# Patient Record
Sex: FEMALE | Race: WHITE
[De-identification: names, ages, dates, MRNs, and addresses within clinical notes are randomized per-mention and may not be internally consistent; named-entity substitution may affect disease eponyms.]

---

## 2018-08-12 ENCOUNTER — HOSPITAL ENCOUNTER (EMERGENCY)
Dept: HOSPITAL 45 - C.EDB | Age: 83
Discharge: HOME | End: 2018-08-12
Payer: COMMERCIAL

## 2018-08-12 VITALS — OXYGEN SATURATION: 96 % | SYSTOLIC BLOOD PRESSURE: 156 MMHG | DIASTOLIC BLOOD PRESSURE: 89 MMHG | HEART RATE: 78 BPM

## 2018-08-12 VITALS
BODY MASS INDEX: 33.37 KG/M2 | HEIGHT: 62.01 IN | WEIGHT: 183.65 LBS | WEIGHT: 183.65 LBS | BODY MASS INDEX: 33.37 KG/M2 | HEIGHT: 62.01 IN

## 2018-08-12 VITALS — TEMPERATURE: 97.7 F

## 2018-08-12 DIAGNOSIS — Z79.82: ICD-10-CM

## 2018-08-12 DIAGNOSIS — J39.2: Primary | ICD-10-CM

## 2018-08-12 DIAGNOSIS — Z87.891: ICD-10-CM

## 2018-08-12 DIAGNOSIS — L50.9: ICD-10-CM

## 2018-08-12 NOTE — EMERGENCY ROOM VISIT NOTE
History


First contact with patient:  09:02


Chief Complaint:  ALLERGIC REACTION


Stated Complaint:  HIVES,SWELLING IN MOUTH AND THROAT


Nursing Triage Summary:  


Pt developed angioedema from Lisinopril one month ago, stopped taking it at 

that 


time. Has not taken it since then, today woke up with similar sx of toung edema

, 


swollen throat, and generalized hives. 











Pt hypoxic in triage, 87% on room air- normally wears 3L O2 chronic, but did 

not 


wear it today.





History of Present Illness


The patient is a 85 year old female who presents to the Emergency Room with 

complaints of some swelling of her tongue.  The patient states that about a 

month ago she had similar symptoms and it was felt to be related to lisinopril.

  She has been off lisinopril for at least a month.  She has had at least 1 or 

2 other episodes since that time.  Today she is traveling through the area 

heading back to [place].  She noticed some swelling of the tongue and came in for 

evaluation.  She did take some Benadryl prior to arrival.  Nothing else has 

made her symptoms better or worse.  She has had no other allergic contacts.  

She also felt that there was some swelling or hives to the abdominal region.





Review of Systems


As above otherwise negative for 10 systems





Past Medical/Surgical History


Hypertension, atrial fibrillation, asthma/COPD-chronically on oxygen 2 L





Social History


Smoking Status:  Former Smoker





Current/Historical Medications


Scheduled


Amiodarone Hcl (Pacerone), 200 MG PO DAILY


Amlodipine (Norvasc), 2.5 MG PO DAILY


Apixaban (Eliquis), 5 MG PO BID


Aspirin (Aspirin Ec), 81 MG PO DAILY


Atorvastatin (Lipitor), 40 MG PO DAILY


Budesonide/Formoterol Fumarate (Symbicort 160/4.5 Inhaler ), 2 PUFFS INH BID


Cholecalciferol (Vitamin D3), 2,000 UNITS PO DAILY


Gabapentin (Neurontin), 100 MG PO TID


Glipizide (Glipizide), 5 MG PO BID


Hydrochlorothiazide (Hctz), 25 MG PO DAILY


Metoprolol Tartrate (Lopressor) (Lopressor), 25 MG PO BID


Oxybutynin Chloride (Oxybutynin Chloride ER), 10 MG PO DAILY


Senna (Senokot), 8.6 MG PO BID





Miscellaneous Medications


Diclofenac Sodium (Topical) (Diclofenac Sodium)





Physical Exam


Vital Signs











  Date Time  Temp Pulse Resp B/P (MAP) Pulse Ox O2 Delivery O2 Flow Rate FiO2


 


8/12/18 10:07  74 18 188/92 94 Nasal Cannula 3.0 


 


8/12/18 09:04  61      


 


8/12/18 08:51 36.5 72 18 180/76 87 Nasal Cannula  











Physical Exam


CONSTITUTIONAL/VITAL SIGNS: Reviewed / noted above.


GENERAL: Non-toxic in appearance. 


INTEGUMENTARY: Warm, dry, and Pink.


HEAD: Normocephalic.


EYES: without scleral icterus or trauma.


ENT/OROPHARYNX: clear and moist.  There is some mild swelling to the tongue.  

There is also some mild hives to the abdominal region left and right.


LYMPHADENOPATHY/NECK: Is supple without lymphadenopathy or meningismus.


RESPIRATORY: Lungs clear and equal.


CARDIOVASCULAR: Regular rate and rhythm.


GI/ABDOMEN: Soft and nontender.  No organomegaly or pulsatile mass.  No rebound 

or guarding. Normal bowel sounds.


EXTREMITIES: Warm and well perfused.


BACK: No CVA tenderness.


NEUROLOGICAL: Intact without focal deficits.  


PSYCHIATRIC: normal affect.


MUSCULOSKELETAL: Normally developed with good muscle tone.  





TRIAGE NURSING DOCUMENTATION REVIEWED.





Medical Decision & Procedures


Medications Administered











 Medications


  (Trade)  Dose


 Ordered  Sig/Sima


 Route  Start Time


 Stop Time Status Last Admin


Dose Admin


 


 Ranitidine HCl


  (zANTac IV)  50 mg  NOW  STAT


 IV  8/12/18 09:07


 8/12/18 09:09 DC 8/12/18 09:35


50 MG


 


 Methylprednisolone


 Sodium Succinate


  (Solu-Medrol IV)  125 mg  NOW  STAT


 IV  8/12/18 09:07


 8/12/18 09:09 DC 8/12/18 09:14


125 MG


 


 Diphenhydramine


 HCl


  (Benadryl Inj)  25 mg  NOW  STAT


 IV  8/12/18 09:07


 8/12/18 09:09 DC 8/12/18 09:14


25 MG











ED Course


The patient was treated with IV Zantac, IV Benadryl and IV Solu-Medrol





Medical Decision


There is no evidence of impending airway obstruction, anaphylaxis or serious 

infection.





There is an 85-year-old female who presents with swelling of her tongue and 

some hives in the abdominal region.  She has had this previously.  It was felt 

to be related to lisinopril.  She has not been on lisinopril for a month.  The 

patient felt like she was having some tongue swelling today and came in for 

evaluation.  She is traveling through the area.  She was treated with IV 

Benadryl, IV Solu-Medrol and IV Zantac.  She states that she is feeling better.

  The hives have resolved in the abdominal region.  Her tongue swelling appears 

to have improved slightly and certainly not worsened.  She is felt to be stable 

for discharge.  Prescription for prednisone given.  She will continue Benadryl.





Medication Reconcilliation


Current Medication List:  was personally reviewed by me





Blood Pressure Screening


Patient's blood pressure:  Elevated blood pressure


Blood pressure disposition:  Referred to PCP





Impression





 Primary Impression:  


 Mild tongue swelling


 Additional Impression:  


 Hives





Departure Information


Dispostion


Home / Self-Care





Prescriptions





Prednisone (Prednisone) 20 Mg Tab


2 TAB PO DAILY for 4 Days, #8 TAB


   Prov: David Elder D.O.         8/12/18





Referrals


No Doctor, Assigned (PCP)





Patient Instructions


My [facility]





Additional Instructions





Take Benadryl 25-50 mg every 6 hours as needed for swelling or hives or itching.





Prednisone as prescribed.





Follow-up with your doctor for further care and evaluation in 1-5 days.  Return 

to the emergency department for worsening or new symptoms or any concerns.


You have been examined and treated today on an emergency basis only. This is 

not a substitute for, or an effort to provide, complete comprehensive medical 

care. It is impossible to recognize and treat all injuries or illnesses in a 

single emergency department visit. It is therefore important that you follow up 

closely with your doctor.  Call as soon as possible for an appointment.





Have your blood pressure rechecked by your doctor sometime this week.





Problem Qualifiers

## 2023-03-10 PROBLEM — M25.552 BILATERAL HIP PAIN: Status: ACTIVE | Noted: 2023-03-10

## 2023-03-10 PROBLEM — E53.8 VITAMIN B12 DEFICIENCY: Status: ACTIVE | Noted: 2023-03-10

## 2023-03-10 PROBLEM — J45.909 ASTHMA (HHS-HCC): Status: ACTIVE | Noted: 2023-03-10

## 2023-03-10 PROBLEM — M62.838 TRAPEZIUS MUSCLE SPASM: Status: ACTIVE | Noted: 2023-03-10

## 2023-03-10 PROBLEM — R18.8 ASCITES: Status: ACTIVE | Noted: 2023-03-10

## 2023-03-10 PROBLEM — R09.02 HYPOXIA: Status: ACTIVE | Noted: 2023-03-10

## 2023-03-10 PROBLEM — J44.9 CHRONIC OBSTRUCTIVE PULMONARY DISEASE (MULTI): Status: ACTIVE | Noted: 2023-03-10

## 2023-03-10 PROBLEM — H54.7 POOR VISION: Status: ACTIVE | Noted: 2023-03-10

## 2023-03-10 PROBLEM — J43.9 EMPHYSEMA, UNSPECIFIED (MULTI): Status: ACTIVE | Noted: 2023-03-10

## 2023-03-10 PROBLEM — H40.003 GLAUCOMA SUSPECT OF BOTH EYES: Status: ACTIVE | Noted: 2023-03-10

## 2023-03-10 PROBLEM — R93.89 ABNORMAL CT OF THE CHEST: Status: ACTIVE | Noted: 2023-03-10

## 2023-03-10 PROBLEM — K59.09 CHRONIC CONSTIPATION: Status: ACTIVE | Noted: 2023-03-10

## 2023-03-10 PROBLEM — B35.6 TINEA CRURIS: Status: ACTIVE | Noted: 2023-03-10

## 2023-03-10 PROBLEM — M50.90 CERVICAL DISC DISEASE: Status: ACTIVE | Noted: 2023-03-10

## 2023-03-10 PROBLEM — R30.0 DYSURIA: Status: ACTIVE | Noted: 2023-03-10

## 2023-03-10 PROBLEM — R41.3 MEMORY LOSS: Status: ACTIVE | Noted: 2023-03-10

## 2023-03-10 PROBLEM — M85.80 OSTEOPENIA: Status: ACTIVE | Noted: 2023-03-10

## 2023-03-10 PROBLEM — E78.5 HYPERLIPIDEMIA: Status: ACTIVE | Noted: 2023-03-10

## 2023-03-10 PROBLEM — I10 HYPERTENSION: Status: ACTIVE | Noted: 2023-03-10

## 2023-03-10 PROBLEM — G47.33 OBSTRUCTIVE SLEEP APNEA SYNDROME: Status: ACTIVE | Noted: 2023-03-10

## 2023-03-10 PROBLEM — H61.20 CERUMEN IMPACTION: Status: ACTIVE | Noted: 2023-03-10

## 2023-03-10 PROBLEM — I87.8 VENOUS STASIS: Status: ACTIVE | Noted: 2023-03-10

## 2023-03-10 PROBLEM — F32.A DEPRESSION: Status: ACTIVE | Noted: 2023-03-10

## 2023-03-10 PROBLEM — R42 DIZZINESS: Status: ACTIVE | Noted: 2023-03-10

## 2023-03-10 PROBLEM — I50.9 CONGESTIVE HEART FAILURE (CHF) (MULTI): Status: ACTIVE | Noted: 2023-03-10

## 2023-03-10 PROBLEM — M54.9 BACK PAIN: Status: ACTIVE | Noted: 2023-03-10

## 2023-03-10 PROBLEM — R10.9 ABDOMINAL PAIN: Status: ACTIVE | Noted: 2023-03-10

## 2023-03-10 PROBLEM — K21.9 ACID REFLUX: Status: ACTIVE | Noted: 2023-03-10

## 2023-03-10 PROBLEM — R32 URINARY INCONTINENCE: Status: ACTIVE | Noted: 2023-03-10

## 2023-03-10 PROBLEM — E11.610 DIABETIC NEUROPATHIC ARTHROPATHY (MULTI): Status: ACTIVE | Noted: 2023-03-10

## 2023-03-10 PROBLEM — R20.2 PARESTHESIAS: Status: ACTIVE | Noted: 2023-03-10

## 2023-03-10 PROBLEM — R44.0 AUDITORY HALLUCINATIONS: Status: ACTIVE | Noted: 2023-03-10

## 2023-03-10 PROBLEM — R60.0 PEDAL EDEMA: Status: ACTIVE | Noted: 2023-03-10

## 2023-03-10 PROBLEM — M47.812 OSTEOARTHRITIS OF NECK: Status: ACTIVE | Noted: 2023-03-10

## 2023-03-10 PROBLEM — M25.551 BILATERAL HIP PAIN: Status: ACTIVE | Noted: 2023-03-10

## 2023-03-10 PROBLEM — B37.2 CANDIDIASIS OF SKIN: Status: ACTIVE | Noted: 2023-03-10

## 2023-03-10 PROBLEM — L03.119 CELLULITIS, LEG: Status: ACTIVE | Noted: 2023-03-10

## 2023-03-10 PROBLEM — S29.001A: Status: ACTIVE | Noted: 2023-03-10

## 2023-03-10 PROBLEM — R10.32 LEFT GROIN PAIN: Status: ACTIVE | Noted: 2023-03-10

## 2023-03-10 PROBLEM — M54.16 LUMBAR RADICULOPATHY: Status: ACTIVE | Noted: 2023-03-10

## 2023-03-10 PROBLEM — E55.9 VITAMIN D DEFICIENCY: Status: ACTIVE | Noted: 2023-03-10

## 2023-03-10 PROBLEM — E66.01 SEVERE OBESITY (BMI 35.0-35.9 WITH COMORBIDITY) (MULTI): Status: ACTIVE | Noted: 2023-03-10

## 2023-03-10 PROBLEM — I50.30 (HFPEF) HEART FAILURE WITH PRESERVED EJECTION FRACTION (MULTI): Status: ACTIVE | Noted: 2023-03-10

## 2023-03-10 PROBLEM — H91.90 ACQUIRED DEAFNESS: Status: ACTIVE | Noted: 2023-03-10

## 2023-03-10 PROBLEM — E66.9 OBESITY: Status: ACTIVE | Noted: 2023-03-10

## 2023-03-10 PROBLEM — M51.369 DEGENERATIVE LUMBAR DISC: Status: ACTIVE | Noted: 2023-03-10

## 2023-03-10 PROBLEM — M48.061 LUMBAR STENOSIS: Status: ACTIVE | Noted: 2023-03-10

## 2023-03-10 PROBLEM — R26.9 GAIT DIFFICULTY: Status: ACTIVE | Noted: 2023-03-10

## 2023-03-10 PROBLEM — Z96.1 BILATERAL ARTIFICIAL LENS IMPLANT: Status: ACTIVE | Noted: 2023-03-10

## 2023-03-10 PROBLEM — E11.9 DIABETES MELLITUS TYPE 2 WITHOUT RETINOPATHY (MULTI): Status: ACTIVE | Noted: 2023-03-10

## 2023-03-10 PROBLEM — Z79.01 CHRONIC ANTICOAGULATION: Status: ACTIVE | Noted: 2023-03-10

## 2023-03-10 PROBLEM — I48.91 ATRIAL FIBRILLATION (MULTI): Status: ACTIVE | Noted: 2023-03-10

## 2023-03-10 PROBLEM — H04.123 BILATERAL DRY EYES: Status: ACTIVE | Noted: 2023-03-10

## 2023-03-10 PROBLEM — M51.36 DEGENERATIVE LUMBAR DISC: Status: ACTIVE | Noted: 2023-03-10

## 2023-03-10 RX ORDER — ATORVASTATIN CALCIUM 40 MG/1
1 TABLET, FILM COATED ORAL NIGHTLY
COMMUNITY
Start: 2015-04-08

## 2023-03-10 RX ORDER — GABAPENTIN 100 MG/1
300 CAPSULE ORAL 3 TIMES DAILY
COMMUNITY
Start: 2021-02-01 | End: 2024-01-19 | Stop reason: ENTERED-IN-ERROR

## 2023-03-10 RX ORDER — BUDESONIDE AND FORMOTEROL FUMARATE DIHYDRATE 160; 4.5 UG/1; UG/1
AEROSOL RESPIRATORY (INHALATION)
Status: ON HOLD | COMMUNITY
Start: 2017-11-06 | End: 2024-01-19 | Stop reason: ENTERED-IN-ERROR

## 2023-03-10 RX ORDER — ALBUTEROL SULFATE 90 UG/1
AEROSOL, METERED RESPIRATORY (INHALATION)
COMMUNITY
Start: 2019-02-28

## 2023-03-10 RX ORDER — KETOCONAZOLE 20 MG/G
CREAM TOPICAL
COMMUNITY
Start: 2022-11-09 | End: 2024-01-19 | Stop reason: ENTERED-IN-ERROR

## 2023-03-10 RX ORDER — TIOTROPIUM BROMIDE 18 UG/1
CAPSULE ORAL; RESPIRATORY (INHALATION)
COMMUNITY
Start: 2013-11-26

## 2023-03-10 RX ORDER — ACETAMINOPHEN 500 MG
1 TABLET ORAL DAILY
COMMUNITY
Start: 2017-11-06

## 2023-03-10 RX ORDER — AMLODIPINE BESYLATE 2.5 MG/1
1 TABLET ORAL DAILY
COMMUNITY
Start: 2015-04-03 | End: 2024-02-27

## 2023-03-10 RX ORDER — IBUPROFEN 200 MG
CAPSULE ORAL
COMMUNITY
Start: 2022-11-09

## 2023-03-10 RX ORDER — TRIAMCINOLONE ACETONIDE 1 MG/G
CREAM TOPICAL
COMMUNITY
Start: 2021-03-03 | End: 2024-01-19 | Stop reason: ENTERED-IN-ERROR

## 2023-03-10 RX ORDER — POLYETHYLENE GLYCOL 3350 17 G/17G
POWDER, FOR SOLUTION ORAL
Status: ON HOLD | COMMUNITY
Start: 2020-09-02 | End: 2024-01-19 | Stop reason: ENTERED-IN-ERROR

## 2023-03-10 RX ORDER — OXYBUTYNIN CHLORIDE 5 MG/1
1 TABLET ORAL DAILY
COMMUNITY
Start: 2022-11-02 | End: 2024-04-04 | Stop reason: WASHOUT

## 2023-03-10 RX ORDER — NYSTATIN 100000 [USP'U]/G
POWDER TOPICAL
COMMUNITY
Start: 2022-11-09 | End: 2024-01-19 | Stop reason: ENTERED-IN-ERROR

## 2023-03-10 RX ORDER — NYSTATIN 100000 [USP'U]/G
POWDER TOPICAL
COMMUNITY
Start: 2018-10-16 | End: 2024-01-19 | Stop reason: ENTERED-IN-ERROR

## 2023-03-10 RX ORDER — BLOOD SUGAR DIAGNOSTIC
STRIP MISCELLANEOUS
Status: ON HOLD | COMMUNITY
Start: 2019-01-04 | End: 2024-01-19 | Stop reason: ENTERED-IN-ERROR

## 2023-03-10 RX ORDER — AMIODARONE HYDROCHLORIDE 100 MG/1
1 TABLET ORAL DAILY
COMMUNITY
Start: 2022-03-23

## 2023-03-16 ENCOUNTER — APPOINTMENT (OUTPATIENT)
Dept: PRIMARY CARE | Facility: CLINIC | Age: 88
End: 2023-03-16
Payer: MEDICARE

## 2023-03-29 ENCOUNTER — PATIENT OUTREACH (OUTPATIENT)
Dept: PRIMARY CARE | Facility: CLINIC | Age: 88
End: 2023-03-29
Payer: MEDICARE

## 2023-03-29 ENCOUNTER — DOCUMENTATION (OUTPATIENT)
Dept: PRIMARY CARE | Facility: CLINIC | Age: 88
End: 2023-03-29
Payer: MEDICARE

## 2023-03-29 DIAGNOSIS — J12.9 VIRAL PNEUMONIA: ICD-10-CM

## 2023-03-29 DIAGNOSIS — U07.1 COVID-19: ICD-10-CM

## 2023-03-29 RX ORDER — DAPAGLIFLOZIN 5 MG/1
5 TABLET, FILM COATED ORAL DAILY
COMMUNITY

## 2023-03-29 RX ORDER — ACETAMINOPHEN 325 MG/1
325 TABLET ORAL EVERY 6 HOURS PRN
COMMUNITY
End: 2024-04-04 | Stop reason: WASHOUT

## 2023-03-30 NOTE — PROGRESS NOTES
Discharge Facility: [facility]  Discharge Diagnosis: Covid 19, viral pneumonia, arrythmia  Admission Date: 3/24/2023  Discharge Date: 3/28/2023    PCP appt: 5/24/2023 2:00 pm (previously scheduled)    Engagement  Call Start Time: 0837 (3/30/2023  8:36 AM)    Medications  Medications reviewed with patient/caregiver?: No (Per patient, staff at the assisted living manage her medications. She is getting them in a timely manner.  Message left at the AL to review medications, no response received.) (3/30/2023  8:36 AM)  Is the patient having any side effects they believe may be caused by any medication additions or changes?: No (3/30/2023  8:36 AM)  Does the patient have all medications ordered at discharge?: Yes (To the best of the patient's knowledge, she is getting her medications.) (3/30/2023  8:36 AM)  Care Management Interventions: No intervention needed (3/30/2023  8:36 AM)    Appointments  Does the patient have a primary care provider?: Yes (3/30/2023  8:36 AM)  Care Management Interventions: Verified appointment date/time/provider; Educated patient on importance of making appointment; Advised patient to make appointment (Patient states the CNP from [facility] is coming to see her on Tuesday, 4/4. If the nurse advises her to make an appt. to see her PCP, she will.) (3/30/2023  8:36 AM)  Has the patient kept scheduled appointments due by today?: Not applicable (3/30/2023  8:36 AM)    Self Management  What is the home health agency?: N/A (3/30/2023  8:36 AM)  What Durable Medical Equipment (DME) was ordered?: N/A (3/30/2023  8:36 AM)    Patient Teaching  Does the patient have access to their discharge instructions?: Yes (3/30/2023  8:36 AM)  Care Management Interventions: Reviewed instructions with patient (3/30/2023  8:36 AM)  What is the patient's perception of their health status since discharge?: Improving (Patient states she still has an occ. cough, but no SOB. Does have a right, sided pain.  CT of abdomen showed constipation, but the patient states her bowels are moving well. Patient independent in ADL's and has returned to previous level of function.) (3/30/2023  8:36 AM)  Is the patient/caregiver able to teach back the hierarchy of who to call/visit for symptoms/problems? PCP, Specialist, Home Health nurse, Urgent Care, ED, 911: Yes (3/30/2023  8:36 AM)

## 2023-04-27 ENCOUNTER — PATIENT OUTREACH (OUTPATIENT)
Dept: PRIMARY CARE | Facility: CLINIC | Age: 88
End: 2023-04-27
Payer: MEDICARE

## 2023-05-24 ENCOUNTER — OFFICE VISIT (OUTPATIENT)
Dept: PRIMARY CARE | Facility: CLINIC | Age: 88
End: 2023-05-24
Payer: MEDICARE

## 2023-05-24 VITALS
DIASTOLIC BLOOD PRESSURE: 61 MMHG | TEMPERATURE: 97.5 F | HEART RATE: 85 BPM | SYSTOLIC BLOOD PRESSURE: 123 MMHG | BODY MASS INDEX: 31.96 KG/M2 | WEIGHT: 158.25 LBS

## 2023-05-24 DIAGNOSIS — R09.02 HYPOXIA: ICD-10-CM

## 2023-05-24 DIAGNOSIS — E11.42 DIABETIC POLYNEUROPATHY ASSOCIATED WITH TYPE 2 DIABETES MELLITUS (MULTI): Primary | ICD-10-CM

## 2023-05-24 DIAGNOSIS — J44.9 CHRONIC OBSTRUCTIVE PULMONARY DISEASE, UNSPECIFIED COPD TYPE (MULTI): ICD-10-CM

## 2023-05-24 DIAGNOSIS — H91.93 HEARING DIFFICULTY OF BOTH EARS: ICD-10-CM

## 2023-05-24 DIAGNOSIS — E83.51 HYPOCALCEMIA: ICD-10-CM

## 2023-05-24 DIAGNOSIS — Z00.00 HEALTHCARE MAINTENANCE: ICD-10-CM

## 2023-05-24 DIAGNOSIS — I50.30 HEART FAILURE WITH PRESERVED EJECTION FRACTION, UNSPECIFIED HF CHRONICITY (MULTI): ICD-10-CM

## 2023-05-24 PROBLEM — M62.838 TRAPEZIUS MUSCLE SPASM: Status: RESOLVED | Noted: 2023-03-10 | Resolved: 2023-05-24

## 2023-05-24 PROBLEM — R44.0 AUDITORY HALLUCINATIONS: Status: RESOLVED | Noted: 2023-03-10 | Resolved: 2023-05-24

## 2023-05-24 PROBLEM — H04.123 BILATERAL DRY EYES: Status: RESOLVED | Noted: 2023-03-10 | Resolved: 2023-05-24

## 2023-05-24 PROBLEM — I50.9 CONGESTIVE HEART FAILURE (CHF) (MULTI): Status: RESOLVED | Noted: 2023-03-10 | Resolved: 2023-05-24

## 2023-05-24 PROBLEM — M50.90 CERVICAL DISC DISEASE: Status: RESOLVED | Noted: 2023-03-10 | Resolved: 2023-05-24

## 2023-05-24 PROBLEM — M25.552 BILATERAL HIP PAIN: Status: RESOLVED | Noted: 2023-03-10 | Resolved: 2023-05-24

## 2023-05-24 PROBLEM — E11.40 DIABETIC NEUROPATHY (MULTI): Status: ACTIVE | Noted: 2023-05-24

## 2023-05-24 PROBLEM — J43.9 EMPHYSEMA, UNSPECIFIED (MULTI): Status: RESOLVED | Noted: 2023-03-10 | Resolved: 2023-05-24

## 2023-05-24 PROBLEM — R42 DIZZINESS: Status: RESOLVED | Noted: 2023-03-10 | Resolved: 2023-05-24

## 2023-05-24 PROBLEM — L03.119 CELLULITIS, LEG: Status: RESOLVED | Noted: 2023-03-10 | Resolved: 2023-05-24

## 2023-05-24 PROBLEM — H61.20 CERUMEN IMPACTION: Status: RESOLVED | Noted: 2023-03-10 | Resolved: 2023-05-24

## 2023-05-24 PROBLEM — B35.6 TINEA CRURIS: Status: RESOLVED | Noted: 2023-03-10 | Resolved: 2023-05-24

## 2023-05-24 PROBLEM — E66.01 SEVERE OBESITY (BMI 35.0-35.9 WITH COMORBIDITY) (MULTI): Status: RESOLVED | Noted: 2023-03-10 | Resolved: 2023-05-24

## 2023-05-24 PROBLEM — M25.551 BILATERAL HIP PAIN: Status: RESOLVED | Noted: 2023-03-10 | Resolved: 2023-05-24

## 2023-05-24 PROCEDURE — 1036F TOBACCO NON-USER: CPT | Performed by: INTERNAL MEDICINE

## 2023-05-24 PROCEDURE — 99214 OFFICE O/P EST MOD 30 MIN: CPT | Performed by: INTERNAL MEDICINE

## 2023-05-24 PROCEDURE — 3074F SYST BP LT 130 MM HG: CPT | Performed by: INTERNAL MEDICINE

## 2023-05-24 PROCEDURE — 1160F RVW MEDS BY RX/DR IN RCRD: CPT | Performed by: INTERNAL MEDICINE

## 2023-05-24 PROCEDURE — 1159F MED LIST DOCD IN RCRD: CPT | Performed by: INTERNAL MEDICINE

## 2023-05-24 PROCEDURE — 3078F DIAST BP <80 MM HG: CPT | Performed by: INTERNAL MEDICINE

## 2023-05-24 PROCEDURE — 1157F ADVNC CARE PLAN IN RCRD: CPT | Performed by: INTERNAL MEDICINE

## 2023-05-24 NOTE — PATIENT INSTRUCTIONS
It was a pleasure to see you today! Here is a list of things we have discussed and to follow up on:    See if you can fix the hearing aids!   I have referred you to our CARE MANAGER to help with your chronic conditions.   Numbness of your hands - I believe this is related to NEUROPATHY. I recommend following up with the occupational therapist to see if your dexterity and strength improve. I will also refer you to a neurologist to consider further workup.   I have ordered blood and/or urine tests for you to do today. The lab can be found on this floor (2nd floor) next to the pharmacy across from the elevators.   Followup with me in 3 months for your ANNUAL WELLNESS VISIT.

## 2023-05-24 NOTE — ASSESSMENT & PLAN NOTE
Gradually progressive in bilateral hands and now toes. Has upcoming appt already scheduled with OT for further management.

## 2023-05-24 NOTE — PROGRESS NOTES
Subjective   Patient ID: Lissett Rodríguez is a 89 y.o. female who presents for Follow-up.  HPI  89-year-old female here for follow-up visit.    3/23: hospitalized for covid 19, viral pneumonia and arrythmia 3/24-3/28.  She also complained of right upper quadrant discomfort a CAT scan was performed showing postsurgical changes, stool debris of the colon and signs of possible ileus, but she did have a bowel movement at the time.  She was treated for COVID-19 pneumonia with remdesivir and Decadron, discharged to her baseline of 6 L of oxygen on nasal cannula    CHRONIC CARE MANAGEMENT     - Bilateral neck and right shoulder pain -has been getting PT which has been helping her symptoms.   - HfPEF with two hospitalizations due to exacerbation - On lasix BID as well as Jardiance, getting  cardiac rehab.No elevation of BNP, seen recently on 11/2 recommended continued management.  - COPD  with MANUEL thought multifactorial due to pulmonary edema and COPD with HFpEF and mediastinal adenopathy -seen by pulmonology recommended discontinuation of Symbicort secondary to cardiac etiology for her worsening symptoms.  Next visit scheduled in September.  - HTN on amlodipine   - Bilateral hip pain referred to PT and orthopedics   - KALPANA on CPAP   - Acquired Deafness recommended hearing aids needs repair   - AFib on xarelto and amiodarone   - DM2 on Jardiance - last A1C 7.8%  - HLD   - TIA   - Memory loss   - Poor vision   - Obesity     Social:   - Lives in [facility] alone, son lives nearby who checks in regularly   - 14 grandchildren and 7 great-grandchildren  Current Outpatient Medications   Medication Instructions    acetaminophen (TYLENOL) 325 mg, oral, Every 6 hours PRN    albuterol 90 mcg/actuation inhaler INHALE 1 TO 2 PUFFS EVERY 4 TO 6 HOURS AS NEEDED.    amiodarone (Pacerone) 100 mg tablet 1 tablet, oral, Daily    amLODIPine (Norvasc) 2.5 mg tablet 1 tablet, oral, Daily    atorvastatin (Lipitor) 40 mg tablet 1  tablet, oral, Nightly    blood sugar diagnostic (Blood Glucose Test) strip TEST 3 TIMES DAILY.    blood sugar diagnostic (Prodigy No Coding) strip USE 1 STRIP DAILY.    budesonide-formoteroL (Symbicort) 160-4.5 mcg/actuation inhaler INHALE 2 PUFFS TWICE DAILY. RINSE MOUTH AFTER USE.    cholecalciferol (Vitamin D-3) 50 mcg (2,000 unit) capsule 1 tablet, oral, Daily    dapagliflozin (FARXIGA) 5 mg, oral, Daily    diclofenac sodium 1 % kit APPLY SPARINGLY TO AFFECTED AREA EVERY DAY    empagliflozin (Jardiance) 10 mg 1 tablet, oral, Daily    flash glucose sensor (FREESTYLE BUSTER 14 DAY SENSOR [facility] [place] Use to check blood sugar once daily    gabapentin (Neurontin) 100 mg capsule Take 3 capsules (300 mg) by mouth in the morning and 3 capsules (300 mg) in the evening and 3 capsules (300 mg) before bedtime.    ketoconazole (NIZOral) 2 % cream APPLY SPARINGLY TO AFFECTED AREA(S) ONCE DAILY    nystatin (Mycostatin) 100,000 unit/gram powder APPLY TO THE AFFECTED AREA DAILY    nystatin (Mycostatin) 100,000 unit/gram powder APPLY 2-3 TIMES DAILY TO AFFECTED AREA(S).    oxybutynin (Ditropan) 5 mg tablet 1 tablet, oral, Daily    polyethylene glycol (Glycolax) 17 gram/dose powder MIX 17 GM IN 8 OUNCES OF LIQUID AND DRINK 1 TO 2 TIMES DAILY FOR CONSTIPATION.    rivaroxaban (Xarelto) 20 mg tablet 1 tablet, oral, Daily    tiotropium (Spiriva with HandiHaler) 18 mcg inhalation capsule INHALE CONTENTS OF 1 CAPSULE ONCE DAILY.    triamcinolone (Kenalog) 0.1 % cream APPLY  AND RUB  IN A THIN FILM TO AFFECTED AREAS TWICE DAILY.(AM AND PM).        Objective     /61   Pulse 85   Temp 36.4 °C (97.5 °F)   Wt 71.8 kg (158 lb 4 oz)   BMI 31.96 kg/m²     Physical Exam  General: Appears comfortable, NAD, appropriate affect, speaking full sentences  HEENT: NCAT, EOMI, pupils symmetric, no conjunctival erythema, nasal cannula in place  Neck: Supple, no LAD   Heart: RRR S1 S2 no murmurs appreciated   Lungs: Faint crackles appreciated at right  lower base, no rhonchi, rales, or wheezes   Abdomen: Soft, NT/ND, no rebound or guarding, NABS   Extremities: no cyanosis or edema appreciated  Neuro: AAO x 3, answers questions appropriately, no FND, walks with a walker    Assessment/Plan   Problem List Items Addressed This Visit          Nervous    Diabetic neuropathy (CMS/HCC) - Primary     Gradually progressive in bilateral hands and now toes. Has upcoming appt already scheduled with OT for further management.           Relevant Orders    Referral to Neurology       Respiratory    Chronic obstructive pulmonary disease (CMS/HCC)     -Discontinue Symbicort without notable changes in her symptoms, has upcoming appointment scheduled with pulmonology in September.  She is clinically stable.         Hypoxia     Now back to her baseline of 5 L of oxygen therapy.  This has been reduced from 6 L posthospitalization.  May consider reduction in dosage of oxygen as tolerated            Circulatory    (HFpEF) heart failure with preserved ejection fraction (CMS/HCC)     Without current evidence of exacerbation, BNP has been unremarkable during hospitalization.            Other    Hearing difficulty     Initially getting her hearing aids, encouraged her to have them fixed if possible.          Other Visit Diagnoses       Hypocalcemia        Mild noted during hospitalization we will repeat    Relevant Orders    Comprehensive Metabolic Panel    Healthcare maintenance        Relevant Orders    Follow Up In [facility] - PCP

## 2023-05-25 NOTE — ASSESSMENT & PLAN NOTE
Now back to her baseline of 5 L of oxygen therapy.  This has been reduced from 6 L posthospitalization.  May consider reduction in dosage of oxygen as tolerated

## 2023-05-25 NOTE — ASSESSMENT & PLAN NOTE
-Discontinue Symbicort without notable changes in her symptoms, has upcoming appointment scheduled with pulmonology in September.  She is clinically stable.

## 2023-08-29 PROBLEM — N17.9 ACUTE KIDNEY INJURY (CMS-HCC): Status: ACTIVE | Noted: 2023-08-29

## 2023-08-29 PROBLEM — G56.00 CARPAL TUNNEL SYNDROME: Status: ACTIVE | Noted: 2023-08-29

## 2023-08-29 PROBLEM — Z79.899 HIGH RISK MEDICATION USE: Status: ACTIVE | Noted: 2023-08-29

## 2023-08-29 PROBLEM — I45.9 CARDIAC CONDUCTION DISORDER: Status: ACTIVE | Noted: 2023-03-27

## 2023-08-29 PROBLEM — H91.90 ACQUIRED DEAFNESS: Status: ACTIVE | Noted: 2023-08-29

## 2023-08-29 PROBLEM — Z86.73 HISTORY OF TRANSIENT CEREBRAL ISCHEMIA: Status: ACTIVE | Noted: 2023-08-29

## 2023-08-29 RX ORDER — GUAIFENESIN 600 MG/1
600 TABLET, EXTENDED RELEASE ORAL 2 TIMES DAILY
COMMUNITY
End: 2024-02-27

## 2023-08-29 RX ORDER — FUROSEMIDE 40 MG/1
40 TABLET ORAL DAILY
COMMUNITY
Start: 2023-02-03

## 2023-08-29 RX ORDER — GABAPENTIN 100 MG/1
2 CAPSULE ORAL 3 TIMES DAILY
COMMUNITY
Start: 2021-02-01 | End: 2024-01-19 | Stop reason: ENTERED-IN-ERROR

## 2023-08-29 RX ORDER — GABAPENTIN 300 MG/1
1 CAPSULE ORAL 3 TIMES DAILY
COMMUNITY
Start: 2023-05-28

## 2023-08-29 RX ORDER — SENNOSIDES 8.6 MG/1
1 TABLET ORAL DAILY PRN
COMMUNITY
End: 2024-02-29 | Stop reason: WASHOUT

## 2023-08-29 RX ORDER — GLIPIZIDE 5 MG/1
TABLET ORAL
Status: ON HOLD | COMMUNITY
Start: 2022-11-09 | End: 2024-01-19 | Stop reason: ENTERED-IN-ERROR

## 2023-08-29 RX ORDER — NITROFURANTOIN 25; 75 MG/1; MG/1
CAPSULE ORAL
Status: ON HOLD | COMMUNITY
Start: 2022-08-11 | End: 2024-01-19 | Stop reason: ENTERED-IN-ERROR

## 2023-08-29 RX ORDER — CYCLOBENZAPRINE HCL 10 MG
10 TABLET ORAL 3 TIMES DAILY PRN
COMMUNITY
End: 2024-02-27

## 2023-11-01 ENCOUNTER — OFFICE VISIT (OUTPATIENT)
Dept: CARDIOLOGY | Facility: CLINIC | Age: 88
End: 2023-11-01
Payer: MEDICARE

## 2023-11-01 VITALS
HEART RATE: 69 BPM | WEIGHT: 159.6 LBS | BODY MASS INDEX: 32.17 KG/M2 | TEMPERATURE: 97.6 F | HEIGHT: 59 IN | DIASTOLIC BLOOD PRESSURE: 68 MMHG | SYSTOLIC BLOOD PRESSURE: 154 MMHG | RESPIRATION RATE: 20 BRPM

## 2023-11-01 DIAGNOSIS — I50.32 CHRONIC HEART FAILURE WITH PRESERVED EJECTION FRACTION (MULTI): Primary | ICD-10-CM

## 2023-11-01 PROCEDURE — 99214 OFFICE O/P EST MOD 30 MIN: CPT | Performed by: INTERNAL MEDICINE

## 2023-11-01 PROCEDURE — 3077F SYST BP >= 140 MM HG: CPT | Performed by: INTERNAL MEDICINE

## 2023-11-01 PROCEDURE — 1159F MED LIST DOCD IN RCRD: CPT | Performed by: INTERNAL MEDICINE

## 2023-11-01 PROCEDURE — 1160F RVW MEDS BY RX/DR IN RCRD: CPT | Performed by: INTERNAL MEDICINE

## 2023-11-01 PROCEDURE — 1036F TOBACCO NON-USER: CPT | Performed by: INTERNAL MEDICINE

## 2023-11-01 PROCEDURE — 3078F DIAST BP <80 MM HG: CPT | Performed by: INTERNAL MEDICINE

## 2023-11-01 NOTE — PATIENT INSTRUCTIONS
Thank you for coming to see us today! To reach Dr. Hinkle's office please call [phone]. Fax [phone]. Call [phone] to schedule an appointment. You may also contact the HF RNs at [email or web address]  (Please include your name and date of birth)  For MEDICATION REFILLS, please call [phone] option [phone] then option 1.    Please reach out to our office if you need anything or if you have worsening symptoms. [phone].  2. No medication changes today.

## 2023-11-01 NOTE — PROGRESS NOTES
"Advanced Heart Failure and Cardiac Transplantation Cardiology    Lissett Rodríguez is a 89 y.o. female from [place], [place], resident of an [facility], here for a routine visit.  I last saw her a year ago, and in the interim she reports that she has been well without any worsening in her cardiac condition.  On exam today she is completely euvolemic, without leg edema, and normal appearing jugular vein without distention.  Regular rate and rhythm.    Exam: /68 (BP Location: Right arm, Patient Position: Sitting, BP Cuff Size: Adult)   Pulse 69   Temp 36.4 °C (97.6 °F) (Temporal)   Resp 20   Ht 1.499 m (4' 11\")   Wt 72.4 kg (159 lb 9.6 oz)   BMI 32.24 kg/m²     Current Outpatient Medications   Medication Instructions    acetaminophen (TYLENOL) 325 mg, oral, Every 6 hours PRN    albuterol 90 mcg/actuation inhaler INHALE 1 TO 2 PUFFS EVERY 4 TO 6 HOURS AS NEEDED.    amiodarone (Pacerone) 100 mg tablet 1 tablet, oral, Daily    amLODIPine (Norvasc) 2.5 mg tablet 1 tablet, oral, Daily    atorvastatin (Lipitor) 40 mg tablet 1 tablet, oral, Nightly    benzocaine (Hurricaine) 20 % mucosal gel Topical, 2 times daily, Apply to affected area     blood sugar diagnostic (Blood Glucose Test) strip TEST 3 TIMES DAILY.    blood sugar diagnostic (Prodigy No Coding) strip USE 1 STRIP DAILY.    budesonide-formoteroL (Symbicort) 160-4.5 mcg/actuation inhaler INHALE 2 PUFFS TWICE DAILY. RINSE MOUTH AFTER USE.    cholecalciferol (Vitamin D-3) 50 mcg (2,000 unit) capsule 1 tablet, oral, Daily    cyclobenzaprine (FLEXERIL) 10 mg, oral, 3 times daily PRN    dapagliflozin propanediol (FARXIGA) 5 mg, oral, Daily    diclofenac sodium 1 % kit APPLY SPARINGLY TO AFFECTED AREA EVERY DAY    empagliflozin (Jardiance) 10 mg 1 tablet, oral, Daily    flash glucose sensor (FREESTYLE BUSTER 14 DAY SENSOR [facility]) Use to check blood sugar once daily    furosemide (LASIX) 40 mg, oral, Daily    gabapentin (Neurontin) 100 mg capsule Take 3 capsules (300 mg) " by mouth 3 times a day.    gabapentin (Neurontin) 100 mg capsule 2 capsules, oral, 3 times daily, For neuropathy    gabapentin (Neurontin) 300 mg capsule 1 capsule, oral, 3 times daily    glipiZIDE (Glucotrol) 5 mg tablet     guaiFENesin (MUCINEX) 600 mg, oral, 2 times daily    ketoconazole (NIZOral) 2 % cream APPLY SPARINGLY TO AFFECTED AREA(S) ONCE DAILY    nitrofurantoin, macrocrystal-monohydrate, (Macrobid) 100 mg capsule Nitrofurantoin Monohyd Macro 100 MG Oral Capsule  Quantity: 6   Refills: 0  Start: 11-Aug-2022    nystatin (Mycostatin) 100,000 unit/gram powder APPLY TO THE AFFECTED AREA DAILY    nystatin (Mycostatin) 100,000 unit/gram powder APPLY 2-3 TIMES DAILY TO AFFECTED AREA(S).    oxybutynin (Ditropan) 5 mg tablet 1 tablet, oral, Daily    oxygen (O2) 6 L/min, inhalation, Continuous    polyethylene glycol (Glycolax) 17 gram/dose powder MIX 17 GM IN 8 OUNCES OF LIQUID AND DRINK 1 TO 2 TIMES DAILY FOR CONSTIPATION.    rivaroxaban (Xarelto) 20 mg tablet 1 tablet, oral, Daily    sennosides (Senokot) 8.6 mg tablet 1 tablet, oral, Daily PRN    tiotropium (Spiriva with HandiHaler) 18 mcg inhalation capsule INHALE CONTENTS OF 1 CAPSULE ONCE DAILY.    triamcinolone (Kenalog) 0.1 % cream APPLY  AND RUB  IN A THIN FILM TO AFFECTED AREAS TWICE DAILY.(AM AND PM).     Past medical history (non-cardiac):  -- Obesity  -- DM2  -- KALPANA  -- OA  -- History of TIA  -- COPD, former smoker on home O2 6L nc    Cardiovascular history:  -- PAF on amiodarone suppression, and DOAC  -- HFpEF (mild LA dilatation, mild MV regurgitation) Stage C and stable; NYHA class IV symptoms related to lung disease / home O2    Assessment: Very pleasant 89WF who is quite stable from cardiac perspective. Euvolemic on exam.  Note the medication list above may be inaccurate, she does not have a list of what she is on and does not know them by heart.    Plan:  -- No changes recommended today    Keri Hinkle MD, MPH  Advanced Heart Failure and  Transplant Cardiology  [facility]  [facility]

## 2024-01-18 ENCOUNTER — APPOINTMENT (OUTPATIENT)
Dept: RADIOLOGY | Facility: HOSPITAL | Age: 89
DRG: 175 | End: 2024-01-18
Payer: MEDICARE

## 2024-01-18 ENCOUNTER — HOSPITAL ENCOUNTER (INPATIENT)
Facility: HOSPITAL | Age: 89
LOS: 9 days | Discharge: HOME | DRG: 175 | End: 2024-01-27
Attending: GENERAL PRACTICE | Admitting: HOSPITALIST
Payer: MEDICARE

## 2024-01-18 ENCOUNTER — APPOINTMENT (OUTPATIENT)
Dept: CARDIOLOGY | Facility: HOSPITAL | Age: 89
DRG: 175 | End: 2024-01-18
Payer: MEDICARE

## 2024-01-18 DIAGNOSIS — J18.9 COMMUNITY ACQUIRED PNEUMONIA, UNSPECIFIED LATERALITY: Primary | ICD-10-CM

## 2024-01-18 DIAGNOSIS — I26.93 SINGLE SUBSEGMENTAL PULMONARY EMBOLISM WITHOUT ACUTE COR PULMONALE (MULTI): ICD-10-CM

## 2024-01-18 DIAGNOSIS — K59.00 CONSTIPATION, UNSPECIFIED CONSTIPATION TYPE: ICD-10-CM

## 2024-01-18 DIAGNOSIS — Z13.6 ENCOUNTER FOR SCREENING FOR CARDIOVASCULAR DISORDERS: ICD-10-CM

## 2024-01-18 DIAGNOSIS — I26.99 OTHER ACUTE PULMONARY EMBOLISM, UNSPECIFIED WHETHER ACUTE COR PULMONALE PRESENT (MULTI): ICD-10-CM

## 2024-01-18 LAB
ALBUMIN SERPL BCP-MCNC: 3.9 G/DL (ref 3.4–5)
ALP SERPL-CCNC: 78 U/L (ref 33–136)
ALT SERPL W P-5'-P-CCNC: 9 U/L (ref 7–45)
ANION GAP SERPL CALC-SCNC: 14 MMOL/L (ref 10–20)
AST SERPL W P-5'-P-CCNC: 13 U/L (ref 9–39)
BASOPHILS # BLD AUTO: 0.06 X10*3/UL (ref 0–0.1)
BASOPHILS NFR BLD AUTO: 0.4 %
BILIRUB SERPL-MCNC: 1.4 MG/DL (ref 0–1.2)
BNP SERPL-MCNC: 59 PG/ML (ref 0–99)
BUN SERPL-MCNC: 20 MG/DL (ref 6–23)
CALCIUM SERPL-MCNC: 9 MG/DL (ref 8.6–10.3)
CARDIAC TROPONIN I PNL SERPL HS: 9 NG/L (ref 0–13)
CHLORIDE SERPL-SCNC: 98 MMOL/L (ref 98–107)
CO2 SERPL-SCNC: 25 MMOL/L (ref 21–32)
CREAT SERPL-MCNC: 1.05 MG/DL (ref 0.5–1.05)
EGFRCR SERPLBLD CKD-EPI 2021: 50 ML/MIN/1.73M*2
EOSINOPHIL # BLD AUTO: 0.02 X10*3/UL (ref 0–0.4)
EOSINOPHIL NFR BLD AUTO: 0.1 %
ERYTHROCYTE [DISTWIDTH] IN BLOOD BY AUTOMATED COUNT: 14.2 % (ref 11.5–14.5)
FLUAV RNA RESP QL NAA+PROBE: NOT DETECTED
FLUBV RNA RESP QL NAA+PROBE: NOT DETECTED
GLUCOSE SERPL-MCNC: 186 MG/DL (ref 74–99)
HCT VFR BLD AUTO: 49.4 % (ref 36–46)
HGB BLD-MCNC: 15.8 G/DL (ref 12–16)
IMM GRANULOCYTES # BLD AUTO: 0.09 X10*3/UL (ref 0–0.5)
IMM GRANULOCYTES NFR BLD AUTO: 0.6 % (ref 0–0.9)
LYMPHOCYTES # BLD AUTO: 1.83 X10*3/UL (ref 0.8–3)
LYMPHOCYTES NFR BLD AUTO: 11.4 %
MCH RBC QN AUTO: 29.2 PG (ref 26–34)
MCHC RBC AUTO-ENTMCNC: 32 G/DL (ref 32–36)
MCV RBC AUTO: 91 FL (ref 80–100)
MONOCYTES # BLD AUTO: 1.08 X10*3/UL (ref 0.05–0.8)
MONOCYTES NFR BLD AUTO: 6.7 %
NEUTROPHILS # BLD AUTO: 12.94 X10*3/UL (ref 1.6–5.5)
NEUTROPHILS NFR BLD AUTO: 80.8 %
NRBC BLD-RTO: 0 /100 WBCS (ref 0–0)
PLATELET # BLD AUTO: 217 X10*3/UL (ref 150–450)
POTASSIUM SERPL-SCNC: 3.8 MMOL/L (ref 3.5–5.3)
PROT SERPL-MCNC: 7.9 G/DL (ref 6.4–8.2)
RBC # BLD AUTO: 5.41 X10*6/UL (ref 4–5.2)
RSV RNA RESP QL NAA+PROBE: NOT DETECTED
SARS-COV-2 RNA RESP QL NAA+PROBE: NOT DETECTED
SODIUM SERPL-SCNC: 133 MMOL/L (ref 136–145)
WBC # BLD AUTO: 16 X10*3/UL (ref 4.4–11.3)

## 2024-01-18 PROCEDURE — 96365 THER/PROPH/DIAG IV INF INIT: CPT

## 2024-01-18 PROCEDURE — 1210000001 HC SEMI-PRIVATE ROOM DAILY

## 2024-01-18 PROCEDURE — 99223 1ST HOSP IP/OBS HIGH 75: CPT | Performed by: HOSPITALIST

## 2024-01-18 PROCEDURE — 96372 THER/PROPH/DIAG INJ SC/IM: CPT

## 2024-01-18 PROCEDURE — 99285 EMERGENCY DEPT VISIT HI MDM: CPT | Performed by: GENERAL PRACTICE

## 2024-01-18 PROCEDURE — 83880 ASSAY OF NATRIURETIC PEPTIDE: CPT | Performed by: GENERAL PRACTICE

## 2024-01-18 PROCEDURE — 87637 SARSCOV2&INF A&B&RSV AMP PRB: CPT | Performed by: GENERAL PRACTICE

## 2024-01-18 PROCEDURE — 71045 X-RAY EXAM CHEST 1 VIEW: CPT

## 2024-01-18 PROCEDURE — 36415 COLL VENOUS BLD VENIPUNCTURE: CPT | Performed by: GENERAL PRACTICE

## 2024-01-18 PROCEDURE — 71045 X-RAY EXAM CHEST 1 VIEW: CPT | Performed by: RADIOLOGY

## 2024-01-18 PROCEDURE — 96367 TX/PROPH/DG ADDL SEQ IV INF: CPT

## 2024-01-18 PROCEDURE — 85025 COMPLETE CBC W/AUTO DIFF WBC: CPT | Performed by: GENERAL PRACTICE

## 2024-01-18 PROCEDURE — 2550000001 HC RX 255 CONTRASTS: Performed by: GENERAL PRACTICE

## 2024-01-18 PROCEDURE — 2500000004 HC RX 250 GENERAL PHARMACY W/ HCPCS (ALT 636 FOR OP/ED): Performed by: GENERAL PRACTICE

## 2024-01-18 PROCEDURE — 80053 COMPREHEN METABOLIC PANEL: CPT | Performed by: GENERAL PRACTICE

## 2024-01-18 PROCEDURE — 84484 ASSAY OF TROPONIN QUANT: CPT | Performed by: GENERAL PRACTICE

## 2024-01-18 PROCEDURE — 71275 CT ANGIOGRAPHY CHEST: CPT | Performed by: RADIOLOGY

## 2024-01-18 PROCEDURE — 71275 CT ANGIOGRAPHY CHEST: CPT

## 2024-01-18 PROCEDURE — 93005 ELECTROCARDIOGRAM TRACING: CPT

## 2024-01-18 RX ORDER — ACETAMINOPHEN 325 MG/1
650 TABLET ORAL ONCE
Status: COMPLETED | OUTPATIENT
Start: 2024-01-18 | End: 2024-01-18

## 2024-01-18 RX ORDER — ENOXAPARIN SODIUM 100 MG/ML
1 INJECTION SUBCUTANEOUS ONCE
Status: COMPLETED | OUTPATIENT
Start: 2024-01-18 | End: 2024-01-18

## 2024-01-18 RX ADMIN — ACETAMINOPHEN 650 MG: 325 TABLET ORAL at 19:40

## 2024-01-18 RX ADMIN — CEFTRIAXONE 2 G: 2 INJECTION, POWDER, FOR SOLUTION INTRAMUSCULAR; INTRAVENOUS at 20:50

## 2024-01-18 RX ADMIN — IOHEXOL 65 ML: 350 INJECTION, SOLUTION INTRAVENOUS at 21:12

## 2024-01-18 RX ADMIN — ENOXAPARIN SODIUM 70 MG: 80 INJECTION SUBCUTANEOUS at 22:20

## 2024-01-18 RX ADMIN — AZITHROMYCIN 500 MG: 500 INJECTION, POWDER, LYOPHILIZED, FOR SOLUTION INTRAVENOUS at 21:20

## 2024-01-18 ASSESSMENT — PAIN - FUNCTIONAL ASSESSMENT
PAIN_FUNCTIONAL_ASSESSMENT: 0-10
PAIN_FUNCTIONAL_ASSESSMENT: 0-10

## 2024-01-18 ASSESSMENT — PAIN SCALES - GENERAL
PAINLEVEL_OUTOF10: 3
PAINLEVEL_OUTOF10: 0 - NO PAIN

## 2024-01-18 ASSESSMENT — COLUMBIA-SUICIDE SEVERITY RATING SCALE - C-SSRS
1. IN THE PAST MONTH, HAVE YOU WISHED YOU WERE DEAD OR WISHED YOU COULD GO TO SLEEP AND NOT WAKE UP?: NO
6. HAVE YOU EVER DONE ANYTHING, STARTED TO DO ANYTHING, OR PREPARED TO DO ANYTHING TO END YOUR LIFE?: NO
2. HAVE YOU ACTUALLY HAD ANY THOUGHTS OF KILLING YOURSELF?: NO

## 2024-01-19 ENCOUNTER — APPOINTMENT (OUTPATIENT)
Dept: CARDIOLOGY | Facility: HOSPITAL | Age: 89
DRG: 175 | End: 2024-01-19
Payer: MEDICARE

## 2024-01-19 LAB
ANION GAP SERPL CALC-SCNC: 12 MMOL/L (ref 10–20)
AORTIC VALVE MEAN GRADIENT: 4 MMHG
AORTIC VALVE PEAK VELOCITY: 1.24 M/S
ATRIAL RATE: 85 BPM
AV PEAK GRADIENT: 6.2 MMHG
AVA (PEAK VEL): 2.44 CM2
AVA (VTI): 2.65 CM2
BUN SERPL-MCNC: 17 MG/DL (ref 6–23)
CALCIUM SERPL-MCNC: 7.7 MG/DL (ref 8.6–10.3)
CHLORIDE SERPL-SCNC: 103 MMOL/L (ref 98–107)
CO2 SERPL-SCNC: 24 MMOL/L (ref 21–32)
CREAT SERPL-MCNC: 0.92 MG/DL (ref 0.5–1.05)
EGFRCR SERPLBLD CKD-EPI 2021: 59 ML/MIN/1.73M*2
EJECTION FRACTION APICAL 4 CHAMBER: 62.9
EJECTION FRACTION: 63 %
ERYTHROCYTE [DISTWIDTH] IN BLOOD BY AUTOMATED COUNT: 14.1 % (ref 11.5–14.5)
EST. AVERAGE GLUCOSE BLD GHB EST-MCNC: 189 MG/DL
GLUCOSE BLD MANUAL STRIP-MCNC: 157 MG/DL (ref 74–99)
GLUCOSE BLD MANUAL STRIP-MCNC: 169 MG/DL (ref 74–99)
GLUCOSE BLD MANUAL STRIP-MCNC: 235 MG/DL (ref 74–99)
GLUCOSE SERPL-MCNC: 158 MG/DL (ref 74–99)
HBA1C MFR BLD: 8.2 %
HCT VFR BLD AUTO: 40.5 % (ref 36–46)
HGB BLD-MCNC: 12.6 G/DL (ref 12–16)
LEFT ATRIUM VOLUME AREA LENGTH INDEX BSA: 32.9 ML/M2
LEFT VENTRICLE INTERNAL DIMENSION DIASTOLE: 3.9 CM (ref 3.5–6)
LEFT VENTRICULAR OUTFLOW TRACT DIAMETER: 2.12 CM
MCH RBC QN AUTO: 28.3 PG (ref 26–34)
MCHC RBC AUTO-ENTMCNC: 31.1 G/DL (ref 32–36)
MCV RBC AUTO: 91 FL (ref 80–100)
NRBC BLD-RTO: 0 /100 WBCS (ref 0–0)
P AXIS: 53 DEGREES
P OFFSET: 204 MS
P ONSET: 148 MS
PLATELET # BLD AUTO: 170 X10*3/UL (ref 150–450)
POTASSIUM SERPL-SCNC: 3.3 MMOL/L (ref 3.5–5.3)
PR INTERVAL: 156 MS
Q ONSET: 226 MS
QRS COUNT: 14 BEATS
QRS DURATION: 90 MS
QT INTERVAL: 382 MS
QTC CALCULATION(BAZETT): 454 MS
QTC FREDERICIA: 429 MS
R AXIS: -10 DEGREES
RBC # BLD AUTO: 4.46 X10*6/UL (ref 4–5.2)
RIGHT VENTRICLE FREE WALL PEAK S': 15.4 CM/S
SODIUM SERPL-SCNC: 136 MMOL/L (ref 136–145)
T AXIS: 70 DEGREES
T OFFSET: 417 MS
TRICUSPID ANNULAR PLANE SYSTOLIC EXCURSION: 2.3 CM
VENTRICULAR RATE: 85 BPM
WBC # BLD AUTO: 13.4 X10*3/UL (ref 4.4–11.3)

## 2024-01-19 PROCEDURE — 94640 AIRWAY INHALATION TREATMENT: CPT | Mod: MUE

## 2024-01-19 PROCEDURE — 85027 COMPLETE CBC AUTOMATED: CPT | Performed by: HOSPITALIST

## 2024-01-19 PROCEDURE — 99221 1ST HOSP IP/OBS SF/LOW 40: CPT | Performed by: NURSE PRACTITIONER

## 2024-01-19 PROCEDURE — 83036 HEMOGLOBIN GLYCOSYLATED A1C: CPT | Mod: AHULAB | Performed by: HOSPITALIST

## 2024-01-19 PROCEDURE — 2500000002 HC RX 250 W HCPCS SELF ADMINISTERED DRUGS (ALT 637 FOR MEDICARE OP, ALT 636 FOR OP/ED): Mod: MUE | Performed by: PHARMACIST

## 2024-01-19 PROCEDURE — 99233 SBSQ HOSP IP/OBS HIGH 50: CPT | Performed by: INTERNAL MEDICINE

## 2024-01-19 PROCEDURE — 5A0935A ASSISTANCE WITH RESPIRATORY VENTILATION, LESS THAN 24 CONSECUTIVE HOURS, HIGH NASAL FLOW/VELOCITY: ICD-10-PCS | Performed by: INTERNAL MEDICINE

## 2024-01-19 PROCEDURE — 94660 CPAP INITIATION&MGMT: CPT

## 2024-01-19 PROCEDURE — 97165 OT EVAL LOW COMPLEX 30 MIN: CPT | Mod: GO | Performed by: OCCUPATIONAL THERAPIST

## 2024-01-19 PROCEDURE — 1200000002 HC GENERAL ROOM WITH TELEMETRY DAILY

## 2024-01-19 PROCEDURE — 2500000002 HC RX 250 W HCPCS SELF ADMINISTERED DRUGS (ALT 637 FOR MEDICARE OP, ALT 636 FOR OP/ED): Performed by: HOSPITALIST

## 2024-01-19 PROCEDURE — 97161 PT EVAL LOW COMPLEX 20 MIN: CPT | Mod: GP

## 2024-01-19 PROCEDURE — 94640 AIRWAY INHALATION TREATMENT: CPT

## 2024-01-19 PROCEDURE — 82947 ASSAY GLUCOSE BLOOD QUANT: CPT

## 2024-01-19 PROCEDURE — 2500000001 HC RX 250 WO HCPCS SELF ADMINISTERED DRUGS (ALT 637 FOR MEDICARE OP): Performed by: HOSPITALIST

## 2024-01-19 PROCEDURE — 93306 TTE W/DOPPLER COMPLETE: CPT | Performed by: INTERNAL MEDICINE

## 2024-01-19 PROCEDURE — 93306 TTE W/DOPPLER COMPLETE: CPT

## 2024-01-19 PROCEDURE — 2500000001 HC RX 250 WO HCPCS SELF ADMINISTERED DRUGS (ALT 637 FOR MEDICARE OP): Performed by: INTERNAL MEDICINE

## 2024-01-19 PROCEDURE — 80048 BASIC METABOLIC PNL TOTAL CA: CPT | Performed by: HOSPITALIST

## 2024-01-19 PROCEDURE — 2500000004 HC RX 250 GENERAL PHARMACY W/ HCPCS (ALT 636 FOR OP/ED): Performed by: HOSPITALIST

## 2024-01-19 PROCEDURE — 2500000005 HC RX 250 GENERAL PHARMACY W/O HCPCS: Performed by: HOSPITALIST

## 2024-01-19 PROCEDURE — 36415 COLL VENOUS BLD VENIPUNCTURE: CPT | Performed by: HOSPITALIST

## 2024-01-19 RX ORDER — INSULIN LISPRO 100 [IU]/ML
0-10 INJECTION, SOLUTION INTRAVENOUS; SUBCUTANEOUS
Status: DISCONTINUED | OUTPATIENT
Start: 2024-01-19 | End: 2024-01-28 | Stop reason: HOSPADM

## 2024-01-19 RX ORDER — SENNOSIDES 8.6 MG/1
1 TABLET ORAL 2 TIMES DAILY
Status: DISCONTINUED | OUTPATIENT
Start: 2024-01-19 | End: 2024-01-28 | Stop reason: HOSPADM

## 2024-01-19 RX ORDER — POTASSIUM CHLORIDE 20 MEQ/1
20 TABLET, EXTENDED RELEASE ORAL DAILY
COMMUNITY

## 2024-01-19 RX ORDER — FORMOTEROL FUMARATE DIHYDRATE 20 UG/2ML
20 SOLUTION RESPIRATORY (INHALATION)
Status: DISCONTINUED | OUTPATIENT
Start: 2024-01-19 | End: 2024-01-28 | Stop reason: HOSPADM

## 2024-01-19 RX ORDER — AMIODARONE HYDROCHLORIDE 200 MG/1
100 TABLET ORAL DAILY
Status: DISCONTINUED | OUTPATIENT
Start: 2024-01-19 | End: 2024-01-28 | Stop reason: HOSPADM

## 2024-01-19 RX ORDER — ACETAMINOPHEN 325 MG/1
650 TABLET ORAL EVERY 4 HOURS PRN
Status: DISCONTINUED | OUTPATIENT
Start: 2024-01-19 | End: 2024-01-28 | Stop reason: HOSPADM

## 2024-01-19 RX ORDER — ONDANSETRON HYDROCHLORIDE 2 MG/ML
4 INJECTION, SOLUTION INTRAVENOUS EVERY 8 HOURS PRN
Status: DISCONTINUED | OUTPATIENT
Start: 2024-01-19 | End: 2024-01-28 | Stop reason: HOSPADM

## 2024-01-19 RX ORDER — TALC
3 POWDER (GRAM) TOPICAL DAILY
Status: DISCONTINUED | OUTPATIENT
Start: 2024-01-19 | End: 2024-01-28 | Stop reason: HOSPADM

## 2024-01-19 RX ORDER — BUDESONIDE 0.5 MG/2ML
0.5 INHALANT ORAL
Status: DISCONTINUED | OUTPATIENT
Start: 2024-01-19 | End: 2024-01-22

## 2024-01-19 RX ORDER — DEXTROSE 50 % IN WATER (D50W) INTRAVENOUS SYRINGE
25
Status: DISCONTINUED | OUTPATIENT
Start: 2024-01-19 | End: 2024-01-28 | Stop reason: HOSPADM

## 2024-01-19 RX ORDER — DEXTROSE MONOHYDRATE 100 MG/ML
0.3 INJECTION, SOLUTION INTRAVENOUS ONCE AS NEEDED
Status: DISCONTINUED | OUTPATIENT
Start: 2024-01-19 | End: 2024-01-28 | Stop reason: HOSPADM

## 2024-01-19 RX ORDER — ONDANSETRON 4 MG/1
4 TABLET, ORALLY DISINTEGRATING ORAL EVERY 8 HOURS PRN
Status: DISCONTINUED | OUTPATIENT
Start: 2024-01-19 | End: 2024-01-28 | Stop reason: HOSPADM

## 2024-01-19 RX ORDER — IPRATROPIUM BROMIDE AND ALBUTEROL SULFATE 2.5; .5 MG/3ML; MG/3ML
3 SOLUTION RESPIRATORY (INHALATION) EVERY 2 HOUR PRN
Status: DISCONTINUED | OUTPATIENT
Start: 2024-01-19 | End: 2024-01-28 | Stop reason: HOSPADM

## 2024-01-19 RX ORDER — CHOLECALCIFEROL (VITAMIN D3) 25 MCG
2000 TABLET ORAL DAILY
Status: DISCONTINUED | OUTPATIENT
Start: 2024-01-19 | End: 2024-01-28 | Stop reason: HOSPADM

## 2024-01-19 RX ORDER — FLUTICASONE FUROATE AND VILANTEROL 200; 25 UG/1; UG/1
1 POWDER RESPIRATORY (INHALATION)
Status: DISCONTINUED | OUTPATIENT
Start: 2024-01-19 | End: 2024-01-19

## 2024-01-19 RX ORDER — GABAPENTIN 100 MG/1
200 CAPSULE ORAL 3 TIMES DAILY
Status: DISCONTINUED | OUTPATIENT
Start: 2024-01-19 | End: 2024-01-28 | Stop reason: HOSPADM

## 2024-01-19 RX ORDER — IPRATROPIUM BROMIDE AND ALBUTEROL SULFATE 2.5; .5 MG/3ML; MG/3ML
3 SOLUTION RESPIRATORY (INHALATION)
Status: DISCONTINUED | OUTPATIENT
Start: 2024-01-19 | End: 2024-01-28 | Stop reason: HOSPADM

## 2024-01-19 RX ORDER — AMLODIPINE BESYLATE 5 MG/1
2.5 TABLET ORAL DAILY
Status: DISCONTINUED | OUTPATIENT
Start: 2024-01-19 | End: 2024-01-28 | Stop reason: HOSPADM

## 2024-01-19 RX ORDER — OXYBUTYNIN CHLORIDE 5 MG/1
5 TABLET ORAL DAILY
Status: DISCONTINUED | OUTPATIENT
Start: 2024-01-19 | End: 2024-01-28 | Stop reason: HOSPADM

## 2024-01-19 RX ORDER — GUAIFENESIN 600 MG/1
600 TABLET, EXTENDED RELEASE ORAL 2 TIMES DAILY
Status: DISCONTINUED | OUTPATIENT
Start: 2024-01-19 | End: 2024-01-28 | Stop reason: HOSPADM

## 2024-01-19 RX ORDER — CEFTRIAXONE 1 G/50ML
1 INJECTION, SOLUTION INTRAVENOUS EVERY 24 HOURS
Status: DISCONTINUED | OUTPATIENT
Start: 2024-01-19 | End: 2024-01-22

## 2024-01-19 RX ORDER — IPRATROPIUM BROMIDE AND ALBUTEROL SULFATE 2.5; .5 MG/3ML; MG/3ML
3 SOLUTION RESPIRATORY (INHALATION)
Status: DISCONTINUED | OUTPATIENT
Start: 2024-01-19 | End: 2024-01-19

## 2024-01-19 RX ORDER — POLYETHYLENE GLYCOL 3350 17 G/17G
17 POWDER, FOR SOLUTION ORAL DAILY
Status: DISCONTINUED | OUTPATIENT
Start: 2024-01-19 | End: 2024-01-26

## 2024-01-19 RX ORDER — ATORVASTATIN CALCIUM 40 MG/1
40 TABLET, FILM COATED ORAL NIGHTLY
Status: DISCONTINUED | OUTPATIENT
Start: 2024-01-19 | End: 2024-01-28 | Stop reason: HOSPADM

## 2024-01-19 RX ORDER — GUAIFENESIN 100 MG/5ML
200 SOLUTION ORAL EVERY 4 HOURS PRN
Status: DISCONTINUED | OUTPATIENT
Start: 2024-01-19 | End: 2024-01-28 | Stop reason: HOSPADM

## 2024-01-19 RX ORDER — CYCLOBENZAPRINE HCL 10 MG
10 TABLET ORAL 3 TIMES DAILY PRN
Status: DISCONTINUED | OUTPATIENT
Start: 2024-01-19 | End: 2024-01-28 | Stop reason: HOSPADM

## 2024-01-19 RX ADMIN — AZITHROMYCIN MONOHYDRATE 500 MG: 500 INJECTION, POWDER, LYOPHILIZED, FOR SOLUTION INTRAVENOUS at 20:07

## 2024-01-19 RX ADMIN — ATORVASTATIN CALCIUM 40 MG: 40 TABLET, FILM COATED ORAL at 01:32

## 2024-01-19 RX ADMIN — Medication 6 L/MIN: at 00:55

## 2024-01-19 RX ADMIN — FORMOTEROL FUMARATE DIHYDRATE 20 MCG: 20 SOLUTION RESPIRATORY (INHALATION) at 11:13

## 2024-01-19 RX ADMIN — IPRATROPIUM BROMIDE AND ALBUTEROL SULFATE 3 ML: 2.5; .5 SOLUTION RESPIRATORY (INHALATION) at 02:12

## 2024-01-19 RX ADMIN — GABAPENTIN 200 MG: 100 CAPSULE ORAL at 20:07

## 2024-01-19 RX ADMIN — FORMOTEROL FUMARATE DIHYDRATE 20 MCG: 20 SOLUTION RESPIRATORY (INHALATION) at 20:12

## 2024-01-19 RX ADMIN — INSULIN LISPRO 2 UNITS: 100 INJECTION, SOLUTION INTRAVENOUS; SUBCUTANEOUS at 13:26

## 2024-01-19 RX ADMIN — APIXABAN 10 MG: 5 TABLET, FILM COATED ORAL at 20:07

## 2024-01-19 RX ADMIN — Medication 3 MG: at 01:32

## 2024-01-19 RX ADMIN — IPRATROPIUM BROMIDE AND ALBUTEROL SULFATE 3 ML: 2.5; .5 SOLUTION RESPIRATORY (INHALATION) at 20:12

## 2024-01-19 RX ADMIN — BUDESONIDE 0.5 MG: 0.5 INHALANT ORAL at 11:13

## 2024-01-19 RX ADMIN — RIVAROXABAN 20 MG: 20 TABLET, FILM COATED ORAL at 09:49

## 2024-01-19 RX ADMIN — GUAIFENESIN 600 MG: 600 TABLET, EXTENDED RELEASE ORAL at 21:00

## 2024-01-19 RX ADMIN — GUAIFENESIN 600 MG: 600 TABLET, EXTENDED RELEASE ORAL at 09:49

## 2024-01-19 RX ADMIN — GABAPENTIN 200 MG: 100 CAPSULE ORAL at 09:49

## 2024-01-19 RX ADMIN — GUAIFENESIN 600 MG: 600 TABLET, EXTENDED RELEASE ORAL at 01:32

## 2024-01-19 RX ADMIN — CEFTRIAXONE SODIUM 1 G: 1 INJECTION, SOLUTION INTRAVENOUS at 20:07

## 2024-01-19 RX ADMIN — Medication 2000 UNITS: at 09:49

## 2024-01-19 RX ADMIN — Medication 50 PERCENT: at 01:15

## 2024-01-19 RX ADMIN — INSULIN LISPRO 4 UNITS: 100 INJECTION, SOLUTION INTRAVENOUS; SUBCUTANEOUS at 16:26

## 2024-01-19 RX ADMIN — IPRATROPIUM BROMIDE AND ALBUTEROL SULFATE 3 ML: 2.5; .5 SOLUTION RESPIRATORY (INHALATION) at 11:12

## 2024-01-19 RX ADMIN — STANDARDIZED SENNA CONCENTRATE 8.6 MG: 8.6 TABLET ORAL at 21:00

## 2024-01-19 RX ADMIN — ATORVASTATIN CALCIUM 40 MG: 40 TABLET, FILM COATED ORAL at 20:08

## 2024-01-19 RX ADMIN — Medication 3 MG: at 20:07

## 2024-01-19 RX ADMIN — IPRATROPIUM BROMIDE AND ALBUTEROL SULFATE 3 ML: 2.5; .5 SOLUTION RESPIRATORY (INHALATION) at 15:19

## 2024-01-19 RX ADMIN — GABAPENTIN 200 MG: 100 CAPSULE ORAL at 15:05

## 2024-01-19 RX ADMIN — OXYBUTYNIN CHLORIDE 5 MG: 5 TABLET ORAL at 09:49

## 2024-01-19 RX ADMIN — AMIODARONE HYDROCHLORIDE 100 MG: 200 TABLET ORAL at 09:49

## 2024-01-19 RX ADMIN — ACETAMINOPHEN 650 MG: 325 TABLET ORAL at 17:49

## 2024-01-19 RX ADMIN — BUDESONIDE 0.5 MG: 0.5 INHALANT ORAL at 20:12

## 2024-01-19 RX ADMIN — Medication 10 L/MIN: at 00:54

## 2024-01-19 ASSESSMENT — ENCOUNTER SYMPTOMS
HEMATOLOGIC/LYMPHATIC NEGATIVE: 1
JOINT SWELLING: 0
COUGH: 1
SHORTNESS OF BREATH: 1
MUSCULOSKELETAL NEGATIVE: 1
PALPITATIONS: 0
CHILLS: 0
FEVER: 0
ACTIVITY CHANGE: 0
SHORTNESS OF BREATH: 0
APPETITE CHANGE: 0
DYSURIA: 0
LIGHT-HEADEDNESS: 1
GASTROINTESTINAL NEGATIVE: 1
ABDOMINAL PAIN: 0
HEMATURIA: 0
VOMITING: 0
WHEEZING: 0
NECK PAIN: 0
NEUROLOGICAL NEGATIVE: 1
NAUSEA: 0
ALLERGIC/IMMUNOLOGIC NEGATIVE: 1
BRUISES/BLEEDS EASILY: 1
DIZZINESS: 1
EYES NEGATIVE: 1
FATIGUE: 1
ARTHRALGIAS: 1
CARDIOVASCULAR NEGATIVE: 1
PSYCHIATRIC NEGATIVE: 1

## 2024-01-19 ASSESSMENT — COGNITIVE AND FUNCTIONAL STATUS - GENERAL
DAILY ACTIVITIY SCORE: 19
CLIMB 3 TO 5 STEPS WITH RAILING: A LITTLE
HELP NEEDED FOR BATHING: A LITTLE
DRESSING REGULAR LOWER BODY CLOTHING: A LITTLE
WALKING IN HOSPITAL ROOM: A LITTLE
DAILY ACTIVITIY SCORE: 22
MOVING TO AND FROM BED TO CHAIR: A LITTLE
PERSONAL GROOMING: A LITTLE
MOBILITY SCORE: 20
TOILETING: A LITTLE
DRESSING REGULAR UPPER BODY CLOTHING: A LITTLE
DRESSING REGULAR LOWER BODY CLOTHING: A LITTLE
HELP NEEDED FOR BATHING: A LITTLE
STANDING UP FROM CHAIR USING ARMS: A LITTLE

## 2024-01-19 ASSESSMENT — ACTIVITIES OF DAILY LIVING (ADL)
JUDGMENT_ADEQUATE_SAFELY_COMPLETE_DAILY_ACTIVITIES: NO
DRESSING YOURSELF: NEEDS ASSISTANCE
PATIENT'S MEMORY ADEQUATE TO SAFELY COMPLETE DAILY ACTIVITIES?: YES
HEARING - LEFT EAR: HEARING AID
ADL_ASSISTANCE: INDEPENDENT
HEARING - RIGHT EAR: HEARING AID
TOILETING: NEEDS ASSISTANCE
GROOMING: NEEDS ASSISTANCE
BATHING: NEEDS ASSISTANCE
ADEQUATE_TO_COMPLETE_ADL: YES
WALKS IN HOME: INDEPENDENT
LACK_OF_TRANSPORTATION: NO
ASSISTIVE_DEVICE: WALKER
ADL_ASSISTANCE: INDEPENDENT
FEEDING YOURSELF: INDEPENDENT

## 2024-01-19 ASSESSMENT — PAIN SCALES - GENERAL
PAINLEVEL_OUTOF10: 0 - NO PAIN

## 2024-01-19 ASSESSMENT — PAIN - FUNCTIONAL ASSESSMENT
PAIN_FUNCTIONAL_ASSESSMENT: 0-10

## 2024-01-19 NOTE — PROGRESS NOTES
Occupational Therapy    Evaluation    Patient Name: Lissett Rodríguez  MRN: [id number]  Today's Date: 1/19/2024  Time Calculation  Start Time: 1327  Stop Time: 1338  Time Calculation (min): 11 min        Assessment:  OT Assessment: Pt demos decreased balance, strength, endurance and cognition/safety awareness resulting in decreased safety and independence with ADLs and functional transfers/mobility. Pt requires skilled OT services to address above deficits to safely return to PLOF.  Prognosis: Good  Evaluation/Treatment Tolerance: Patient limited by fatigue  Medical Staff Made Aware: Yes  End of Session Communication: Bedside nurse  End of Session Patient Position: Up in chair, Alarm on  OT Assessment Results: Decreased ADL status, Decreased upper extremity strength, Decreased safe judgment during ADL, Decreased cognition, Decreased endurance, Decreased functional mobility, Decreased IADLs, Decreased trunk control for functional activities  Prognosis: Good  Evaluation/Treatment Tolerance: Patient limited by fatigue  Medical Staff Made Aware: Yes  Strengths: Living arrangement secure, Premorbid level of function  Barriers to Participation: Insight into problems, Comorbidities  Plan:  Treatment Interventions: ADL retraining, Functional transfer training, UE strengthening/ROM, Endurance training, Patient/family training, Equipment evaluation/education, Compensatory technique education  OT Frequency: 3 times per week  OT Discharge Recommendations: Low intensity level of continued care  OT - OK to Discharge: Yes (OT POC established this date)  Treatment Interventions: ADL retraining, Functional transfer training, UE strengthening/ROM, Endurance training, Patient/family training, Equipment evaluation/education, Compensatory technique education    Subjective     General:  General  Reason for Referral: Pt is a 89 y/o female admitted with PNA  Referred By: Padmaja  Past Medical History Relevant to Rehab: afib (on Xarelto), HFpEF  (55%), COPD (on 6 LPM oxygen 24/7), KALPANA, DM2, HTN, HLD, TIA  Family/Caregiver Present: No  Co-Treatment: PT  Co-Treatment Reason: to maximize safety and participation with skilled intervention  Prior to Session Communication: Bedside nurse  Patient Position Received: Bed, 3 rail up, Alarm on  General Comment: Pt supine in bed upon arrival and agreeable to OT eval/tx. Pt fully participatory. Pt very impulsive, demos poor safety awareness. Pt on high flow NC.  Precautions:  Hearing/Visual Limitations: Hard of hearing  Medical Precautions: Fall precautions, Oxygen therapy device and L/min       Pain:  Pain Assessment  Pain Assessment: 0-10  Pain Score: 0 - No pain    Objective   Cognition:  Overall Cognitive Status: Within Functional Limits  Orientation Level: Oriented X4  Safety/Judgement: Exceptions to WFL  Insight: Moderate  Impulsive: Severely           Home Living:  Type of Home: Independent living  Lives With: Alone  Home Adaptive Equipment: Cane (Rollator)  Home Layout: One level  Home Access: Elevator  Bathroom Shower/Tub: Walk-in shower  Bathroom Toilet: Standard  Bathroom Equipment: Grab bars around toilet, Grab bars in shower, Shower chair with back  Prior Function:  Level of [place]: Independent with ADLs and functional transfers  Receives Help From:  (jail staff)  ADL Assistance: Independent  Homemaking Assistance: Needs assistance  Ambulatory Assistance: Independent (no AD x household distances, rollator for community distances)  Prior Function Comments: pt denies recent falls       ADL:  Grooming Assistance:  (CGA)  Grooming Deficit: Wash/dry hands, Verbal cueing, Steadying, Supervision/safety  LE Dressing Assistance: Stand by  LE Dressing Deficit:  (to don/doff B socks using figure four technique)  Toileting Assistance with Device:  (CGA)  Toileting Deficit: Steadying, Supervison/safety  Activity Tolerance:  Endurance: Tolerates less than 10 min exercise, no significant change in vital signs  Bed  Mobility/Transfers: Bed Mobility  Bed Mobility: Yes  Bed Mobility 1  Bed Mobility 1: Supine to sitting, Sitting to supine  Level of Assistance 1: Independent    Transfers  Transfer: Yes  Transfer 1  Technique 1: Sit to stand, Stand to sit (from EOB)  Transfer Level of Assistance 1: Close supervision, Minimal verbal cues  Trials/Comments 1: cues for safe hand placement and sequencing  Transfers 2  Transfer From 2: Stand to, Toilet to  Transfer to 2: Stand  Technique 2:  (ambulating)  Transfer Device 2:  (grab bar)  Transfer Level of Assistance 2: Close supervision, Minimal verbal cues  Trials/Comments 2: cues for safe hand placement and sequencing      Ambulation/Gait Training:  Ambulation/Gait Training 1  Comments/Distance (ft) 1: Pt functionally navigated x min household distance in room using no AD then FWW with CGA-Min A x1. Max cues for sequencing and walker safety. With no AD, pt reaching out for furniture in environment and demos poor safety awareness. Unsteadiness with multiple mild LOB using no AD. Pt colliding with objects in environment using FWW and very impulsive, demos fast pace, max cues to slow pace and for safety awareness and safe FWW management.  Sitting Balance:  Static Sitting Balance  Static Sitting-Balance Support: Bilateral upper extremity supported  Static Sitting-Level of Assistance: Independent  Static Sitting-Comment/Number of Minutes: at EOB  Standing Balance:  Static Standing Balance  Static Standing-Comment/Number of Minutes: CGA  Dynamic Standing Balance  Dynamic Standing-Comments: CGA-Min A     Sensation:  Light Touch: No apparent deficits  Strength:  Strength Comments: B UEs grossly 3/5 based on function       Coordination:  Movements are Fluid and Coordinated: No  Upper Body Coordination: mildly impaired, tremors noted at times, pt reports this is not present at baseline     Extremities: RUE   RUE : Within Functional Limits and LUE   LUE: Within Functional Limits      Outcome  Measures:[facility] Daily Activity  Putting on and taking off regular lower body clothing: A little  Bathing (including washing, rinsing, drying): A little  Putting on and taking off regular upper body clothing: A little  Toileting, which includes using toilet, bedpan or urinal: A little  Taking care of personal grooming such as brushing teeth: A little  Eating Meals: None  Daily Activity - Total Score: 19        Education Documentation  Body Mechanics, taught by America Andrade OT at 1/19/2024  3:57 PM.  Learner: Patient  Readiness: Acceptance  Method: Explanation  Response: Verbalizes Understanding, Needs Reinforcement    Precautions, taught by America Andrade OT at 1/19/2024  3:57 PM.  Learner: Patient  Readiness: Acceptance  Method: Explanation  Response: Verbalizes Understanding, Needs Reinforcement    ADL Training, taught by America Andrade OT at 1/19/2024  3:57 PM.  Learner: Patient  Readiness: Acceptance  Method: Explanation  Response: Verbalizes Understanding, Needs Reinforcement    Education Comments  No comments found.             Goals:  Encounter Problems       Encounter Problems (Active)       ADLs       Patient will perform UB and LB bathing with modified independent level of assistance and grab bars, shower chair, and long-handled sponge.       Start:  01/19/24    Expected End:  02/02/24            Patient with complete upper body dressing with independent level of assistance donning and doffing all UE clothes with PRN adaptive equipment.       Start:  01/19/24    Expected End:  02/02/24            Patient with complete lower body dressing with modified independent level of assistance donning and doffing all LE clothes  with PRN adaptive equipment.       Start:  01/19/24    Expected End:  02/02/24            Patient will complete all daily grooming tasks with independent level of assistance and PRN adaptive equipment.       Start:  01/19/24    Expected End:  02/02/24            Patient will complete  toileting including hygiene clothing management/hygiene with modified independent level of assistance and raised toilet seat and grab bars.       Start:  01/19/24    Expected End:  02/02/24                       MOBILITY       Patient will perform Functional mobility x Household distances/Community Distances with modified independent level of assistance and least restrictive device in order to improve safety and functional mobility.       Start:  01/19/24    Expected End:  02/02/24                       TRANSFERS       Patient will complete all functional transfers  with least restrictive device with modified independent level of assistance.       Start:  01/19/24    Expected End:  02/02/24

## 2024-01-19 NOTE — PROGRESS NOTES
[facility] @ [facility] [phone]      01/19/24 0732   Current Planned Discharge Disposition   Current Planned Discharge Disposition Inter

## 2024-01-19 NOTE — PROGRESS NOTES
Transitional Care Coordination Progress Note:  Plan per Medical/Surgical team: treatment of with IV ATB, inhalers, Duoneb, home oxygen @ 50% mask  Status: inpatient  Payor source: medicare A/[place]isel  Discharge disposition: [facility] [phone]   Potential Barriers: dizzy, high oxygen demands  ADOD: 1/21/2024  ALEC Winston RN, BSN Transitional Care Coordinator ED# [phone]      01/19/24 0733   Discharge Planning   Living Arrangements Alone   Support Systems Children   Assistance Needed home oxygen @ 3-4 liters NC established   Type of Residence Assisted living   Do you have animals or pets at home? No   Care Facility Name [facility] [phone]   Home or Post Acute Services Post acute facilities (Rehab/SNF/etc)   Type of Post Acute Facility Services Assisted living   Patient expects to be discharged to: [facility] [phone]   Does the patient need discharge transport arranged? Yes   RoundTrip coordination needed? Yes   Has discharge transport been arranged? No   Financial Resource Strain   How hard is it for you to pay for the very basics like food, housing, medical care, and heating? Not hard   Housing Stability   In the last 12 months, was there a time when you were not able to pay the mortgage or rent on time? N   In the last 12 months, how many places have you lived? 1   In the last 12 months, was there a time when you did not have a steady place to sleep or slept in a shelter (including now)? N   Transportation Needs   In the past 12 months, has lack of transportation kept you from medical appointments or from getting medications? no   In the past 12 months, has lack of transportation kept you from meetings, work, or from getting things needed for daily living? No   Patient Choice   Provider Choice list and CMS website (https://medicare.gov/care-compare#search) for post-acute Quality and Resource Measure Data were provided and reviewed with:  Patient;Family   Patient / Family choosing to utilize agency / facility established prior to hospitalization Yes

## 2024-01-19 NOTE — PROGRESS NOTES
01/19/2024 0918 Patient not medically ready for discharge. HFNC @12L. Spoke with Ebony RODRIGUES at [facility]. She endorses patient walks independently with walke =er at facility. Will need PT/OT eval. No barriers to return pending patient is ambulatory. Patient ambulatory to bathroom with assist of one, shaking movements.   Gabrielle LEAL RN [facility] CCM

## 2024-01-19 NOTE — PROGRESS NOTES
Has home oxygen in place & walker     01/19/24 0732   [facility] Disability Status   Are you deaf or do you have serious difficulty hearing? N   Are you blind or do you have serious difficulty seeing, even when wearing glasses? N   Because of a physical, mental, or emotional condition, do you have serious difficulty concentrating, remembering, or making decisions? (5 years old or older) N   Do you have serious difficulty walking or climbing stairs? Y   Do you have serious difficulty dressing or bathing? Y   Because of a physical, mental, or emotional condition, do you have serious difficulty doing errands alone such as visiting the doctor? Y

## 2024-01-19 NOTE — ED PROVIDER NOTES
HPI   Chief Complaint   Patient presents with    Dizziness     Patient arrived from home via private auto ambulatory upon arrival complaining of dizziness beginning tonight.  The patient states she has been fatigued for the past week prior to the dizziness.       HPI: 89-year-old female with a history of COPD, DM 2 and congestive heart failure presents for lightheadedness and fatigue.  She states that for the past week she has felt very fatigued and lightheaded when using her walker.  She normally wears 6 L around-the-clock and states that she is felt mildly short of breath.  She denies cough, sick contacts, recent travel and chest pain.      Limitations to history: None  Independent Historians: Patient  External Records Reviewed: HIE, outpatient notes, inpatient notes  ------------------------------------------------------------------------------------------------------------------------------------------  ROS: a ten point review of systems was performed and was negative except as per HPI.  ------------------------------------------------------------------------------------------------------------------------------------------  PMH / PSH: as per HPI, otherwise reviewed in EMR  MEDS: as per HPI, otherwise reviewed in EMR  ALLERGIES: as per HPI, otherwise reviewed in EMR  SocH:  as per HPI, otherwise reviewed in EMR  FH:  as per HPI, otherwise reviewed in EMR  ------------------------------------------------------------------------------------------------------------------------------------------  Physical Exam:  VS: As documented in the triage note and EMR flowsheet from this visit was reviewed  General: Fatigued appearing. No acute distress.   Eyes:  Extraocular movements grossly intact. No scleral icterus. No discharge  HEENT:  Normocephalic.  Atraumatic  Neck: Moves neck freely. No gross masses  CV: Regular rhythm. No murmurs, rubs or gallops   Resp: Inspiratory rales bilaterally.  No conversational dyspnea or  accessory muscle use  GI: Soft, no masses, nontender. No rebound tenderness or guarding  MSK: Symmetric muscle bulk. No deformities. No lower extremity edema.    Skin: Warm, dry, intact.   Neuro: No focal deficits.  A&O x3.   Psych: Appropriate for situation  ------------------------------------------------------------------------------------------------------------------------------------------  Hospital Course / Medical Decision Making:  Independent Interpretations: CT chest, chest x-ray  EKG as interpreted by me: CT chest normal sinus rhythm 85 bpm with a left axis deviation, no bundle branch block and no signs of acute ischemia    MDM: 89-year-old female with a history of COPD, DM 2 and congestive heart failure presenting for lightheadedness and fatigue.  She is at her oxygen baseline which is 6 L via nasal cannula.  There is a leukocytosis.  No major electrolyte abnormalities or acute kidney injury.  Troponin and BNP not elevated.  Viral swabs negative.  Chest x-ray shows a consolidative opacity in the left mid lung.  CT angiogram shows groundglass opacities throughout the lungs and more consolidative opacities in the left upper lobe and left lower lobe.  There is an acute pulmonary embolus within the segmental branch of the lingula.  The patient was given Rocephin, azithromycin and Lovenox.  I do not feel that her pulmonary embolism is hemodynamically significant.  She was admitted to the medicine service for further management.    Discussion of Management with Other Providers:   I discussed the patient/results with: Emergency medicine team    Final diagnosis and disposition as below.    Results for orders placed or performed during the hospital encounter of 01/18/24  -CBC and Auto Differential:        Result                      Value             Ref Range           WBC                         16.0 (H)          4.4 - 11.3 x*       nRBC                        0.0               0.0 - 0.0 /1*       RBC                          5.41 (H)          4.00 - 5.20 *       Hemoglobin                  15.8              12.0 - 16.0 *       Hematocrit                  49.4 (H)          36.0 - 46.0 %       MCV                         91                80 - 100 fL         MCH                         29.2              26.0 - 34.0 *       MCHC                        32.0              32.0 - 36.0 *       RDW                         14.2              11.5 - 14.5 %       Platelets                   217               150 - 450 x1*       Neutrophils %               80.8              40.0 - 80.0 %       Immature Granulocytes *     0.6               0.0 - 0.9 %         Lymphocytes %               11.4              13.0 - 44.0 %       Monocytes %                 6.7               2.0 - 10.0 %        Eosinophils %               0.1               0.0 - 6.0 %         Basophils %                 0.4               0.0 - 2.0 %         Neutrophils Absolute        12.94 (H)         1.60 - 5.50 *       Immature Granulocytes *     0.09              0.00 - 0.50 *       Lymphocytes Absolute        1.83              0.80 - 3.00 *       Monocytes Absolute          1.08 (H)          0.05 - 0.80 *       Eosinophils Absolute        0.02              0.00 - 0.40 *       Basophils Absolute          0.06              0.00 - 0.10 *  -Comprehensive metabolic panel:        Result                      Value             Ref Range           Glucose                     186 (H)           74 - 99 mg/dL       Sodium                      133 (L)           136 - 145 mm*       Potassium                   3.8               3.5 - 5.3 mm*       Chloride                    98                98 - 107 mmo*       Bicarbonate                 25                21 - 32 mmol*       Anion Gap                   14                10 - 20 mmol*       Urea Nitrogen               20                6 - 23 mg/dL        Creatinine                  1.05              0.50 - 1.05 *       eGFR                         50 (L)            >60 mL/min/1*       Calcium                     9.0               8.6 - 10.3 m*       Albumin                     3.9               3.4 - 5.0 g/*       Alkaline Phosphatase        78                33 - 136 U/L        Total Protein               7.9               6.4 - 8.2 g/*       AST                         13                9 - 39 U/L          Bilirubin, Total            1.4 (H)           0.0 - 1.2 mg*       ALT                         9                 7 - 45 U/L     -Troponin I, High Sensitivity:        Result                      Value             Ref Range           Troponin I, High Sensi*     9                 0 - 13 ng/L    -B-Type Natriuretic Peptide:        Result                      Value             Ref Range           BNP                         59                0 - 99 pg/mL   -Sars-CoV-2 and Influenza A/B PCR:        Result                      Value             Ref Range           Flu A Result                Not Detected      Not Detected        Flu B Result                Not Detected      Not Detected        Coronavirus 2019, PCR       Not Detected      Not Detected   -RSV PCR:        Result                      Value             Ref Range           RSV PCR                     Not Detected      Not Detected     Transthoracic Echo (TTE) Complete        CT angio chest for pulmonary embolism   Final Result    Acute pulmonary embolus within a segmental branch of the lingula.          Ground-glass opacities scattered diffusely throughout the lungs with    more consolidative opacities in the left upper lobe and left lower    lobe concerning for pneumonia. 1 of the opacities in the lingula is    somewhat wedge-shaped and may also represent a component of pulmonary    infarction.          Left lower lobe nodular density measuring up to 8 mm. While findings    may be related to the underlying likely infectious process, recommend    repeat imaging after treatment to assess  for resolution and/or    stability.          Nonspecific enlarged mediastinal and hilar lymph nodes, which may be    reactive.                MACRO:    Evan Finkelstein discussed the significance and urgency of this    critical finding by telephone with  TED BENITO on 1/18/2024 at 10:02    pm.  (**-RCF-**) Findings:  See findings.          Signed by: Evan Finkelstein 1/18/2024 10:05 PM    Dictation workstation:   [id number]     XR chest 1 view   Final Result    Somewhat wedge-shaped consolidative opacity in the left mid lung    pulmonary infarct not excluded if there is clinical concern for    pulmonary emboli further evaluation with dedicated PE CT can be    considered. Continued radiographic follow-up is recommended to ensure    complete resolution and exclude underlying mass lesion.          Mild interstitial prominence could be chronic or relate to component    developing interstitial edema.          MACRO:    None          Signed by: Apolinar Howell 1/18/2024 8:10 PM    Dictation workstation:   [id number]                                 Jennifer Coma Scale Score: 15                  Patient History   Past Medical History:   Diagnosis Date    Angioneurotic edema, initial encounter 08/24/2018    Angioedema of lips    Calculus of gallbladder without cholecystitis without obstruction 02/22/2017    Calculus of gallbladder without cholecystitis without obstruction    Chronic obstructive pulmonary disease, unspecified (CMS/HCC) 02/20/2017    Chronic obstructive pulmonary disease with hypoxia    Encounter for follow-up examination after completed treatment for conditions other than malignant neoplasm 02/20/2017    Hospital discharge follow-up    Encounter for gynecological examination (general) (routine) without abnormal findings     Encounter for routine gynecological examination    Encounter for screening mammogram for malignant neoplasm of breast     Visit for screening mammogram    Eructation 06/16/2017     Burping    Local infection of the skin and subcutaneous tissue, unspecified 01/05/2017    Infection of skin    Other specified symptoms and signs involving the circulatory and respiratory systems 10/16/2018    Respiratory crackles at right lung base    Pain in unspecified knee 04/11/2014    Pain in patella    Personal history of diseases of the skin and subcutaneous tissue     History of urticaria    Personal history of other diseases of the musculoskeletal system and connective tissue 06/01/2015    History of low back pain    Personal history of other diseases of the respiratory system 04/27/2016    History of acute sinusitis    Personal history of other diseases of the respiratory system 05/19/2017    History of acute bronchitis with bronchospasm    Personal history of other specified conditions 01/05/2017    History of nausea and vomiting    Personal history of other specified conditions 01/09/2017    History of nausea and vomiting    Personal history of other specified conditions 02/23/2016    History of chest pain    Personal history of other specified conditions 04/11/2014    History of fatigue    Personal history of other specified conditions 06/13/2014    History of diarrhea    Personal history of transient ischemic attack (TIA), and cerebral infarction 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 10/16/2018    History of transient cerebral ischemia    Personal history of transient ischemic attack (TIA), and cerebral infarction 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 03/08/2016    History of transient cerebral ischemia    Pneumonia, unspecified organism 02/09/2018    CAP (community acquired pneumonia)    Trochanteric bursitis, unspecified hip 04/23/2015    Greater trochanteric bursitis    Type 2 diabetes mellitus without complications (CMS/HCC) 11/06/2017    Controlled type 2 diabetes mellitus without complication, without long-term current use of insulin     Past Surgical History:   Procedure Laterality Date    APPENDECTOMY  05/15/2013     Appendectomy    CATARACT EXTRACTION  05/15/2013    Cataract Surgery    COLONOSCOPY  04/11/2014    Complete Colonoscopy    HYSTERECTOMY  05/15/2013    Hysterectomy    MR HEAD ANGIO WO IV CONTRAST  1/8/2016    MR HEAD ANGIO WO IV CONTRAST 1/8/2016 [facility]    MR HEAD ANGIO WO IV CONTRAST  10/1/2012    MR HEAD ANGIO WO IV CONTRAST 10/1/2012 [facility]    MR NECK ANGIO W IV CONTRAST  10/1/2012    MR NECK ANGIO W IV CONTRAST 10/1/2012 [facility]    MR NECK ANGIO WO IV CONTRAST  1/8/2016    MR NECK ANGIO WO IV CONTRAST 1/8/2016 [facility]    OTHER SURGICAL HISTORY  05/15/2013    Cardiac Cath Procedure Outcome: Successful    OTHER SURGICAL HISTORY  02/28/2019    Cholecystectomy    TOTAL KNEE ARTHROPLASTY  05/29/2013    Knee Replacement     Family History   Problem Relation Name Age of Onset    Alzheimer's disease Mother      Heart failure Mother      Stroke Mother          ischemic stroke    Heart attack Father      Stroke Father          ischemic stroke    COPD Sister      Diabetes Sister          mellitus    Cancer Sister      Parkinsonism Sister      Other (previous cardiac prolems) Sister       Social History     Tobacco Use    Smoking status: Never    Smokeless tobacco: Never   Substance Use Topics    Alcohol use: Never    Drug use: Never       Physical Exam   ED Triage Vitals [01/18/24 1914]   Temp Heart Rate Resp BP   38.1 °C (100.6 °F) 85 16 124/68      SpO2 Temp Source Heart Rate Source Patient Position   91 % Temporal Monitor Sitting      BP Location FiO2 (%)     Left arm --       Physical Exam    ED Course & MDM   Diagnoses as of 01/22/24 0540   Community acquired pneumonia, unspecified laterality   Single subsegmental pulmonary embolism without acute cor pulmonale (CMS/HCC)       Medical Decision Making      Procedure  Procedures     Rolf Rasmussen,   01/22/24 0542

## 2024-01-19 NOTE — H&P
History Of Present Illness  Lissett Rodríguez is a 89 y.o.  female with a PMH of afib (on Xarelto), HFpEF (55%), COPD (on 6 LPM oxygen 24/7), KALPANA, DM2, HTN, HLD, TIA, presenting with fatigue, body aches and cough.  Has felt fatigued for the past 1 week, unable to perform her usual activities at her assisted living facility.   Has had a dry cough and body aches x 2 days.   She denies hemoptysis, shaking chills, fever, N/V/D, chest pain and LE edema.   Is still on 6 LPM oxygen.      In the ED her WBC ct is 16, BMP unremarkable.   CTPE showed LLL and PACO PNA, acute PE a segmental branch of the lingula.   She was given a therapeutic dose of Lovenox in the ED. Xarelto listed on her medication list, however, uncertain if she is getting it at her facility.     Past Medical History  Past Medical History:   Diagnosis Date    Angioneurotic edema, initial encounter 08/24/2018    Angioedema of lips    Calculus of gallbladder without cholecystitis without obstruction 02/22/2017    Calculus of gallbladder without cholecystitis without obstruction    Chronic obstructive pulmonary disease, unspecified (CMS/[facility]) 02/20/2017    Chronic obstructive pulmonary disease with hypoxia    Encounter for follow-up examination after completed treatment for conditions other than malignant neoplasm 02/20/2017    Hospital discharge follow-up    Encounter for gynecological examination (general) (routine) without abnormal findings     Encounter for routine gynecological examination    Encounter for screening mammogram for malignant neoplasm of breast     Visit for screening mammogram    Eructation 06/16/2017    Burping    Local infection of the skin and subcutaneous tissue, unspecified 01/05/2017    Infection of skin    Other specified symptoms and signs involving the circulatory and respiratory systems 10/16/2018    Respiratory crackles at right lung base    Pain in unspecified knee 04/11/2014    Pain in patella    Personal history of diseases of the  skin and subcutaneous tissue     History of urticaria    Personal history of other diseases of the musculoskeletal system and connective tissue 06/01/2015    History of low back pain    Personal history of other diseases of the respiratory system 04/27/2016    History of acute sinusitis    Personal history of other diseases of the respiratory system 05/19/2017    History of acute bronchitis with bronchospasm    Personal history of other specified conditions 01/05/2017    History of nausea and vomiting    Personal history of other specified conditions 01/09/2017    History of nausea and vomiting    Personal history of other specified conditions 02/23/2016    History of chest pain    Personal history of other specified conditions 04/11/2014    History of fatigue    Personal history of other specified conditions 06/13/2014    History of diarrhea    Personal history of transient ischemic attack (TIA), and cerebral infarction 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 10/16/2018    History of transient cerebral ischemia    Personal history of transient ischemic attack (TIA), and cerebral infarction 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 03/08/2016    History of transient cerebral ischemia    Pneumonia, unspecified organism 02/09/2018    CAP (community acquired pneumonia)    Trochanteric bursitis, unspecified hip 04/23/2015    Greater trochanteric bursitis    Type 2 diabetes mellitus without complications (CMS/HCC) 11/06/2017    Controlled type 2 diabetes mellitus without complication, without long-term current use of insulin       Surgical History  Past Surgical History:   Procedure Laterality Date    APPENDECTOMY  05/15/2013    Appendectomy    CATARACT EXTRACTION  05/15/2013    Cataract Surgery    COLONOSCOPY  04/11/2014    Complete Colonoscopy    HYSTERECTOMY  05/15/2013    Hysterectomy    MR HEAD ANGIO WO IV CONTRAST  1/8/2016    MR HEAD ANGIO WO IV CONTRAST 1/8/2016 [facility]    MR HEAD ANGIO WO IV CONTRAST  10/1/2012    MR HEAD ANGIO  WO IV CONTRAST 10/1/2012 [facility]    MR NECK ANGIO W IV CONTRAST  10/1/2012    MR NECK ANGIO W IV CONTRAST 10/1/2012 [facility]    MR NECK ANGIO WO IV CONTRAST  1/8/2016    MR NECK ANGIO WO IV CONTRAST 1/8/2016 [facility]    OTHER SURGICAL HISTORY  05/15/2013    Cardiac Cath Procedure Outcome: Successful    OTHER SURGICAL HISTORY  02/28/2019    Cholecystectomy    TOTAL KNEE ARTHROPLASTY  05/29/2013    Knee Replacement        Social History  She reports that she has never smoked. She has never used smokeless tobacco. She reports that she does not drink alcohol and does not use drugs.    Family History  Family History   Problem Relation Name Age of Onset    Alzheimer's disease Mother      Heart failure Mother      Stroke Mother          ischemic stroke    Heart attack Father      Stroke Father          ischemic stroke    COPD Sister      Diabetes Sister          mellitus    Cancer Sister      Parkinsonism Sister      Other (previous cardiac prolems) Sister          Allergies  Lisinopril, Tetracyclines, Vancomycin, and Penicillins    Review of Systems   Constitutional:  Positive for fatigue. Negative for chills and fever.   HENT: Negative.     Eyes: Negative.    Respiratory:  Positive for cough. Negative for shortness of breath.    Cardiovascular: Negative.  Negative for chest pain and leg swelling.   Gastrointestinal: Negative.    Genitourinary: Negative.    Musculoskeletal: Negative.    Skin: Negative.    Allergic/Immunologic: Negative.    Neurological: Negative.    Hematological: Negative.    Psychiatric/Behavioral: Negative.          Physical Exam  Vitals reviewed.   Constitutional:       Appearance: Normal appearance.   HENT:      Head: Normocephalic and atraumatic.      Mouth/Throat:      Mouth: Mucous membranes are moist.   Eyes:      Extraocular Movements: Extraocular movements intact.      Conjunctiva/sclera: Conjunctivae normal.      Pupils: Pupils are equal, round, and  "reactive to light.   Cardiovascular:      Rate and Rhythm: Normal rate and regular rhythm.      Pulses: Normal pulses.      Heart sounds: Normal heart sounds.   Pulmonary:      Effort: Pulmonary effort is normal.      Breath sounds: Normal breath sounds.   Abdominal:      General: Abdomen is flat. Bowel sounds are normal.      Palpations: Abdomen is soft.   Musculoskeletal:         General: Normal range of motion.   Skin:     General: Skin is warm and dry.   Neurological:      General: No focal deficit present.      Mental Status: She is alert and oriented to person, place, and time.   Psychiatric:         Mood and Affect: Mood normal.         Behavior: Behavior normal.         Thought Content: Thought content normal.         Judgment: Judgment normal.          Last Recorded Vitals  Blood pressure 142/72, pulse 84, temperature 38.1 °C (100.6 °F), temperature source Temporal, resp. rate 16, height 1.499 m (4' 11\"), weight 72.1 kg (159 lb), SpO2 95 %.    Relevant Results        Results for orders placed or performed during the hospital encounter of 01/18/24 (from the past 24 hour(s))   CBC and Auto Differential   Result Value Ref Range    WBC 16.0 (H) 4.4 - 11.3 x10*3/uL    nRBC 0.0 0.0 - 0.0 /100 WBCs    RBC 5.41 (H) 4.00 - 5.20 x10*6/uL    Hemoglobin 15.8 12.0 - 16.0 g/dL    Hematocrit 49.4 (H) 36.0 - 46.0 %    MCV 91 80 - 100 fL    MCH 29.2 26.0 - 34.0 pg    MCHC 32.0 32.0 - 36.0 g/dL    RDW 14.2 11.5 - 14.5 %    Platelets 217 150 - 450 x10*3/uL    Neutrophils % 80.8 40.0 - 80.0 %    Immature Granulocytes %, Automated 0.6 0.0 - 0.9 %    Lymphocytes % 11.4 13.0 - 44.0 %    Monocytes % 6.7 2.0 - 10.0 %    Eosinophils % 0.1 0.0 - 6.0 %    Basophils % 0.4 0.0 - 2.0 %    Neutrophils Absolute 12.94 (H) 1.60 - 5.50 x10*3/uL    Immature Granulocytes Absolute, Automated 0.09 0.00 - 0.50 x10*3/uL    Lymphocytes Absolute 1.83 0.80 - 3.00 x10*3/uL    Monocytes Absolute 1.08 (H) 0.05 - 0.80 x10*3/uL    Eosinophils Absolute 0.02 " 0.00 - 0.40 x10*3/uL    Basophils Absolute 0.06 0.00 - 0.10 x10*3/uL   Comprehensive metabolic panel   Result Value Ref Range    Glucose 186 (H) 74 - 99 mg/dL    Sodium 133 (L) 136 - 145 mmol/L    Potassium 3.8 3.5 - 5.3 mmol/L    Chloride 98 98 - 107 mmol/L    Bicarbonate 25 21 - 32 mmol/L    Anion Gap 14 10 - 20 mmol/L    Urea Nitrogen 20 6 - 23 mg/dL    Creatinine 1.05 0.50 - 1.05 mg/dL    eGFR 50 (L) >60 mL/min/1.73m*2    Calcium 9.0 8.6 - 10.3 mg/dL    Albumin 3.9 3.4 - 5.0 g/dL    Alkaline Phosphatase 78 33 - 136 U/L    Total Protein 7.9 6.4 - 8.2 g/dL    AST 13 9 - 39 U/L    Bilirubin, Total 1.4 (H) 0.0 - 1.2 mg/dL    ALT 9 7 - 45 U/L   Troponin I, High Sensitivity   Result Value Ref Range    Troponin I, High Sensitivity 9 0 - 13 ng/L   B-Type Natriuretic Peptide   Result Value Ref Range    BNP 59 0 - 99 pg/mL   Sars-CoV-2 and Influenza A/B PCR   Result Value Ref Range    Flu A Result Not Detected Not Detected    Flu B Result Not Detected Not Detected    Coronavirus 2019, PCR Not Detected Not Detected   RSV PCR   Result Value Ref Range    RSV PCR Not Detected Not Detected     CT angio chest for pulmonary embolism    Result Date: 1/18/2024  Interpreted By:  Finkelstein, Evan, STUDY: CT ANGIO CHEST FOR PULMONARY EMBOLISM;  1/18/2024 9:17 pm   INDICATION: Signs/Symptoms:Possible pulmonary infarct on chest x-ray, history of COPD.   COMPARISON: Chest radiograph 01/18/2024   ACCESSION NUMBER(S): [id number]   ORDERING CLINICIAN: TED BENITO   TECHNIQUE: Axial CTA images of the chest after intravenous administration of 65 mL Omnipaque 350 using CT angiographic technique. Coronal and sagittal images are reconstructed. MIP images were created and reviewed.   FINDINGS: CHEST WALL AND LOWER NECK: Within normal limits. ABDOMEN: No acute abnormality of the partially visualized abdomen.   VASCULAR: AORTA: No aortic aneurysm or dissection.  Moderate atherosclerotic disease. PULMONARY ARTERY: Normal caliber. Filling defect  within a segmental branch of the lingula best seen on axial image 118 series 607.   CHEST:   HEART: Normal size. No pericardial effusion. MEDIASTINUM AND INGRID: 1.4 cm AP window lymph node. 1.6 cm subcarinal lymph node. Numerous additional prominent mediastinal and hilar lymph nodes. Small hiatal hernia. LUNG, PLEURA, LARGE AIRWAYS: Ground-glass opacities are scattered diffusely throughout the lungs. More consolidative opacities are present within the left upper lobe and left lower lobe with a somewhat wedge-shaped opacity in the lingula. Left lower lobe nodular density measures approximately 8 mm image 198   BONES: No acute osseous abnormality.       Acute pulmonary embolus within a segmental branch of the lingula.   Ground-glass opacities scattered diffusely throughout the lungs with more consolidative opacities in the left upper lobe and left lower lobe concerning for pneumonia. 1 of the opacities in the lingula is somewhat wedge-shaped and may also represent a component of pulmonary infarction.   Left lower lobe nodular density measuring up to 8 mm. While findings may be related to the underlying likely infectious process, recommend repeat imaging after treatment to assess for resolution and/or stability.   Nonspecific enlarged mediastinal and hilar lymph nodes, which may be reactive.     MACRO: Evan Finkelstein discussed the significance and urgency of this critical finding by telephone with  TED BENITO on 1/18/2024 at 10:02 pm.  (**-RCF-**) Findings:  See findings.   Signed by: Evan Finkelstein 1/18/2024 10:05 PM Dictation workstation:   [id number]    XR chest 1 view    Result Date: 1/18/2024  Interpreted By:  Apolinar Howell, STUDY: XR CHEST 1 VIEW;  1/18/2024 7:53 pm   INDICATION: Signs/Symptoms:dizzy, hypoxia.   COMPARISON: Chest x-ray 03/25/2023   ACCESSION NUMBER(S): [id number]   ORDERING CLINICIAN: TED BENITO   FINDINGS: A loop recorder device overlies the left mid thorax.   CARDIOMEDIASTINAL  SILHOUETTE: Cardiomediastinal silhouette is stable in size and configuration. Atherosclerotic calcification of the aorta.   LUNGS: There is somewhat wedge-shaped consolidative opacity in the left mid lung suspicious for developing airspace disease. Right lung is clear. Stable mild diffuse interstitial prominence, likely chronic. No effusion or pneumothorax.   ABDOMEN: No remarkable upper abdominal findings.   BONES: Degenerative changes of the spine. Generalized diffuse osteopenia.       Somewhat wedge-shaped consolidative opacity in the left mid lung pulmonary infarct not excluded if there is clinical concern for pulmonary emboli further evaluation with dedicated PE CT can be considered. Continued radiographic follow-up is recommended to ensure complete resolution and exclude underlying mass lesion.   Mild interstitial prominence could be chronic or relate to component developing interstitial edema.   MACRO: None   Signed by: Apolinar Howell 1/18/2024 8:10 PM Dictation workstation:   [id number]        Assessment/Plan   Acute PE  - continue Xarelto  - continue oxygen 6LPM via [place], which she wears at home, inc if needed  - echocardiogram to evaluate for right heart strain     Pneumonia  - pt is on Amiodarone  - continue Ceftriaxone and Azithromycin, however, need to keep on telemetry, and check the Qtc.   - duonebs  - hycodan for cough     Atrial fibrillation  - telemetry  - Xarelto  - continue Amiodarone    KALPANA  - CPAP    HTN  - amlodipine    HLD  - Lipitor    DM2  - ISS       I spent 65 minutes in the professional and overall care of this patient.      Jeannette Ron MD

## 2024-01-19 NOTE — ED NOTES
VSS pt to 5th floor, denies pain or discomfort. Breakfast tray ordered and will have it delivered to her room upon arrival.      Kellie Talbot RN  01/19/24 [id number]

## 2024-01-19 NOTE — CONSULTS
Inpatient consult to Hematology  Consult performed by: DANIELLE Linder-CNP  Consult ordered by: Dolores Giang MD  Reason for consult: PE while on xarelto  Assessment/Recommendations: PE while taking Xarelto 20 mg PO daily for AF  - currently on Lovenox  - may consider Apixapan at discharge, discuss dose with pharmacy considering age and renal function  - patient has not seen her PCP in years- follow up with PCP regarding PE  - ensure all age appropriate cancer screening is up to date  - no need for hypercoagulable work up    Discussed with Dr. Carrasco and Dr. Giang  NO HEMATOLOGY COVERAGE OVER THE WEEKEND PLEASE CALL [facility] WITH QUESTIONS OF CONCERNS              Reason For Consult  PE while on xarelto    History Of Present Illness  Lissett Rodríguez is a 89 y.o. female presenting with dizziness X one day and fatigue for one week with associated lightheadedness.  PMH of afib (on Xarelto), HFpEF (55%), COPD (on 6 LPM oxygen 24/7), KALPANA, DM2, HTN, HLD, TIA, presenting with fatigue, body aches and cough. Reportedly felt fatigued for the past 1 week, unable to perform her usual activities at her assisted living facility c/o dry cough and body aches x 2 days.  She denies hemoptysis, shaking chills, fever, N/V/D, chest pain or LE edema.  No new oxygen requirement.      In the ED, her labs were significant for leukocytosis, WBC 16, ANC 12.9, otherwise unremarkable CBC w DIFF, BMP unremarkable, GFR . CTPE showed LLL and PACO PNA, acute PE a segmental branch of the lingula. She was given a therapeutic dose of Lovenox in the ED. Xarelto listed on her medication list, she lives in [facility], nurse passes meds, she is getting it at her facility. COVID, flu and RSV were negative, denied  sick contacts, recent travel, she did have COVID pneumonia in 3/2023.  Hematology is consulted for possible xarelto failure and AC recommendations.       Past Medical History  COPD, KALPANA, HTN, HFpEF, DM2, AF,  mediastinal lymphanenopathy  noted on imaging since 2022, TIA    Surgical History  She has a past surgical history that includes Appendectomy (05/15/2013); Cataract extraction (05/15/2013); Hysterectomy (05/15/2013); Other surgical history (05/15/2013); Other surgical history (02/28/2019); Colonoscopy (04/11/2014); Total knee arthroplasty (05/29/2013); MR angio head wo IV contrast (1/8/2016); MR angio neck wo IV contrast (1/8/2016); MR angio head wo IV contrast (10/1/2012); and MR angio neck w IV contrast (10/1/2012).     Social History  She reports that she has never smoked. She has never used smokeless tobacco. She reports that she does not drink alcohol and does not use drugs.  - Lives in [facility] alone, son lives nearby who checks in regularly   - 14 grandchildren and 7 great-grandchildren       Family History  Family History   Problem Relation Name Age of Onset    Alzheimer's disease Mother      Heart failure Mother      Stroke Mother          ischemic stroke    Heart attack Father      Stroke Father          ischemic stroke    COPD Sister      Diabetes Sister          mellitus    Cancer Sister      Parkinsonism Sister      Other (previous cardiac prolems) Sister          Allergies  Lisinopril, Tetracyclines, Vancomycin, and Penicillins    Review of Systems   Constitutional:  Negative for activity change, appetite change, chills and fever.   Eyes:  Negative for visual disturbance.   Respiratory:  Positive for cough and shortness of breath. Negative for wheezing.    Cardiovascular:  Negative for chest pain, palpitations and leg swelling.   Gastrointestinal:  Negative for abdominal pain, nausea and vomiting.   Genitourinary:  Negative for dysuria and hematuria.   Musculoskeletal:  Positive for arthralgias. Negative for joint swelling and neck pain.   Neurological:  Positive for dizziness and light-headedness. Negative for syncope.   Hematological:  Bruises/bleeds easily.        Physical Exam  Vitals and nursing note reviewed.  "  Constitutional:       General: She is not in acute distress.     Appearance: She is ill-appearing. She is not toxic-appearing.   HENT:      Mouth/Throat:      Mouth: Mucous membranes are moist.   Cardiovascular:      Rate and Rhythm: Normal rate.   Pulmonary:      Effort: Pulmonary effort is normal.      Breath sounds: Normal breath sounds.   Abdominal:      Palpations: Abdomen is soft.      Tenderness: There is no abdominal tenderness.   Musculoskeletal:      Cervical back: No tenderness.      Right lower leg: Edema present.      Left lower leg: Edema present.   Lymphadenopathy:      Cervical: No cervical adenopathy.   Skin:     Coloration: Skin is pale.      Comments: fragile   Neurological:      General: No focal deficit present.      Mental Status: She is oriented to person, place, and time.          Last Recorded Vitals  Blood pressure 105/63, pulse 77, temperature 37.4 °C (99.4 °F), temperature source Oral, resp. rate 17, height 1.499 m (4' 11\"), weight 72.1 kg (159 lb), SpO2 95 %.    Relevant Results  Results for orders placed or performed during the hospital encounter of 01/18/24 (from the past 96 hour(s))   CBC and Auto Differential   Result Value Ref Range    WBC 16.0 (H) 4.4 - 11.3 x10*3/uL    nRBC 0.0 0.0 - 0.0 /100 WBCs    RBC 5.41 (H) 4.00 - 5.20 x10*6/uL    Hemoglobin 15.8 12.0 - 16.0 g/dL    Hematocrit 49.4 (H) 36.0 - 46.0 %    MCV 91 80 - 100 fL    MCH 29.2 26.0 - 34.0 pg    MCHC 32.0 32.0 - 36.0 g/dL    RDW 14.2 11.5 - 14.5 %    Platelets 217 150 - 450 x10*3/uL    Neutrophils % 80.8 40.0 - 80.0 %    Immature Granulocytes %, Automated 0.6 0.0 - 0.9 %    Lymphocytes % 11.4 13.0 - 44.0 %    Monocytes % 6.7 2.0 - 10.0 %    Eosinophils % 0.1 0.0 - 6.0 %    Basophils % 0.4 0.0 - 2.0 %    Neutrophils Absolute 12.94 (H) 1.60 - 5.50 x10*3/uL    Immature Granulocytes Absolute, Automated 0.09 0.00 - 0.50 x10*3/uL    Lymphocytes Absolute 1.83 0.80 - 3.00 x10*3/uL    Monocytes Absolute 1.08 (H) 0.05 - 0.80 " x10*3/uL    Eosinophils Absolute 0.02 0.00 - 0.40 x10*3/uL    Basophils Absolute 0.06 0.00 - 0.10 x10*3/uL   Comprehensive metabolic panel   Result Value Ref Range    Glucose 186 (H) 74 - 99 mg/dL    Sodium 133 (L) 136 - 145 mmol/L    Potassium 3.8 3.5 - 5.3 mmol/L    Chloride 98 98 - 107 mmol/L    Bicarbonate 25 21 - 32 mmol/L    Anion Gap 14 10 - 20 mmol/L    Urea Nitrogen 20 6 - 23 mg/dL    Creatinine 1.05 0.50 - 1.05 mg/dL    eGFR 50 (L) >60 mL/min/1.73m*2    Calcium 9.0 8.6 - 10.3 mg/dL    Albumin 3.9 3.4 - 5.0 g/dL    Alkaline Phosphatase 78 33 - 136 U/L    Total Protein 7.9 6.4 - 8.2 g/dL    AST 13 9 - 39 U/L    Bilirubin, Total 1.4 (H) 0.0 - 1.2 mg/dL    ALT 9 7 - 45 U/L   Troponin I, High Sensitivity   Result Value Ref Range    Troponin I, High Sensitivity 9 0 - 13 ng/L   B-Type Natriuretic Peptide   Result Value Ref Range    BNP 59 0 - 99 pg/mL   ECG 12 lead   Result Value Ref Range    Ventricular Rate 85 BPM    Atrial Rate 85 BPM    UT Interval 156 ms    QRS Duration 90 ms    QT Interval 382 ms    QTC Calculation(Bazett) 454 ms    P Axis 53 degrees    R Axis -10 degrees    T Axis 70 degrees    QRS Count 14 beats    Q Onset 226 ms    P Onset 148 ms    P Offset 204 ms    T Offset 417 ms    QTC Fredericia 429 ms   Sars-CoV-2 and Influenza A/B PCR   Result Value Ref Range    Flu A Result Not Detected Not Detected    Flu B Result Not Detected Not Detected    Coronavirus 2019, PCR Not Detected Not Detected   RSV PCR   Result Value Ref Range    RSV PCR Not Detected Not Detected   CBC   Result Value Ref Range    WBC 13.4 (H) 4.4 - 11.3 x10*3/uL    nRBC 0.0 0.0 - 0.0 /100 WBCs    RBC 4.46 4.00 - 5.20 x10*6/uL    Hemoglobin 12.6 12.0 - 16.0 g/dL    Hematocrit 40.5 36.0 - 46.0 %    MCV 91 80 - 100 fL    MCH 28.3 26.0 - 34.0 pg    MCHC 31.1 (L) 32.0 - 36.0 g/dL    RDW 14.1 11.5 - 14.5 %    Platelets 170 150 - 450 x10*3/uL   Basic metabolic panel   Result Value Ref Range    Glucose 158 (H) 74 - 99 mg/dL    Sodium 136  136 - 145 mmol/L    Potassium 3.3 (L) 3.5 - 5.3 mmol/L    Chloride 103 98 - 107 mmol/L    Bicarbonate 24 21 - 32 mmol/L    Anion Gap 12 10 - 20 mmol/L    Urea Nitrogen 17 6 - 23 mg/dL    Creatinine 0.92 0.50 - 1.05 mg/dL    eGFR 59 (L) >60 mL/min/1.73m*2    Calcium 7.7 (L) 8.6 - 10.3 mg/dL   Hemoglobin A1C   Result Value Ref Range    Hemoglobin A1C 8.2 (H) see below %    Estimated Average Glucose 189 Not Established mg/dL   Transthoracic Echo (TTE) Complete   Result Value Ref Range    BSA 1.73 m2   POCT GLUCOSE   Result Value Ref Range    POCT Glucose 157 (H) 74 - 99 mg/dL   POCT GLUCOSE   Result Value Ref Range    POCT Glucose 235 (H) 74 - 99 mg/dL     === 01/18/24 ===    CT ANGIO CHEST FOR PULMONARY EMBOLISM    - Impression -  Acute pulmonary embolus within a segmental branch of the lingula.    Ground-glass opacities scattered diffusely throughout the lungs with  more consolidative opacities in the left upper lobe and left lower  lobe concerning for pneumonia. 1 of the opacities in the lingula is  somewhat wedge-shaped and may also represent a component of pulmonary  infarction.    Left lower lobe nodular density measuring up to 8 mm. While findings  may be related to the underlying likely infectious process, recommend  repeat imaging after treatment to assess for resolution and/or  stability.    Nonspecific enlarged mediastinal and hilar lymph nodes, which may be  reactive.      MACRO:  Evan Finkelstein discussed the significance and urgency of this  critical finding by telephone with  TED BENITO on 1/18/2024 at 10:02  pm.  (**-RCF-**) Findings:  See findings.    Signed by: Evan Finkelstein 1/18/2024 10:05 PM  Dictation workstation:   [id number]           I spent 45 minutes in the professional and overall care of this patient.

## 2024-01-19 NOTE — PROGRESS NOTES
Lissett Rodríguez is a 89 y.o. female on day 1 of admission presenting with Community acquired pneumonia, unspecified laterality.      Subjective   Pt seen and examined.        Objective     Last Recorded Vitals  /63 (BP Location: Right arm, Patient Position: Sitting)   Pulse 77   Temp 37.4 °C (99.4 °F) (Oral)   Resp 17   Wt 72.1 kg (159 lb)   SpO2 95%   Intake/Output last 3 Shifts:    Intake/Output Summary (Last 24 hours) at 1/19/2024 1142  Last data filed at 1/18/2024 2222  Gross per 24 hour   Intake 300 ml   Output --   Net 300 ml       Admission Weight  Weight: 72.1 kg (159 lb) (01/18/24 1914)    Daily Weight  01/18/24 : 72.1 kg (159 lb)      Physical Exam    Constitutional: No acute distress, awake, alert  Head/Neck: Neck supple  Respiratory/Thorax: Lungs are Clear to auscultation  Cardiovascular: Regular, rate and rhythm,  2+ equal pulses of the extremities, normal S 1and S 2  Gastrointestinal: Nondistended, soft, non-tender, no rebound tenderness or guarding  Extremities: No edema. No calf tenderness.  Neurological: Awake and alert. No focal neurological deficits  Psychological: Appropriate mood and behavior    Relevant Results  Results for orders placed or performed during the hospital encounter of 01/18/24 (from the past 24 hour(s))   CBC and Auto Differential   Result Value Ref Range    WBC 16.0 (H) 4.4 - 11.3 x10*3/uL    nRBC 0.0 0.0 - 0.0 /100 WBCs    RBC 5.41 (H) 4.00 - 5.20 x10*6/uL    Hemoglobin 15.8 12.0 - 16.0 g/dL    Hematocrit 49.4 (H) 36.0 - 46.0 %    MCV 91 80 - 100 fL    MCH 29.2 26.0 - 34.0 pg    MCHC 32.0 32.0 - 36.0 g/dL    RDW 14.2 11.5 - 14.5 %    Platelets 217 150 - 450 x10*3/uL    Neutrophils % 80.8 40.0 - 80.0 %    Immature Granulocytes %, Automated 0.6 0.0 - 0.9 %    Lymphocytes % 11.4 13.0 - 44.0 %    Monocytes % 6.7 2.0 - 10.0 %    Eosinophils % 0.1 0.0 - 6.0 %    Basophils % 0.4 0.0 - 2.0 %    Neutrophils Absolute 12.94 (H) 1.60 - 5.50 x10*3/uL    Immature Granulocytes  Absolute, Automated 0.09 0.00 - 0.50 x10*3/uL    Lymphocytes Absolute 1.83 0.80 - 3.00 x10*3/uL    Monocytes Absolute 1.08 (H) 0.05 - 0.80 x10*3/uL    Eosinophils Absolute 0.02 0.00 - 0.40 x10*3/uL    Basophils Absolute 0.06 0.00 - 0.10 x10*3/uL   Comprehensive metabolic panel   Result Value Ref Range    Glucose 186 (H) 74 - 99 mg/dL    Sodium 133 (L) 136 - 145 mmol/L    Potassium 3.8 3.5 - 5.3 mmol/L    Chloride 98 98 - 107 mmol/L    Bicarbonate 25 21 - 32 mmol/L    Anion Gap 14 10 - 20 mmol/L    Urea Nitrogen 20 6 - 23 mg/dL    Creatinine 1.05 0.50 - 1.05 mg/dL    eGFR 50 (L) >60 mL/min/1.73m*2    Calcium 9.0 8.6 - 10.3 mg/dL    Albumin 3.9 3.4 - 5.0 g/dL    Alkaline Phosphatase 78 33 - 136 U/L    Total Protein 7.9 6.4 - 8.2 g/dL    AST 13 9 - 39 U/L    Bilirubin, Total 1.4 (H) 0.0 - 1.2 mg/dL    ALT 9 7 - 45 U/L   Troponin I, High Sensitivity   Result Value Ref Range    Troponin I, High Sensitivity 9 0 - 13 ng/L   B-Type Natriuretic Peptide   Result Value Ref Range    BNP 59 0 - 99 pg/mL   ECG 12 lead   Result Value Ref Range    Ventricular Rate 85 BPM    Atrial Rate 85 BPM    CA Interval 156 ms    QRS Duration 90 ms    QT Interval 382 ms    QTC Calculation(Bazett) 454 ms    P Axis 53 degrees    R Axis -10 degrees    T Axis 70 degrees    QRS Count 14 beats    Q Onset 226 ms    P Onset 148 ms    P Offset 204 ms    T Offset 417 ms    QTC Fredericia 429 ms   Sars-CoV-2 and Influenza A/B PCR   Result Value Ref Range    Flu A Result Not Detected Not Detected    Flu B Result Not Detected Not Detected    Coronavirus 2019, PCR Not Detected Not Detected   RSV PCR   Result Value Ref Range    RSV PCR Not Detected Not Detected   CBC   Result Value Ref Range    WBC 13.4 (H) 4.4 - 11.3 x10*3/uL    nRBC 0.0 0.0 - 0.0 /100 WBCs    RBC 4.46 4.00 - 5.20 x10*6/uL    Hemoglobin 12.6 12.0 - 16.0 g/dL    Hematocrit 40.5 36.0 - 46.0 %    MCV 91 80 - 100 fL    MCH 28.3 26.0 - 34.0 pg    MCHC 31.1 (L) 32.0 - 36.0 g/dL    RDW 14.1 11.5 -  14.5 %    Platelets 170 150 - 450 x10*3/uL   Basic metabolic panel   Result Value Ref Range    Glucose 158 (H) 74 - 99 mg/dL    Sodium 136 136 - 145 mmol/L    Potassium 3.3 (L) 3.5 - 5.3 mmol/L    Chloride 103 98 - 107 mmol/L    Bicarbonate 24 21 - 32 mmol/L    Anion Gap 12 10 - 20 mmol/L    Urea Nitrogen 17 6 - 23 mg/dL    Creatinine 0.92 0.50 - 1.05 mg/dL    eGFR 59 (L) >60 mL/min/1.73m*2    Calcium 7.7 (L) 8.6 - 10.3 mg/dL   Hemoglobin A1C   Result Value Ref Range    Hemoglobin A1C 8.2 (H) see below %    Estimated Average Glucose 189 Not Established mg/dL        CT angio chest for pulmonary embolism   Final Result   Acute pulmonary embolus within a segmental branch of the lingula.        Ground-glass opacities scattered diffusely throughout the lungs with   more consolidative opacities in the left upper lobe and left lower   lobe concerning for pneumonia. 1 of the opacities in the lingula is   somewhat wedge-shaped and may also represent a component of pulmonary   infarction.        Left lower lobe nodular density measuring up to 8 mm. While findings   may be related to the underlying likely infectious process, recommend   repeat imaging after treatment to assess for resolution and/or   stability.        Nonspecific enlarged mediastinal and hilar lymph nodes, which may be   reactive.             MACRO:   Evan Finkelstein discussed the significance and urgency of this   critical finding by telephone with  TED BENITO on 1/18/2024 at 10:02   pm.  (**-RCF-**) Findings:  See findings.        Signed by: Evan Finkelstein 1/18/2024 10:05 PM   Dictation workstation:   [id number]      XR chest 1 view   Final Result   Somewhat wedge-shaped consolidative opacity in the left mid lung   pulmonary infarct not excluded if there is clinical concern for   pulmonary emboli further evaluation with dedicated PE CT can be   considered. Continued radiographic follow-up is recommended to ensure   complete resolution and exclude  underlying mass lesion.        Mild interstitial prominence could be chronic or relate to component   developing interstitial edema.        MACRO:   None        Signed by: Apolinar Howell 1/18/2024 8:10 PM   Dictation workstation:   [id number]      Transthoracic Echo (TTE) Complete    (Results Pending)       Scheduled medications  amiodarone, 100 mg, oral, Daily  amLODIPine, 2.5 mg, oral, Daily  atorvastatin, 40 mg, oral, Nightly  azithromycin, 500 mg, intravenous, q24h  budesonide, 0.5 mg, nebulization, BID  cefTRIAXone, 1 g, intravenous, q24h  cholecalciferol, 2,000 Units, oral, Daily  formoterol, 20 mcg, nebulization, BID  gabapentin, 200 mg, oral, TID  guaiFENesin, 600 mg, oral, BID  insulin lispro, 0-10 Units, subcutaneous, TID with meals  ipratropium-albuteroL, 3 mL, nebulization, 4x daily  melatonin, 3 mg, oral, Daily  oxybutynin, 5 mg, oral, Daily  polyethylene glycol, 17 g, oral, Daily  rivaroxaban, 20 mg, oral, Daily  sennosides, 1 tablet, oral, BID  tiotropium, 2 Inhalation, inhalation, Daily      Continuous medications  oxygen, 6 L/min      PRN medications  PRN medications: acetaminophen, acetaminophen, cyclobenzaprine, dextrose 10 % in water (D10W), dextrose, glucagon, guaiFENesin, ipratropium-albuteroL, ondansetron ODT **OR** ondansetron, oxygen, oxygen         Assessment/Plan                  Principal Problem:    Community acquired pneumonia, unspecified laterality    Acute PE  - continue Xarelto  - continue oxygen 6LPM via NC, which she wears at home, inc if needed  - echocardiogram to evaluate for right heart strain   -hematology consult     Pneumonia  - pt is on Amiodarone  - continue Ceftriaxone and Azithromycin, however, need to keep on telemetry, and check the Qtc.   - duonebs  - hycodan for cough      Atrial fibrillation  - telemetry  - Xarelto  - continue Amiodarone     KALPANA  - CPAP     HTN  - amlodipine     HLD  - Lipitor     DM2  - ISS              Dolores Giang MD

## 2024-01-19 NOTE — PROGRESS NOTES
Physical Therapy    Physical Therapy Evaluation    Patient Name: Lissett Rodríguze  MRN: [id number]  Today's Date: 1/19/2024   Time Calculation  Start Time: 1326  Stop Time: 1337  Time Calculation (min): 11 min    Assessment/Plan   PT Assessment  PT Assessment Results: Decreased strength, Decreased endurance, Impaired balance, Decreased mobility, Impaired judgement, Decreased safety awareness, Impaired hearing  Rehab Prognosis: Good  End of Session Communication: Bedside nurse  End of Session Patient Position: Alarm on, Up in chair  IP OR SWING BED PT PLAN  Inpatient or Swing Bed: Inpatient  PT Plan  Treatment/Interventions: Transfer training, Gait training, Stair training, Balance training, Strengthening, Endurance training, Range of motion, Therapeutic exercise, Therapeutic activity, Home exercise program  PT Plan: Skilled PT  PT Frequency: 3 times per week  PT Discharge Recommendations: Low intensity level of continued care  PT Recommended Transfer Status: Assist x1  PT - OK to Discharge: Yes (PT POC established)      Subjective   General Visit Information:  General  Reason for Referral: PNA  Referred By: Padmaja  Past Medical History Relevant to Rehab: afib (on Xarelto), HFpEF (55%), COPD (on 6 LPM oxygen 24/7), KALPANA, DM2, HTN, HLD, TIA  Co-Treatment: OT  Co-Treatment Reason: To increase patient safety, transfer ability, and participation  Prior to Session Communication: Bedside nurse  Patient Position Received: Bed, 3 rail up, Alarm on  General Comment: Pt pleasant and agreeable to eval.  Home Living:  Home Living  Type of Home: Independent living  Lives With: Alone  Home Adaptive Equipment: Cane (rollator)  Home Layout: One level  Home Access: Elevator  Prior Level of Function:  Prior Function Per Pt/Caregiver Report  Level of [place]: Independent with ADLs and functional transfers  Receives Help From:  (Facility staff)  ADL Assistance: Independent  Homemaking Assistance: Needs assistance  Ambulatory  Assistance: Independent (no AD for household distances, rollator for community distances)  Prior Function Comments: Denies any recent falls  Precautions:  Precautions  Hearing/Visual Limitations: Hard of hearing  Medical Precautions: Fall precautions  Vital Signs:       Objective   Pain:  Pain Assessment  Pain Assessment: 0-10  Pain Score: 0 - No pain  Cognition:  Cognition  Overall Cognitive Status: Within Functional Limits  Orientation Level: Oriented X4  Safety/Judgement: Exceptions to WFL  Insight: Moderate  Impulsive: Severely    General Assessments:  Activity Tolerance  Endurance: Tolerates less than 10 min exercise, no significant change in vital signs    Sensation  Light Touch: No apparent deficits    Postural Control  Postural Control: Within Functional Limits    Static Sitting Balance  Static Sitting-Balance Support: Feet supported, Bilateral upper extremity supported  Static Sitting-Level of Assistance: Independent  Dynamic Sitting Balance  Dynamic Sitting-Balance Support: Feet supported, Bilateral upper extremity supported  Dynamic Sitting-Comments: Independent    Static Standing Balance  Static Standing-Balance Support: Bilateral upper extremity supported, No upper extremity supported  Static Standing-Level of Assistance: Contact guard  Dynamic Standing Balance  Dynamic Standing-Balance Support: No upper extremity supported  Dynamic Standing-Comments: Contact guard  Functional Assessments:  Bed Mobility  Bed Mobility: Yes  Bed Mobility 1  Bed Mobility 1: Supine to sitting, Sitting to supine  Level of Assistance 1: Independent    Transfers  Transfer: Yes  Transfer 1  Technique 1: Sit to stand, Stand to sit  Transfer Level of Assistance 1: Close supervision    Ambulation/Gait Training  Ambulation/Gait Training Performed: Yes  Ambulation/Gait Training 1  Surface 1: Level tile  Device 1: Rolling walker, No device  Assistance 1: Contact guard, Minimum assistance  Comments/Distance (ft) 1: 10 ft + 30 ft. Pt  ambulates initially with no AD with CGA-min A due to LOB. Pt is extremely impulsive, increasing unsteadiness with cues to slow down. Pt trials walker use frequrently running into furniture with continued impulsivity. Max cues to increase safety. Recommend continued walker use for safety.  Extremity/Trunk Assessments:  RUE   RUE : Within Functional Limits  LUE   LUE: Within Functional Limits  RLE   RLE : Within Functional Limits  LLE   LLE : Within Functional Limits  Outcome Measures:  [facility] Basic Mobility  Turning from your back to your side while in a flat bed without using bedrails: None  Moving from lying on your back to sitting on the side of a flat bed without using bedrails: None  Moving to and from bed to chair (including a wheelchair): A little  Standing up from a chair using your arms (e.g. wheelchair or bedside chair): A little  To walk in hospital room: A little  Climbing 3-5 steps with railing: A little  Basic Mobility - Total Score: 20    Encounter Problems       Encounter Problems (Active)       Balance       complete all mobility with normal balance while dual tasking, negotiating in a dynamic environment, carrying items, etc., with proactive and reactive static and dynamic standing and sitting tasks, with mod I and a RW, >15 minutes.       Start:  01/19/24    Expected End:  02/02/24               Mobility       STG - Patient will ambulate 250 ft with a RW mod I.        Start:  01/19/24    Expected End:  02/02/24               Transfers       STG - Patient will transfer sit to and from stand mod I with a RW.        Start:  01/19/24    Expected End:  02/02/24                   Education Documentation  Body Mechanics, taught by Eneida Voss PT at 1/19/2024  2:06 PM.  Learner: Patient  Readiness: Acceptance  Method: Explanation  Response: Verbalizes Understanding    Mobility Training, taught by Eneida Voss, PT at 1/19/2024  2:06 PM.  Learner: Patient  Readiness: Acceptance  Method:  Explanation  Response: Verbalizes Understanding    Education Comments  No comments found.

## 2024-01-19 NOTE — PROGRESS NOTES
Pharmacy Medication History Review    Lissett Rodríguez is a 89 y.o. female admitted for Community acquired pneumonia, unspecified laterality. Pharmacy reviewed the patient's [id number] medications and allergies for accuracy.    The list below reflectives the updated PTA list. Please review each medication in order reconciliation for additional clarification and justification.  Medications Prior to Admission   Medication Sig Dispense Refill Last Dose    acetaminophen (Tylenol) 325 mg tablet Take 1 tablet (325 mg) by mouth every 6 hours if needed for mild pain (1 - 3).       albuterol 90 mcg/actuation inhaler INHALE 1 TO 2 PUFFS EVERY 4 TO 6 HOURS AS NEEDED.       amiodarone (Pacerone) 100 mg tablet Take 1 tablet (100 mg) by mouth once daily.   1/18/2024    amLODIPine (Norvasc) 2.5 mg tablet Take 1 tablet (2.5 mg) by mouth once daily.   1/18/2024    atorvastatin (Lipitor) 40 mg tablet Take 1 tablet (40 mg) by mouth once daily at bedtime.   1/18/2024 at pm    blood sugar diagnostic (Blood Glucose Test) strip TEST 3 TIMES DAILY.       cholecalciferol (Vitamin D-3) 50 mcg (2,000 unit) capsule Take 1 tablet by mouth once daily.       cyclobenzaprine (Flexeril) 10 mg tablet Take 1 tablet (10 mg) by mouth 3 times a day as needed.       dapagliflozin (Farxiga) 5 mg Take 1 tablet (5 mg) by mouth once daily.   1/18/2024    flash glucose sensor (FREESTYLE BUSTER 14 DAY SENSOR [facility] Use to check blood sugar once daily       furosemide (Lasix) 40 mg tablet Take 1 tablet (40 mg) by mouth once daily.   1/18/2024    gabapentin (Neurontin) 300 mg capsule Take 1 capsule (300 mg) by mouth 3 times a day.   1/18/2024    guaiFENesin (Mucinex) 600 mg 12 hr tablet Take 1 tablet (600 mg) by mouth twice a day.   1/18/2024    oxybutynin (Ditropan) 5 mg tablet Take 1 tablet (5 mg) by mouth once daily.   1/18/2024    oxygen (O2) gas therapy Inhale 6 L/min continuously.       potassium chloride CR 20 mEq ER tablet Take 1 tablet (20 mEq) by  mouth once daily. Do not crush or chew.   1/18/2024    rivaroxaban (Xarelto) 20 mg tablet Take 1 tablet (20 mg) by mouth once daily.   1/18/2024    sennosides (Senokot) 8.6 mg tablet Take 1 tablet (8.6 mg) by mouth once daily as needed.       tiotropium (Spiriva with HandiHaler) 18 mcg inhalation capsule INHALE CONTENTS OF 1 CAPSULE ONCE DAILY.   1/18/2024       Patient came from [facility], came with a Medication list      The list below reflectives the updated allergy list. Please review each documented allergy for additional clarification and justification.  Allergies  Reviewed by Den Gamboa, BAY on 1/18/2024        Severity Reactions Comments    Lisinopril Not Specified Angioedema, Swelling     Tetracyclines Not Specified Hives     Vancomycin Not Specified Unknown     Penicillins Low Hives, Other Tetramycin            Below are additional concerns with the patient's PTA list.      Shruti Sanders CPhT

## 2024-01-20 LAB
ANION GAP SERPL CALC-SCNC: 14 MMOL/L (ref 10–20)
BUN SERPL-MCNC: 18 MG/DL (ref 6–23)
CALCIUM SERPL-MCNC: 8.5 MG/DL (ref 8.6–10.3)
CHLORIDE SERPL-SCNC: 100 MMOL/L (ref 98–107)
CO2 SERPL-SCNC: 25 MMOL/L (ref 21–32)
CREAT SERPL-MCNC: 0.91 MG/DL (ref 0.5–1.05)
EGFRCR SERPLBLD CKD-EPI 2021: 60 ML/MIN/1.73M*2
ERYTHROCYTE [DISTWIDTH] IN BLOOD BY AUTOMATED COUNT: 13.8 % (ref 11.5–14.5)
GLUCOSE BLD MANUAL STRIP-MCNC: 179 MG/DL (ref 74–99)
GLUCOSE BLD MANUAL STRIP-MCNC: 219 MG/DL (ref 74–99)
GLUCOSE BLD MANUAL STRIP-MCNC: 237 MG/DL (ref 74–99)
GLUCOSE SERPL-MCNC: 154 MG/DL (ref 74–99)
HCT VFR BLD AUTO: 40.6 % (ref 36–46)
HGB BLD-MCNC: 12.7 G/DL (ref 12–16)
MCH RBC QN AUTO: 28.9 PG (ref 26–34)
MCHC RBC AUTO-ENTMCNC: 31.3 G/DL (ref 32–36)
MCV RBC AUTO: 92 FL (ref 80–100)
NRBC BLD-RTO: 0 /100 WBCS (ref 0–0)
PLATELET # BLD AUTO: 192 X10*3/UL (ref 150–450)
POTASSIUM SERPL-SCNC: 4 MMOL/L (ref 3.5–5.3)
RBC # BLD AUTO: 4.4 X10*6/UL (ref 4–5.2)
SODIUM SERPL-SCNC: 135 MMOL/L (ref 136–145)
WBC # BLD AUTO: 11.2 X10*3/UL (ref 4.4–11.3)

## 2024-01-20 PROCEDURE — 2500000001 HC RX 250 WO HCPCS SELF ADMINISTERED DRUGS (ALT 637 FOR MEDICARE OP): Performed by: INTERNAL MEDICINE

## 2024-01-20 PROCEDURE — 2500000001 HC RX 250 WO HCPCS SELF ADMINISTERED DRUGS (ALT 637 FOR MEDICARE OP): Performed by: HOSPITALIST

## 2024-01-20 PROCEDURE — 94660 CPAP INITIATION&MGMT: CPT

## 2024-01-20 PROCEDURE — 2500000004 HC RX 250 GENERAL PHARMACY W/ HCPCS (ALT 636 FOR OP/ED): Performed by: HOSPITALIST

## 2024-01-20 PROCEDURE — 80048 BASIC METABOLIC PNL TOTAL CA: CPT | Performed by: INTERNAL MEDICINE

## 2024-01-20 PROCEDURE — 82947 ASSAY GLUCOSE BLOOD QUANT: CPT

## 2024-01-20 PROCEDURE — 94640 AIRWAY INHALATION TREATMENT: CPT | Mod: MUE

## 2024-01-20 PROCEDURE — 85027 COMPLETE CBC AUTOMATED: CPT | Performed by: INTERNAL MEDICINE

## 2024-01-20 PROCEDURE — 2500000002 HC RX 250 W HCPCS SELF ADMINISTERED DRUGS (ALT 637 FOR MEDICARE OP, ALT 636 FOR OP/ED): Performed by: PHARMACIST

## 2024-01-20 PROCEDURE — 94667 MNPJ CHEST WALL 1ST: CPT

## 2024-01-20 PROCEDURE — 36415 COLL VENOUS BLD VENIPUNCTURE: CPT | Performed by: INTERNAL MEDICINE

## 2024-01-20 PROCEDURE — 99233 SBSQ HOSP IP/OBS HIGH 50: CPT | Performed by: INTERNAL MEDICINE

## 2024-01-20 PROCEDURE — 94668 MNPJ CHEST WALL SBSQ: CPT

## 2024-01-20 PROCEDURE — 9420000001 HC RT PATIENT EDUCATION 5 MIN

## 2024-01-20 PROCEDURE — 2500000002 HC RX 250 W HCPCS SELF ADMINISTERED DRUGS (ALT 637 FOR MEDICARE OP, ALT 636 FOR OP/ED): Mod: MUE | Performed by: HOSPITALIST

## 2024-01-20 PROCEDURE — 2500000005 HC RX 250 GENERAL PHARMACY W/O HCPCS: Performed by: HOSPITALIST

## 2024-01-20 PROCEDURE — 1200000002 HC GENERAL ROOM WITH TELEMETRY DAILY

## 2024-01-20 RX ADMIN — Medication 2000 UNITS: at 08:32

## 2024-01-20 RX ADMIN — IPRATROPIUM BROMIDE AND ALBUTEROL SULFATE 3 ML: 2.5; .5 SOLUTION RESPIRATORY (INHALATION) at 07:51

## 2024-01-20 RX ADMIN — AZITHROMYCIN MONOHYDRATE 500 MG: 500 INJECTION, POWDER, LYOPHILIZED, FOR SOLUTION INTRAVENOUS at 23:40

## 2024-01-20 RX ADMIN — Medication 12 L/MIN: at 07:51

## 2024-01-20 RX ADMIN — GUAIFENESIN 600 MG: 600 TABLET, EXTENDED RELEASE ORAL at 21:40

## 2024-01-20 RX ADMIN — FORMOTEROL FUMARATE DIHYDRATE 20 MCG: 20 SOLUTION RESPIRATORY (INHALATION) at 07:51

## 2024-01-20 RX ADMIN — STANDARDIZED SENNA CONCENTRATE 8.6 MG: 8.6 TABLET ORAL at 08:33

## 2024-01-20 RX ADMIN — ATORVASTATIN CALCIUM 40 MG: 40 TABLET, FILM COATED ORAL at 21:40

## 2024-01-20 RX ADMIN — BUDESONIDE 0.5 MG: 0.5 INHALANT ORAL at 20:07

## 2024-01-20 RX ADMIN — POLYETHYLENE GLYCOL 3350 17 G: 17 POWDER, FOR SOLUTION ORAL at 08:32

## 2024-01-20 RX ADMIN — BUDESONIDE 0.5 MG: 0.5 INHALANT ORAL at 07:51

## 2024-01-20 RX ADMIN — CEFTRIAXONE SODIUM 1 G: 1 INJECTION, SOLUTION INTRAVENOUS at 23:40

## 2024-01-20 RX ADMIN — IPRATROPIUM BROMIDE AND ALBUTEROL SULFATE 3 ML: 2.5; .5 SOLUTION RESPIRATORY (INHALATION) at 14:35

## 2024-01-20 RX ADMIN — ACETAMINOPHEN 650 MG: 325 TABLET ORAL at 21:52

## 2024-01-20 RX ADMIN — INSULIN LISPRO 4 UNITS: 100 INJECTION, SOLUTION INTRAVENOUS; SUBCUTANEOUS at 18:27

## 2024-01-20 RX ADMIN — GABAPENTIN 200 MG: 100 CAPSULE ORAL at 08:33

## 2024-01-20 RX ADMIN — TIOTROPIUM BROMIDE INHALATION SPRAY 2 PUFF: 3.12 SPRAY, METERED RESPIRATORY (INHALATION) at 07:57

## 2024-01-20 RX ADMIN — FORMOTEROL FUMARATE DIHYDRATE 20 MCG: 20 SOLUTION RESPIRATORY (INHALATION) at 20:07

## 2024-01-20 RX ADMIN — AMIODARONE HYDROCHLORIDE 100 MG: 200 TABLET ORAL at 08:33

## 2024-01-20 RX ADMIN — GABAPENTIN 200 MG: 100 CAPSULE ORAL at 21:40

## 2024-01-20 RX ADMIN — OXYBUTYNIN CHLORIDE 5 MG: 5 TABLET ORAL at 08:32

## 2024-01-20 RX ADMIN — GUAIFENESIN 600 MG: 600 TABLET, EXTENDED RELEASE ORAL at 08:32

## 2024-01-20 RX ADMIN — IPRATROPIUM BROMIDE AND ALBUTEROL SULFATE 3 ML: 2.5; .5 SOLUTION RESPIRATORY (INHALATION) at 20:08

## 2024-01-20 RX ADMIN — GABAPENTIN 200 MG: 100 CAPSULE ORAL at 14:53

## 2024-01-20 RX ADMIN — INSULIN LISPRO 4 UNITS: 100 INJECTION, SOLUTION INTRAVENOUS; SUBCUTANEOUS at 12:58

## 2024-01-20 RX ADMIN — IPRATROPIUM BROMIDE AND ALBUTEROL SULFATE 3 ML: 2.5; .5 SOLUTION RESPIRATORY (INHALATION) at 11:24

## 2024-01-20 RX ADMIN — APIXABAN 10 MG: 5 TABLET, FILM COATED ORAL at 08:32

## 2024-01-20 RX ADMIN — APIXABAN 10 MG: 5 TABLET, FILM COATED ORAL at 21:40

## 2024-01-20 RX ADMIN — STANDARDIZED SENNA CONCENTRATE 8.6 MG: 8.6 TABLET ORAL at 21:40

## 2024-01-20 SDOH — SOCIAL STABILITY: SOCIAL INSECURITY: DO YOU FEEL ANYONE HAS EXPLOITED OR TAKEN ADVANTAGE OF YOU FINANCIALLY OR OF YOUR PERSONAL PROPERTY?: NO

## 2024-01-20 SDOH — SOCIAL STABILITY: SOCIAL INSECURITY: HAS ANYONE EVER THREATENED TO HURT YOUR FAMILY OR YOUR PETS?: NO

## 2024-01-20 SDOH — SOCIAL STABILITY: SOCIAL INSECURITY: ABUSE: ADULT

## 2024-01-20 SDOH — SOCIAL STABILITY: SOCIAL INSECURITY: DOES ANYONE TRY TO KEEP YOU FROM HAVING/CONTACTING OTHER FRIENDS OR DOING THINGS OUTSIDE YOUR HOME?: NO

## 2024-01-20 SDOH — SOCIAL STABILITY: SOCIAL INSECURITY: ARE THERE ANY APPARENT SIGNS OF INJURIES/BEHAVIORS THAT COULD BE RELATED TO ABUSE/NEGLECT?: NO

## 2024-01-20 SDOH — SOCIAL STABILITY: SOCIAL INSECURITY: WERE YOU ABLE TO COMPLETE ALL THE BEHAVIORAL HEALTH SCREENINGS?: YES

## 2024-01-20 SDOH — SOCIAL STABILITY: SOCIAL INSECURITY: HAVE YOU HAD THOUGHTS OF HARMING ANYONE ELSE?: NO

## 2024-01-20 SDOH — SOCIAL STABILITY: SOCIAL INSECURITY: DO YOU FEEL UNSAFE GOING BACK TO THE PLACE WHERE YOU ARE LIVING?: NO

## 2024-01-20 SDOH — SOCIAL STABILITY: SOCIAL INSECURITY: ARE YOU OR HAVE YOU BEEN THREATENED OR ABUSED PHYSICALLY, EMOTIONALLY, OR SEXUALLY BY ANYONE?: NO

## 2024-01-20 ASSESSMENT — COGNITIVE AND FUNCTIONAL STATUS - GENERAL
WALKING IN HOSPITAL ROOM: A LITTLE
MOVING TO AND FROM BED TO CHAIR: A LITTLE
DRESSING REGULAR UPPER BODY CLOTHING: A LITTLE
WALKING IN HOSPITAL ROOM: A LITTLE
CLIMB 3 TO 5 STEPS WITH RAILING: A LITTLE
CLIMB 3 TO 5 STEPS WITH RAILING: A LOT
MOBILITY SCORE: 21
HELP NEEDED FOR BATHING: A LITTLE
WALKING IN HOSPITAL ROOM: A LITTLE
DRESSING REGULAR LOWER BODY CLOTHING: A LITTLE
HELP NEEDED FOR BATHING: A LITTLE
DRESSING REGULAR UPPER BODY CLOTHING: A LITTLE
DAILY ACTIVITIY SCORE: 19
MOVING TO AND FROM BED TO CHAIR: A LITTLE
TOILETING: A LITTLE
DRESSING REGULAR UPPER BODY CLOTHING: A LITTLE
PATIENT BASELINE BEDBOUND: NO
TOILETING: A LITTLE
TOILETING: A LITTLE
PERSONAL GROOMING: A LITTLE
CLIMB 3 TO 5 STEPS WITH RAILING: A LITTLE
DRESSING REGULAR LOWER BODY CLOTHING: A LITTLE
STANDING UP FROM CHAIR USING ARMS: A LITTLE
PERSONAL GROOMING: A LITTLE
MOBILITY SCORE: 20
MOVING TO AND FROM BED TO CHAIR: A LITTLE
DAILY ACTIVITIY SCORE: 20
MOBILITY SCORE: 20
PERSONAL GROOMING: A LITTLE
DAILY ACTIVITIY SCORE: 19
DRESSING REGULAR LOWER BODY CLOTHING: A LITTLE

## 2024-01-20 ASSESSMENT — LIFESTYLE VARIABLES
SKIP TO QUESTIONS 9-10: 1
HOW OFTEN DO YOU HAVE A DRINK CONTAINING ALCOHOL: NEVER
AUDIT-C TOTAL SCORE: 0
PRESCIPTION_ABUSE_PAST_12_MONTHS: NO
HOW MANY STANDARD DRINKS CONTAINING ALCOHOL DO YOU HAVE ON A TYPICAL DAY: PATIENT DOES NOT DRINK
HOW OFTEN DO YOU HAVE 6 OR MORE DRINKS ON ONE OCCASION: NEVER
AUDIT-C TOTAL SCORE: 0
SUBSTANCE_ABUSE_PAST_12_MONTHS: NO

## 2024-01-20 ASSESSMENT — PAIN SCALES - GENERAL
PAINLEVEL_OUTOF10: 0 - NO PAIN
PAINLEVEL_OUTOF10: 0 - NO PAIN

## 2024-01-20 ASSESSMENT — PATIENT HEALTH QUESTIONNAIRE - PHQ9
1. LITTLE INTEREST OR PLEASURE IN DOING THINGS: NOT AT ALL
2. FEELING DOWN, DEPRESSED OR HOPELESS: NOT AT ALL
SUM OF ALL RESPONSES TO PHQ9 QUESTIONS 1 & 2: 0

## 2024-01-20 ASSESSMENT — PAIN - FUNCTIONAL ASSESSMENT: PAIN_FUNCTIONAL_ASSESSMENT: 0-10

## 2024-01-20 NOTE — CARE PLAN
Problem: Skin  Goal: Prevent/manage excess moisture  Outcome: Progressing  Flowsheets (Taken 1/20/2024 0336)  Prevent/manage excess moisture:   Cleanse incontinence/protect with barrier cream   Moisturize dry skin

## 2024-01-20 NOTE — CARE PLAN
The patient's goals for the shift include adequate oxygenation    The clinical goals for the shift include  adequate oxygenation

## 2024-01-20 NOTE — PROGRESS NOTES
Lissett Rodríguez is a 89 y.o. female on day 2 of admission presenting with Community acquired pneumonia, unspecified laterality.      Subjective   Pt seen and examined.        Objective     Last Recorded Vitals  /67 (BP Location: Right arm, Patient Position: Lying)   Pulse 77   Temp 37 °C (98.6 °F) (Temporal)   Resp 16   Wt 72.1 kg (159 lb)   SpO2 94%   Intake/Output last 3 Shifts:    Intake/Output Summary (Last 24 hours) at 1/20/2024 1012  Last data filed at 1/19/2024 2320  Gross per 24 hour   Intake --   Output 0 ml   Net 0 ml         Admission Weight  Weight: 72.1 kg (159 lb) (01/18/24 1914)    Daily Weight  01/19/24 : 72.1 kg (159 lb)      Physical Exam    Constitutional: No acute distress, awake, alert  Head/Neck: Neck supple  Respiratory/Thorax: Lungs are Clear to auscultation  Cardiovascular: Regular, rate and rhythm,  2+ equal pulses of the extremities, normal S 1and S 2  Gastrointestinal: Nondistended, soft, non-tender, no rebound tenderness or guarding  Extremities: No edema. No calf tenderness.  Neurological: Awake and alert. No focal neurological deficits  Psychological: Appropriate mood and behavior    Relevant Results  Results for orders placed or performed during the hospital encounter of 01/18/24 (from the past 24 hour(s))   Transthoracic Echo (TTE) Complete   Result Value Ref Range    BSA 1.73 m2   POCT GLUCOSE   Result Value Ref Range    POCT Glucose 157 (H) 74 - 99 mg/dL   POCT GLUCOSE   Result Value Ref Range    POCT Glucose 235 (H) 74 - 99 mg/dL   POCT GLUCOSE   Result Value Ref Range    POCT Glucose 169 (H) 74 - 99 mg/dL   CBC   Result Value Ref Range    WBC 11.2 4.4 - 11.3 x10*3/uL    nRBC 0.0 0.0 - 0.0 /100 WBCs    RBC 4.40 4.00 - 5.20 x10*6/uL    Hemoglobin 12.7 12.0 - 16.0 g/dL    Hematocrit 40.6 36.0 - 46.0 %    MCV 92 80 - 100 fL    MCH 28.9 26.0 - 34.0 pg    MCHC 31.3 (L) 32.0 - 36.0 g/dL    RDW 13.8 11.5 - 14.5 %    Platelets 192 150 - 450 x10*3/uL   Basic Metabolic Panel    Result Value Ref Range    Glucose 154 (H) 74 - 99 mg/dL    Sodium 135 (L) 136 - 145 mmol/L    Potassium 4.0 3.5 - 5.3 mmol/L    Chloride 100 98 - 107 mmol/L    Bicarbonate 25 21 - 32 mmol/L    Anion Gap 14 10 - 20 mmol/L    Urea Nitrogen 18 6 - 23 mg/dL    Creatinine 0.91 0.50 - 1.05 mg/dL    eGFR 60 (L) >60 mL/min/1.73m*2    Calcium 8.5 (L) 8.6 - 10.3 mg/dL   POCT GLUCOSE   Result Value Ref Range    POCT Glucose 179 (H) 74 - 99 mg/dL        Transthoracic Echo (TTE) Complete         CT angio chest for pulmonary embolism   Final Result   Acute pulmonary embolus within a segmental branch of the lingula.        Ground-glass opacities scattered diffusely throughout the lungs with   more consolidative opacities in the left upper lobe and left lower   lobe concerning for pneumonia. 1 of the opacities in the lingula is   somewhat wedge-shaped and may also represent a component of pulmonary   infarction.        Left lower lobe nodular density measuring up to 8 mm. While findings   may be related to the underlying likely infectious process, recommend   repeat imaging after treatment to assess for resolution and/or   stability.        Nonspecific enlarged mediastinal and hilar lymph nodes, which may be   reactive.             MACRO:   Evan Finkelstein discussed the significance and urgency of this   critical finding by telephone with  TED BENITO on 1/18/2024 at 10:02   pm.  (**-RCF-**) Findings:  See findings.        Signed by: Evan Finkelstein 1/18/2024 10:05 PM   Dictation workstation:   [id number]      XR chest 1 view   Final Result   Somewhat wedge-shaped consolidative opacity in the left mid lung   pulmonary infarct not excluded if there is clinical concern for   pulmonary emboli further evaluation with dedicated PE CT can be   considered. Continued radiographic follow-up is recommended to ensure   complete resolution and exclude underlying mass lesion.        Mild interstitial prominence could be chronic or relate  to component   developing interstitial edema.        MACRO:   None        Signed by: Apolinar Howell 1/18/2024 8:10 PM   Dictation workstation:   [id number]          Scheduled medications  amiodarone, 100 mg, oral, Daily  amLODIPine, 2.5 mg, oral, Daily  apixaban, 10 mg, oral, BID   Followed by  [START ON 1/26/2024] apixaban, 5 mg, oral, BID  atorvastatin, 40 mg, oral, Nightly  azithromycin, 500 mg, intravenous, q24h  budesonide, 0.5 mg, nebulization, BID  cefTRIAXone, 1 g, intravenous, q24h  cholecalciferol, 2,000 Units, oral, Daily  formoterol, 20 mcg, nebulization, BID  gabapentin, 200 mg, oral, TID  guaiFENesin, 600 mg, oral, BID  insulin lispro, 0-10 Units, subcutaneous, TID with meals  ipratropium-albuteroL, 3 mL, nebulization, 4x daily  melatonin, 3 mg, oral, Daily  oxybutynin, 5 mg, oral, Daily  polyethylene glycol, 17 g, oral, Daily  sennosides, 1 tablet, oral, BID  tiotropium, 2 Inhalation, inhalation, Daily      Continuous medications  oxygen, 6 L/min      PRN medications  PRN medications: acetaminophen, acetaminophen, cyclobenzaprine, dextrose 10 % in water (D10W), dextrose, glucagon, guaiFENesin, ipratropium-albuteroL, ondansetron ODT **OR** ondansetron, oxygen, oxygen         Assessment/Plan                  Principal Problem:    Community acquired pneumonia, unspecified laterality    Acute PE  - on 12 L high flow, uses 6 LPM at baseline  - echocardiogram to evaluate for right heart strain   -hematology consult  -switch to eliquis     Pneumonia  - pt is on Amiodarone  - continue Ceftriaxone and Azithromycin, however, need to keep on telemetry, and check the Qtc.   - duonebs  - hycodan for cough      Atrial fibrillation  - telemetry  - Xarelto  - continue Amiodarone     KALPANA  - CPAP     HTN  - amlodipine     HLD  - Lipitor     DM2  - ISS              Dolores Giang MD

## 2024-01-20 NOTE — PROGRESS NOTES
ALIREZA spoke with Provider. Pt was on 12L this morning and has been weaned to 6L. Plan is to return to [place] @ [facility] [phone]. Spoke with Jessi at [place] and provided update. Spoke with Son, Yaron [phone] and shared plan for return to [place] tomorrow.

## 2024-01-21 LAB
ANION GAP SERPL CALC-SCNC: 12 MMOL/L (ref 10–20)
BUN SERPL-MCNC: 18 MG/DL (ref 6–23)
CALCIUM SERPL-MCNC: 8.4 MG/DL (ref 8.6–10.3)
CHLORIDE SERPL-SCNC: 102 MMOL/L (ref 98–107)
CO2 SERPL-SCNC: 25 MMOL/L (ref 21–32)
CREAT SERPL-MCNC: 0.87 MG/DL (ref 0.5–1.05)
EGFRCR SERPLBLD CKD-EPI 2021: 63 ML/MIN/1.73M*2
ERYTHROCYTE [DISTWIDTH] IN BLOOD BY AUTOMATED COUNT: 13.7 % (ref 11.5–14.5)
GLUCOSE BLD MANUAL STRIP-MCNC: 150 MG/DL (ref 74–99)
GLUCOSE BLD MANUAL STRIP-MCNC: 156 MG/DL (ref 74–99)
GLUCOSE BLD MANUAL STRIP-MCNC: 189 MG/DL (ref 74–99)
GLUCOSE BLD MANUAL STRIP-MCNC: 211 MG/DL (ref 74–99)
GLUCOSE SERPL-MCNC: 169 MG/DL (ref 74–99)
HCT VFR BLD AUTO: 40.7 % (ref 36–46)
HGB BLD-MCNC: 12.9 G/DL (ref 12–16)
MCH RBC QN AUTO: 29.1 PG (ref 26–34)
MCHC RBC AUTO-ENTMCNC: 31.7 G/DL (ref 32–36)
MCV RBC AUTO: 92 FL (ref 80–100)
NRBC BLD-RTO: 0 /100 WBCS (ref 0–0)
PLATELET # BLD AUTO: 201 X10*3/UL (ref 150–450)
POTASSIUM SERPL-SCNC: 3.9 MMOL/L (ref 3.5–5.3)
RBC # BLD AUTO: 4.44 X10*6/UL (ref 4–5.2)
SODIUM SERPL-SCNC: 135 MMOL/L (ref 136–145)
WBC # BLD AUTO: 10.1 X10*3/UL (ref 4.4–11.3)

## 2024-01-21 PROCEDURE — 94640 AIRWAY INHALATION TREATMENT: CPT | Mod: MUE

## 2024-01-21 PROCEDURE — 94668 MNPJ CHEST WALL SBSQ: CPT

## 2024-01-21 PROCEDURE — 85027 COMPLETE CBC AUTOMATED: CPT | Performed by: INTERNAL MEDICINE

## 2024-01-21 PROCEDURE — 2500000002 HC RX 250 W HCPCS SELF ADMINISTERED DRUGS (ALT 637 FOR MEDICARE OP, ALT 636 FOR OP/ED): Performed by: PHARMACIST

## 2024-01-21 PROCEDURE — 82947 ASSAY GLUCOSE BLOOD QUANT: CPT

## 2024-01-21 PROCEDURE — 99233 SBSQ HOSP IP/OBS HIGH 50: CPT | Performed by: INTERNAL MEDICINE

## 2024-01-21 PROCEDURE — 2500000001 HC RX 250 WO HCPCS SELF ADMINISTERED DRUGS (ALT 637 FOR MEDICARE OP): Performed by: HOSPITALIST

## 2024-01-21 PROCEDURE — 36415 COLL VENOUS BLD VENIPUNCTURE: CPT | Performed by: INTERNAL MEDICINE

## 2024-01-21 PROCEDURE — 2500000002 HC RX 250 W HCPCS SELF ADMINISTERED DRUGS (ALT 637 FOR MEDICARE OP, ALT 636 FOR OP/ED): Performed by: HOSPITALIST

## 2024-01-21 PROCEDURE — 2500000001 HC RX 250 WO HCPCS SELF ADMINISTERED DRUGS (ALT 637 FOR MEDICARE OP): Performed by: INTERNAL MEDICINE

## 2024-01-21 PROCEDURE — 2500000004 HC RX 250 GENERAL PHARMACY W/ HCPCS (ALT 636 FOR OP/ED): Performed by: HOSPITALIST

## 2024-01-21 PROCEDURE — 1200000002 HC GENERAL ROOM WITH TELEMETRY DAILY

## 2024-01-21 PROCEDURE — 2500000005 HC RX 250 GENERAL PHARMACY W/O HCPCS: Performed by: HOSPITALIST

## 2024-01-21 PROCEDURE — 2500000005 HC RX 250 GENERAL PHARMACY W/O HCPCS: Performed by: INTERNAL MEDICINE

## 2024-01-21 PROCEDURE — 80048 BASIC METABOLIC PNL TOTAL CA: CPT | Performed by: INTERNAL MEDICINE

## 2024-01-21 RX ADMIN — STANDARDIZED SENNA CONCENTRATE 8.6 MG: 8.6 TABLET ORAL at 09:21

## 2024-01-21 RX ADMIN — GUAIFENESIN 600 MG: 600 TABLET, EXTENDED RELEASE ORAL at 21:57

## 2024-01-21 RX ADMIN — IPRATROPIUM BROMIDE AND ALBUTEROL SULFATE 3 ML: 2.5; .5 SOLUTION RESPIRATORY (INHALATION) at 14:30

## 2024-01-21 RX ADMIN — ATORVASTATIN CALCIUM 40 MG: 40 TABLET, FILM COATED ORAL at 21:57

## 2024-01-21 RX ADMIN — CEFTRIAXONE SODIUM 1 G: 1 INJECTION, SOLUTION INTRAVENOUS at 21:56

## 2024-01-21 RX ADMIN — APIXABAN 10 MG: 5 TABLET, FILM COATED ORAL at 21:57

## 2024-01-21 RX ADMIN — AMIODARONE HYDROCHLORIDE 100 MG: 200 TABLET ORAL at 09:20

## 2024-01-21 RX ADMIN — OXYBUTYNIN CHLORIDE 5 MG: 5 TABLET ORAL at 09:20

## 2024-01-21 RX ADMIN — IPRATROPIUM BROMIDE AND ALBUTEROL SULFATE 3 ML: 2.5; .5 SOLUTION RESPIRATORY (INHALATION) at 08:03

## 2024-01-21 RX ADMIN — GABAPENTIN 200 MG: 100 CAPSULE ORAL at 15:19

## 2024-01-21 RX ADMIN — Medication 11 L/MIN: at 08:04

## 2024-01-21 RX ADMIN — Medication 2000 UNITS: at 09:20

## 2024-01-21 RX ADMIN — APIXABAN 10 MG: 5 TABLET, FILM COATED ORAL at 09:21

## 2024-01-21 RX ADMIN — POLYETHYLENE GLYCOL 3350 17 G: 17 POWDER, FOR SOLUTION ORAL at 09:20

## 2024-01-21 RX ADMIN — AMLODIPINE BESYLATE 2.5 MG: 5 TABLET ORAL at 09:20

## 2024-01-21 RX ADMIN — STANDARDIZED SENNA CONCENTRATE 8.6 MG: 8.6 TABLET ORAL at 21:57

## 2024-01-21 RX ADMIN — IPRATROPIUM BROMIDE AND ALBUTEROL SULFATE 3 ML: 2.5; .5 SOLUTION RESPIRATORY (INHALATION) at 11:49

## 2024-01-21 RX ADMIN — IPRATROPIUM BROMIDE AND ALBUTEROL SULFATE 3 ML: 2.5; .5 SOLUTION RESPIRATORY (INHALATION) at 19:48

## 2024-01-21 RX ADMIN — TIOTROPIUM BROMIDE INHALATION SPRAY 2 PUFF: 3.12 SPRAY, METERED RESPIRATORY (INHALATION) at 08:03

## 2024-01-21 RX ADMIN — GABAPENTIN 200 MG: 100 CAPSULE ORAL at 09:20

## 2024-01-21 RX ADMIN — BUDESONIDE 0.5 MG: 0.5 INHALANT ORAL at 19:48

## 2024-01-21 RX ADMIN — BUDESONIDE 0.5 MG: 0.5 INHALANT ORAL at 08:02

## 2024-01-21 RX ADMIN — AZITHROMYCIN MONOHYDRATE 500 MG: 500 INJECTION, POWDER, LYOPHILIZED, FOR SOLUTION INTRAVENOUS at 21:57

## 2024-01-21 RX ADMIN — GUAIFENESIN 600 MG: 600 TABLET, EXTENDED RELEASE ORAL at 09:21

## 2024-01-21 RX ADMIN — INSULIN LISPRO 4 UNITS: 100 INJECTION, SOLUTION INTRAVENOUS; SUBCUTANEOUS at 17:53

## 2024-01-21 RX ADMIN — GABAPENTIN 200 MG: 100 CAPSULE ORAL at 21:57

## 2024-01-21 RX ADMIN — FORMOTEROL FUMARATE DIHYDRATE 20 MCG: 20 SOLUTION RESPIRATORY (INHALATION) at 19:48

## 2024-01-21 RX ADMIN — FORMOTEROL FUMARATE DIHYDRATE 20 MCG: 20 SOLUTION RESPIRATORY (INHALATION) at 08:02

## 2024-01-21 ASSESSMENT — COGNITIVE AND FUNCTIONAL STATUS - GENERAL
PERSONAL GROOMING: A LITTLE
TOILETING: A LITTLE
CLIMB 3 TO 5 STEPS WITH RAILING: A LITTLE
MOVING TO AND FROM BED TO CHAIR: A LITTLE
DRESSING REGULAR LOWER BODY CLOTHING: A LITTLE
DRESSING REGULAR UPPER BODY CLOTHING: A LITTLE
CLIMB 3 TO 5 STEPS WITH RAILING: A LITTLE
DAILY ACTIVITIY SCORE: 20
PERSONAL GROOMING: A LITTLE
DRESSING REGULAR UPPER BODY CLOTHING: A LITTLE
DAILY ACTIVITIY SCORE: 20
TOILETING: A LITTLE
MOVING TO AND FROM BED TO CHAIR: A LITTLE
WALKING IN HOSPITAL ROOM: A LITTLE
WALKING IN HOSPITAL ROOM: A LITTLE
MOBILITY SCORE: 21
DRESSING REGULAR LOWER BODY CLOTHING: A LITTLE
MOBILITY SCORE: 21

## 2024-01-21 ASSESSMENT — PAIN - FUNCTIONAL ASSESSMENT
PAIN_FUNCTIONAL_ASSESSMENT: 0-10

## 2024-01-21 ASSESSMENT — PAIN SCALES - GENERAL
PAINLEVEL_OUTOF10: 0 - NO PAIN

## 2024-01-21 NOTE — PROGRESS NOTES
Lissett Rodríguez is a 89 y.o. female on day 3 of admission presenting with Community acquired pneumonia, unspecified laterality.      Subjective   Pt seen and examined.        Objective     Last Recorded Vitals  /56 (BP Location: Right arm, Patient Position: Lying)   Pulse 78   Temp 36.8 °C (98.2 °F) (Temporal)   Resp 18   Wt 72.1 kg (159 lb)   SpO2 93%   Intake/Output last 3 Shifts:    Intake/Output Summary (Last 24 hours) at 1/21/2024 1114  Last data filed at 1/21/2024 0500  Gross per 24 hour   Intake 820 ml   Output --   Net 820 ml         Admission Weight  Weight: 72.1 kg (159 lb) (01/18/24 1914)    Daily Weight  01/19/24 : 72.1 kg (159 lb)      Physical Exam    Constitutional: No acute distress, awake, alert  Head/Neck: Neck supple  Respiratory/Thorax: Lungs are Clear to auscultation  Cardiovascular: Regular, rate and rhythm,  2+ equal pulses of the extremities, normal S 1and S 2  Gastrointestinal: Nondistended, soft, non-tender, no rebound tenderness or guarding  Extremities: No edema. No calf tenderness.  Neurological: Awake and alert. No focal neurological deficits  Psychological: Appropriate mood and behavior    Relevant Results  Results for orders placed or performed during the hospital encounter of 01/18/24 (from the past 24 hour(s))   POCT GLUCOSE   Result Value Ref Range    POCT Glucose 237 (H) 74 - 99 mg/dL   POCT GLUCOSE   Result Value Ref Range    POCT Glucose 219 (H) 74 - 99 mg/dL   CBC   Result Value Ref Range    WBC 10.1 4.4 - 11.3 x10*3/uL    nRBC 0.0 0.0 - 0.0 /100 WBCs    RBC 4.44 4.00 - 5.20 x10*6/uL    Hemoglobin 12.9 12.0 - 16.0 g/dL    Hematocrit 40.7 36.0 - 46.0 %    MCV 92 80 - 100 fL    MCH 29.1 26.0 - 34.0 pg    MCHC 31.7 (L) 32.0 - 36.0 g/dL    RDW 13.7 11.5 - 14.5 %    Platelets 201 150 - 450 x10*3/uL   Basic Metabolic Panel   Result Value Ref Range    Glucose 169 (H) 74 - 99 mg/dL    Sodium 135 (L) 136 - 145 mmol/L    Potassium 3.9 3.5 - 5.3 mmol/L    Chloride 102 98 - 107  mmol/L    Bicarbonate 25 21 - 32 mmol/L    Anion Gap 12 10 - 20 mmol/L    Urea Nitrogen 18 6 - 23 mg/dL    Creatinine 0.87 0.50 - 1.05 mg/dL    eGFR 63 >60 mL/min/1.73m*2    Calcium 8.4 (L) 8.6 - 10.3 mg/dL   POCT GLUCOSE   Result Value Ref Range    POCT Glucose 150 (H) 74 - 99 mg/dL        Transthoracic Echo (TTE) Complete         CT angio chest for pulmonary embolism   Final Result   Acute pulmonary embolus within a segmental branch of the lingula.        Ground-glass opacities scattered diffusely throughout the lungs with   more consolidative opacities in the left upper lobe and left lower   lobe concerning for pneumonia. 1 of the opacities in the lingula is   somewhat wedge-shaped and may also represent a component of pulmonary   infarction.        Left lower lobe nodular density measuring up to 8 mm. While findings   may be related to the underlying likely infectious process, recommend   repeat imaging after treatment to assess for resolution and/or   stability.        Nonspecific enlarged mediastinal and hilar lymph nodes, which may be   reactive.             MACRO:   Evan Finkelstein discussed the significance and urgency of this   critical finding by telephone with  TED BENITO on 1/18/2024 at 10:02   pm.  (**-RCF-**) Findings:  See findings.        Signed by: Evan Finkelstein 1/18/2024 10:05 PM   Dictation workstation:   [id number]      XR chest 1 view   Final Result   Somewhat wedge-shaped consolidative opacity in the left mid lung   pulmonary infarct not excluded if there is clinical concern for   pulmonary emboli further evaluation with dedicated PE CT can be   considered. Continued radiographic follow-up is recommended to ensure   complete resolution and exclude underlying mass lesion.        Mild interstitial prominence could be chronic or relate to component   developing interstitial edema.        MACRO:   None        Signed by: Apolinar Howell 1/18/2024 8:10 PM   Dictation workstation:   [id number]           Scheduled medications  amiodarone, 100 mg, oral, Daily  amLODIPine, 2.5 mg, oral, Daily  apixaban, 10 mg, oral, BID   Followed by  [START ON 1/26/2024] apixaban, 5 mg, oral, BID  atorvastatin, 40 mg, oral, Nightly  azithromycin, 500 mg, intravenous, q24h  budesonide, 0.5 mg, nebulization, BID  cefTRIAXone, 1 g, intravenous, q24h  cholecalciferol, 2,000 Units, oral, Daily  formoterol, 20 mcg, nebulization, BID  gabapentin, 200 mg, oral, TID  guaiFENesin, 600 mg, oral, BID  insulin lispro, 0-10 Units, subcutaneous, TID with meals  ipratropium-albuteroL, 3 mL, nebulization, 4x daily  melatonin, 3 mg, oral, Daily  oxybutynin, 5 mg, oral, Daily  polyethylene glycol, 17 g, oral, Daily  sennosides, 1 tablet, oral, BID  tiotropium, 2 Inhalation, inhalation, Daily      Continuous medications  oxygen, 6 L/min, Last Rate: 10 L/min (01/21/24 0804)      PRN medications  PRN medications: acetaminophen, acetaminophen, cyclobenzaprine, dextrose 10 % in water (D10W), dextrose, glucagon, guaiFENesin, ipratropium-albuteroL, ondansetron ODT **OR** ondansetron, oxygen, oxygen         Assessment/Plan                  Principal Problem:    Community acquired pneumonia, unspecified laterality    Acute PE  Acute hypoxic resp failure  - on 11 L high flow, uses 6 LPM at baseline  - echocardiogram to evaluate for right heart strain, pending  -hematology consult  -switch to eliquis  -wean off O2 to baseline     Pneumonia  - pt is on Amiodarone  - continue Ceftriaxone and Azithromycin, however, need to keep on telemetry, and check the Qtc.   - duonebs  - hycodan for cough      Atrial fibrillation  - telemetry  - continue Amiodarone  -eliquis     KALPANA  - CPAP     HTN  - amlodipine     HLD  - Lipitor     DM2  - ISS    Dispo: pending oxygen wean off to baseline.               Dolores Giang MD

## 2024-01-21 NOTE — SIGNIFICANT EVENT
1/21 error w [facility] documentation,attempted to change to 11 liters,kept going higher in flowsheet,,pt has been on 11 liters [facility] all day

## 2024-01-21 NOTE — CARE PLAN
Problem: Skin  Goal: Prevent/manage excess moisture  1/21/2024 0525 by Gini Hall RN  Flowsheets (Taken 1/21/2024 0525)  Prevent/manage excess moisture:   Moisturize dry skin   Cleanse incontinence/protect with barrier cream

## 2024-01-22 ENCOUNTER — APPOINTMENT (OUTPATIENT)
Dept: RADIOLOGY | Facility: HOSPITAL | Age: 89
DRG: 175 | End: 2024-01-22
Payer: MEDICARE

## 2024-01-22 LAB
GLUCOSE BLD MANUAL STRIP-MCNC: 193 MG/DL (ref 74–99)
GLUCOSE BLD MANUAL STRIP-MCNC: 206 MG/DL (ref 74–99)
GLUCOSE BLD MANUAL STRIP-MCNC: 272 MG/DL (ref 74–99)
GLUCOSE BLD MANUAL STRIP-MCNC: 326 MG/DL (ref 74–99)
UFH PPP CHRO-ACNC: >2 IU/ML
UFH PPP CHRO-ACNC: >2 IU/ML

## 2024-01-22 PROCEDURE — 71045 X-RAY EXAM CHEST 1 VIEW: CPT

## 2024-01-22 PROCEDURE — 99223 1ST HOSP IP/OBS HIGH 75: CPT | Performed by: CLINICAL NURSE SPECIALIST

## 2024-01-22 PROCEDURE — 36415 COLL VENOUS BLD VENIPUNCTURE: CPT | Performed by: INTERNAL MEDICINE

## 2024-01-22 PROCEDURE — 99233 SBSQ HOSP IP/OBS HIGH 50: CPT | Performed by: INTERNAL MEDICINE

## 2024-01-22 PROCEDURE — 2500000002 HC RX 250 W HCPCS SELF ADMINISTERED DRUGS (ALT 637 FOR MEDICARE OP, ALT 636 FOR OP/ED): Performed by: PHARMACIST

## 2024-01-22 PROCEDURE — 94799 UNLISTED PULMONARY SVC/PX: CPT

## 2024-01-22 PROCEDURE — 2500000002 HC RX 250 W HCPCS SELF ADMINISTERED DRUGS (ALT 637 FOR MEDICARE OP, ALT 636 FOR OP/ED): Performed by: HOSPITALIST

## 2024-01-22 PROCEDURE — 85520 HEPARIN ASSAY: CPT | Performed by: PHARMACIST

## 2024-01-22 PROCEDURE — 71045 X-RAY EXAM CHEST 1 VIEW: CPT | Performed by: RADIOLOGY

## 2024-01-22 PROCEDURE — 2500000001 HC RX 250 WO HCPCS SELF ADMINISTERED DRUGS (ALT 637 FOR MEDICARE OP): Performed by: INTERNAL MEDICINE

## 2024-01-22 PROCEDURE — 2500000004 HC RX 250 GENERAL PHARMACY W/ HCPCS (ALT 636 FOR OP/ED): Performed by: INTERNAL MEDICINE

## 2024-01-22 PROCEDURE — 82947 ASSAY GLUCOSE BLOOD QUANT: CPT

## 2024-01-22 PROCEDURE — 2500000005 HC RX 250 GENERAL PHARMACY W/O HCPCS: Performed by: CLINICAL NURSE SPECIALIST

## 2024-01-22 PROCEDURE — 94660 CPAP INITIATION&MGMT: CPT

## 2024-01-22 PROCEDURE — 36415 COLL VENOUS BLD VENIPUNCTURE: CPT | Performed by: PHARMACIST

## 2024-01-22 PROCEDURE — 2500000004 HC RX 250 GENERAL PHARMACY W/ HCPCS (ALT 636 FOR OP/ED): Performed by: HOSPITALIST

## 2024-01-22 PROCEDURE — 94668 MNPJ CHEST WALL SBSQ: CPT

## 2024-01-22 PROCEDURE — 85520 HEPARIN ASSAY: CPT | Performed by: INTERNAL MEDICINE

## 2024-01-22 PROCEDURE — 94640 AIRWAY INHALATION TREATMENT: CPT | Mod: MUE

## 2024-01-22 PROCEDURE — 1200000002 HC GENERAL ROOM WITH TELEMETRY DAILY

## 2024-01-22 PROCEDURE — 2500000001 HC RX 250 WO HCPCS SELF ADMINISTERED DRUGS (ALT 637 FOR MEDICARE OP): Performed by: HOSPITALIST

## 2024-01-22 RX ORDER — HEPARIN SODIUM 10000 [USP'U]/100ML
0-4500 INJECTION, SOLUTION INTRAVENOUS CONTINUOUS
Status: DISCONTINUED | OUTPATIENT
Start: 2024-01-22 | End: 2024-01-24

## 2024-01-22 RX ORDER — HEPARIN SODIUM 5000 [USP'U]/ML
80 INJECTION, SOLUTION INTRAVENOUS; SUBCUTANEOUS ONCE
Status: DISCONTINUED | OUTPATIENT
Start: 2024-01-22 | End: 2024-01-24

## 2024-01-22 RX ORDER — FUROSEMIDE 10 MG/ML
20 INJECTION INTRAMUSCULAR; INTRAVENOUS ONCE
Status: COMPLETED | OUTPATIENT
Start: 2024-01-22 | End: 2024-01-22

## 2024-01-22 RX ORDER — HEPARIN SODIUM 5000 [USP'U]/ML
3000-6000 INJECTION, SOLUTION INTRAVENOUS; SUBCUTANEOUS EVERY 4 HOURS PRN
Status: DISCONTINUED | OUTPATIENT
Start: 2024-01-22 | End: 2024-01-24

## 2024-01-22 RX ADMIN — STANDARDIZED SENNA CONCENTRATE 8.6 MG: 8.6 TABLET ORAL at 09:12

## 2024-01-22 RX ADMIN — AZITHROMYCIN MONOHYDRATE 500 MG: 500 INJECTION, POWDER, LYOPHILIZED, FOR SOLUTION INTRAVENOUS at 21:07

## 2024-01-22 RX ADMIN — FORMOTEROL FUMARATE DIHYDRATE 20 MCG: 20 SOLUTION RESPIRATORY (INHALATION) at 20:39

## 2024-01-22 RX ADMIN — GABAPENTIN 200 MG: 100 CAPSULE ORAL at 21:07

## 2024-01-22 RX ADMIN — FUROSEMIDE 20 MG: 10 INJECTION, SOLUTION INTRAMUSCULAR; INTRAVENOUS at 12:54

## 2024-01-22 RX ADMIN — GABAPENTIN 200 MG: 100 CAPSULE ORAL at 16:59

## 2024-01-22 RX ADMIN — GUAIFENESIN 600 MG: 600 TABLET, EXTENDED RELEASE ORAL at 09:11

## 2024-01-22 RX ADMIN — INSULIN LISPRO 2 UNITS: 100 INJECTION, SOLUTION INTRAVENOUS; SUBCUTANEOUS at 09:12

## 2024-01-22 RX ADMIN — IPRATROPIUM BROMIDE AND ALBUTEROL SULFATE 3 ML: 2.5; .5 SOLUTION RESPIRATORY (INHALATION) at 11:16

## 2024-01-22 RX ADMIN — BUDESONIDE 0.5 MG: 0.5 INHALANT ORAL at 07:33

## 2024-01-22 RX ADMIN — GABAPENTIN 200 MG: 100 CAPSULE ORAL at 09:11

## 2024-01-22 RX ADMIN — AMIODARONE HYDROCHLORIDE 100 MG: 200 TABLET ORAL at 09:12

## 2024-01-22 RX ADMIN — GUAIFENESIN 600 MG: 600 TABLET, EXTENDED RELEASE ORAL at 21:07

## 2024-01-22 RX ADMIN — Medication 2000 UNITS: at 09:12

## 2024-01-22 RX ADMIN — METHYLPREDNISOLONE SODIUM SUCCINATE 40 MG: 40 INJECTION, POWDER, FOR SOLUTION INTRAMUSCULAR; INTRAVENOUS at 21:07

## 2024-01-22 RX ADMIN — POLYETHYLENE GLYCOL 3350 17 G: 17 POWDER, FOR SOLUTION ORAL at 09:12

## 2024-01-22 RX ADMIN — AMLODIPINE BESYLATE 2.5 MG: 5 TABLET ORAL at 09:11

## 2024-01-22 RX ADMIN — CEFEPIME 1 G: 1 INJECTION, POWDER, FOR SOLUTION INTRAMUSCULAR; INTRAVENOUS at 17:51

## 2024-01-22 RX ADMIN — Medication 40 L/MIN: at 16:13

## 2024-01-22 RX ADMIN — INSULIN LISPRO 4 UNITS: 100 INJECTION, SOLUTION INTRAVENOUS; SUBCUTANEOUS at 12:54

## 2024-01-22 RX ADMIN — STANDARDIZED SENNA CONCENTRATE 8.6 MG: 8.6 TABLET ORAL at 21:07

## 2024-01-22 RX ADMIN — FORMOTEROL FUMARATE DIHYDRATE 20 MCG: 20 SOLUTION RESPIRATORY (INHALATION) at 07:33

## 2024-01-22 RX ADMIN — INSULIN LISPRO 6 UNITS: 100 INJECTION, SOLUTION INTRAVENOUS; SUBCUTANEOUS at 17:00

## 2024-01-22 RX ADMIN — METHYLPREDNISOLONE SODIUM SUCCINATE 40 MG: 40 INJECTION, POWDER, FOR SOLUTION INTRAMUSCULAR; INTRAVENOUS at 10:49

## 2024-01-22 RX ADMIN — IPRATROPIUM BROMIDE AND ALBUTEROL SULFATE 3 ML: 2.5; .5 SOLUTION RESPIRATORY (INHALATION) at 07:34

## 2024-01-22 RX ADMIN — IPRATROPIUM BROMIDE AND ALBUTEROL SULFATE 3 ML: 2.5; .5 SOLUTION RESPIRATORY (INHALATION) at 20:38

## 2024-01-22 RX ADMIN — ATORVASTATIN CALCIUM 40 MG: 40 TABLET, FILM COATED ORAL at 21:07

## 2024-01-22 RX ADMIN — OXYBUTYNIN CHLORIDE 5 MG: 5 TABLET ORAL at 09:11

## 2024-01-22 RX ADMIN — APIXABAN 10 MG: 5 TABLET, FILM COATED ORAL at 09:11

## 2024-01-22 RX ADMIN — IPRATROPIUM BROMIDE AND ALBUTEROL SULFATE 3 ML: 2.5; .5 SOLUTION RESPIRATORY (INHALATION) at 15:55

## 2024-01-22 RX ADMIN — Medication 3 MG: at 21:07

## 2024-01-22 ASSESSMENT — COGNITIVE AND FUNCTIONAL STATUS - GENERAL
MOBILITY SCORE: 20
MOVING TO AND FROM BED TO CHAIR: A LITTLE
CLIMB 3 TO 5 STEPS WITH RAILING: A LITTLE
STANDING UP FROM CHAIR USING ARMS: A LITTLE
DRESSING REGULAR UPPER BODY CLOTHING: A LITTLE
TOILETING: A LITTLE
TOILETING: A LITTLE
MOBILITY SCORE: 21
CLIMB 3 TO 5 STEPS WITH RAILING: A LITTLE
MOVING TO AND FROM BED TO CHAIR: A LITTLE
DRESSING REGULAR UPPER BODY CLOTHING: A LITTLE
DRESSING REGULAR LOWER BODY CLOTHING: A LITTLE
DAILY ACTIVITIY SCORE: 18
PERSONAL GROOMING: A LITTLE
DRESSING REGULAR LOWER BODY CLOTHING: A LITTLE
EATING MEALS: A LITTLE
WALKING IN HOSPITAL ROOM: A LITTLE
PERSONAL GROOMING: A LITTLE
WALKING IN HOSPITAL ROOM: A LITTLE
HELP NEEDED FOR BATHING: A LITTLE
DAILY ACTIVITIY SCORE: 20

## 2024-01-22 ASSESSMENT — ENCOUNTER SYMPTOMS
COUGH: 1
ACTIVITY CHANGE: 1
RHINORRHEA: 1
FEVER: 0
SHORTNESS OF BREATH: 1
FATIGUE: 1

## 2024-01-22 ASSESSMENT — PAIN SCALES - GENERAL
PAINLEVEL_OUTOF10: 0 - NO PAIN
PAINLEVEL_OUTOF10: 0 - NO PAIN

## 2024-01-22 ASSESSMENT — PAIN - FUNCTIONAL ASSESSMENT: PAIN_FUNCTIONAL_ASSESSMENT: 0-10

## 2024-01-22 NOTE — SIGNIFICANT EVENT
/ pt transferred to sdu on cpap,she's been on cpap since nightshift,w nursing,and 2 resp.therapists,w/o issues.

## 2024-01-22 NOTE — CARE PLAN
The patient's goals for the shift include  improvement in breathing    The clinical goals for the shift include adequate oxygenation

## 2024-01-22 NOTE — PROGRESS NOTES
Physical Therapy                 Therapy Communication Note    Patient Name: Lissett Rodríguez  MRN: [id number]  Today's Date: 1/22/2024     Discipline: Physical Therapy    Missed Visit Reason: Missed Visit Reason: Patient placed on medical hold (Per chart review and discussion with RN, pt transferred to SDU today due to inability to maintain adequate O2 saturations. Pt currently on CPAP/BiPAP. Not appropriate for therapy at this time due to unstable respiratory status.)    Missed Time: Attempt    Comment:

## 2024-01-22 NOTE — PROGRESS NOTES
Occupational Therapy                 Therapy Communication Note    Patient Name: Lissett Rodríguez  MRN: [id number]  Today's Date: 1/22/2024     Discipline: Occupational Therapy    Missed Visit Reason: Missed Visit Reason: Patient placed on medical hold (Per chart review and PT's discussion with RN, pt transferred to SDU this date d/t inability to maintain adequate O2 saturations. Pt currently on CPAP/BiPAP. Pt is not currently appropriate for therapy at this time d/t unstable respiratory status.)    Missed Time: Attempt

## 2024-01-22 NOTE — SIGNIFICANT EVENT
Pt requiring 15L O2 bleed in to keep spo2 high 80's. Switched pt to V60 machine cpap +11 per cpap home settings , 100% FIO2 .  Pt satting 95% at this time. RN aware

## 2024-01-22 NOTE — CONSULTS
Reason For Consult  Acute hypoxic respiratory failure, pneumonia and PE    History Of Present Illness  Lissett Rodríguez is a 89 y.o. female with past medical history of COPD (6 L home O2), KALPANA on CPAP, HFpEF, A-fib on Xarelto, diabetes who presented to the ED for lightheadedness and fatigue.  EKG showed NSR without acute ischemia, troponins negative.  WBC 16, BNP 59.  Patient negative for flu COVID and RSV.  CXR with mid left wedge-shaped consolidative opacity with mild interstitial prominence.  CT PE positive for acute PE in the segmental branch of the lingula with scattered GGO's bilaterally and left upper and left lower consolidative opacities.  Patient was treated with ceftriaxone, azithromycin and Lovenox and admitted for further management.  During admission patient continued to have increasing oxygen needs with a brief period on Airvo. Patient escalated further requiring 15 L with pulse ox 80s.  Patient's transition to CPAP at +11 and 100% with pulse ox reaching 95%. Pulmonology is consulted for acute hypoxic respiratory failure, pneumonia and pulmonary embolus.    Patient seen and examined on CPAP.  Patient visible conversational dyspnea but was able to complete interview.  ED course as above. Patient reports that she normally exercises by riding a stationary bike or walking and has been increasingly unable to do this over the past month, reporting increasing dyspnea and fatigue without chest pain.  She reported cough without sputum, + PND and rhinorrhea.  Denied orthopnea.  Reported 1 episode of familia blood in her sputum approximately a month ago, nothing current.  Patient reports that she has CPAP at home but has not worn it in roughly 1 year as it had an error message and she never called to fix it.  Patient currently remains on CPAP +11, 90% saturating 100%.  Reports an occasional cough and no chest pain.  Denies fever, chills, nausea and emesis.  Reports that she has continued shortness of breath but is  able to stop and take deep breaths and recover, overall improved from yesterday.     Past Medical History  She has a past medical history of Angioneurotic edema, initial encounter (08/24/2018), Calculus of gallbladder without cholecystitis without obstruction (02/22/2017), Chronic obstructive pulmonary disease, unspecified (CMS/HCC) (02/20/2017), Encounter for follow-up examination after completed treatment for conditions other than malignant neoplasm (02/20/2017), Encounter for gynecological examination (general) (routine) without abnormal findings, Encounter for screening mammogram for malignant neoplasm of breast, Eructation (06/16/2017), Local infection of the skin and subcutaneous tissue, unspecified (01/05/2017), Other specified symptoms and signs involving the circulatory and respiratory systems (10/16/2018), Pain in unspecified knee (04/11/2014), Personal history of diseases of the skin and subcutaneous tissue, Personal history of other diseases of the musculoskeletal system and connective tissue (06/01/2015), Personal history of other diseases of the respiratory system (04/27/2016), Personal history of other diseases of the respiratory system (05/19/2017), Personal history of other specified conditions (01/05/2017), Personal history of other specified conditions (01/09/2017), Personal history of other specified conditions (02/23/2016), Personal history of other specified conditions (04/11/2014), Personal history of other specified conditions (06/13/2014), Personal history of transient ischemic attack (TIA), and cerebral infarction 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 (10/16/2018), Personal history of transient ischemic attack (TIA), and cerebral infarction 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 (03/08/2016), Pneumonia, unspecified organism (02/09/2018), Trochanteric bursitis, unspecified hip (04/23/2015), and Type 2 diabetes mellitus without complications (CMS/[facility]) (11/06/2017).    Surgical History  She has a past surgical history  that includes Appendectomy (05/15/2013); Cataract extraction (05/15/2013); Hysterectomy (05/15/2013); Other surgical history (05/15/2013); Other surgical history (02/28/2019); Colonoscopy (04/11/2014); Total knee arthroplasty (05/29/2013); MR angio head wo IV contrast (1/8/2016); MR angio neck wo IV contrast (1/8/2016); MR angio head wo IV contrast (10/1/2012); and MR angio neck w IV contrast (10/1/2012).     Social History  She reports that she has never smoked. She has never used smokeless tobacco. She reports that she does not drink alcohol and does not use drugs.  Patient is a former smoker with 20+ years at 1 pack/day quitting in roughly the 1970s.  Patient lives in assisted living facility and is .  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    Family History  Family History   Problem Relation Name Age of Onset    Alzheimer's disease Mother      Heart failure Mother      Stroke Mother          ischemic stroke    Heart attack Father      Stroke Father          ischemic stroke    COPD Sister      Diabetes Sister          mellitus    Cancer Sister      Parkinsonism Sister      Other (previous cardiac prolems) Sister          Allergies  Lisinopril, Tetracyclines, Vancomycin, and Penicillins    Review of Systems  Review of Systems   Constitutional:  Positive for activity change and fatigue. Negative for fever.   HENT:  Positive for postnasal drip and rhinorrhea.    Respiratory:  Positive for cough and shortness of breath.    Cardiovascular:  Negative for chest pain and leg swelling.         Physical Exam    Constitutional:   Elderly, + increased work of breathing on CPAP, cooperative  HENT: Atraumatic, semimoist mucous membranes  Eyes: PERRL, nonicteric  Neck: Supple, no JVD  Cardiovascular: S1-S2 distinct, regular, HR 70s, no murmur appreciated  Pulmonary: Fair air entry with bibasilar posterior crackles, anterior diminished bases; no rhonchi or wheezing noted  Abdominal: Obese,  "soft, nontender, + BS  Musculoskeletal: Fair active range of motion with fair strength  Extremities:   No BLE edema  Lymphadenopathy: No nuchal LAP  Skin: Warm posterior diaphoresis  Neurological: Alert and oriented x 3, speech clear and appropriate; no focal deficits noted  Psychiatric:     Appropriate mood and behavior     Last Recorded Vitals  Blood pressure 109/66, pulse 73, temperature 37.2 °C (99 °F), temperature source Axillary, resp. rate 18, height 1.499 m (4' 11.02\"), weight 72.1 kg (159 lb), SpO2 100 %.    Relevant Results  Scheduled Medications:  amiodarone, 100 mg, oral, Daily  amLODIPine, 2.5 mg, oral, Daily  apixaban, 10 mg, oral, BID   Followed by  [START ON 1/26/2024] apixaban, 5 mg, oral, BID  atorvastatin, 40 mg, oral, Nightly  azithromycin, 500 mg, intravenous, q24h  cefTRIAXone, 1 g, intravenous, q24h  cholecalciferol, 2,000 Units, oral, Daily  formoterol, 20 mcg, nebulization, BID  furosemide, 20 mg, intravenous, Once  gabapentin, 200 mg, oral, TID  guaiFENesin, 600 mg, oral, BID  insulin lispro, 0-10 Units, subcutaneous, TID with meals  ipratropium-albuteroL, 3 mL, nebulization, 4x daily  melatonin, 3 mg, oral, Daily  methylPREDNISolone sodium succinate (PF), 40 mg, intravenous, q12h  oxybutynin, 5 mg, oral, Daily  polyethylene glycol, 17 g, oral, Daily  sennosides, 1 tablet, oral, BID  tiotropium, 2 Inhalation, inhalation, Daily       PRN medications: acetaminophen, acetaminophen, cyclobenzaprine, dextrose 10 % in water (D10W), dextrose, glucagon, guaiFENesin, ipratropium-albuteroL, ondansetron ODT **OR** ondansetron, oxygen, oxygen      Results from last 7 days   Lab Units 01/21/24  0744 01/20/24  0541 01/19/24  0600   WBC AUTO x10*3/uL 10.1 11.2 13.4*   HEMOGLOBIN g/dL 12.9 12.7 12.6   HEMATOCRIT % 40.7 40.6 40.5   PLATELETS AUTO x10*3/uL 201 192 170      Results from last 7 days   Lab Units 01/21/24  0744 01/20/24  0541 01/19/24  0600 01/18/24  1937   SODIUM mmol/L 135* 135* 136 133* "   POTASSIUM mmol/L 3.9 4.0 3.3* 3.8   CHLORIDE mmol/L 102 100 103 98   CO2 mmol/L 25 25 24 25   BUN mg/dL 18 18 17 20   CREATININE mg/dL 0.87 0.91 0.92 1.05   CALCIUM mg/dL 8.4* 8.5* 7.7* 9.0   PROTEIN TOTAL g/dL  --   --   --  7.9   BILIRUBIN TOTAL mg/dL  --   --   --  1.4*   ALK PHOS U/L  --   --   --  78   ALT U/L  --   --   --  9   AST U/L  --   --   --  13   GLUCOSE mg/dL 169* 154* 158* 186*      XR chest 1 view  Result Date: 1/22/2024  Interpreted By:  Melba Guzman, STUDY: XR CHEST 1 VIEW;  1/22/2024 9:55 am   INDICATION: Signs/Symptoms:SOB.   COMPARISON: 01/18/2024   ACCESSION NUMBER(S): [id number]   ORDERING CLINICIAN: JOANNA GERARD   FINDINGS: Patient is slightly rotated. Artifact related to overlying monitoring leads. Increased perihilar interstitial prominence and interstitial edema and enlargement of left mid chest infiltrate. New thickening along the right horizontal fissure. No definite pleural effusion. Presumed soft tissue fold overlies the left upper chest. Cardiac silhouette within normal limits for size. Electronic structure is again seen near the left heart border.       Worsening left chest infiltrate and bilateral interstitial infiltrates/edema since 01/18/2024.   MACRO: None.   Signed by: Melba Guzman 1/22/2024 9:59 AM Dictation workstation:   [id number]      Assessment/Plan     Lissett Rodríguez is a 89 y.o. female with past medical history of COPD (6 L home O2), KALPANA on CPAP, HFpEF, A-fib on Xarelto, diabetes who presented to the ED for lightheadedness and fatigue.  EKG showed NSR without acute ischemia, troponins negative.  WBC 16, BNP 59.  Patient negative for flu COVID and RSV.  CXR with mid left wedge-shaped consolidative opacity with mild interstitial prominence.  CT PE positive for acute PE in the segmental branch of the lingula with scattered GGO's bilaterally and left upper and left lower consolidative opacities.  Patient was treated with ceftriaxone, azithromycin and Lovenox  and admitted for further management.  During admission patient continued to have increasing oxygen needs with a brief period on Airvo. Patient escalated further requiring 15 L with pulse ox 80s.  Patient's transition to CPAP at +11 and 100% with pulse ox reaching 95%. Pulmonology is consulted for acute hypoxic respiratory failure, pneumonia and pulmonary embolus.    Impressions:  #COPD:not in exacerbation; PFT 11/2022 with FEV1/FVC 0.79 (no obstruction), + BD response, substantially reduced DLCO; values consistent with hyperinflation and air trapping;  Home meds: Symbicort and Spiriva (Symbicort periodically held)  #Acute on chronic hypoxic respiratory failure: Multifactorial secondary to COPD, PE/infarct, PNA, HFpEF/edema  #Pulmonary embolism versus pulmonary infarct: CT PE with positive PE in the segmental level of the lingula with wedge-shaped consolidation mid left lung concerning for infarct; occurred while on Xarelto therapy  #Abnormal CT: Diffuse bilateral GGO's; pneumonia with PACO and LLL consolidative opacities; left lower lobe 8 mm nodular density  #KALPANA on CPAP: Last documented sleep study in 2018 suggested CPAP therapy limiting pressure 4-13 cmH2O  #HFpEF: Echo 1/19/2024 with EF 60 to 65%, no impaired relaxation, normal RVSF    Recommendations:  -Agree with heparin infusion given escalation in oxygen requirements; given that PE occurred on Xarelto will defer to hematology recommendations for anticoagulation (switch to Eliquis when able to take p.o.)  -Continue CPAP at night and during the day as needed  -Wean to Airvo/HFNC during the day, was able  -BPH with Acapella when patient is off CPAP  -Continued scheduled DuoNebs; hold Spiriva while on DuoNebs  -Agree with antibiotic therapy; avoid inhaled steroid at this time  -Continue Solu-Medrol today; will reevaluate tomorrow  -Maintain saturation 90 to 95%  -Continued scheduled DuoNebs; hold Spiriva while on DuoNebs  -Avoid ICS at this time; can continue  systemic steroids for now - will reevaluate in 1/23  -Diuresis as renal function and hemodynamics allow    CODE STATUS discussion: Discussed with patient her wishes should she be unable to support her own ability to breathe and require an endotracheal tube/ventilator.  Additionally we discussed her wishes should her heart go into a lethal arrhythmia and or stop.  She reports that she does have a living will I asked her to please have her family bring that in.  She does not have healthcare POA papers.  The patient agreed that if her breathing needed to be supported with an ET tube and ventilator that she does want this intervention.  Additionally she agreed that if her heart should go into a lethal arrhythmia and/or stop that she does not want CPR.  CODE STATUS was changed accordingly.     Recap: Intubation YES; CPR No    Thank you for the consult.  Pulmonology will continue to follow.  DANIELLE Flores-CNS

## 2024-01-22 NOTE — PROGRESS NOTES
Lissett Rodríguez is a 89 y.o. female on day 4 of admission presenting with Community acquired pneumonia, unspecified laterality.      Subjective   Pt seen and examined.        Objective     Last Recorded Vitals  /66 (BP Location: Right arm, Patient Position: Lying)   Pulse 73   Temp 37.2 °C (99 °F) (Axillary)   Resp 18   Wt 72.1 kg (159 lb)   SpO2 100%   Intake/Output last 3 Shifts:    Intake/Output Summary (Last 24 hours) at 1/22/2024 1226  Last data filed at 1/22/2024 0500  Gross per 24 hour   Intake 120 ml   Output --   Net 120 ml         Admission Weight  Weight: 72.1 kg (159 lb) (01/18/24 1914)    Daily Weight  01/19/24 : 72.1 kg (159 lb)      Physical Exam    Constitutional: No acute distress, awake, alert  Head/Neck: Neck supple  Respiratory/Thorax: Lungs are Clear to auscultation  Cardiovascular: Regular, rate and rhythm,  2+ equal pulses of the extremities, normal S 1and S 2  Gastrointestinal: Nondistended, soft, non-tender, no rebound tenderness or guarding  Extremities: No edema. No calf tenderness.  Neurological: Awake and alert. No focal neurological deficits  Psychological: Appropriate mood and behavior    Relevant Results  Results for orders placed or performed during the hospital encounter of 01/18/24 (from the past 24 hour(s))   POCT GLUCOSE   Result Value Ref Range    POCT Glucose 211 (H) 74 - 99 mg/dL   POCT GLUCOSE   Result Value Ref Range    POCT Glucose 156 (H) 74 - 99 mg/dL   POCT GLUCOSE   Result Value Ref Range    POCT Glucose 193 (H) 74 - 99 mg/dL   POCT GLUCOSE   Result Value Ref Range    POCT Glucose 206 (H) 74 - 99 mg/dL        XR chest 1 view   Final Result   Worsening left chest infiltrate and bilateral interstitial   infiltrates/edema since 01/18/2024.        MACRO:   None.        Signed by: Melba Guzman 1/22/2024 9:59 AM   Dictation workstation:   [id number]      Transthoracic Echo (TTE) Complete         CT angio chest for pulmonary embolism   Final Result   Acute  pulmonary embolus within a segmental branch of the lingula.        Ground-glass opacities scattered diffusely throughout the lungs with   more consolidative opacities in the left upper lobe and left lower   lobe concerning for pneumonia. 1 of the opacities in the lingula is   somewhat wedge-shaped and may also represent a component of pulmonary   infarction.        Left lower lobe nodular density measuring up to 8 mm. While findings   may be related to the underlying likely infectious process, recommend   repeat imaging after treatment to assess for resolution and/or   stability.        Nonspecific enlarged mediastinal and hilar lymph nodes, which may be   reactive.             MACRO:   Evan Finkelstein discussed the significance and urgency of this   critical finding by telephone with  TED BENITO on 1/18/2024 at 10:02   pm.  (**-RCF-**) Findings:  See findings.        Signed by: Evan Finkelstein 1/18/2024 10:05 PM   Dictation workstation:   [id number]      XR chest 1 view   Final Result   Somewhat wedge-shaped consolidative opacity in the left mid lung   pulmonary infarct not excluded if there is clinical concern for   pulmonary emboli further evaluation with dedicated PE CT can be   considered. Continued radiographic follow-up is recommended to ensure   complete resolution and exclude underlying mass lesion.        Mild interstitial prominence could be chronic or relate to component   developing interstitial edema.        MACRO:   None        Signed by: Apolinar Howell 1/18/2024 8:10 PM   Dictation workstation:   [id number]          Scheduled medications  amiodarone, 100 mg, oral, Daily  amLODIPine, 2.5 mg, oral, Daily  apixaban, 10 mg, oral, BID   Followed by  [START ON 1/26/2024] apixaban, 5 mg, oral, BID  atorvastatin, 40 mg, oral, Nightly  azithromycin, 500 mg, intravenous, q24h  cefTRIAXone, 1 g, intravenous, q24h  cholecalciferol, 2,000 Units, oral, Daily  formoterol, 20 mcg, nebulization,  BID  furosemide, 20 mg, intravenous, Once  gabapentin, 200 mg, oral, TID  guaiFENesin, 600 mg, oral, BID  insulin lispro, 0-10 Units, subcutaneous, TID with meals  ipratropium-albuteroL, 3 mL, nebulization, 4x daily  melatonin, 3 mg, oral, Daily  methylPREDNISolone sodium succinate (PF), 40 mg, intravenous, q12h  oxybutynin, 5 mg, oral, Daily  polyethylene glycol, 17 g, oral, Daily  sennosides, 1 tablet, oral, BID  tiotropium, 2 Inhalation, inhalation, Daily      Continuous medications       PRN medications  PRN medications: acetaminophen, acetaminophen, cyclobenzaprine, dextrose 10 % in water (D10W), dextrose, glucagon, guaiFENesin, ipratropium-albuteroL, ondansetron ODT **OR** ondansetron, oxygen, oxygen         Assessment/Plan   This patient currently has cardiac telemetry ordered; if you would like to modify or discontinue the telemetry order, click here to go to the orders activity to modify/discontinue the order.              Principal Problem:    Community acquired pneumonia, unspecified laterality    Acute PE  Acute hypoxic resp failure  - on continuous CPAP, uses 6 LPM at baseline  - echocardiogram to evaluate for right heart strain, pending  -hematology consult  -switch to eliquis  -wean off O2 to baseline  -Pulm consult  -transfer to step down due to inc O2 requirements  -monitor     Pneumonia  - pt is on Amiodarone  - continue Ceftriaxone and Azithromycin, however, need to keep on telemetry, and check the Qtc.   - duonebs  - hycodan for cough      Atrial fibrillation  - telemetry  - continue Amiodarone  -eliquis     KALPANA  - CPAP     HTN  - amlodipine     HLD  - Lipitor     DM2  - ISS    Dispo: pending oxygen wean off to baseline.               Dolores Giang MD

## 2024-01-22 NOTE — PROGRESS NOTES
Lissett Rodríguez is a 89 y.o. female on day 4 of admission presenting with Community acquired pneumonia, unspecified laterality.  Patient still on hi flow 02. Will discuss with MD about goc  Irina Aguilar RN

## 2024-01-22 NOTE — SIGNIFICANT EVENT
Hematology follow up    Following up on patient. Hematology was consulted for acute PE with rivaroxaban failure in the setting of CAP. We recommended switching AC to apixaban. Patient has been transferred to SDU for worsening respiratory status requiring continuous CPAP.  Recommend heparin drip if patient is unable to take PO. Heparin is also easier to reverse if needed, discussed with Dr. Giang.     Hematology will sign off, please call with any questions or concerns.    Discussed with Dr. Dhiraj Naylor, APRN-CNP

## 2024-01-23 LAB
ANION GAP SERPL CALC-SCNC: 15 MMOL/L (ref 10–20)
BUN SERPL-MCNC: 31 MG/DL (ref 6–23)
CALCIUM SERPL-MCNC: 8.2 MG/DL (ref 8.6–10.3)
CHLORIDE SERPL-SCNC: 99 MMOL/L (ref 98–107)
CO2 SERPL-SCNC: 23 MMOL/L (ref 21–32)
CREAT SERPL-MCNC: 0.93 MG/DL (ref 0.5–1.05)
EGFRCR SERPLBLD CKD-EPI 2021: 58 ML/MIN/1.73M*2
ERYTHROCYTE [DISTWIDTH] IN BLOOD BY AUTOMATED COUNT: 13.2 % (ref 11.5–14.5)
ERYTHROCYTE [DISTWIDTH] IN BLOOD BY AUTOMATED COUNT: 13.3 % (ref 11.5–14.5)
GLUCOSE BLD MANUAL STRIP-MCNC: 279 MG/DL (ref 74–99)
GLUCOSE BLD MANUAL STRIP-MCNC: 287 MG/DL (ref 74–99)
GLUCOSE BLD MANUAL STRIP-MCNC: 293 MG/DL (ref 74–99)
GLUCOSE BLD MANUAL STRIP-MCNC: 316 MG/DL (ref 74–99)
GLUCOSE BLD MANUAL STRIP-MCNC: 317 MG/DL (ref 74–99)
GLUCOSE SERPL-MCNC: 274 MG/DL (ref 74–99)
HCT VFR BLD AUTO: 40 % (ref 36–46)
HCT VFR BLD AUTO: 41.2 % (ref 36–46)
HGB BLD-MCNC: 12.6 G/DL (ref 12–16)
HGB BLD-MCNC: 13.3 G/DL (ref 12–16)
HOLD SPECIMEN: NORMAL
MCH RBC QN AUTO: 28.4 PG (ref 26–34)
MCH RBC QN AUTO: 28.8 PG (ref 26–34)
MCHC RBC AUTO-ENTMCNC: 31.5 G/DL (ref 32–36)
MCHC RBC AUTO-ENTMCNC: 32.3 G/DL (ref 32–36)
MCV RBC AUTO: 89 FL (ref 80–100)
MCV RBC AUTO: 90 FL (ref 80–100)
NRBC BLD-RTO: 0 /100 WBCS (ref 0–0)
NRBC BLD-RTO: 0 /100 WBCS (ref 0–0)
PLATELET # BLD AUTO: 267 X10*3/UL (ref 150–450)
PLATELET # BLD AUTO: 342 X10*3/UL (ref 150–450)
POTASSIUM SERPL-SCNC: 4.4 MMOL/L (ref 3.5–5.3)
RBC # BLD AUTO: 4.44 X10*6/UL (ref 4–5.2)
RBC # BLD AUTO: 4.62 X10*6/UL (ref 4–5.2)
SODIUM SERPL-SCNC: 133 MMOL/L (ref 136–145)
UFH PPP CHRO-ACNC: 0.5 IU/ML
UFH PPP CHRO-ACNC: 0.8 IU/ML
UFH PPP CHRO-ACNC: 1 IU/ML
UFH PPP CHRO-ACNC: 1 IU/ML
UFH PPP CHRO-ACNC: 1.3 IU/ML
WBC # BLD AUTO: 19.3 X10*3/UL (ref 4.4–11.3)
WBC # BLD AUTO: 9.8 X10*3/UL (ref 4.4–11.3)

## 2024-01-23 PROCEDURE — 2500000004 HC RX 250 GENERAL PHARMACY W/ HCPCS (ALT 636 FOR OP/ED): Performed by: HOSPITALIST

## 2024-01-23 PROCEDURE — 2500000002 HC RX 250 W HCPCS SELF ADMINISTERED DRUGS (ALT 637 FOR MEDICARE OP, ALT 636 FOR OP/ED): Performed by: HOSPITALIST

## 2024-01-23 PROCEDURE — 94660 CPAP INITIATION&MGMT: CPT | Mod: MUE

## 2024-01-23 PROCEDURE — 85520 HEPARIN ASSAY: CPT | Performed by: INTERNAL MEDICINE

## 2024-01-23 PROCEDURE — 94668 MNPJ CHEST WALL SBSQ: CPT

## 2024-01-23 PROCEDURE — 97535 SELF CARE MNGMENT TRAINING: CPT | Mod: GO

## 2024-01-23 PROCEDURE — 82947 ASSAY GLUCOSE BLOOD QUANT: CPT

## 2024-01-23 PROCEDURE — 99232 SBSQ HOSP IP/OBS MODERATE 35: CPT | Performed by: CLINICAL NURSE SPECIALIST

## 2024-01-23 PROCEDURE — 36415 COLL VENOUS BLD VENIPUNCTURE: CPT | Performed by: INTERNAL MEDICINE

## 2024-01-23 PROCEDURE — 85027 COMPLETE CBC AUTOMATED: CPT | Performed by: INTERNAL MEDICINE

## 2024-01-23 PROCEDURE — 36415 COLL VENOUS BLD VENIPUNCTURE: CPT | Performed by: PEDIATRICS

## 2024-01-23 PROCEDURE — 85520 HEPARIN ASSAY: CPT

## 2024-01-23 PROCEDURE — 2500000001 HC RX 250 WO HCPCS SELF ADMINISTERED DRUGS (ALT 637 FOR MEDICARE OP): Performed by: HOSPITALIST

## 2024-01-23 PROCEDURE — 82374 ASSAY BLOOD CARBON DIOXIDE: CPT | Performed by: INTERNAL MEDICINE

## 2024-01-23 PROCEDURE — 94660 CPAP INITIATION&MGMT: CPT

## 2024-01-23 PROCEDURE — 97530 THERAPEUTIC ACTIVITIES: CPT | Mod: GP,CQ

## 2024-01-23 PROCEDURE — 97110 THERAPEUTIC EXERCISES: CPT | Mod: GP,CQ

## 2024-01-23 PROCEDURE — 99232 SBSQ HOSP IP/OBS MODERATE 35: CPT | Performed by: INTERNAL MEDICINE

## 2024-01-23 PROCEDURE — 2500000001 HC RX 250 WO HCPCS SELF ADMINISTERED DRUGS (ALT 637 FOR MEDICARE OP): Performed by: INTERNAL MEDICINE

## 2024-01-23 PROCEDURE — 2500000004 HC RX 250 GENERAL PHARMACY W/ HCPCS (ALT 636 FOR OP/ED): Performed by: INTERNAL MEDICINE

## 2024-01-23 PROCEDURE — 94640 AIRWAY INHALATION TREATMENT: CPT | Mod: MUE

## 2024-01-23 PROCEDURE — 1200000002 HC GENERAL ROOM WITH TELEMETRY DAILY

## 2024-01-23 PROCEDURE — 85520 HEPARIN ASSAY: CPT | Performed by: PEDIATRICS

## 2024-01-23 PROCEDURE — 2500000002 HC RX 250 W HCPCS SELF ADMINISTERED DRUGS (ALT 637 FOR MEDICARE OP, ALT 636 FOR OP/ED): Performed by: PHARMACIST

## 2024-01-23 RX ORDER — FUROSEMIDE 40 MG/1
40 TABLET ORAL DAILY
Status: DISCONTINUED | OUTPATIENT
Start: 2024-01-23 | End: 2024-01-28 | Stop reason: HOSPADM

## 2024-01-23 RX ADMIN — IPRATROPIUM BROMIDE AND ALBUTEROL SULFATE 3 ML: 2.5; .5 SOLUTION RESPIRATORY (INHALATION) at 19:44

## 2024-01-23 RX ADMIN — CEFEPIME 1 G: 1 INJECTION, POWDER, FOR SOLUTION INTRAMUSCULAR; INTRAVENOUS at 03:57

## 2024-01-23 RX ADMIN — STANDARDIZED SENNA CONCENTRATE 8.6 MG: 8.6 TABLET ORAL at 20:13

## 2024-01-23 RX ADMIN — METHYLPREDNISOLONE SODIUM SUCCINATE 40 MG: 40 INJECTION, POWDER, FOR SOLUTION INTRAMUSCULAR; INTRAVENOUS at 08:49

## 2024-01-23 RX ADMIN — GABAPENTIN 200 MG: 100 CAPSULE ORAL at 20:13

## 2024-01-23 RX ADMIN — IPRATROPIUM BROMIDE AND ALBUTEROL SULFATE 3 ML: 2.5; .5 SOLUTION RESPIRATORY (INHALATION) at 15:26

## 2024-01-23 RX ADMIN — IPRATROPIUM BROMIDE AND ALBUTEROL SULFATE 3 ML: 2.5; .5 SOLUTION RESPIRATORY (INHALATION) at 11:38

## 2024-01-23 RX ADMIN — Medication 3 MG: at 20:13

## 2024-01-23 RX ADMIN — IPRATROPIUM BROMIDE AND ALBUTEROL SULFATE 3 ML: 2.5; .5 SOLUTION RESPIRATORY (INHALATION) at 07:39

## 2024-01-23 RX ADMIN — INSULIN LISPRO 6 UNITS: 100 INJECTION, SOLUTION INTRAVENOUS; SUBCUTANEOUS at 17:06

## 2024-01-23 RX ADMIN — INSULIN LISPRO 6 UNITS: 100 INJECTION, SOLUTION INTRAVENOUS; SUBCUTANEOUS at 12:52

## 2024-01-23 RX ADMIN — AMLODIPINE BESYLATE 2.5 MG: 5 TABLET ORAL at 08:49

## 2024-01-23 RX ADMIN — GUAIFENESIN 600 MG: 600 TABLET, EXTENDED RELEASE ORAL at 08:50

## 2024-01-23 RX ADMIN — OXYBUTYNIN CHLORIDE 5 MG: 5 TABLET ORAL at 08:50

## 2024-01-23 RX ADMIN — INSULIN LISPRO 8 UNITS: 100 INJECTION, SOLUTION INTRAVENOUS; SUBCUTANEOUS at 08:56

## 2024-01-23 RX ADMIN — FORMOTEROL FUMARATE DIHYDRATE 20 MCG: 20 SOLUTION RESPIRATORY (INHALATION) at 07:41

## 2024-01-23 RX ADMIN — FORMOTEROL FUMARATE DIHYDRATE 20 MCG: 20 SOLUTION RESPIRATORY (INHALATION) at 19:44

## 2024-01-23 RX ADMIN — GABAPENTIN 200 MG: 100 CAPSULE ORAL at 15:51

## 2024-01-23 RX ADMIN — Medication 2000 UNITS: at 08:50

## 2024-01-23 RX ADMIN — GUAIFENESIN 600 MG: 600 TABLET, EXTENDED RELEASE ORAL at 20:13

## 2024-01-23 RX ADMIN — AMIODARONE HYDROCHLORIDE 100 MG: 200 TABLET ORAL at 08:57

## 2024-01-23 RX ADMIN — GABAPENTIN 200 MG: 100 CAPSULE ORAL at 08:49

## 2024-01-23 RX ADMIN — STANDARDIZED SENNA CONCENTRATE 8.6 MG: 8.6 TABLET ORAL at 08:50

## 2024-01-23 RX ADMIN — POLYETHYLENE GLYCOL 3350 17 G: 17 POWDER, FOR SOLUTION ORAL at 08:50

## 2024-01-23 RX ADMIN — ATORVASTATIN CALCIUM 40 MG: 40 TABLET, FILM COATED ORAL at 20:13

## 2024-01-23 RX ADMIN — FUROSEMIDE 40 MG: 40 TABLET ORAL at 17:06

## 2024-01-23 RX ADMIN — CEFEPIME 1 G: 1 INJECTION, POWDER, FOR SOLUTION INTRAMUSCULAR; INTRAVENOUS at 15:51

## 2024-01-23 ASSESSMENT — COGNITIVE AND FUNCTIONAL STATUS - GENERAL
DAILY ACTIVITIY SCORE: 18
DRESSING REGULAR LOWER BODY CLOTHING: A LITTLE
MOVING TO AND FROM BED TO CHAIR: A LITTLE
WALKING IN HOSPITAL ROOM: A LITTLE
STANDING UP FROM CHAIR USING ARMS: A LITTLE
STANDING UP FROM CHAIR USING ARMS: A LITTLE
HELP NEEDED FOR BATHING: A LITTLE
CLIMB 3 TO 5 STEPS WITH RAILING: A LITTLE
CLIMB 3 TO 5 STEPS WITH RAILING: A LITTLE
DRESSING REGULAR UPPER BODY CLOTHING: A LITTLE
PERSONAL GROOMING: A LITTLE
MOBILITY SCORE: 20
TOILETING: A LITTLE
WALKING IN HOSPITAL ROOM: A LITTLE
MOVING TO AND FROM BED TO CHAIR: A LITTLE
DAILY ACTIVITIY SCORE: 18
TOILETING: A LITTLE
CLIMB 3 TO 5 STEPS WITH RAILING: A LITTLE
TURNING FROM BACK TO SIDE WHILE IN FLAT BAD: A LITTLE
HELP NEEDED FOR BATHING: A LITTLE
PERSONAL GROOMING: A LITTLE
EATING MEALS: A LITTLE
MOBILITY SCORE: 20
HELP NEEDED FOR BATHING: A LITTLE
EATING MEALS: A LITTLE
DRESSING REGULAR UPPER BODY CLOTHING: A LITTLE
MOVING FROM LYING ON BACK TO SITTING ON SIDE OF FLAT BED WITH BEDRAILS: A LITTLE
WALKING IN HOSPITAL ROOM: A LITTLE
DRESSING REGULAR LOWER BODY CLOTHING: A LITTLE
STANDING UP FROM CHAIR USING ARMS: A LITTLE
DRESSING REGULAR LOWER BODY CLOTHING: A LITTLE
TOILETING: A LITTLE
MOVING TO AND FROM BED TO CHAIR: A LITTLE
DRESSING REGULAR UPPER BODY CLOTHING: A LITTLE
MOBILITY SCORE: 18
PERSONAL GROOMING: A LITTLE
DAILY ACTIVITIY SCORE: 18
EATING MEALS: A LITTLE

## 2024-01-23 ASSESSMENT — PAIN SCALES - GENERAL
PAINLEVEL_OUTOF10: 0 - NO PAIN

## 2024-01-23 ASSESSMENT — PAIN - FUNCTIONAL ASSESSMENT
PAIN_FUNCTIONAL_ASSESSMENT: 0-10

## 2024-01-23 ASSESSMENT — ACTIVITIES OF DAILY LIVING (ADL): HOME_MANAGEMENT_TIME_ENTRY: 17

## 2024-01-23 NOTE — PROGRESS NOTES
Lissett Rodríguez is a 89 y.o. female on day 5 of admission presenting with Community acquired pneumonia, unspecified laterality.      Subjective   She was seen and examined at bedside this morning, was in company of her son, who had many questions regarding current clinical state, and plan  forward.  All questions answered to his satisfaction.       Objective     Last Recorded Vitals  /60 (BP Location: Right arm, Patient Position: Sitting)   Pulse 69   Temp 36.4 °C (97.5 °F) (Temporal)   Resp 18   Wt 72.1 kg (159 lb)   SpO2 92% Comment: 87%- 92%  Intake/Output last 3 Shifts:  No intake or output data in the 24 hours ending 01/23/24 1800    Admission Weight  Weight: 72.1 kg (159 lb) (01/18/24 1914)    Daily Weight  01/19/24 : 72.1 kg (159 lb)    Image Results  XR chest 1 view  Narrative: Interpreted By:  Melba Guzman,   STUDY:  XR CHEST 1 VIEW;  1/22/2024 9:55 am      INDICATION:  Signs/Symptoms:SOB.      COMPARISON:  01/18/2024      ACCESSION NUMBER(S):  [id number]      ORDERING CLINICIAN:  JOANNA GERARD      FINDINGS:  Patient is slightly rotated. Artifact related to overlying monitoring  leads. Increased perihilar interstitial prominence and interstitial  edema and enlargement of left mid chest infiltrate. New thickening  along the right horizontal fissure. No definite pleural effusion.  Presumed soft tissue fold overlies the left upper chest. Cardiac  silhouette within normal limits for size. Electronic structure is  again seen near the left heart border.      Impression: Worsening left chest infiltrate and bilateral interstitial  infiltrates/edema since 01/18/2024.      MACRO:  None.      Signed by: Melba Guzman 1/22/2024 9:59 AM  Dictation workstation:   [id number]      Physical Exam  Constitutional: Pleasant elderly female breathing on Airvo, alert active, cooperative not in acute distress  Eyes: PERRLA, clear sclera  ENMT: Moist mucosal membranes, no exudate  Head / Neck: Atraumatic,  normocephalic, supple neck, JVP not visualized  Lungs: Patent airways, CTABL  Heart: RRR, S1S2, no murmurs appreciated, palpable pulses in all extremities  GI: Soft, NT, ND, bowel sounds present in all quadrants  MSK: Moves all extremities freely, no restriction  of ROM, no joint edema  Extremities: Intact x 4, no peripheral edema  : No Arceo catheter inserted  Breast: Deferred  Neurological: AAO x 3 to person, place and date, facial muscles symmetrical, sensation intact, strength 4/4, no acute focal neurological deficits appreciated  Psychological: Appropriate mood and behavior    Relevant Results           Scheduled medications  amiodarone, 100 mg, oral, Daily  amLODIPine, 2.5 mg, oral, Daily  atorvastatin, 40 mg, oral, Nightly  cefepime, 1 g, intravenous, q12h  cholecalciferol, 2,000 Units, oral, Daily  formoterol, 20 mcg, nebulization, BID  furosemide, 40 mg, oral, Daily  gabapentin, 200 mg, oral, TID  guaiFENesin, 600 mg, oral, BID  heparin, 80 Units/kg, intravenous, Once  insulin lispro, 0-10 Units, subcutaneous, TID with meals  ipratropium-albuteroL, 3 mL, nebulization, 4x daily  melatonin, 3 mg, oral, Daily  [START ON 1/24/2024] methylPREDNISolone sodium succinate (PF), 40 mg, intravenous, q24h  oxybutynin, 5 mg, oral, Daily  oxygen, , inhalation, Continuous - 02/gases  polyethylene glycol, 17 g, oral, Daily  sennosides, 1 tablet, oral, BID  [Held by provider] tiotropium, 2 Inhalation, inhalation, Daily      Continuous medications  heparin, 0-4,500 Units/hr      PRN medications  PRN medications: acetaminophen, acetaminophen, cyclobenzaprine, dextrose 10 % in water (D10W), dextrose, glucagon, guaiFENesin, heparin, ipratropium-albuteroL, ondansetron ODT **OR** ondansetron, oxygen, oxygen  XR chest 1 view    Result Date: 1/22/2024  Interpreted By:  Melba Guzman, STUDY: XR CHEST 1 VIEW;  1/22/2024 9:55 am   INDICATION: Signs/Symptoms:SOB.   COMPARISON: 01/18/2024   ACCESSION NUMBER(S): [id number]   ORDERING  CLINICIAN: JOANNA GERARD   FINDINGS: Patient is slightly rotated. Artifact related to overlying monitoring leads. Increased perihilar interstitial prominence and interstitial edema and enlargement of left mid chest infiltrate. New thickening along the right horizontal fissure. No definite pleural effusion. Presumed soft tissue fold overlies the left upper chest. Cardiac silhouette within normal limits for size. Electronic structure is again seen near the left heart border.       Worsening left chest infiltrate and bilateral interstitial infiltrates/edema since 01/18/2024.   MACRO: None.   Signed by: Melba Guzman 1/22/2024 9:59 AM Dictation workstation:   [id number]    ECG 12 lead    Result Date: 1/19/2024  Normal sinus rhythm Normal ECG When compared with ECG of 25-MAR-2023 00:16, Previous ECG has undetermined rhythm, needs review    CT angio chest for pulmonary embolism    Result Date: 1/18/2024  Interpreted By:  Finkelstein, Evan, STUDY: CT ANGIO CHEST FOR PULMONARY EMBOLISM;  1/18/2024 9:17 pm   INDICATION: Signs/Symptoms:Possible pulmonary infarct on chest x-ray, history of COPD.   COMPARISON: Chest radiograph 01/18/2024   ACCESSION NUMBER(S): [id number]   ORDERING CLINICIAN: TED BENITO   TECHNIQUE: Axial CTA images of the chest after intravenous administration of 65 mL Omnipaque 350 using CT angiographic technique. Coronal and sagittal images are reconstructed. MIP images were created and reviewed.   FINDINGS: CHEST WALL AND LOWER NECK: Within normal limits. ABDOMEN: No acute abnormality of the partially visualized abdomen.   VASCULAR: AORTA: No aortic aneurysm or dissection.  Moderate atherosclerotic disease. PULMONARY ARTERY: Normal caliber. Filling defect within a segmental branch of the lingula best seen on axial image 118 series 607.   CHEST:   HEART: Normal size. No pericardial effusion. MEDIASTINUM AND INGRID: 1.4 cm AP window lymph node. 1.6 cm subcarinal lymph node. Numerous additional prominent  mediastinal and hilar lymph nodes. Small hiatal hernia. LUNG, PLEURA, LARGE AIRWAYS: Ground-glass opacities are scattered diffusely throughout the lungs. More consolidative opacities are present within the left upper lobe and left lower lobe with a somewhat wedge-shaped opacity in the lingula. Left lower lobe nodular density measures approximately 8 mm image 198   BONES: No acute osseous abnormality.       Acute pulmonary embolus within a segmental branch of the lingula.   Ground-glass opacities scattered diffusely throughout the lungs with more consolidative opacities in the left upper lobe and left lower lobe concerning for pneumonia. 1 of the opacities in the lingula is somewhat wedge-shaped and may also represent a component of pulmonary infarction.   Left lower lobe nodular density measuring up to 8 mm. While findings may be related to the underlying likely infectious process, recommend repeat imaging after treatment to assess for resolution and/or stability.   Nonspecific enlarged mediastinal and hilar lymph nodes, which may be reactive.     MACRO: Evan Finkelstein discussed the significance and urgency of this critical finding by telephone with  TED BENITO on 1/18/2024 at 10:02 pm.  (**-RCF-**) Findings:  See findings.   Signed by: Evan Finkelstein 1/18/2024 10:05 PM Dictation workstation:   [id number]    XR chest 1 view    Result Date: 1/18/2024  Interpreted By:  Apolinar Howell, STUDY: XR CHEST 1 VIEW;  1/18/2024 7:53 pm   INDICATION: Signs/Symptoms:dizzy, hypoxia.   COMPARISON: Chest x-ray 03/25/2023   ACCESSION NUMBER(S): [id number]   ORDERING CLINICIAN: TED BENITO   FINDINGS: A loop recorder device overlies the left mid thorax.   CARDIOMEDIASTINAL SILHOUETTE: Cardiomediastinal silhouette is stable in size and configuration. Atherosclerotic calcification of the aorta.   LUNGS: There is somewhat wedge-shaped consolidative opacity in the left mid lung suspicious for developing airspace disease. Right  lung is clear. Stable mild diffuse interstitial prominence, likely chronic. No effusion or pneumothorax.   ABDOMEN: No remarkable upper abdominal findings.   BONES: Degenerative changes of the spine. Generalized diffuse osteopenia.       Somewhat wedge-shaped consolidative opacity in the left mid lung pulmonary infarct not excluded if there is clinical concern for pulmonary emboli further evaluation with dedicated PE CT can be considered. Continued radiographic follow-up is recommended to ensure complete resolution and exclude underlying mass lesion.   Mild interstitial prominence could be chronic or relate to component developing interstitial edema.   MACRO: None   Signed by: Apolinar Howell 1/18/2024 8:10 PM Dictation workstation:   [id number]       Assessment/Plan   This patient currently has cardiac telemetry ordered; if you would like to modify or discontinue the telemetry order, click here to go to the orders activity to modify/discontinue the order.    Lissett Rodríguez is a 89 y.o.  female with a PMH of afib (on Xarelto), HFpEF (55%), COPD (on 6 LPM oxygen 24/7), KALPANA, DM2, HTN, HLD, TIA, presenting with fatigue, body aches and cough.  Has felt fatigued for the past 1 week, unable to perform her usual activities at her assisted living facility.   Has had a dry cough and body aches x 2 days.     Principal Problem:    Community acquired pneumonia, unspecified laterality    Acute PE  Acute hypoxic resp failure  - on continuous CPAP, uses 6 LPM at baseline  - echocardiogram to evaluate for right heart strain, pending  -hematology consult  -switch to eliquis  -wean off O2 to baseline  -Pulm consult  -transfer to step down due to inc O2 requirements  -monitor     Pneumonia  -Increase O2 demand, on Airvo  - continue Ceftriaxone, completed azithromycin regimen  -Chest x-ray shows worsening left-sided infiltrate  - duonebs  - hycodan for cough   -Consult ID for antibiotic review     Atrial fibrillation  - telemetry  -  continue Amiodarone  -eliquis     KALPANA  - CPAP     HTN  - amlodipine     HLD  - Lipitor     DM2  - ISS    Diet  -Diabetic    DVT prophylaxis  -Heparin drip    CODE STATUS: DNR, no intubation    Disposition: Presented with PE and pneumonia, need further management, discharge pending clinical improvement    Mikal Doty DO

## 2024-01-23 NOTE — CARE PLAN
The patient's goals for the shift include      The clinical goals for the shift include remain safe, reduce oxygen demand    Over the shift, the patient did not make progress toward the following goals. Barriers to progression include . Recommendations to address these barriers include   Problem: Pain  Goal: My pain/discomfort is manageable  Outcome: Progressing     Problem: Safety  Goal: Patient will be injury free during hospitalization  Outcome: Progressing  Goal: I will remain free of falls  Outcome: Progressing     Problem: Skin  Goal: Decreased wound size/increased tissue granulation at next dressing change  Outcome: Progressing  Goal: Participates in plan/prevention/treatment measures  Outcome: Progressing  Goal: Prevent/manage excess moisture  Outcome: Progressing  Goal: Prevent/minimize sheer/friction injuries  Outcome: Progressing  Goal: Promote/optimize nutrition  Outcome: Progressing  Goal: Promote skin healing  Outcome: Progressing   .

## 2024-01-23 NOTE — PROGRESS NOTES
Physical Therapy    Physical Therapy Treatment    Patient Name: Lissett Rodríguez  MRN: [id number]  Today's Date: 1/23/2024  Time Calculation  Start Time: 1114  Stop Time: 1137  Time Calculation (min): 23 min       Assessment/Plan   PT Assessment  End of Session Communication: Bedside nurse  End of Session Patient Position: Up in chair, Alarm on  PT Plan  Inpatient/Swing Bed or Outpatient: Inpatient  PT Plan  Treatment/Interventions: Bed mobility, Transfer training, Gait training  PT Plan: Skilled PT  PT Frequency: 3 times per week  PT Discharge Recommendations: Low intensity level of continued care  PT Recommended Transfer Status: Assist x1  PT - OK to Discharge: Yes (per  POC)      General Visit Information:   PT  Visit  PT Received On: 01/23/24  General  Reason for Referral: Pt is a 89 y/o female admitted with PNA  Referred By: Padmaja  Past Medical History Relevant to Rehab: afib (on Xarelto), HFpEF (55%), COPD (on 6 LPM oxygen 24/7), KALPANA, DM2, HTN, HLD, TIA  Family/Caregiver Present: Yes  Prior to Session Communication: Bedside nurse  Patient Position Received: Bed, 3 rail up, Alarm off, not on at start of session  General Comment: limited activity 2/2 being on airvo    Subjective   Precautions:     Vital Signs:  Vital Signs  Heart Rate: 66  SpO2: 97 %    Objective   Pain:  Pain Assessment  Pain Score: 0 - No pain  Cognition:  Cognition  Overall Cognitive Status: Within Functional Limits  Postural Control:     Extremity/Trunk Assessments:    Activity Tolerance:     Treatments:  Therapeutic Exercise  Therapeutic Exercise Performed: Yes (pt performed seated HR DF LAQ Marching. rest breaks as needed x15)    Therapeutic Activity  Therapeutic Activity Performed: Yes (pt performed static sitting balance for extended period of time at EOB with no overt LOB.  pt also able to perform static standing balance x2 min with UE support at RW.)    Bed Mobility  Bed Mobility: Yes  Bed Mobility 1  Bed Mobility 1: Supine to  sitting  Level of Assistance 1: Modified independent  Bed Mobility Comments 1: HOB elevated    Ambulation/Gait Training  Ambulation/Gait Training Performed: Yes  Ambulation/Gait Training 1  Surface 1: Level tile  Device 1: Rolling walker  Gait Support Devices: Gait belt  Assistance 1: Contact guard  Comments/Distance (ft) 1: 4x1 (pt limited by airvo)  Transfers  Transfer: Yes  Transfer 1  Technique 1: Sit to stand, Stand to sit  Transfer Device 1: Walker  Transfer Level of Assistance 1: Contact guard  Trials/Comments 1: min VC for hand placement. pt performed x2    Outcome Measures:  [facility] Basic Mobility  Turning from your back to your side while in a flat bed without using bedrails: None  Moving from lying on your back to sitting on the side of a flat bed without using bedrails: None  Moving to and from bed to chair (including a wheelchair): A little  Standing up from a chair using your arms (e.g. wheelchair or bedside chair): A little  To walk in hospital room: A little  Climbing 3-5 steps with railing: A little  Basic Mobility - Total Score: 20    Education Documentation  Mobility Training, taught by Man Kumar PTA at 1/23/2024  4:27 PM.  Learner: Patient  Readiness: Acceptance  Method: Explanation  Response: Verbalizes Understanding    Education Comments  No comments found.        OP EDUCATION:  Outpatient Education  Education Comment: importance of OOB activity    Encounter Problems       Encounter Problems (Active)       Balance       complete all mobility with normal balance while dual tasking, negotiating in a dynamic environment, carrying items, etc., with proactive and reactive static and dynamic standing and sitting tasks, with mod I and a RW, >15 minutes.       Start:  01/19/24    Expected End:  01/27/24               Mobility       STG - Patient will ambulate 250 ft with a RW mod I.  (Progressing)       Start:  01/19/24    Expected End:  01/27/24               Transfers       STG - Patient will  transfer sit to and from stand mod I with a RW.  (Progressing)       Start:  01/19/24    Expected End:  01/27/24

## 2024-01-23 NOTE — PROGRESS NOTES
Occupational Therapy    Occupational Therapy Treatment    Name: Lissett Rodríguez  MRN: [id number]  : 1933  Date: 24  Time Calculation  Start Time: 1535  Stop Time: 1552  Time Calculation (min): 17 min    Plan:  Treatment Interventions: ADL retraining, Functional transfer training, Endurance training, Patient/family training, Neuromuscular reeducation  OT Frequency: 3 times per week  OT Discharge Recommendations: Low intensity level of continued care  OT - OK to Discharge: Yes    Subjective   Previous Visit Info:  OT Last Visit  OT Received On: 24  General:  General  Reason for Referral: Pt is a 89 y/o female admitted with PNA  Prior to Session Communication: Bedside nurse  Patient Position Received: Up in chair, Alarm on  General Comment: Patient on 8L High Flow and Spo2 varies from 87% to 94%.  Precautions:  Medical Precautions: Fall precautions  Vitals:  Vital Signs  SpO2:  (87%- 92%)  Pain Assessment:  Pain Assessment  Pain Assessment: 0-10  Pain Score: 0 - No pain     Objective   Activities of Daily Living: Grooming  Grooming Level of Assistance: Close supervision  Grooming Where Assessed: Chair  Grooming Comments: Washing face              UE Dressing  UE Dressing Level of Assistance: Contact guard  UE Dressing Where Assessed: Edge of bed  UE Dressing Comments: To don secondary hospital gown around shoulders.    LE Dressing  LE Dressing: Yes  Pants Level of Assistance: Contact guard  Sock Level of Assistance: Contact guard  LE Dressing Where Assessed: Edge of bed  LE Dressing Comments: Increased time required to complete task.         Functional Standing Tolerance:     Bed Mobility/Transfers: Bed Mobility  Bed Mobility: Yes  Bed Mobility 1  Bed Mobility 1: Sitting to supine  Level of Assistance 1: Distant supervision    Transfers  Transfer: Yes  Transfer 1  Transfer From 1: Chair with arms to  Transfer to 1: Bed  Technique 1: Sit to stand  Transfer Device 1: Walker  Transfer Level of Assistance  1: Contact guard  Trials/Comments 1: Cues for hand placement    Sitting Balance:  Static Sitting Balance  Static Sitting-Balance Support: Bilateral upper extremity supported  Static Sitting-Level of Assistance: Independent  Static Sitting-Comment/Number of Minutes: at EOB  Dynamic Sitting Balance  Dynamic Sitting-Balance Support: Feet unsupported  Dynamic Sitting-Balance: Lateral lean  Dynamic Sitting-Comments: Close Supervision  Standing Balance:  Static Standing Balance  Static Standing-Balance Support: Bilateral upper extremity supported  Static Standing-Comment/Number of Minutes: CGA  Dynamic Standing Balance  Dynamic Standing-Balance Support: Bilateral upper extremity supported  Dynamic Standing-Comments: CGA    Outcome Measures:  [facility] Daily Activity  Putting on and taking off regular lower body clothing: A little  Bathing (including washing, rinsing, drying): A little  Putting on and taking off regular upper body clothing: A little  Toileting, which includes using toilet, bedpan or urinal: A little  Taking care of personal grooming such as brushing teeth: A little  Eating Meals: A little  Daily Activity - Total Score: 18    Education Documentation  ADL Training, taught by Darian Lynn OT at 1/23/2024  4:03 PM.  Learner: Patient  Readiness: Acceptance  Method: Explanation  Response: Verbalizes Understanding    Education Comments  No comments found.      Goals:  Encounter Problems       Encounter Problems (Active)       ADLs       Patient will perform UB and LB bathing with modified independent level of assistance and grab bars, shower chair, and long-handled sponge. (Progressing)       Start:  01/19/24    Expected End:  01/27/24            Patient with complete upper body dressing with independent level of assistance donning and doffing all UE clothes with PRN adaptive equipment. (Progressing)       Start:  01/19/24    Expected End:  01/27/24            Patient with complete lower body dressing with  modified independent level of assistance donning and doffing all LE clothes  with PRN adaptive equipment. (Progressing)       Start:  01/19/24    Expected End:  01/27/24            Patient will complete all daily grooming tasks with independent level of assistance and PRN adaptive equipment. (Progressing)       Start:  01/19/24    Expected End:  01/27/24            Patient will complete toileting including hygiene clothing management/hygiene with modified independent level of assistance and raised toilet seat and grab bars. (Progressing)       Start:  01/19/24    Expected End:  01/27/24                 MOBILITY       Patient will perform Functional mobility x Household distances/Community Distances with modified independent level of assistance and least restrictive device in order to improve safety and functional mobility. (Progressing)       Start:  01/19/24    Expected End:  01/27/24                 TRANSFERS       Patient will complete all functional transfers  with least restrictive device with modified independent level of assistance. (Progressing)       Start:  01/19/24    Expected End:  01/27/24

## 2024-01-23 NOTE — PROGRESS NOTES
"Lissett Rodríguez is a 89 y.o. female on day 5 of admission presenting with Community acquired pneumonia, unspecified laterality.    Subjective   Patient seen and examined sitting up in the chair eating lunch.  Patient currently on Airvo 50 L / 55%, pulse ox 96%.  Patient reported that she did not want to wear the CPAP last night she felt more comfortable with the Airvo.  She denies chest pain, pain in general, fever, chills and nausea.  Continues to feel short of breath although this is improving.  She reports continued use of Acapella with some a productive cough but is swallowing the mucus.     Objective     Physical Exam     Constitutional:   Elderly, mildly increased WOB on AirVo, cooperative  HENT: Atraumatic, semimoist mucous membranes  Eyes: nonicteric  Neck: Supple, no JVD  Cardiovascular: S1-S2 distinct, regular, HR 60s, no murmur appreciated  Pulmonary: Fair air entry with minimal bibasilar posterior crackles, anterior diminished bases; no rhonchi or wheezing noted  Abdominal: Obese, soft, nontender, + BS  Musculoskeletal: Fair active range of motion with fair strength  Extremities:   No BLE edema  Lymphadenopathy: No nuchal LAP  Skin: Warm and dry  Neurological: Alert and oriented x 3, speech clear and appropriate; no focal deficits noted  Psychiatric:     Appropriate mood and behavior    Last Recorded Vitals  Blood pressure 107/63, pulse 64, temperature 36.1 °C (97 °F), temperature source Temporal, resp. rate 18, height 1.499 m (4' 11.02\"), weight 72.1 kg (159 lb), SpO2 93 %.  Intake/Output last 3 Shifts:  I/O last 3 completed shifts:  In: 170 (2.4 mL/kg) [P.O.:120; IV Piggyback:50]  Out: - (0 mL/kg)   Weight: 72.1 kg     Relevant Results  Scheduled medications:  amiodarone, 100 mg, oral, Daily  amLODIPine, 2.5 mg, oral, Daily  atorvastatin, 40 mg, oral, Nightly  cefepime, 1 g, intravenous, q12h  cholecalciferol, 2,000 Units, oral, Daily  formoterol, 20 mcg, nebulization, BID  gabapentin, 200 mg, oral, " TID  guaiFENesin, 600 mg, oral, BID  heparin, 80 Units/kg, intravenous, Once  insulin lispro, 0-10 Units, subcutaneous, TID with meals  ipratropium-albuteroL, 3 mL, nebulization, 4x daily  melatonin, 3 mg, oral, Daily  [START ON 1/24/2024] methylPREDNISolone sodium succinate (PF), 40 mg, intravenous, q24h  oxybutynin, 5 mg, oral, Daily  oxygen, , inhalation, Continuous - 02/gases  polyethylene glycol, 17 g, oral, Daily  sennosides, 1 tablet, oral, BID  [Held by provider] tiotropium, 2 Inhalation, inhalation, Daily     PRN medications: acetaminophen, acetaminophen, cyclobenzaprine, dextrose 10 % in water (D10W), dextrose, glucagon, guaiFENesin, heparin, ipratropium-albuteroL, ondansetron ODT **OR** ondansetron, oxygen, oxygen     Results for orders placed or performed during the hospital encounter of 01/18/24 (from the past 24 hour(s))   POCT GLUCOSE   Result Value Ref Range    POCT Glucose 272 (H) 74 - 99 mg/dL   Heparin Assay   Result Value Ref Range    Heparin Unfractionated >2.0 (HH) See Comment Below for Therapeutic Ranges IU/mL   POCT GLUCOSE   Result Value Ref Range    POCT Glucose 326 (H) 74 - 99 mg/dL   Heparin Assay, UFH   Result Value Ref Range    Heparin Unfractionated >2.0 (HH) See Comment Below for Therapeutic Ranges IU/mL   CBC   Result Value Ref Range    WBC 9.8 4.4 - 11.3 x10*3/uL    nRBC 0.0 0.0 - 0.0 /100 WBCs    RBC 4.44 4.00 - 5.20 x10*6/uL    Hemoglobin 12.6 12.0 - 16.0 g/dL    Hematocrit 40.0 36.0 - 46.0 %    MCV 90 80 - 100 fL    MCH 28.4 26.0 - 34.0 pg    MCHC 31.5 (L) 32.0 - 36.0 g/dL    RDW 13.2 11.5 - 14.5 %    Platelets 267 150 - 450 x10*3/uL   Basic Metabolic Panel   Result Value Ref Range    Glucose 274 (H) 74 - 99 mg/dL    Sodium 133 (L) 136 - 145 mmol/L    Potassium 4.4 3.5 - 5.3 mmol/L    Chloride 99 98 - 107 mmol/L    Bicarbonate 23 21 - 32 mmol/L    Anion Gap 15 10 - 20 mmol/L    Urea Nitrogen 31 (H) 6 - 23 mg/dL    Creatinine 0.93 0.50 - 1.05 mg/dL    eGFR 58 (L) >60  mL/min/1.73m*2    Calcium 8.2 (L) 8.6 - 10.3 mg/dL   Heparin Assay   Result Value Ref Range    Heparin Unfractionated 1.3 (HH) See Comment Below for Therapeutic Ranges IU/mL   POCT GLUCOSE   Result Value Ref Range    POCT Glucose 317 (H) 74 - 99 mg/dL   SST TOP   Result Value Ref Range    Extra Tube Hold for add-ons.    Heparin Assay   Result Value Ref Range    Heparin Unfractionated 1.0 See Comment Below for Therapeutic Ranges IU/mL   POCT GLUCOSE   Result Value Ref Range    POCT Glucose 293 (H) 74 - 99 mg/dL   Heparin Assay, UFH   Result Value Ref Range    Heparin Unfractionated 1.0 See Comment Below for Therapeutic Ranges IU/mL   POCT GLUCOSE   Result Value Ref Range    POCT Glucose 287 (H) 74 - 99 mg/dL      XR chest 1 view  Result Date: 1/22/2024  Interpreted By:  Melba Guzman, STUDY: XR CHEST 1 VIEW;  1/22/2024 9:55 am   INDICATION: Signs/Symptoms:SOB.   COMPARISON: 01/18/2024   ACCESSION NUMBER(S): [id number]   ORDERING CLINICIAN: JOANNA GERARD   FINDINGS: Patient is slightly rotated. Artifact related to overlying monitoring leads. Increased perihilar interstitial prominence and interstitial edema and enlargement of left mid chest infiltrate. New thickening along the right horizontal fissure. No definite pleural effusion. Presumed soft tissue fold overlies the left upper chest. Cardiac silhouette within normal limits for size. Electronic structure is again seen near the left heart border.       Worsening left chest infiltrate and bilateral interstitial infiltrates/edema since 01/18/2024.   MACRO: None.   Signed by: Melba Guzman 1/22/2024 9:59 AM Dictation workstation:   [id number]      Assessment/Plan   Lissett Rodríugez is a 89 y.o. female with past medical history of COPD (6 L home O2), KALPANA on CPAP, HFpEF, A-fib on Xarelto, diabetes who presented to the ED for lightheadedness and fatigue.  EKG showed NSR without acute ischemia, troponins negative.  WBC 16, BNP 59.  Patient negative for flu COVID and  RSV.  CXR with mid left wedge-shaped consolidative opacity with mild interstitial prominence.  CT PE positive for acute PE in the segmental branch of the lingula with scattered GGO's bilaterally and left upper and left lower consolidative opacities.  Patient was treated with ceftriaxone, azithromycin and Lovenox and admitted for further management.  During admission patient continued to have increasing oxygen needs with a brief period on Airvo. Patient escalated further requiring 15 L with pulse ox 80s.  Patient's transition to CPAP at +11 and 100% with pulse ox reaching 95%. Pulmonology is consulted for acute hypoxic respiratory failure, pneumonia and pulmonary embolus.     Impressions:  #COPD: not in exacerbation; PFT 11/2022 with FEV1/FVC 0.79 (no obstruction), + BD response, substantially reduced DLCO; values consistent with hyperinflation and air trapping;  Home meds: Symbicort and Spiriva (Symbicort periodically held); patient previously followed with Dr. Hernández  #Acute on chronic hypoxic respiratory failure: Multifactorial secondary to COPD, PE/infarct, PNA, HFpEF/edema  #Pulmonary embolism versus pulmonary infarct: CT PE with positive PE in the segmental level of the lingula with wedge-shaped consolidation mid left lung concerning for infarct; occurred while on Xarelto therapy  #Abnormal CT: Diffuse bilateral GGO's; pneumonia with PACO and LLL consolidative opacities; left lower lobe 8 mm nodular density  #KALPANA on CPAP: Last documented sleep study in 2018 suggested CPAP therapy limiting pressure 4-13 cmH2O  #HFpEF: Echo 1/19/2024 with EF 60 to 65%, no impaired relaxation, normal RVSF     Recommendations:  -Agree with heparin infusion given escalation in oxygen requirements; given that PE occurred on Xarelto will defer to hematology recommendations for anticoagulation (switch to Eliquis when able to take p.o.)  -Continue CPAP at night and during the day as needed  -Wean to Airvo/HFNC during the day, was  able  -BPH with Acapella when patient is off CPAP  -Continued scheduled DuoNebs; hold Spiriva while on DuoNebs  -Agree with antibiotic therapy; avoid inhaled steroid at this time  -Continue Solu-Medrol, decreased dose to 40 daily, likely DC after tomorrow  -Maintain saturation 92 to 95%  -Continued scheduled DuoNebs; hold Spiriva while on DuoNebs  -Avoid ICS at this time; can continue systemic steroids for now  -Diuresis as renal function and hemodynamics allow     1/22/24: CODE STATUS discussion: Discussed with patient her wishes should she be unable to support her own ability to breathe and require an endotracheal tube/ventilator.  Additionally we discussed her wishes should her heart go into a lethal arrhythmia and or stop.  She reports that she does have a living will I asked her to please have her family bring that in.  She does not have healthcare POA papers.  The patient agreed that if her breathing needed to be supported with an ET tube and ventilator that she does want this intervention.  Additionally she agreed that if her heart should go into a lethal arrhythmia and/or stop that she does not want CPR.  CODE STATUS was changed accordingly.      Recap: Intubation YES; CPR No    Discharge: Patient should follow-up with pulmonology postdischarge.  Please discharged on Spiriva and Symbicort with albuterol rescue.       DANIELLE Flores-CNS

## 2024-01-23 NOTE — PROGRESS NOTES
Lissett Rodríguez is a 89 y.o. female on day 5 of admission presenting with Community acquired pneumonia, unspecified laterality.          Patient's oxygen requirements increased yesterday and she was transferred to SDU on Bi-pap. She is on Airvo currently. Sitting up in the chair. She is treated for PNA with IV abx. Also patient is positive for Pe's. Her Heparin assay  is to high and they are unable to start Heparin drip. Patient is not medically ready for discharge.

## 2024-01-24 ENCOUNTER — APPOINTMENT (OUTPATIENT)
Dept: CARDIOLOGY | Facility: HOSPITAL | Age: 89
DRG: 175 | End: 2024-01-24
Payer: MEDICARE

## 2024-01-24 LAB
ANION GAP SERPL CALC-SCNC: 13 MMOL/L (ref 10–20)
BUN SERPL-MCNC: 45 MG/DL (ref 6–23)
CALCIUM SERPL-MCNC: 8.7 MG/DL (ref 8.6–10.3)
CHLORIDE SERPL-SCNC: 101 MMOL/L (ref 98–107)
CO2 SERPL-SCNC: 23 MMOL/L (ref 21–32)
CREAT SERPL-MCNC: 1.1 MG/DL (ref 0.5–1.05)
EGFRCR SERPLBLD CKD-EPI 2021: 48 ML/MIN/1.73M*2
GLUCOSE BLD MANUAL STRIP-MCNC: 280 MG/DL (ref 74–99)
GLUCOSE BLD MANUAL STRIP-MCNC: 334 MG/DL (ref 74–99)
GLUCOSE BLD MANUAL STRIP-MCNC: 338 MG/DL (ref 74–99)
GLUCOSE BLD MANUAL STRIP-MCNC: 349 MG/DL (ref 74–99)
GLUCOSE SERPL-MCNC: 319 MG/DL (ref 74–99)
HOLD SPECIMEN: NORMAL
HOLD SPECIMEN: NORMAL
POTASSIUM SERPL-SCNC: 4.4 MMOL/L (ref 3.5–5.3)
SODIUM SERPL-SCNC: 133 MMOL/L (ref 136–145)
UFH PPP CHRO-ACNC: 0.3 IU/ML
UFH PPP CHRO-ACNC: 0.3 IU/ML

## 2024-01-24 PROCEDURE — 2500000004 HC RX 250 GENERAL PHARMACY W/ HCPCS (ALT 636 FOR OP/ED): Performed by: CLINICAL NURSE SPECIALIST

## 2024-01-24 PROCEDURE — 94660 CPAP INITIATION&MGMT: CPT

## 2024-01-24 PROCEDURE — 2500000002 HC RX 250 W HCPCS SELF ADMINISTERED DRUGS (ALT 637 FOR MEDICARE OP, ALT 636 FOR OP/ED): Performed by: PHARMACIST

## 2024-01-24 PROCEDURE — 2500000004 HC RX 250 GENERAL PHARMACY W/ HCPCS (ALT 636 FOR OP/ED): Performed by: HOSPITALIST

## 2024-01-24 PROCEDURE — 99232 SBSQ HOSP IP/OBS MODERATE 35: CPT | Performed by: CLINICAL NURSE SPECIALIST

## 2024-01-24 PROCEDURE — 2500000001 HC RX 250 WO HCPCS SELF ADMINISTERED DRUGS (ALT 637 FOR MEDICARE OP): Performed by: INTERNAL MEDICINE

## 2024-01-24 PROCEDURE — 1200000002 HC GENERAL ROOM WITH TELEMETRY DAILY

## 2024-01-24 PROCEDURE — 2500000002 HC RX 250 W HCPCS SELF ADMINISTERED DRUGS (ALT 637 FOR MEDICARE OP, ALT 636 FOR OP/ED): Performed by: HOSPITALIST

## 2024-01-24 PROCEDURE — 2500000004 HC RX 250 GENERAL PHARMACY W/ HCPCS (ALT 636 FOR OP/ED): Performed by: INTERNAL MEDICINE

## 2024-01-24 PROCEDURE — 2500000001 HC RX 250 WO HCPCS SELF ADMINISTERED DRUGS (ALT 637 FOR MEDICARE OP): Performed by: HOSPITALIST

## 2024-01-24 PROCEDURE — 85520 HEPARIN ASSAY: CPT | Performed by: PEDIATRICS

## 2024-01-24 PROCEDURE — 94640 AIRWAY INHALATION TREATMENT: CPT | Mod: MUE

## 2024-01-24 PROCEDURE — 82374 ASSAY BLOOD CARBON DIOXIDE: CPT | Performed by: INTERNAL MEDICINE

## 2024-01-24 PROCEDURE — 36415 COLL VENOUS BLD VENIPUNCTURE: CPT | Performed by: INTERNAL MEDICINE

## 2024-01-24 PROCEDURE — 93005 ELECTROCARDIOGRAM TRACING: CPT

## 2024-01-24 PROCEDURE — 82947 ASSAY GLUCOSE BLOOD QUANT: CPT

## 2024-01-24 PROCEDURE — 94668 MNPJ CHEST WALL SBSQ: CPT

## 2024-01-24 PROCEDURE — 99232 SBSQ HOSP IP/OBS MODERATE 35: CPT | Performed by: INTERNAL MEDICINE

## 2024-01-24 PROCEDURE — 85520 HEPARIN ASSAY: CPT | Performed by: INTERNAL MEDICINE

## 2024-01-24 RX ORDER — PREDNISONE 20 MG/1
40 TABLET ORAL DAILY
Status: COMPLETED | OUTPATIENT
Start: 2024-01-25 | End: 2024-01-26

## 2024-01-24 RX ADMIN — METHYLPREDNISOLONE SODIUM SUCCINATE 40 MG: 40 INJECTION, POWDER, FOR SOLUTION INTRAMUSCULAR; INTRAVENOUS at 09:50

## 2024-01-24 RX ADMIN — INSULIN LISPRO 8 UNITS: 100 INJECTION, SOLUTION INTRAVENOUS; SUBCUTANEOUS at 12:08

## 2024-01-24 RX ADMIN — STANDARDIZED SENNA CONCENTRATE 8.6 MG: 8.6 TABLET ORAL at 08:53

## 2024-01-24 RX ADMIN — Medication 2000 UNITS: at 08:52

## 2024-01-24 RX ADMIN — ATORVASTATIN CALCIUM 40 MG: 40 TABLET, FILM COATED ORAL at 21:36

## 2024-01-24 RX ADMIN — GUAIFENESIN 600 MG: 600 TABLET, EXTENDED RELEASE ORAL at 21:37

## 2024-01-24 RX ADMIN — FORMOTEROL FUMARATE DIHYDRATE 20 MCG: 20 SOLUTION RESPIRATORY (INHALATION) at 20:23

## 2024-01-24 RX ADMIN — GABAPENTIN 200 MG: 100 CAPSULE ORAL at 16:43

## 2024-01-24 RX ADMIN — POLYETHYLENE GLYCOL 3350 17 G: 17 POWDER, FOR SOLUTION ORAL at 08:53

## 2024-01-24 RX ADMIN — IPRATROPIUM BROMIDE AND ALBUTEROL SULFATE 3 ML: 2.5; .5 SOLUTION RESPIRATORY (INHALATION) at 07:41

## 2024-01-24 RX ADMIN — IPRATROPIUM BROMIDE AND ALBUTEROL SULFATE 3 ML: 2.5; .5 SOLUTION RESPIRATORY (INHALATION) at 11:37

## 2024-01-24 RX ADMIN — STANDARDIZED SENNA CONCENTRATE 8.6 MG: 8.6 TABLET ORAL at 21:36

## 2024-01-24 RX ADMIN — CEFEPIME 1 G: 1 INJECTION, POWDER, FOR SOLUTION INTRAMUSCULAR; INTRAVENOUS at 15:56

## 2024-01-24 RX ADMIN — FUROSEMIDE 40 MG: 40 TABLET ORAL at 08:52

## 2024-01-24 RX ADMIN — GUAIFENESIN 600 MG: 600 TABLET, EXTENDED RELEASE ORAL at 08:52

## 2024-01-24 RX ADMIN — APIXABAN 10 MG: 5 TABLET, FILM COATED ORAL at 21:36

## 2024-01-24 RX ADMIN — GABAPENTIN 200 MG: 100 CAPSULE ORAL at 21:36

## 2024-01-24 RX ADMIN — AMLODIPINE BESYLATE 2.5 MG: 5 TABLET ORAL at 08:53

## 2024-01-24 RX ADMIN — GABAPENTIN 200 MG: 100 CAPSULE ORAL at 09:00

## 2024-01-24 RX ADMIN — INSULIN LISPRO 6 UNITS: 100 INJECTION, SOLUTION INTRAVENOUS; SUBCUTANEOUS at 08:53

## 2024-01-24 RX ADMIN — CEFEPIME 1 G: 1 INJECTION, POWDER, FOR SOLUTION INTRAMUSCULAR; INTRAVENOUS at 04:38

## 2024-01-24 RX ADMIN — INSULIN LISPRO 8 UNITS: 100 INJECTION, SOLUTION INTRAVENOUS; SUBCUTANEOUS at 22:23

## 2024-01-24 RX ADMIN — IPRATROPIUM BROMIDE AND ALBUTEROL SULFATE 3 ML: 2.5; .5 SOLUTION RESPIRATORY (INHALATION) at 15:08

## 2024-01-24 RX ADMIN — FORMOTEROL FUMARATE DIHYDRATE 20 MCG: 20 SOLUTION RESPIRATORY (INHALATION) at 07:42

## 2024-01-24 RX ADMIN — INSULIN LISPRO 8 UNITS: 100 INJECTION, SOLUTION INTRAVENOUS; SUBCUTANEOUS at 16:43

## 2024-01-24 RX ADMIN — IPRATROPIUM BROMIDE AND ALBUTEROL SULFATE 3 ML: 2.5; .5 SOLUTION RESPIRATORY (INHALATION) at 20:23

## 2024-01-24 RX ADMIN — OXYBUTYNIN CHLORIDE 5 MG: 5 TABLET ORAL at 08:52

## 2024-01-24 RX ADMIN — AMIODARONE HYDROCHLORIDE 100 MG: 200 TABLET ORAL at 08:52

## 2024-01-24 ASSESSMENT — COGNITIVE AND FUNCTIONAL STATUS - GENERAL
PERSONAL GROOMING: A LITTLE
MOVING TO AND FROM BED TO CHAIR: A LITTLE
HELP NEEDED FOR BATHING: A LITTLE
CLIMB 3 TO 5 STEPS WITH RAILING: A LITTLE
STANDING UP FROM CHAIR USING ARMS: A LITTLE
DAILY ACTIVITIY SCORE: 18
DRESSING REGULAR UPPER BODY CLOTHING: A LITTLE
DAILY ACTIVITIY SCORE: 18
MOBILITY SCORE: 18
DRESSING REGULAR LOWER BODY CLOTHING: A LITTLE
MOVING TO AND FROM BED TO CHAIR: A LITTLE
PERSONAL GROOMING: A LITTLE
WALKING IN HOSPITAL ROOM: A LITTLE
DRESSING REGULAR UPPER BODY CLOTHING: A LITTLE
EATING MEALS: A LITTLE
MOVING FROM LYING ON BACK TO SITTING ON SIDE OF FLAT BED WITH BEDRAILS: A LITTLE
CLIMB 3 TO 5 STEPS WITH RAILING: A LITTLE
WALKING IN HOSPITAL ROOM: A LITTLE
TURNING FROM BACK TO SIDE WHILE IN FLAT BAD: A LITTLE
STANDING UP FROM CHAIR USING ARMS: A LITTLE
TURNING FROM BACK TO SIDE WHILE IN FLAT BAD: A LITTLE
MOVING FROM LYING ON BACK TO SITTING ON SIDE OF FLAT BED WITH BEDRAILS: A LITTLE
HELP NEEDED FOR BATHING: A LITTLE
TOILETING: A LITTLE
MOBILITY SCORE: 18
DRESSING REGULAR LOWER BODY CLOTHING: A LITTLE
TOILETING: A LITTLE
EATING MEALS: A LITTLE

## 2024-01-24 ASSESSMENT — PAIN SCALES - GENERAL
PAINLEVEL_OUTOF10: 0 - NO PAIN

## 2024-01-24 ASSESSMENT — PAIN - FUNCTIONAL ASSESSMENT
PAIN_FUNCTIONAL_ASSESSMENT: 0-10
PAIN_FUNCTIONAL_ASSESSMENT: 0-10

## 2024-01-24 NOTE — CONSULTS
INFECTIOUS DISEASE INPATIENT INITIAL CONSULTATION    Referred By: Mikal Doty    Reason For Consult: Worsening pneumonia, with increased O2 demand, needs antibiotic review     HPI:  This is a 89 y.o. female with PMH of severe COPD home O2 6L NC, A. Fib on Xarelto, HFpEF, KALPANA, DM II who presented with fatigue and lightheadedness.    Afebrile now but had temp to 101.2 before. WBC 16 and normalized. Is up to 19 now but has been on IV Solumedrol. On Cefepime/Azithro now. COVID/Flu negative. CT/PE positive for PE with some scattered GGO and LLL consolidative opacities. Is on IV CTX/Azithro for PNA and AC for PE.    She is off Airvo and on 8L NC now. Seems to be doing better overall now. No specific complaints from patient. Denies chest pain. No abd pain, n/v/c/d.    Allergies:  Lisinopril, Tetracyclines, Vancomycin, and Penicillins     Vitals (Last 24 Hours):  Heart Rate:  [57-77]   Temp:  [35.5 °C (95.9 °F)-36.4 °C (97.5 °F)]   Resp:  [18-22]   BP: (106-117)/(54-67)   Weight:  [76 kg (167 lb 8.8 oz)]   SpO2:  [92 %-98 %]      PHYSICAL EXAM:  Gen - NAD, on 8L NC  Heart - RRR  Lungs - coarse throughout with some wheezing  Abd - soft, no ttp, BS present  Skin - no rash    MEDS:    Current Facility-Administered Medications:     acetaminophen (Tylenol) tablet 650 mg, 650 mg, oral, q4h PRN, Jeannette Ron MD    acetaminophen (Tylenol) tablet 650 mg, 650 mg, oral, q4h PRN, Jeannette Ron MD, 650 mg at 01/20/24 2152    amiodarone (Pacerone) tablet 100 mg, 100 mg, oral, Daily, Jeannette Ron MD, 100 mg at 01/24/24 0852    amLODIPine (Norvasc) tablet 2.5 mg, 2.5 mg, oral, Daily, Jeannette Ron MD, 2.5 mg at 01/24/24 0853    atorvastatin (Lipitor) tablet 40 mg, 40 mg, oral, Nightly, Jeannette Ron MD, 40 mg at 01/23/24 2013    cefepime (Maxipime) 1 g in dextrose 5 % 50 mL IV, 1 g, intravenous, q12h, Dolores Giang MD, Stopped at 01/24/24 0508    cholecalciferol (Vitamin D-3) tablet 2,000 Units, 2,000 Units, oral,  Daily, Jeannette Ron MD, 2,000 Units at 01/24/24 0852    cyclobenzaprine (Flexeril) tablet 10 mg, 10 mg, oral, TID PRN, Jeannette Ron MD    dextrose 10 % in water (D10W) infusion, 0.3 g/kg/hr, intravenous, Once PRN, Jeannette Ron MD    dextrose 50 % injection 25 g, 25 g, intravenous, q15 min PRN, Jeannette Ron MD    formoterol (Perforomist) 20 mcg/2 mL nebulizer solution 20 mcg, 20 mcg, nebulization, BID, Sanaz Wahl PharmD, 20 mcg at 01/24/24 0742    furosemide (Lasix) tablet 40 mg, 40 mg, oral, Daily, Mikal Doty DO, 40 mg at 01/24/24 0852    gabapentin (Neurontin) capsule 200 mg, 200 mg, oral, TID, Jeannette Ron MD, 200 mg at 01/24/24 0900    glucagon (Glucagen) injection 1 mg, 1 mg, intramuscular, q15 min PRN, Jeannette Ron MD    guaiFENesin (Mucinex) 12 hr tablet 600 mg, 600 mg, oral, BID, Jeannette Ron MD, 600 mg at 01/24/24 0852    guaiFENesin (Robitussin) 100 mg/5 mL syrup 200 mg, 200 mg, oral, q4h PRN, Jeannette Ron MD    heparin (porcine) injection 3,000-6,000 Units, 3,000-6,000 Units, intravenous, q4h PRN, Dolores Giang MD    heparin (porcine) injection 5,750 Units, 80 Units/kg, intravenous, Once, Dolores Giang MD    heparin 25,000 Units in dextrose 5% 250 mL (100 Units/mL) infusion (premix), 0-4,500 Units/hr, intravenous, Continuous, Dolores Giang MD    insulin lispro (HumaLOG) injection 0-10 Units, 0-10 Units, subcutaneous, TID with meals, Jeannette Ron MD, 6 Units at 01/24/24 0853    ipratropium-albuteroL (Duo-Neb) 0.5-2.5 mg/3 mL nebulizer solution 3 mL, 3 mL, nebulization, 4x daily, Jeannette Ron MD, 3 mL at 01/24/24 0741    ipratropium-albuteroL (Duo-Neb) 0.5-2.5 mg/3 mL nebulizer solution 3 mL, 3 mL, nebulization, q2h PRN, Jeannette Ron MD    melatonin tablet 3 mg, 3 mg, oral, Daily, Jeannette Ron MD, 3 mg at 01/23/24 2013    methylPREDNISolone sod succinate (PF) (SOLU-Medrol) 40 mg/mL injection 40 mg, 40 mg, intravenous, q24h, Nupur Llanos, APRN-CNS,  40 mg at 01/24/24 0950    ondansetron ODT (Zofran-ODT) disintegrating tablet 4 mg, 4 mg, oral, q8h PRN **OR** ondansetron (Zofran) injection 4 mg, 4 mg, intravenous, q8h PRN, Jeannette Ron MD    oxybutynin (Ditropan) tablet 5 mg, 5 mg, oral, Daily, Jeannette Ron MD, 5 mg at 01/24/24 0852    oxygen (O2) therapy, , inhalation, Continuous PRN - O2/gases, Jeannette Ron MD, Rate Verify at 01/24/24 0243    oxygen (O2) therapy, , inhalation, Continuous PRN - O2/gases, Dolores Giang MD, 8 L/min at 01/24/24 0741    oxygen (O2) therapy, , inhalation, Continuous - 02/gases, GRACE FloresCNS, 40 L/min at 01/22/24 1613    polyethylene glycol (Glycolax, Miralax) packet 17 g, 17 g, oral, Daily, Jeannette Ron MD, 17 g at 01/24/24 0853    sennosides (Senokot) tablet 8.6 mg, 1 tablet, oral, BID, Jeannette Ron MD, 8.6 mg at 01/24/24 0853    [Held by provider] tiotropium (Spiriva Respimat) 2.5 mcg/actuation inhaler 2 puff, 2 Inhalation, inhalation, Daily, Jeannette Ron MD, 2 puff at 01/21/24 0803     LABS:  Lab Results   Component Value Date    WBC 19.3 (H) 01/23/2024    HGB 13.3 01/23/2024    HCT 41.2 01/23/2024    MCV 89 01/23/2024     01/23/2024      Results from last 72 hours   Lab Units 01/24/24  0537   SODIUM mmol/L 133*   POTASSIUM mmol/L 4.4   CHLORIDE mmol/L 101   CO2 mmol/L 23   BUN mg/dL 45*   CREATININE mg/dL 1.10*   GLUCOSE mg/dL 319*   CALCIUM mg/dL 8.7   ANION GAP mmol/L 13   EGFR mL/min/1.73m*2 48*         Estimated Creatinine Clearance: 28.3 mL/min (A) (by C-G formula based on SCr of 1.1 mg/dL (H)).      IMAGING:  CXR 1/22  Impression:     Worsening left chest infiltrate and bilateral interstitial  infiltrates/edema since 01/18/2024.     CT/PE 1/18  Impression:     Acute pulmonary embolus within a segmental branch of the lingula.    Ground-glass opacities scattered diffusely throughout the lungs with  more consolidative opacities in the left upper lobe and left lower  lobe concerning for  pneumonia. 1 of the opacities in the lingula is  somewhat wedge-shaped and may also represent a component of pulmonary  infarction.    Left lower lobe nodular density measuring up to 8 mm. While findings  may be related to the underlying likely infectious process, recommend  repeat imaging after treatment to assess for resolution and/or  stability.    Nonspecific enlarged mediastinal and hilar lymph nodes, which may be  reactive.     CXR 1/18  Impression:     Somewhat wedge-shaped consolidative opacity in the left mid lung  pulmonary infarct not excluded if there is clinical concern for  pulmonary emboli further evaluation with dedicated PE CT can be  considered. Continued radiographic follow-up is recommended to ensure  complete resolution and exclude underlying mass lesion.    Mild interstitial prominence could be chronic or relate to component  developing interstitial edema.       ASSESSMENT/PLAN:    Acute on Chronic Hypoxic Resp Failure  COPD on home 6L NC  CKD - CrCl is 28, affects abx dosing  Acute PE with Lung Infarct  Leukocytosis - seems likely rise is due to steroids based on timing    Appears to be slowly improving. I think keeping Cefepime is fine. Finished Azithromycin course 5D.    Monitoring for adverse effects of abx such as rash/itching/diarrhea.    Will follow. Thanks! D/w Dr. Lalitha Archer MD  [facility]  Office #[phone]

## 2024-01-24 NOTE — CARE PLAN
Problem: Pain  Goal: My pain/discomfort is manageable  Outcome: Progressing   The patient's goals for the shift include      The clinical goals for the shift include to maintain adequate oxygen levels throughout shft      Problem: Daily Care  Goal: Daily care needs are met  Outcome: Progressing     Problem: Psychosocial Needs  Goal: Demonstrates ability to cope with hospitalization/illness  Outcome: Progressing  Goal: Collaborate with me, my family, and caregiver to identify my specific goals  Outcome: Progressing     Problem: Discharge Barriers  Goal: My discharge needs are met  Outcome: Progressing     Problem: Skin  Goal: Decreased wound size/increased tissue granulation at next dressing change  Outcome: Progressing  Goal: Participates in plan/prevention/treatment measures  Outcome: Progressing  Goal: Prevent/manage excess moisture  Outcome: Progressing  Goal: Prevent/minimize sheer/friction injuries  Outcome: Progressing  Goal: Promote/optimize nutrition  Outcome: Progressing  Goal: Promote skin healing  Outcome: Progressing

## 2024-01-24 NOTE — PROGRESS NOTES
"Lissett Rodríguez is a 89 y.o. female on day 6 of admission presenting with Community acquired pneumonia, unspecified laterality.    Subjective   Patient seen and examined sitting up in the chair, family at bedside.  Patient currently weaned to her baseline of 6 L HFNC, pulse ox 92 to 95%.  Patient reports that she is feeling much better with easier breathing today.  Patient reports continued cough reporting it has minimal with occasional clear sputum.  She is reporting some chest discomfort with deep inspiration but no active acute chest pain.  Denies nausea.  Discussed coagulation plan with primary team and pharmacy.  Likely plan will be to transition directly to Eliquis and not restart heparin.  Plan discussed with patient and family.  No additional questions or concerns.    Objective     Physical Exam     Constitutional:   Elderly, NAD, cooperative  HENT: Atraumatic, semimoist mucous membranes  Eyes: nonicteric  Neck: Supple, no JVD  Cardiovascular: S1-S2 distinct, regular, HR 70s, no murmur appreciated  Pulmonary: Fair air entry with minimal bibasilar posterior crackles (improved), anterior diminished bases; no rhonchi or wheezing noted; no conversational dyspnea  Abdominal: Obese, soft, nontender, + BS  Musculoskeletal: Fair active range of motion with fair strength  Extremities:   No BLE edema  Lymphadenopathy: No nuchal LAP  Skin: Warm and dry  Neurological: Alert and oriented x 3, speech clear and appropriate; no focal deficits noted  Psychiatric:     Appropriate mood and behavior    Last Recorded Vitals  Blood pressure 138/74, pulse 75, temperature 35.7 °C (96.2 °F), temperature source Temporal, resp. rate 16, height 1.499 m (4' 11.02\"), weight 76 kg (167 lb 8.8 oz), SpO2 95 %.  Intake/Output last 3 Shifts:  No intake/output data recorded.    Relevant Results  Scheduled medications:  amiodarone, 100 mg, oral, Daily  amLODIPine, 2.5 mg, oral, Daily  apixaban, 10 mg, oral, BID   Followed by  [START ON " 1/31/2024] apixaban, 5 mg, oral, BID  atorvastatin, 40 mg, oral, Nightly  cefepime, 1 g, intravenous, q12h  cholecalciferol, 2,000 Units, oral, Daily  formoterol, 20 mcg, nebulization, BID  furosemide, 40 mg, oral, Daily  gabapentin, 200 mg, oral, TID  guaiFENesin, 600 mg, oral, BID  insulin lispro, 0-10 Units, subcutaneous, TID with meals  ipratropium-albuteroL, 3 mL, nebulization, 4x daily  melatonin, 3 mg, oral, Daily  methylPREDNISolone sodium succinate (PF), 40 mg, intravenous, q24h  oxybutynin, 5 mg, oral, Daily  oxygen, , inhalation, Continuous - 02/gases  polyethylene glycol, 17 g, oral, Daily  sennosides, 1 tablet, oral, BID  [Held by provider] tiotropium, 2 Inhalation, inhalation, Daily     PRN medications: acetaminophen, acetaminophen, cyclobenzaprine, dextrose 10 % in water (D10W), dextrose, glucagon, guaiFENesin, ipratropium-albuteroL, ondansetron ODT **OR** ondansetron, oxygen, oxygen     Results for orders placed or performed during the hospital encounter of 01/18/24 (from the past 24 hour(s))   POCT GLUCOSE   Result Value Ref Range    POCT Glucose 316 (H) 74 - 99 mg/dL   CBC   Result Value Ref Range    WBC 19.3 (H) 4.4 - 11.3 x10*3/uL    nRBC 0.0 0.0 - 0.0 /100 WBCs    RBC 4.62 4.00 - 5.20 x10*6/uL    Hemoglobin 13.3 12.0 - 16.0 g/dL    Hematocrit 41.2 36.0 - 46.0 %    MCV 89 80 - 100 fL    MCH 28.8 26.0 - 34.0 pg    MCHC 32.3 32.0 - 36.0 g/dL    RDW 13.3 11.5 - 14.5 %    Platelets 342 150 - 450 x10*3/uL   Heparin Assay, UFH   Result Value Ref Range    Heparin Unfractionated 0.5 See Comment Below for Therapeutic Ranges IU/mL   Basic Metabolic Panel   Result Value Ref Range    Glucose 319 (H) 74 - 99 mg/dL    Sodium 133 (L) 136 - 145 mmol/L    Potassium 4.4 3.5 - 5.3 mmol/L    Chloride 101 98 - 107 mmol/L    Bicarbonate 23 21 - 32 mmol/L    Anion Gap 13 10 - 20 mmol/L    Urea Nitrogen 45 (H) 6 - 23 mg/dL    Creatinine 1.10 (H) 0.50 - 1.05 mg/dL    eGFR 48 (L) >60 mL/min/1.73m*2    Calcium 8.7 8.6 -  10.3 mg/dL   Heparin Assay   Result Value Ref Range    Heparin Unfractionated 0.3 See Comment Below for Therapeutic Ranges IU/mL   Lavender Top   Result Value Ref Range    Extra Tube Hold for add-ons.    POCT GLUCOSE   Result Value Ref Range    POCT Glucose 280 (H) 74 - 99 mg/dL   POCT GLUCOSE   Result Value Ref Range    POCT Glucose 349 (H) 74 - 99 mg/dL   SST TOP   Result Value Ref Range    Extra Tube Hold for add-ons.    Heparin Assay   Result Value Ref Range    Heparin Unfractionated 0.3 See Comment Below for Therapeutic Ranges IU/mL   POCT GLUCOSE   Result Value Ref Range    POCT Glucose 338 (H) 74 - 99 mg/dL      XR chest 1 view  Result Date: 1/22/2024  Interpreted By:  Melba Guzman, STUDY: XR CHEST 1 VIEW;  1/22/2024 9:55 am   INDICATION: Signs/Symptoms:SOB.   COMPARISON: 01/18/2024   ACCESSION NUMBER(S): [id number]   ORDERING CLINICIAN: JOANNA GERARD   FINDINGS: Patient is slightly rotated. Artifact related to overlying monitoring leads. Increased perihilar interstitial prominence and interstitial edema and enlargement of left mid chest infiltrate. New thickening along the right horizontal fissure. No definite pleural effusion. Presumed soft tissue fold overlies the left upper chest. Cardiac silhouette within normal limits for size. Electronic structure is again seen near the left heart border.       Worsening left chest infiltrate and bilateral interstitial infiltrates/edema since 01/18/2024.   MACRO: None.   Signed by: Melba Guzman 1/22/2024 9:59 AM Dictation workstation:   [id number]      Assessment/Plan   Lissett Rodríguez is a 89 y.o. female with past medical history of COPD (6 L home O2), KALPANA on CPAP, HFpEF, A-fib on Xarelto, diabetes who presented to the ED for lightheadedness and fatigue.  EKG showed NSR without acute ischemia, troponins negative.  WBC 16, BNP 59.  Patient negative for flu COVID and RSV.  CXR with mid left wedge-shaped consolidative opacity with mild interstitial prominence.   CT PE positive for acute PE in the segmental branch of the lingula with scattered GGO's bilaterally and left upper and left lower consolidative opacities.  Patient was treated with ceftriaxone, azithromycin and Lovenox and admitted for further management.  During admission patient continued to have increasing oxygen needs with a brief period on Airvo. Patient escalated further requiring 15 L with pulse ox 80s.  Patient's transition to CPAP at +11 and 100% with pulse ox reaching 95%. Pulmonology is consulted for acute hypoxic respiratory failure, pneumonia and pulmonary embolus.     Impressions:  #COPD: not in exacerbation; PFT 11/2022 with FEV1/FVC 0.79 (no obstruction), + BD response, substantially reduced DLCO; values consistent with hyperinflation and air trapping;  Home meds: Symbicort and Spiriva (Symbicort periodically held); patient previously followed with Dr. Hernández  #Acute on chronic hypoxic respiratory failure: Multifactorial secondary to COPD, PE/infarct, PNA, HFpEF/edema  #Pulmonary embolism versus pulmonary infarct: CT PE with positive PE in the segmental level of the lingula with wedge-shaped consolidation mid left lung concerning for infarct; occurred while on Xarelto therapy  #Abnormal CT: Diffuse bilateral GGO's; pneumonia with PACO and LLL consolidative opacities; left lower lobe 8 mm nodular density  #KALPANA on CPAP: Last documented sleep study in 2018 suggested CPAP therapy limiting pressure 4-13 cmH2O  #HFpEF: Echo 1/19/2024 with EF 60 to 65%, no impaired relaxation, normal RVSF     Recommendations:  -Transition to oral Eliquis for anticoagulation as patient is weaned back to baseline 6 L  -Continue CPAP at night and during the day as needed  -Wean to Airvo/HFNC during the day, was able  -BPH with Acapella when patient is off CPAP  -Continued scheduled DuoNebs; hold Spiriva while on DuoNebs  -Agree with antibiotic therapy; avoid inhaled steroid at this time  -Transition oral prednisone beginning  1/25 with last dose on 1/26  - ordered  -Maintain saturation 92 to 95%  -Continued scheduled DuoNebs; hold Spiriva while on DuoNebs  -Avoid ICS at this time; can continue systemic steroids for now  -Diuresis as renal function and hemodynamics allow     1/22/24: CODE STATUS discussion: Discussed with patient her wishes should she be unable to support her own ability to breathe and require an endotracheal tube/ventilator.  Additionally we discussed her wishes should her heart go into a lethal arrhythmia and or stop.  She reports that she does have a living will I asked her to please have her family bring that in.  She does not have healthcare POA papers.  The patient agreed that if her breathing needed to be supported with an ET tube and ventilator that she does want this intervention.  Additionally she agreed that if her heart should go into a lethal arrhythmia and/or stop that she does not want CPR.  CODE STATUS was changed accordingly.      Recap: Intubation YES; CPR No    Discharge: Patient should follow-up with pulmonology postdischarge.  Please discharged on Spiriva and Symbicort with albuterol rescue.  Patient will need pulmonary follow-up.  Previously followed with Dr. Gretta Hernández.       DANIELLE Flores-CNS

## 2024-01-24 NOTE — PROGRESS NOTES
Lissett Rodríguez is a 89 y.o. female on day 6 of admission presenting with Community acquired pneumonia, unspecified laterality.         Patient is improving slowly. Her oxygen demand is improving too and oxygen is decreased to HF 10L. Patient is sitting up in the chair. She is not medically ready for discharge.

## 2024-01-24 NOTE — PROGRESS NOTES
Lissett Rodríguez is a 89 y.o. female on day 6 of admission presenting with Community acquired pneumonia, unspecified laterality.      Subjective   Patient was seen and examined at bedside this morning, stated that she is breathing much better, denies fever, chills, nausea, vomiting or any other symptoms at that time.  Patient admits of IV fluid O2 8 L via nasal cannula.  While eating and talking she was causing a drop in her O2 sat to upper 80s with recovery to above 94% within seconds to minutes.       Objective     Last Recorded Vitals  /74 (BP Location: Left arm, Patient Position: Sitting)   Pulse 75   Temp 35.7 °C (96.2 °F) (Temporal)   Resp 16   Wt 76 kg (167 lb 8.8 oz)   SpO2 95%   Intake/Output last 3 Shifts:    Intake/Output Summary (Last 24 hours) at 1/24/2024 1735  Last data filed at 1/24/2024 0858  Gross per 24 hour   Intake 240 ml   Output --   Net 240 ml       Admission Weight  Weight: 72.1 kg (159 lb) (01/18/24 1914)    Daily Weight  01/24/24 : 76 kg (167 lb 8.8 oz)    Image Results  XR chest 1 view  Narrative: Interpreted By:  Melba Guzman,   STUDY:  XR CHEST 1 VIEW;  1/22/2024 9:55 am      INDICATION:  Signs/Symptoms:SOB.      COMPARISON:  01/18/2024      ACCESSION NUMBER(S):  [id number]      ORDERING CLINICIAN:  JOANNA GERARD      FINDINGS:  Patient is slightly rotated. Artifact related to overlying monitoring  leads. Increased perihilar interstitial prominence and interstitial  edema and enlargement of left mid chest infiltrate. New thickening  along the right horizontal fissure. No definite pleural effusion.  Presumed soft tissue fold overlies the left upper chest. Cardiac  silhouette within normal limits for size. Electronic structure is  again seen near the left heart border.      Impression: Worsening left chest infiltrate and bilateral interstitial  infiltrates/edema since 01/18/2024.      MACRO:  None.      Signed by: Melba Guzman 1/22/2024 9:59 AM  Dictation workstation:    [id number]      Physical Exam  Constitutional: Pleasant elderly female breathing on Airvo, alert active, cooperative not in acute distress  Eyes: PERRLA, clear sclera  ENMT: Moist mucosal membranes, no exudate  Head / Neck: Atraumatic, normocephalic, supple neck, JVP not visualized  Lungs: Patent airways, CTABL  Heart: RRR, S1S2, no murmurs appreciated, palpable pulses in all extremities  GI: Soft, NT, ND, bowel sounds present in all quadrants  MSK: Moves all extremities freely, no restriction  of ROM, no joint edema  Extremities: Intact x 4, no peripheral edema  : No Arceo catheter inserted  Breast: Deferred  Neurological: AAO x 3 to person, place and date, facial muscles symmetrical, sensation intact, strength 4/4, no acute focal neurological deficits appreciated  Psychological: Appropriate mood and behavior  Relevant Results          Scheduled medications  amiodarone, 100 mg, oral, Daily  amLODIPine, 2.5 mg, oral, Daily  atorvastatin, 40 mg, oral, Nightly  cefepime, 1 g, intravenous, q12h  cholecalciferol, 2,000 Units, oral, Daily  formoterol, 20 mcg, nebulization, BID  furosemide, 40 mg, oral, Daily  gabapentin, 200 mg, oral, TID  guaiFENesin, 600 mg, oral, BID  heparin, 80 Units/kg, intravenous, Once  insulin lispro, 0-10 Units, subcutaneous, TID with meals  ipratropium-albuteroL, 3 mL, nebulization, 4x daily  melatonin, 3 mg, oral, Daily  methylPREDNISolone sodium succinate (PF), 40 mg, intravenous, q24h  oxybutynin, 5 mg, oral, Daily  oxygen, , inhalation, Continuous - 02/gases  polyethylene glycol, 17 g, oral, Daily  sennosides, 1 tablet, oral, BID  [Held by provider] tiotropium, 2 Inhalation, inhalation, Daily      Continuous medications  heparin, 0-4,500 Units/hr      PRN medications  PRN medications: acetaminophen, acetaminophen, cyclobenzaprine, dextrose 10 % in water (D10W), dextrose, glucagon, guaiFENesin, heparin, ipratropium-albuteroL, ondansetron ODT **OR** ondansetron, oxygen, oxygen        Assessment/Plan   This patient currently has cardiac telemetry ordered; if you would like to modify or discontinue the telemetry order, click here to go to the orders activity to modify/discontinue the order.    Lissett Rodríguez is a 89 y.o.  female with a PMH of afib (on Xarelto), HFpEF (55%), COPD (on 6 LPM oxygen 24/7), KALPANA, DM2, HTN, HLD, TIA, presenting with fatigue, body aches and cough.  Has felt fatigued for the past 1 week, unable to perform her usual activities at her assisted living facility.   Has had a dry cough and body aches x 2 days.       Principal Problem:    Community acquired pneumonia, unspecified laterality    Acute PE  Acute hypoxic resp failure  - on continuous CPAP, uses 6 LPM at baseline  - echocardiogram to evaluate for right heart strain  -hematology consult  -Status post heparin drip  -switch to eliquis, started with loading dose 10 mg twice daily  -wean off O2 to baseline  -Pulm consult  -transfer to step down due to inc O2 requirements  -monitor     Pneumonia  -Increase O2 demand, on Airvo  - continue Ceftriaxone, completed azithromycin regimen  -Chest x-ray shows worsening left-sided infiltrate  - duonebs  - hycodan for cough   -Consult ID for antibiotic review     Atrial fibrillation  - telemetry  - continue Amiodarone  -eliquis started, with loading dose for PE treatment     KALPANA  - CPAP     HTN  - amlodipine     HLD  - Lipitor     DM2  - ISS     Diet  -Diabetic     DVT prophylaxis  -Heparin drip     CODE STATUS: DNR, no intubation     Disposition: Presented with PE and pneumonia, need further management, discharge pending clinical improvement         Mikal Doty DO

## 2024-01-25 ENCOUNTER — APPOINTMENT (OUTPATIENT)
Dept: RADIOLOGY | Facility: HOSPITAL | Age: 89
DRG: 175 | End: 2024-01-25
Payer: MEDICARE

## 2024-01-25 LAB
ANION GAP SERPL CALC-SCNC: 17 MMOL/L (ref 10–20)
BASOPHILS # BLD AUTO: 0.03 X10*3/UL (ref 0–0.1)
BASOPHILS NFR BLD AUTO: 0.2 %
BUN SERPL-MCNC: 42 MG/DL (ref 6–23)
CALCIUM SERPL-MCNC: 8.7 MG/DL (ref 8.6–10.3)
CHLORIDE SERPL-SCNC: 102 MMOL/L (ref 98–107)
CO2 SERPL-SCNC: 24 MMOL/L (ref 21–32)
CREAT SERPL-MCNC: 0.94 MG/DL (ref 0.5–1.05)
EGFRCR SERPLBLD CKD-EPI 2021: 57 ML/MIN/1.73M*2
EOSINOPHIL # BLD AUTO: 0.01 X10*3/UL (ref 0–0.4)
EOSINOPHIL NFR BLD AUTO: 0.1 %
ERYTHROCYTE [DISTWIDTH] IN BLOOD BY AUTOMATED COUNT: 13.8 % (ref 11.5–14.5)
GLUCOSE BLD MANUAL STRIP-MCNC: 189 MG/DL (ref 74–99)
GLUCOSE BLD MANUAL STRIP-MCNC: 277 MG/DL (ref 74–99)
GLUCOSE BLD MANUAL STRIP-MCNC: 282 MG/DL (ref 74–99)
GLUCOSE BLD MANUAL STRIP-MCNC: 393 MG/DL (ref 74–99)
GLUCOSE BLD MANUAL STRIP-MCNC: 405 MG/DL (ref 74–99)
GLUCOSE SERPL-MCNC: 218 MG/DL (ref 74–99)
HCT VFR BLD AUTO: 43.1 % (ref 36–46)
HGB BLD-MCNC: 12.7 G/DL (ref 12–16)
HOLD SPECIMEN: NORMAL
IMM GRANULOCYTES # BLD AUTO: 0.14 X10*3/UL (ref 0–0.5)
IMM GRANULOCYTES NFR BLD AUTO: 0.9 % (ref 0–0.9)
LYMPHOCYTES # BLD AUTO: 1.68 X10*3/UL (ref 0.8–3)
LYMPHOCYTES NFR BLD AUTO: 10.5 %
MCH RBC QN AUTO: 27.7 PG (ref 26–34)
MCHC RBC AUTO-ENTMCNC: 29.5 G/DL (ref 32–36)
MCV RBC AUTO: 94 FL (ref 80–100)
MONOCYTES # BLD AUTO: 0.75 X10*3/UL (ref 0.05–0.8)
MONOCYTES NFR BLD AUTO: 4.7 %
NEUTROPHILS # BLD AUTO: 13.37 X10*3/UL (ref 1.6–5.5)
NEUTROPHILS NFR BLD AUTO: 83.6 %
NRBC BLD-RTO: 0 /100 WBCS (ref 0–0)
PLATELET # BLD AUTO: 381 X10*3/UL (ref 150–450)
POTASSIUM SERPL-SCNC: 4.7 MMOL/L (ref 3.5–5.3)
RBC # BLD AUTO: 4.58 X10*6/UL (ref 4–5.2)
SODIUM SERPL-SCNC: 138 MMOL/L (ref 136–145)
WBC # BLD AUTO: 16 X10*3/UL (ref 4.4–11.3)

## 2024-01-25 PROCEDURE — 94660 CPAP INITIATION&MGMT: CPT

## 2024-01-25 PROCEDURE — 74018 RADEX ABDOMEN 1 VIEW: CPT | Performed by: RADIOLOGY

## 2024-01-25 PROCEDURE — 82947 ASSAY GLUCOSE BLOOD QUANT: CPT

## 2024-01-25 PROCEDURE — 2500000004 HC RX 250 GENERAL PHARMACY W/ HCPCS (ALT 636 FOR OP/ED): Performed by: INTERNAL MEDICINE

## 2024-01-25 PROCEDURE — 2500000001 HC RX 250 WO HCPCS SELF ADMINISTERED DRUGS (ALT 637 FOR MEDICARE OP): Performed by: HOSPITALIST

## 2024-01-25 PROCEDURE — 1200000002 HC GENERAL ROOM WITH TELEMETRY DAILY

## 2024-01-25 PROCEDURE — 2500000004 HC RX 250 GENERAL PHARMACY W/ HCPCS (ALT 636 FOR OP/ED): Performed by: HOSPITALIST

## 2024-01-25 PROCEDURE — 2500000001 HC RX 250 WO HCPCS SELF ADMINISTERED DRUGS (ALT 637 FOR MEDICARE OP): Performed by: INTERNAL MEDICINE

## 2024-01-25 PROCEDURE — 85025 COMPLETE CBC W/AUTO DIFF WBC: CPT | Performed by: INTERNAL MEDICINE

## 2024-01-25 PROCEDURE — 2500000002 HC RX 250 W HCPCS SELF ADMINISTERED DRUGS (ALT 637 FOR MEDICARE OP, ALT 636 FOR OP/ED): Performed by: INTERNAL MEDICINE

## 2024-01-25 PROCEDURE — 2500000004 HC RX 250 GENERAL PHARMACY W/ HCPCS (ALT 636 FOR OP/ED): Performed by: CLINICAL NURSE SPECIALIST

## 2024-01-25 PROCEDURE — 2500000002 HC RX 250 W HCPCS SELF ADMINISTERED DRUGS (ALT 637 FOR MEDICARE OP, ALT 636 FOR OP/ED): Performed by: PHARMACIST

## 2024-01-25 PROCEDURE — 2500000002 HC RX 250 W HCPCS SELF ADMINISTERED DRUGS (ALT 637 FOR MEDICARE OP, ALT 636 FOR OP/ED): Performed by: HOSPITALIST

## 2024-01-25 PROCEDURE — 2500000005 HC RX 250 GENERAL PHARMACY W/O HCPCS: Performed by: CLINICAL NURSE SPECIALIST

## 2024-01-25 PROCEDURE — 94640 AIRWAY INHALATION TREATMENT: CPT | Mod: MUE

## 2024-01-25 PROCEDURE — 36415 COLL VENOUS BLD VENIPUNCTURE: CPT | Performed by: INTERNAL MEDICINE

## 2024-01-25 PROCEDURE — 99232 SBSQ HOSP IP/OBS MODERATE 35: CPT | Performed by: CLINICAL NURSE SPECIALIST

## 2024-01-25 PROCEDURE — 80048 BASIC METABOLIC PNL TOTAL CA: CPT | Performed by: INTERNAL MEDICINE

## 2024-01-25 PROCEDURE — 97116 GAIT TRAINING THERAPY: CPT | Mod: GP,CQ

## 2024-01-25 PROCEDURE — 94668 MNPJ CHEST WALL SBSQ: CPT

## 2024-01-25 PROCEDURE — 74018 RADEX ABDOMEN 1 VIEW: CPT

## 2024-01-25 PROCEDURE — 2500000005 HC RX 250 GENERAL PHARMACY W/O HCPCS: Performed by: INTERNAL MEDICINE

## 2024-01-25 PROCEDURE — 99232 SBSQ HOSP IP/OBS MODERATE 35: CPT | Performed by: INTERNAL MEDICINE

## 2024-01-25 RX ORDER — INSULIN LISPRO 100 [IU]/ML
5 INJECTION, SOLUTION INTRAVENOUS; SUBCUTANEOUS ONCE
Status: COMPLETED | OUTPATIENT
Start: 2024-01-25 | End: 2024-01-25

## 2024-01-25 RX ORDER — INSULIN GLARGINE 100 [IU]/ML
10 INJECTION, SOLUTION SUBCUTANEOUS DAILY
Status: DISCONTINUED | OUTPATIENT
Start: 2024-01-25 | End: 2024-01-28 | Stop reason: HOSPADM

## 2024-01-25 RX ADMIN — INSULIN GLARGINE 10 UNITS: 100 INJECTION, SOLUTION SUBCUTANEOUS at 11:15

## 2024-01-25 RX ADMIN — APIXABAN 10 MG: 5 TABLET, FILM COATED ORAL at 09:27

## 2024-01-25 RX ADMIN — IPRATROPIUM BROMIDE AND ALBUTEROL SULFATE 3 ML: 2.5; .5 SOLUTION RESPIRATORY (INHALATION) at 20:54

## 2024-01-25 RX ADMIN — GABAPENTIN 200 MG: 100 CAPSULE ORAL at 21:53

## 2024-01-25 RX ADMIN — STANDARDIZED SENNA CONCENTRATE 8.6 MG: 8.6 TABLET ORAL at 21:53

## 2024-01-25 RX ADMIN — GUAIFENESIN 600 MG: 600 TABLET, EXTENDED RELEASE ORAL at 21:53

## 2024-01-25 RX ADMIN — AMIODARONE HYDROCHLORIDE 100 MG: 200 TABLET ORAL at 09:28

## 2024-01-25 RX ADMIN — IPRATROPIUM BROMIDE AND ALBUTEROL SULFATE 3 ML: 2.5; .5 SOLUTION RESPIRATORY (INHALATION) at 11:43

## 2024-01-25 RX ADMIN — FORMOTEROL FUMARATE DIHYDRATE 20 MCG: 20 SOLUTION RESPIRATORY (INHALATION) at 08:18

## 2024-01-25 RX ADMIN — POLYETHYLENE GLYCOL 3350 17 G: 17 POWDER, FOR SOLUTION ORAL at 09:27

## 2024-01-25 RX ADMIN — Medication: at 08:00

## 2024-01-25 RX ADMIN — GUAIFENESIN 600 MG: 600 TABLET, EXTENDED RELEASE ORAL at 09:26

## 2024-01-25 RX ADMIN — FUROSEMIDE 40 MG: 40 TABLET ORAL at 09:27

## 2024-01-25 RX ADMIN — PREDNISONE 40 MG: 20 TABLET ORAL at 09:26

## 2024-01-25 RX ADMIN — OXYBUTYNIN CHLORIDE 5 MG: 5 TABLET ORAL at 09:27

## 2024-01-25 RX ADMIN — INSULIN LISPRO 2 UNITS: 100 INJECTION, SOLUTION INTRAVENOUS; SUBCUTANEOUS at 09:45

## 2024-01-25 RX ADMIN — CEFEPIME 1 G: 1 INJECTION, POWDER, FOR SOLUTION INTRAMUSCULAR; INTRAVENOUS at 17:33

## 2024-01-25 RX ADMIN — INSULIN LISPRO 6 UNITS: 100 INJECTION, SOLUTION INTRAVENOUS; SUBCUTANEOUS at 13:36

## 2024-01-25 RX ADMIN — Medication 2000 UNITS: at 09:26

## 2024-01-25 RX ADMIN — Medication 7 L/MIN: at 20:54

## 2024-01-25 RX ADMIN — FORMOTEROL FUMARATE DIHYDRATE 20 MCG: 20 SOLUTION RESPIRATORY (INHALATION) at 20:54

## 2024-01-25 RX ADMIN — INSULIN LISPRO 5 UNITS: 100 INJECTION, SOLUTION INTRAVENOUS; SUBCUTANEOUS at 21:55

## 2024-01-25 RX ADMIN — IPRATROPIUM BROMIDE AND ALBUTEROL SULFATE 3 ML: 2.5; .5 SOLUTION RESPIRATORY (INHALATION) at 15:54

## 2024-01-25 RX ADMIN — STANDARDIZED SENNA CONCENTRATE 8.6 MG: 8.6 TABLET ORAL at 09:26

## 2024-01-25 RX ADMIN — AMLODIPINE BESYLATE 2.5 MG: 5 TABLET ORAL at 09:28

## 2024-01-25 RX ADMIN — CEFEPIME 1 G: 1 INJECTION, POWDER, FOR SOLUTION INTRAMUSCULAR; INTRAVENOUS at 05:13

## 2024-01-25 RX ADMIN — APIXABAN 10 MG: 5 TABLET, FILM COATED ORAL at 21:53

## 2024-01-25 RX ADMIN — GABAPENTIN 200 MG: 100 CAPSULE ORAL at 14:12

## 2024-01-25 RX ADMIN — IPRATROPIUM BROMIDE AND ALBUTEROL SULFATE 3 ML: 2.5; .5 SOLUTION RESPIRATORY (INHALATION) at 08:18

## 2024-01-25 RX ADMIN — ATORVASTATIN CALCIUM 40 MG: 40 TABLET, FILM COATED ORAL at 21:53

## 2024-01-25 RX ADMIN — GABAPENTIN 200 MG: 100 CAPSULE ORAL at 09:26

## 2024-01-25 ASSESSMENT — COGNITIVE AND FUNCTIONAL STATUS - GENERAL
WALKING IN HOSPITAL ROOM: A LITTLE
MOBILITY SCORE: 18
TURNING FROM BACK TO SIDE WHILE IN FLAT BAD: A LITTLE
STANDING UP FROM CHAIR USING ARMS: A LITTLE
EATING MEALS: A LITTLE
HELP NEEDED FOR BATHING: A LITTLE
MOBILITY SCORE: 18
MOVING TO AND FROM BED TO CHAIR: A LITTLE
PERSONAL GROOMING: A LITTLE
DRESSING REGULAR UPPER BODY CLOTHING: A LITTLE
STANDING UP FROM CHAIR USING ARMS: A LITTLE
MOBILITY SCORE: 18
TURNING FROM BACK TO SIDE WHILE IN FLAT BAD: A LITTLE
MOVING TO AND FROM BED TO CHAIR: A LITTLE
CLIMB 3 TO 5 STEPS WITH RAILING: A LITTLE
MOVING FROM LYING ON BACK TO SITTING ON SIDE OF FLAT BED WITH BEDRAILS: A LITTLE
CLIMB 3 TO 5 STEPS WITH RAILING: A LITTLE
DAILY ACTIVITIY SCORE: 18
DRESSING REGULAR UPPER BODY CLOTHING: A LITTLE
DRESSING REGULAR LOWER BODY CLOTHING: A LITTLE
TURNING FROM BACK TO SIDE WHILE IN FLAT BAD: A LITTLE
STANDING UP FROM CHAIR USING ARMS: A LITTLE
PERSONAL GROOMING: A LITTLE
DAILY ACTIVITIY SCORE: 18
EATING MEALS: A LITTLE
MOVING FROM LYING ON BACK TO SITTING ON SIDE OF FLAT BED WITH BEDRAILS: A LITTLE
TOILETING: A LITTLE
MOVING FROM LYING ON BACK TO SITTING ON SIDE OF FLAT BED WITH BEDRAILS: A LITTLE
HELP NEEDED FOR BATHING: A LITTLE
CLIMB 3 TO 5 STEPS WITH RAILING: A LITTLE
WALKING IN HOSPITAL ROOM: A LITTLE
WALKING IN HOSPITAL ROOM: A LITTLE
DRESSING REGULAR LOWER BODY CLOTHING: A LITTLE
TOILETING: A LITTLE
MOVING TO AND FROM BED TO CHAIR: A LITTLE

## 2024-01-25 ASSESSMENT — PAIN - FUNCTIONAL ASSESSMENT
PAIN_FUNCTIONAL_ASSESSMENT: 0-10
PAIN_FUNCTIONAL_ASSESSMENT: 0-10

## 2024-01-25 ASSESSMENT — PAIN SCALES - GENERAL
PAINLEVEL_OUTOF10: 0 - NO PAIN
PAINLEVEL_OUTOF10: 0 - NO PAIN

## 2024-01-25 NOTE — CARE PLAN
Patient admitted for PNA    Met with patient at bedside, she confirmed that she will return to [place] at [facility], she states she is on 6L NC at baseline, confirmed she is on 6L NC currently. PT/OT rec'd low [facility] 18,     ADOD tomorrow  TORI UGALDE

## 2024-01-25 NOTE — PROGRESS NOTES
Lissett Rodríguez is a 89 y.o. female on day 7 of admission presenting with Community acquired pneumonia, unspecified laterality.      Subjective   Patient was seen and examined at bedside this morning, stated that she is breathing much better, and denied having any symptoms at that time.  Her abdomen appeared distended, and on review of system patient admitted to [facility], and denied nausea, vomiting, abdominal pain, fever, chills or any other symptoms at that time.       Objective     Last Recorded Vitals  /58 (BP Location: Left arm, Patient Position: Lying)   Pulse 75   Temp 36.4 °C (97.5 °F) (Axillary)   Resp 20   Wt 76 kg (167 lb 8.8 oz)   SpO2 93%   Intake/Output last 3 Shifts:    Intake/Output Summary (Last 24 hours) at 1/25/2024 1704  Last data filed at 1/25/2024 0816  Gross per 24 hour   Intake 240 ml   Output 0 ml   Net 240 ml       Admission Weight  Weight: 72.1 kg (159 lb) (01/18/24 1914)    Daily Weight  01/24/24 : 76 kg (167 lb 8.8 oz)    Image Results  XR abdomen 1 view  Narrative: Interpreted By:  Melba Guzman,   STUDY:  XR ABDOMEN 1 VIEW;  1/25/2024 10:43 am      INDICATION:  Signs/Symptoms:Abdominal distension.      COMPARISON:  02/24/2022      ACCESSION NUMBER(S):  [id number]      ORDERING CLINICIAN:  WELLINGTON BAEZ      FINDINGS:  2 portable supine views of the abdomen obtained.      Dilated gas-filled stomach. A few distended gas-filled loops of bowel  in the central abdomen. Fecal residue overlying the right colon and  rectum. No obvious abdominal mass effect. Can not accurately assess  for free air on supine radiographs. Dense surgical material in the  right upper quadrant and left paramedian lower abdomen. Multifocal  soft tissue presumed vascular calcifications.      Hazy infiltrates in the visualized left lung base. Multifocal osseous  presumed degenerative changes.      Impression: Distended/dilated gas-filled stomach and central abdominal bowel  loops. Follow-up as  clinically warranted.      Left chest infiltrates.      MACRO:  None.      Signed by: Melba Guzman 1/25/2024 11:42 AM  Dictation workstation:   [id number]      Physical Exam  Constitutional: Pleasant elderly female breathing on Airvo, alert active, cooperative not in acute distress  Eyes: PERRLA, clear sclera  ENMT: Moist mucosal membranes, no exudate  Head / Neck: Atraumatic, normocephalic, supple neck, JVP not visualized  Lungs: Patent airways, CTABL  Heart: RRR, S1S2, no murmurs appreciated, palpable pulses in all extremities  GI: Soft, NT, slightly tympanic, bowel sounds present in all quadrants  MSK: Moves all extremities freely, no restriction  of ROM, no joint edema  Extremities: Intact x 4, no peripheral edema  : No Arceo catheter inserted  Breast: Deferred  Neurological: AAO x 3 to person, place and date, facial muscles symmetrical, sensation intact, strength 4/4, no acute focal neurological deficits appreciated  Psychological: Appropriate mood and behavior    Relevant Results          Scheduled medications  amiodarone, 100 mg, oral, Daily  amLODIPine, 2.5 mg, oral, Daily  apixaban, 10 mg, oral, BID   Followed by  [START ON 1/31/2024] apixaban, 5 mg, oral, BID  atorvastatin, 40 mg, oral, Nightly  cefepime, 1 g, intravenous, q12h  cholecalciferol, 2,000 Units, oral, Daily  formoterol, 20 mcg, nebulization, BID  furosemide, 40 mg, oral, Daily  gabapentin, 200 mg, oral, TID  guaiFENesin, 600 mg, oral, BID  insulin glargine, 10 Units, subcutaneous, Daily  insulin lispro, 0-10 Units, subcutaneous, TID with meals  ipratropium-albuteroL, 3 mL, nebulization, 4x daily  melatonin, 3 mg, oral, Daily  oxybutynin, 5 mg, oral, Daily  oxygen, , inhalation, Continuous - 02/gases  polyethylene glycol, 17 g, oral, Daily  predniSONE, 40 mg, oral, Daily  sennosides, 1 tablet, oral, BID  [Held by provider] tiotropium, 2 Inhalation, inhalation, Daily      Continuous medications     PRN medications  PRN medications:  acetaminophen, acetaminophen, cyclobenzaprine, dextrose 10 % in water (D10W), dextrose, glucagon, guaiFENesin, ipratropium-albuteroL, ondansetron ODT **OR** ondansetron, oxygen, oxygen  XR abdomen 1 view    Result Date: 1/25/2024  Interpreted By:  Melba Guzman, STUDY: XR ABDOMEN 1 VIEW;  1/25/2024 10:43 am   INDICATION: Signs/Symptoms:Abdominal distension.   COMPARISON: 02/24/2022   ACCESSION NUMBER(S): [id number]   ORDERING CLINICIAN: WELLINGTON BAEZ   FINDINGS: 2 portable supine views of the abdomen obtained.   Dilated gas-filled stomach. A few distended gas-filled loops of bowel in the central abdomen. Fecal residue overlying the right colon and rectum. No obvious abdominal mass effect. Can not accurately assess for free air on supine radiographs. Dense surgical material in the right upper quadrant and left paramedian lower abdomen. Multifocal soft tissue presumed vascular calcifications.   Hazy infiltrates in the visualized left lung base. Multifocal osseous presumed degenerative changes.       Distended/dilated gas-filled stomach and central abdominal bowel loops. Follow-up as clinically warranted.   Left chest infiltrates.   MACRO: None.   Signed by: Melba Guzman 1/25/2024 11:42 AM Dictation workstation:   [id number]    XR chest 1 view    Result Date: 1/22/2024  Interpreted By:  Melba Guzman, STUDY: XR CHEST 1 VIEW;  1/22/2024 9:55 am   INDICATION: Signs/Symptoms:SOB.   COMPARISON: 01/18/2024   ACCESSION NUMBER(S): [id number]   ORDERING CLINICIAN: JOANNA GERARD   FINDINGS: Patient is slightly rotated. Artifact related to overlying monitoring leads. Increased perihilar interstitial prominence and interstitial edema and enlargement of left mid chest infiltrate. New thickening along the right horizontal fissure. No definite pleural effusion. Presumed soft tissue fold overlies the left upper chest. Cardiac silhouette within normal limits for size. Electronic structure is again seen near the left heart  border.       Worsening left chest infiltrate and bilateral interstitial infiltrates/edema since 01/18/2024.   MACRO: None.   Signed by: Melba Guzman 1/22/2024 9:59 AM Dictation workstation:   [id number]    ECG 12 lead    Result Date: 1/19/2024  Normal sinus rhythm Normal ECG When compared with ECG of 25-MAR-2023 00:16, Previous ECG has undetermined rhythm, needs review    CT angio chest for pulmonary embolism    Result Date: 1/18/2024  Interpreted By:  Finkelstein, Evan, STUDY: CT ANGIO CHEST FOR PULMONARY EMBOLISM;  1/18/2024 9:17 pm   INDICATION: Signs/Symptoms:Possible pulmonary infarct on chest x-ray, history of COPD.   COMPARISON: Chest radiograph 01/18/2024   ACCESSION NUMBER(S): [id number]   ORDERING CLINICIAN: TED BENITO   TECHNIQUE: Axial CTA images of the chest after intravenous administration of 65 mL Omnipaque 350 using CT angiographic technique. Coronal and sagittal images are reconstructed. MIP images were created and reviewed.   FINDINGS: CHEST WALL AND LOWER NECK: Within normal limits. ABDOMEN: No acute abnormality of the partially visualized abdomen.   VASCULAR: AORTA: No aortic aneurysm or dissection.  Moderate atherosclerotic disease. PULMONARY ARTERY: Normal caliber. Filling defect within a segmental branch of the lingula best seen on axial image 118 series 607.   CHEST:   HEART: Normal size. No pericardial effusion. MEDIASTINUM AND INGRID: 1.4 cm AP window lymph node. 1.6 cm subcarinal lymph node. Numerous additional prominent mediastinal and hilar lymph nodes. Small hiatal hernia. LUNG, PLEURA, LARGE AIRWAYS: Ground-glass opacities are scattered diffusely throughout the lungs. More consolidative opacities are present within the left upper lobe and left lower lobe with a somewhat wedge-shaped opacity in the lingula. Left lower lobe nodular density measures approximately 8 mm image 198   BONES: No acute osseous abnormality.       Acute pulmonary embolus within a segmental branch of the  lingula.   Ground-glass opacities scattered diffusely throughout the lungs with more consolidative opacities in the left upper lobe and left lower lobe concerning for pneumonia. 1 of the opacities in the lingula is somewhat wedge-shaped and may also represent a component of pulmonary infarction.   Left lower lobe nodular density measuring up to 8 mm. While findings may be related to the underlying likely infectious process, recommend repeat imaging after treatment to assess for resolution and/or stability.   Nonspecific enlarged mediastinal and hilar lymph nodes, which may be reactive.     MACRO: Evan Finkelstein discussed the significance and urgency of this critical finding by telephone with  TED BENITO on 1/18/2024 at 10:02 pm.  (**-RCF-**) Findings:  See findings.   Signed by: Evan Finkelstein 1/18/2024 10:05 PM Dictation workstation:   [id number]    XR chest 1 view    Result Date: 1/18/2024  Interpreted By:  Apolinar Howell, STUDY: XR CHEST 1 VIEW;  1/18/2024 7:53 pm   INDICATION: Signs/Symptoms:dizzy, hypoxia.   COMPARISON: Chest x-ray 03/25/2023   ACCESSION NUMBER(S): [id number]   ORDERING CLINICIAN: TED BENITO   FINDINGS: A loop recorder device overlies the left mid thorax.   CARDIOMEDIASTINAL SILHOUETTE: Cardiomediastinal silhouette is stable in size and configuration. Atherosclerotic calcification of the aorta.   LUNGS: There is somewhat wedge-shaped consolidative opacity in the left mid lung suspicious for developing airspace disease. Right lung is clear. Stable mild diffuse interstitial prominence, likely chronic. No effusion or pneumothorax.   ABDOMEN: No remarkable upper abdominal findings.   BONES: Degenerative changes of the spine. Generalized diffuse osteopenia.       Somewhat wedge-shaped consolidative opacity in the left mid lung pulmonary infarct not excluded if there is clinical concern for pulmonary emboli further evaluation with dedicated PE CT can be considered. Continued radiographic  follow-up is recommended to ensure complete resolution and exclude underlying mass lesion.   Mild interstitial prominence could be chronic or relate to component developing interstitial edema.   MACRO: None   Signed by: Apolinar Howell 1/18/2024 8:10 PM Dictation workstation:   [id number]        Assessment/Plan      Lissett Rodríguez is a 89 y.o.  female with a PMH of afib (on Xarelto), HFpEF (55%), COPD (on 6 LPM oxygen 24/7), KALPANA, DM2, HTN, HLD, TIA, presenting with fatigue, body aches and cough.  Has felt fatigued for the past 1 week, unable to perform her usual activities at her assisted living facility.   Has had a dry cough and body aches x 2 days.       Principal Problem:    Community acquired pneumonia, unspecified laterality    Abdominal distention in the setting of minimal bowel movement  -KUB showed nonspecific dilated loop of bowel, no ileus reported  -MiraLAX 17 g packet daily ordered    Acute PE  Acute hypoxic resp failure  - on continuous CPAP, uses 6 LPM at baseline  - echocardiogram to evaluate for right heart strain  -hematology consult  -Status post heparin drip  -switch to eliquis, started with loading dose 10 mg twice daily  -wean off O2 to baseline  -Pulm consult: Appreciate management and recommendations  -transfer to step down due to inc O2 requirements  -monitor     Pneumonia  -Increase O2 demand, on Airvo  - continue Ceftriaxone, completed azithromycin regimen  -Chest x-ray shows worsening left-sided infiltrate  - duonebs  - hycodan for cough   -Consult ID for antibiotic review     Atrial fibrillation  - telemetry  - continue Amiodarone  -eliquis started, with loading dose for PE treatment     KALPANA  - CPAP     HTN  - amlodipine     HLD  - Lipitor     DM2  - ISS     Diet  -Diabetic     DVT prophylaxis  -Heparin drip     CODE STATUS: DNR, no intubation     Disposition: Presented with PE and pneumonia, need further management, discharge likely tomorrow if continued to remain stable.         Mikal Doty DO

## 2024-01-25 NOTE — PROGRESS NOTES
"Lissett Rodríguez is a 89 y.o. female on day 7 of admission presenting with Community acquired pneumonia, unspecified laterality.    Subjective   Patient seen and examined sitting up in the chair, reports that her breathing is doing much better.  Patient is currently stable on her home O2 of 6 L.  Pulse ox maintaining at 92 to 94%.  Patient reports some occasional increased congestion with chest tightness early in the morning, coughing with occasional bits of clear sputum; no hemoptysis.  Denies fever and chills.    Objective     Physical Exam     Constitutional:   Elderly, NAD, cooperative  HENT: Atraumatic, semimoist mucous membranes  Eyes: nonicteric  Neck: Supple, no JVD  Cardiovascular: S1-S2 distinct, regular, HR 70s, no murmur appreciated  Pulmonary: Fair air entry with minimal bibasilar posterior crackles (improved), anterior diminished bases; no rhonchi or wheezing noted; no conversational dyspnea  Abdominal: Obese, soft, nontender, + BS  Musculoskeletal: Fair active range of motion with fair strength  Extremities:   No BLE edema  Lymphadenopathy: No nuchal LAP  Skin: Warm and dry  Neurological: A & O x 3, speech clear and appropriate; no focal deficits noted  Psychiatric:     Appropriate mood and behavior    Last Recorded Vitals  Blood pressure 125/69, pulse 71, temperature 36.4 °C (97.6 °F), temperature source Oral, resp. rate 21, height 1.499 m (4' 11.02\"), weight 76 kg (167 lb 8.8 oz), SpO2 93 %.  Intake/Output last 3 Shifts:  I/O last 3 completed shifts:  In: 240 (3.2 mL/kg) [P.O.:240]  Out: - (0 mL/kg)   Weight: 76 kg     Relevant Results  Scheduled medications:  amiodarone, 100 mg, oral, Daily  amLODIPine, 2.5 mg, oral, Daily  apixaban, 10 mg, oral, BID   Followed by  [START ON 1/31/2024] apixaban, 5 mg, oral, BID  atorvastatin, 40 mg, oral, Nightly  cefepime, 1 g, intravenous, q12h  cholecalciferol, 2,000 Units, oral, Daily  formoterol, 20 mcg, nebulization, BID  furosemide, 40 mg, oral, " Daily  gabapentin, 200 mg, oral, TID  guaiFENesin, 600 mg, oral, BID  insulin glargine, 10 Units, subcutaneous, Daily  insulin lispro, 0-10 Units, subcutaneous, TID with meals  ipratropium-albuteroL, 3 mL, nebulization, 4x daily  melatonin, 3 mg, oral, Daily  oxybutynin, 5 mg, oral, Daily  oxygen, , inhalation, Continuous - 02/gases  polyethylene glycol, 17 g, oral, Daily  predniSONE, 40 mg, oral, Daily  sennosides, 1 tablet, oral, BID  [Held by provider] tiotropium, 2 Inhalation, inhalation, Daily     PRN medications: acetaminophen, acetaminophen, cyclobenzaprine, dextrose 10 % in water (D10W), dextrose, glucagon, guaiFENesin, ipratropium-albuteroL, ondansetron ODT **OR** ondansetron, oxygen, oxygen     Results for orders placed or performed during the hospital encounter of 01/18/24 (from the past 24 hour(s))   POCT GLUCOSE   Result Value Ref Range    POCT Glucose 338 (H) 74 - 99 mg/dL   POCT GLUCOSE   Result Value Ref Range    POCT Glucose 334 (H) 74 - 99 mg/dL   Basic Metabolic Panel   Result Value Ref Range    Glucose 218 (H) 74 - 99 mg/dL    Sodium 138 136 - 145 mmol/L    Potassium 4.7 3.5 - 5.3 mmol/L    Chloride 102 98 - 107 mmol/L    Bicarbonate 24 21 - 32 mmol/L    Anion Gap 17 10 - 20 mmol/L    Urea Nitrogen 42 (H) 6 - 23 mg/dL    Creatinine 0.94 0.50 - 1.05 mg/dL    eGFR 57 (L) >60 mL/min/1.73m*2    Calcium 8.7 8.6 - 10.3 mg/dL   Lavender Top   Result Value Ref Range    Extra Tube Hold for add-ons.    POCT GLUCOSE   Result Value Ref Range    POCT Glucose 189 (H) 74 - 99 mg/dL   POCT GLUCOSE   Result Value Ref Range    POCT Glucose 282 (H) 74 - 99 mg/dL      XR chest 1 view  Result Date: 1/22/2024  Interpreted By:  Melba Guzman, STUDY: XR CHEST 1 VIEW;  1/22/2024 9:55 am   INDICATION: Signs/Symptoms:SOB.   COMPARISON: 01/18/2024   ACCESSION NUMBER(S): [id number]   ORDERING CLINICIAN: JOANNA GERARD   FINDINGS: Patient is slightly rotated. Artifact related to overlying monitoring leads. Increased  perihilar interstitial prominence and interstitial edema and enlargement of left mid chest infiltrate. New thickening along the right horizontal fissure. No definite pleural effusion. Presumed soft tissue fold overlies the left upper chest. Cardiac silhouette within normal limits for size. Electronic structure is again seen near the left heart border.       Worsening left chest infiltrate and bilateral interstitial infiltrates/edema since 01/18/2024.   MACRO: None.   Signed by: Melba Guzman 1/22/2024 9:59 AM Dictation workstation:   [id number]      Assessment/Plan   Lissett Rodríguez is a 89 y.o. female with past medical history of COPD (6 L home O2), KALPANA on CPAP, HFpEF, A-fib on Xarelto, diabetes who presented to the ED for lightheadedness and fatigue.  EKG showed NSR without acute ischemia, troponins negative.  WBC 16, BNP 59.  Patient negative for flu COVID and RSV.  CXR with mid left wedge-shaped consolidative opacity with mild interstitial prominence.  CT PE positive for acute PE in the segmental branch of the lingula with scattered GGO's bilaterally and left upper and left lower consolidative opacities.  Patient was treated with ceftriaxone, azithromycin and Lovenox and admitted for further management.  During admission patient continued to have increasing oxygen needs with a brief period on Airvo. Patient escalated further requiring 15 L with pulse ox 80s.  Patient's transition to CPAP at +11 and 100% with pulse ox reaching 95%. Pulmonology is consulted for acute hypoxic respiratory failure, pneumonia and pulmonary embolus.     Impressions:  #COPD: not in exacerbation; PFT 11/2022 with FEV1/FVC 0.79 (no obstruction), + BD response, substantially reduced DLCO; values consistent with hyperinflation and air trapping;  Home meds: Symbicort and Spiriva (Symbicort periodically held); patient previously followed with Dr. Hernández  #Acute on chronic hypoxic respiratory failure: Multifactorial secondary to COPD,  PE/infarct, PNA, HFpEF/edema  #Pulmonary embolism versus pulmonary infarct: CT PE with positive PE in the segmental level of the lingula with wedge-shaped consolidation mid left lung concerning for infarct; occurred while on Xarelto therapy  #Abnormal CT: Diffuse bilateral GGO's; pneumonia with PACO and LLL consolidative opacities; left lower lobe 8 mm nodular density  #KALPANA on CPAP: Last documented sleep study in 2018 suggested CPAP therapy limiting pressure 4-13 cmH2O  #HFpEF: Echo 1/19/2024 with EF 60 to 65%, no impaired relaxation, normal RVSF     Recommendations:  -Transition to oral Eliquis for anticoagulation as patient is weaned back to baseline 6 L  -Continue CPAP at night and during the day as needed  -Wean HFNC during the day  -BPH with Acapella   -Continued scheduled DuoNebs; hold Spiriva while on DuoNebs  -Agree with antibiotic therapy; avoid inhaled steroid at this time  -Transition oral prednisone beginning 1/25 with last dose on 1/26  - ordered  -Maintain saturation 92 to 95%  -Continued scheduled DuoNebs; hold Spiriva while on DuoNebs  -Avoid ICS at this time; can continue systemic steroids for now  -Diuresis as renal function and hemodynamics allow     1/22/24: CODE STATUS discussion: Discussed with patient her wishes should she be unable to support her own ability to breathe and require an endotracheal tube/ventilator.  Additionally we discussed her wishes should her heart go into a lethal arrhythmia and or stop.  She reports that she does have a living will I asked her to please have her family bring that in.  She does not have healthcare POA papers.  The patient agreed that if her breathing needed to be supported with an ET tube and ventilator that she does want this intervention.  Additionally she agreed that if her heart should go into a lethal arrhythmia and/or stop that she does not want CPR.  CODE STATUS was changed accordingly.      Recap: Intubation YES; CPR No    Discharge: Patient should  follow-up with pulmonology post discharge.  Please discharged on Spiriva and Symbicort with albuterol rescue.  Previously followed with Dr. Gretta Hernández.  Patient should follow-up with vascular medicine for long-term anticoagulation management.     Nupur Llanos, APRN-CNS

## 2024-01-25 NOTE — PROGRESS NOTES
Occupational Therapy                 Therapy Communication Note    Patient Name: Lissett Rodríguez  MRN: [id number]  Today's Date: 1/25/2024     Discipline: Occupational Therapy    Missed Visit Reason: Missed Visit Reason: Patient refused (pt reports just recently returning supine in bed and fatigue, pt very anxious re: supplemental O2 and levels fluctuating in 80s on 6L/min, RN present. Pt does not appear to be in respiratory distress at this time.)    Missed Time: Attempt

## 2024-01-25 NOTE — PROGRESS NOTES
"  INFECTIOUS DISEASE DAILY PROGRESS NOTE    SUBJECTIVE:    No new events. Seems to be doing better. No fevers. No CBC today.    OBJECTIVE:  VITALS (Last 24 Hours)  /85 (BP Location: Left arm, Patient Position: Lying)   Pulse 71   Temp 36.3 °C (97.3 °F) (Temporal)   Resp 22   Ht 1.499 m (4' 11.02\")   Wt 76 kg (167 lb 8.8 oz)   SpO2 94%   BMI 33.82 kg/m²     PHYSICAL EXAM:  Gen - NAD, on 6L NC  Heart - RRR  Lungs - coarse throughout with some wh  Abd - soft, no ttp, BS present  Skin - no rash    ABX: IV Cefepime    LABS:  Lab Results   Component Value Date    WBC 19.3 (H) 01/23/2024    HGB 13.3 01/23/2024    HCT 41.2 01/23/2024    MCV 89 01/23/2024     01/23/2024     Lab Results   Component Value Date    GLUCOSE 218 (H) 01/25/2024    CALCIUM 8.7 01/25/2024     01/25/2024    K 4.7 01/25/2024    CO2 24 01/25/2024     01/25/2024    BUN 42 (H) 01/25/2024    CREATININE 0.94 01/25/2024         Estimated Creatinine Clearance: 33.2 mL/min (by C-G formula based on SCr of 0.94 mg/dL).    ASSESSMENT/PLAN:     Acute on Chronic Hypoxic Resp Failure - improving back to baseline  COPD on home 6L NC  CKD - CrCl is 33, affects abx dosing  Acute PE with Lung Infarct  Leukocytosis - seems likely rise is due to steroids based on timing     Checking CBC/diff today - if WBC is coming down I will likely stop abx as has been on for about 8 days between CTX and Cefepime. Completed 5D Azithro already.    Monitoring for adverse effects of abx such as rash/itching/diarrhea.     Will follow. Thanks!    Dae Archer MD  [facility]  Office #[phone]      "

## 2024-01-25 NOTE — PROGRESS NOTES
Physical Therapy    Physical Therapy Treatment    Patient Name: Lissett Rodríguez  MRN: [id number]  Today's Date: 1/25/2024  Time Calculation  Start Time: 1101  Stop Time: 1128  Time Calculation (min): 27 min       Assessment/Plan   PT Assessment  End of Session Communication: Bedside nurse  End of Session Patient Position: Up in chair, Alarm on  PT Plan  Inpatient/Swing Bed or Outpatient: Inpatient  PT Plan  Treatment/Interventions: Transfer training, Gait training  PT Plan: Skilled PT  PT Frequency: 3 times per week  PT Discharge Recommendations: Low intensity level of continued care  PT Recommended Transfer Status: Assist x1  PT - OK to Discharge: Yes (per POC)      General Visit Information:   PT  Visit  PT Received On: 01/25/24  General  Reason for Referral: Pt is a 89 y/o female admitted with PNA  Referred By: Padmaja  Past Medical History Relevant to Rehab: afib (on Xarelto), HFpEF (55%), COPD (on 6 LPM oxygen 24/7), KALPANA, DM2, HTN, HLD, TIA  Prior to Session Communication: Bedside nurse  Patient Position Received: Bed, 3 rail up, Alarm on  General Comment: pt on 6L highflow at rest SP02 92-95%, after activity 83-95% req PLB x2 min to return    Subjective   Precautions:  Precautions  Hearing/Visual Limitations: Hard of hearing  Medical Precautions: Fall precautions  Vital Signs:  Vital Signs  Heart Rate: 80    Objective   Pain:     Cognition:  Cognition  Overall Cognitive Status: Within Functional Limits  Postural Control:     Extremity/Trunk Assessments:    Activity Tolerance:     Treatments:       Bed Mobility  Bed Mobility: Yes  Bed Mobility 1  Bed Mobility 1: Supine to sitting  Level of Assistance 1: Modified independent  Bed Mobility Comments 1: HOB elevated    Ambulation/Gait Training  Ambulation/Gait Training Performed: Yes  Ambulation/Gait Training 1  Surface 1: Level tile  Device 1: Rolling walker  Gait Support Devices: Gait belt  Assistance 1: Close supervision  Comments/Distance (ft) 1: 75x1, 250x1 (pt  performed with step through gait pattern. x1 seated rest break x1 standing rest break. slow and steady throughout.)  Transfers  Transfer: Yes  Transfer 1  Technique 1: Sit to stand, Stand to sit  Transfer Device 1: Walker  Transfer Level of Assistance 1: Close supervision, Contact guard  Trials/Comments 1: min VC for hand placement.  pt performed x2    Outcome Measures:  [facility] Basic Mobility  Turning from your back to your side while in a flat bed without using bedrails: A little  Moving from lying on your back to sitting on the side of a flat bed without using bedrails: A little  Moving to and from bed to chair (including a wheelchair): A little  Standing up from a chair using your arms (e.g. wheelchair or bedside chair): A little  To walk in hospital room: A little  Climbing 3-5 steps with railing: A little  Basic Mobility - Total Score: 18    Education Documentation  Mobility Training, taught by Man Kumar PTA at 1/25/2024  4:23 PM.  Learner: Patient  Readiness: Acceptance  Method: Explanation  Response: Verbalizes Understanding    Education Comments  No comments found.        OP EDUCATION:  Outpatient Education  Education Comment: Importance of OOB, PLB    Encounter Problems       Encounter Problems (Active)       Balance       complete all mobility with normal balance while dual tasking, negotiating in a dynamic environment, carrying items, etc., with proactive and reactive static and dynamic standing and sitting tasks, with mod I and a RW, >15 minutes. (Progressing)       Start:  01/19/24    Expected End:  01/27/24               Mobility       STG - Patient will ambulate 250 ft with a RW mod I.  (Progressing)       Start:  01/19/24    Expected End:  01/27/24               Transfers       STG - Patient will transfer sit to and from stand mod I with a RW.  (Progressing)       Start:  01/19/24    Expected End:  01/27/24

## 2024-01-26 ENCOUNTER — APPOINTMENT (OUTPATIENT)
Dept: RADIOLOGY | Facility: HOSPITAL | Age: 89
DRG: 175 | End: 2024-01-26
Payer: MEDICARE

## 2024-01-26 LAB
ALBUMIN SERPL BCP-MCNC: 3.1 G/DL (ref 3.4–5)
ANION GAP SERPL CALC-SCNC: 13 MMOL/L (ref 10–20)
BUN SERPL-MCNC: 32 MG/DL (ref 6–23)
CALCIUM SERPL-MCNC: 8.4 MG/DL (ref 8.6–10.3)
CHLORIDE SERPL-SCNC: 103 MMOL/L (ref 98–107)
CO2 SERPL-SCNC: 24 MMOL/L (ref 21–32)
CREAT SERPL-MCNC: 0.96 MG/DL (ref 0.5–1.05)
EGFRCR SERPLBLD CKD-EPI 2021: 56 ML/MIN/1.73M*2
ERYTHROCYTE [DISTWIDTH] IN BLOOD BY AUTOMATED COUNT: 13.7 % (ref 11.5–14.5)
GLUCOSE BLD MANUAL STRIP-MCNC: 196 MG/DL (ref 74–99)
GLUCOSE BLD MANUAL STRIP-MCNC: 242 MG/DL (ref 74–99)
GLUCOSE BLD MANUAL STRIP-MCNC: 305 MG/DL (ref 74–99)
GLUCOSE SERPL-MCNC: 257 MG/DL (ref 74–99)
HCT VFR BLD AUTO: 42.6 % (ref 36–46)
HGB BLD-MCNC: 13.3 G/DL (ref 12–16)
MCH RBC QN AUTO: 28.6 PG (ref 26–34)
MCHC RBC AUTO-ENTMCNC: 31.2 G/DL (ref 32–36)
MCV RBC AUTO: 92 FL (ref 80–100)
NRBC BLD-RTO: 0 /100 WBCS (ref 0–0)
PHOSPHATE SERPL-MCNC: 2.2 MG/DL (ref 2.5–4.9)
PLATELET # BLD AUTO: 360 X10*3/UL (ref 150–450)
POTASSIUM SERPL-SCNC: 4.2 MMOL/L (ref 3.5–5.3)
RBC # BLD AUTO: 4.65 X10*6/UL (ref 4–5.2)
SODIUM SERPL-SCNC: 136 MMOL/L (ref 136–145)
WBC # BLD AUTO: 13.4 X10*3/UL (ref 4.4–11.3)

## 2024-01-26 PROCEDURE — 99232 SBSQ HOSP IP/OBS MODERATE 35: CPT | Performed by: INTERNAL MEDICINE

## 2024-01-26 PROCEDURE — 84100 ASSAY OF PHOSPHORUS: CPT | Performed by: INTERNAL MEDICINE

## 2024-01-26 PROCEDURE — 2500000001 HC RX 250 WO HCPCS SELF ADMINISTERED DRUGS (ALT 637 FOR MEDICARE OP): Performed by: INTERNAL MEDICINE

## 2024-01-26 PROCEDURE — 2500000001 HC RX 250 WO HCPCS SELF ADMINISTERED DRUGS (ALT 637 FOR MEDICARE OP): Performed by: HOSPITALIST

## 2024-01-26 PROCEDURE — 97530 THERAPEUTIC ACTIVITIES: CPT | Mod: GO | Performed by: OCCUPATIONAL THERAPIST

## 2024-01-26 PROCEDURE — 2500000002 HC RX 250 W HCPCS SELF ADMINISTERED DRUGS (ALT 637 FOR MEDICARE OP, ALT 636 FOR OP/ED): Performed by: HOSPITALIST

## 2024-01-26 PROCEDURE — 94660 CPAP INITIATION&MGMT: CPT

## 2024-01-26 PROCEDURE — 2500000004 HC RX 250 GENERAL PHARMACY W/ HCPCS (ALT 636 FOR OP/ED): Performed by: INTERNAL MEDICINE

## 2024-01-26 PROCEDURE — 2500000002 HC RX 250 W HCPCS SELF ADMINISTERED DRUGS (ALT 637 FOR MEDICARE OP, ALT 636 FOR OP/ED): Performed by: PHARMACIST

## 2024-01-26 PROCEDURE — 94640 AIRWAY INHALATION TREATMENT: CPT | Mod: MUE

## 2024-01-26 PROCEDURE — 74018 RADEX ABDOMEN 1 VIEW: CPT

## 2024-01-26 PROCEDURE — 85027 COMPLETE CBC AUTOMATED: CPT | Performed by: INTERNAL MEDICINE

## 2024-01-26 PROCEDURE — 2500000004 HC RX 250 GENERAL PHARMACY W/ HCPCS (ALT 636 FOR OP/ED): Performed by: CLINICAL NURSE SPECIALIST

## 2024-01-26 PROCEDURE — 2500000004 HC RX 250 GENERAL PHARMACY W/ HCPCS (ALT 636 FOR OP/ED): Performed by: HOSPITALIST

## 2024-01-26 PROCEDURE — 1200000002 HC GENERAL ROOM WITH TELEMETRY DAILY

## 2024-01-26 PROCEDURE — 82947 ASSAY GLUCOSE BLOOD QUANT: CPT

## 2024-01-26 PROCEDURE — 36415 COLL VENOUS BLD VENIPUNCTURE: CPT | Performed by: INTERNAL MEDICINE

## 2024-01-26 PROCEDURE — 2500000005 HC RX 250 GENERAL PHARMACY W/O HCPCS: Performed by: CLINICAL NURSE SPECIALIST

## 2024-01-26 PROCEDURE — 94668 MNPJ CHEST WALL SBSQ: CPT

## 2024-01-26 PROCEDURE — 2500000002 HC RX 250 W HCPCS SELF ADMINISTERED DRUGS (ALT 637 FOR MEDICARE OP, ALT 636 FOR OP/ED): Performed by: INTERNAL MEDICINE

## 2024-01-26 RX ORDER — BISACODYL 10 MG/1
10 SUPPOSITORY RECTAL DAILY
Status: DISCONTINUED | OUTPATIENT
Start: 2024-01-26 | End: 2024-01-28 | Stop reason: HOSPADM

## 2024-01-26 RX ORDER — POLYETHYLENE GLYCOL 3350 17 G/17G
17 POWDER, FOR SOLUTION ORAL 2 TIMES DAILY PRN
Status: DISCONTINUED | OUTPATIENT
Start: 2024-01-26 | End: 2024-01-28 | Stop reason: HOSPADM

## 2024-01-26 RX ADMIN — CEFEPIME 1 G: 1 INJECTION, POWDER, FOR SOLUTION INTRAMUSCULAR; INTRAVENOUS at 05:03

## 2024-01-26 RX ADMIN — BISACODYL 10 MG: 10 SUPPOSITORY RECTAL at 14:35

## 2024-01-26 RX ADMIN — Medication 2000 UNITS: at 09:50

## 2024-01-26 RX ADMIN — GUAIFENESIN 600 MG: 600 TABLET, EXTENDED RELEASE ORAL at 20:34

## 2024-01-26 RX ADMIN — STANDARDIZED SENNA CONCENTRATE 8.6 MG: 8.6 TABLET ORAL at 20:34

## 2024-01-26 RX ADMIN — IPRATROPIUM BROMIDE AND ALBUTEROL SULFATE 3 ML: 2.5; .5 SOLUTION RESPIRATORY (INHALATION) at 07:43

## 2024-01-26 RX ADMIN — GUAIFENESIN 600 MG: 600 TABLET, EXTENDED RELEASE ORAL at 09:51

## 2024-01-26 RX ADMIN — APIXABAN 10 MG: 5 TABLET, FILM COATED ORAL at 20:35

## 2024-01-26 RX ADMIN — GABAPENTIN 200 MG: 100 CAPSULE ORAL at 20:34

## 2024-01-26 RX ADMIN — GABAPENTIN 200 MG: 100 CAPSULE ORAL at 14:35

## 2024-01-26 RX ADMIN — STANDARDIZED SENNA CONCENTRATE 8.6 MG: 8.6 TABLET ORAL at 09:51

## 2024-01-26 RX ADMIN — IPRATROPIUM BROMIDE AND ALBUTEROL SULFATE 3 ML: 2.5; .5 SOLUTION RESPIRATORY (INHALATION) at 15:40

## 2024-01-26 RX ADMIN — AMIODARONE HYDROCHLORIDE 100 MG: 200 TABLET ORAL at 09:51

## 2024-01-26 RX ADMIN — AMLODIPINE BESYLATE 2.5 MG: 5 TABLET ORAL at 09:50

## 2024-01-26 RX ADMIN — GABAPENTIN 200 MG: 100 CAPSULE ORAL at 09:50

## 2024-01-26 RX ADMIN — INSULIN LISPRO 8 UNITS: 100 INJECTION, SOLUTION INTRAVENOUS; SUBCUTANEOUS at 16:39

## 2024-01-26 RX ADMIN — INSULIN LISPRO 4 UNITS: 100 INJECTION, SOLUTION INTRAVENOUS; SUBCUTANEOUS at 12:58

## 2024-01-26 RX ADMIN — ATORVASTATIN CALCIUM 40 MG: 40 TABLET, FILM COATED ORAL at 20:34

## 2024-01-26 RX ADMIN — OXYBUTYNIN CHLORIDE 5 MG: 5 TABLET ORAL at 09:51

## 2024-01-26 RX ADMIN — INSULIN LISPRO 2 UNITS: 100 INJECTION, SOLUTION INTRAVENOUS; SUBCUTANEOUS at 09:52

## 2024-01-26 RX ADMIN — INSULIN GLARGINE 10 UNITS: 100 INJECTION, SOLUTION SUBCUTANEOUS at 09:51

## 2024-01-26 RX ADMIN — FUROSEMIDE 40 MG: 40 TABLET ORAL at 09:50

## 2024-01-26 RX ADMIN — IPRATROPIUM BROMIDE AND ALBUTEROL SULFATE 3 ML: 2.5; .5 SOLUTION RESPIRATORY (INHALATION) at 10:52

## 2024-01-26 RX ADMIN — Medication 3 MG: at 20:35

## 2024-01-26 RX ADMIN — POLYETHYLENE GLYCOL 3350 17 G: 17 POWDER, FOR SOLUTION ORAL at 09:17

## 2024-01-26 RX ADMIN — FORMOTEROL FUMARATE DIHYDRATE 20 MCG: 20 SOLUTION RESPIRATORY (INHALATION) at 07:44

## 2024-01-26 RX ADMIN — APIXABAN 10 MG: 5 TABLET, FILM COATED ORAL at 09:50

## 2024-01-26 RX ADMIN — Medication 7 L/MIN: at 08:00

## 2024-01-26 RX ADMIN — PREDNISONE 40 MG: 20 TABLET ORAL at 09:51

## 2024-01-26 ASSESSMENT — PAIN - FUNCTIONAL ASSESSMENT
PAIN_FUNCTIONAL_ASSESSMENT: 0-10

## 2024-01-26 ASSESSMENT — COGNITIVE AND FUNCTIONAL STATUS - GENERAL
DAILY ACTIVITIY SCORE: 20
DRESSING REGULAR LOWER BODY CLOTHING: A LITTLE
DAILY ACTIVITIY SCORE: 19
TURNING FROM BACK TO SIDE WHILE IN FLAT BAD: A LITTLE
DRESSING REGULAR LOWER BODY CLOTHING: A LITTLE
DRESSING REGULAR UPPER BODY CLOTHING: A LITTLE
MOVING TO AND FROM BED TO CHAIR: A LITTLE
MOVING FROM LYING ON BACK TO SITTING ON SIDE OF FLAT BED WITH BEDRAILS: A LITTLE
CLIMB 3 TO 5 STEPS WITH RAILING: A LITTLE
STANDING UP FROM CHAIR USING ARMS: A LITTLE
MOBILITY SCORE: 18
TOILETING: A LITTLE
TOILETING: A LITTLE
WALKING IN HOSPITAL ROOM: A LITTLE
STANDING UP FROM CHAIR USING ARMS: A LITTLE
MOVING FROM LYING ON BACK TO SITTING ON SIDE OF FLAT BED WITH BEDRAILS: A LITTLE
CLIMB 3 TO 5 STEPS WITH RAILING: A LITTLE
DAILY ACTIVITIY SCORE: 19
HELP NEEDED FOR BATHING: A LITTLE
DRESSING REGULAR UPPER BODY CLOTHING: A LITTLE
DRESSING REGULAR UPPER BODY CLOTHING: A LITTLE
PERSONAL GROOMING: A LITTLE
WALKING IN HOSPITAL ROOM: A LITTLE
TURNING FROM BACK TO SIDE WHILE IN FLAT BAD: A LITTLE
PERSONAL GROOMING: A LITTLE
DRESSING REGULAR LOWER BODY CLOTHING: A LITTLE
MOVING TO AND FROM BED TO CHAIR: A LITTLE
TOILETING: A LITTLE
MOBILITY SCORE: 18
HELP NEEDED FOR BATHING: A LITTLE
HELP NEEDED FOR BATHING: A LITTLE

## 2024-01-26 ASSESSMENT — PAIN SCALES - GENERAL
PAINLEVEL_OUTOF10: 0 - NO PAIN

## 2024-01-26 NOTE — PROGRESS NOTES
"  INFECTIOUS DISEASE DAILY PROGRESS NOTE    SUBJECTIVE:    No new events. Leukocytosis is resolving. No fevers. No complaints.    OBJECTIVE:  VITALS (Last 24 Hours)  /68   Pulse 66   Temp 36.6 °C (97.8 °F) (Temporal)   Resp 18   Ht 1.499 m (4' 11.02\")   Wt 76 kg (167 lb 8.8 oz)   SpO2 92%   BMI 33.82 kg/m²     PHYSICAL EXAM:  Gen - NAD, on nasal cannula and getting breathing treatment  Lungs - coarse throughout with some wh  Abd - soft, no ttp, BS present  Skin - no rash    ABX: IV Cefepime    LABS:  Lab Results   Component Value Date    WBC 13.4 (H) 01/26/2024    HGB 13.3 01/26/2024    HCT 42.6 01/26/2024    MCV 92 01/26/2024     01/26/2024     Lab Results   Component Value Date    GLUCOSE 257 (H) 01/26/2024    CALCIUM 8.4 (L) 01/26/2024     01/26/2024    K 4.2 01/26/2024    CO2 24 01/26/2024     01/26/2024    BUN 32 (H) 01/26/2024    CREATININE 0.96 01/26/2024     Results from last 72 hours   Lab Units 01/26/24  0537   ALBUMIN g/dL 3.1*     Estimated Creatinine Clearance: 32.5 mL/min (by C-G formula based on SCr of 0.96 mg/dL).    ASSESSMENT/PLAN:     Acute on Chronic Hypoxic Resp Failure - improving back to baseline  COPD on home 6L NC  CKD - CrCl is 33, affects abx dosing  Acute PE with Lung Infarct  Leukocytosis - seems likely rise is due to steroids based on timing, resolving now     Stopping abx as leukocytosis is resolving and has completed adequate course of IV abx for PNA.    Will sign off. Please call back with questions. Thanks!    Dae Archer MD  [facility]  Office #[phone]    "

## 2024-01-26 NOTE — CARE PLAN
Patient admitted for PNA    Met with patient at bedside, confirmed return to [facility] at dc, states son with provide transport back to [place] when dc orders complete.    ADOD today  [facility] [place]

## 2024-01-26 NOTE — PROGRESS NOTES
Lissett Rodríguez is a 89 y.o. female on day 8 of admission presenting with Community acquired pneumonia, unspecified laterality.      Subjective   Patient was seen and examined at bedside this morning, states that she has not moved her bowels, and her abdomen still appears distended.  Otherwise patient denies having any other symptoms at that time.       Objective     Last Recorded Vitals  /74 (BP Location: Left arm, Patient Position: Lying)   Pulse 84   Temp 36.5 °C (97.7 °F) (Temporal)   Resp 18   Wt 76 kg (167 lb 8.8 oz)   SpO2 92%   Intake/Output last 3 Shifts:    Intake/Output Summary (Last 24 hours) at 1/26/2024 1832  Last data filed at 1/26/2024 1613  Gross per 24 hour   Intake 950 ml   Output --   Net 950 ml       Admission Weight  Weight: 72.1 kg (159 lb) (01/18/24 1914)    Daily Weight  01/24/24 : 76 kg (167 lb 8.8 oz)    Image Results  XR abdomen 1 view  Narrative: Interpreted By:  Clara Neely,   STUDY:  XR ABDOMEN 1 VIEW;  1/26/2024 5:06 pm      INDICATION:  Signs/Symptoms:Abdominal distention reevaluation, constipation.      COMPARISON:  01/25/2024      ACCESSION NUMBER(S):  [id number]      ORDERING CLINICIAN:  WELLINGTON BAEZ      FINDINGS:  Multiple views of the abdomen obtained. A nonspecific, nonobstructive  bowel gas pattern. Air-filled distended stomach. Clips in the right  upper quadrant. Moderate amount of stool throughout. Surgical staples  overlying the pelvis.      Scoliosis and discogenic degenerative changes.      Impression: 1. Air-filled distended stomach.  2. Large amount of stool.      MACRO:  None      Signed by: Clara Neely 1/26/2024 5:09 PM  Dictation workstation:   [id number]      Physical Exam  Constitutional: Pleasant elderly female breathing on Airvo, alert active, cooperative not in acute distress  Eyes: PERRLA, clear sclera  ENMT: Moist mucosal membranes, no exudate  Head / Neck: Atraumatic, normocephalic, supple neck, JVP not visualized  Lungs: Patent  airways, CTABL  Heart: RRR, S1S2, no murmurs appreciated, palpable pulses in all extremities  GI: Soft, NT, slightly tympanic, bowel sounds present in all quadrants  MSK: Moves all extremities freely, no restriction  of ROM, no joint edema  Extremities: Intact x 4, no peripheral edema  : No Arceo catheter inserted  Breast: Deferred  Neurological: AAO x 3 to person, place and date, facial muscles symmetrical, sensation intact, strength 4/4, no acute focal neurological deficits appreciated  Psychological: Appropriate mood and behavior    Relevant Results             Scheduled medications  amiodarone, 100 mg, oral, Daily  amLODIPine, 2.5 mg, oral, Daily  apixaban, 10 mg, oral, BID   Followed by  [START ON 1/31/2024] apixaban, 5 mg, oral, BID  atorvastatin, 40 mg, oral, Nightly  bisacodyl, 10 mg, rectal, Daily  cholecalciferol, 2,000 Units, oral, Daily  formoterol, 20 mcg, nebulization, BID  furosemide, 40 mg, oral, Daily  gabapentin, 200 mg, oral, TID  guaiFENesin, 600 mg, oral, BID  insulin glargine, 10 Units, subcutaneous, Daily  insulin lispro, 0-10 Units, subcutaneous, TID with meals  ipratropium-albuteroL, 3 mL, nebulization, 4x daily  melatonin, 3 mg, oral, Daily  oxybutynin, 5 mg, oral, Daily  oxygen, , inhalation, Continuous - 02/gases  polyethylene glycol, 17 g, oral, Daily  sennosides, 1 tablet, oral, BID  [Held by provider] tiotropium, 2 Inhalation, inhalation, Daily      Continuous medications     PRN medications  PRN medications: acetaminophen, acetaminophen, cyclobenzaprine, dextrose 10 % in water (D10W), dextrose, glucagon, guaiFENesin, ipratropium-albuteroL, ondansetron ODT **OR** ondansetron, oxygen, oxygen    Assessment/Plan        Lissett Rodríguez is a 89 y.o.  female with a PMH of afib (on Xarelto), HFpEF (55%), COPD (on 6 LPM oxygen 24/7), KALPANA, DM2, HTN, HLD, TIA, presenting with fatigue, body aches and cough.  Has felt fatigued for the past 1 week, unable to perform her usual activities at her  assisted living facility.   Has had a dry cough and body aches x 2 days.      Principal Problem:    Community acquired pneumonia, unspecified laterality    Constipation as evidenced by abdominal distention  -KUB shows nonspecific dilated loop of bowels with large stool burden  -MiraLAX twice daily  -Dulcolax suppository    Acute PE  Acute hypoxic resp failure  - on continuous CPAP, uses 6 LPM at baseline  - echocardiogram to evaluate for right heart strain  -hematology consult  -Status post heparin drip  -switch to eliquis, started with loading dose 10 mg twice daily  -wean off O2 to baseline  -Pulm consult: Appreciate management and recommendations  -transfer to step down due to inc O2 requirements  -monitor     Pneumonia  -Increase O2 demand, on Airvo  - continue Ceftriaxone, completed azithromycin regimen  -Chest x-ray shows worsening left-sided infiltrate  - duonebs  - hycodan for cough   -Consult ID for antibiotic review     Atrial fibrillation  - telemetry  - continue Amiodarone  -eliquis started, with loading dose for PE treatment     KALPANA  - CPAP     HTN  - amlodipine     HLD  - Lipitor     DM2  - ISS     Diet  -Diabetic     DVT prophylaxis  -Heparin drip     CODE STATUS: DNR, no intubation     Disposition: Presented with PE and pneumonia, need further management, discharge likely tomorrow if constipation resolved.       Mikal Doty,

## 2024-01-26 NOTE — PROGRESS NOTES
Occupational Therapy    OT Treatment    Patient Name: Lissett Rodríguez  MRN: [id number]  Today's Date: 1/26/2024  Time Calculation  Start Time: 1353  Stop Time: 1410  Time Calculation (min): 17 min         Assessment:  End of Session Communication: Bedside nurse  End of Session Patient Position: Up in chair, Alarm on (visitors in room)     Plan:  Treatment Interventions: ADL retraining, Functional transfer training, Endurance training, Patient/family training, Neuromuscular reeducation  OT Frequency: 3 times per week  OT Discharge Recommendations: Low intensity level of continued care  OT - OK to Discharge: Yes (continue per OT POC)  Treatment Interventions: ADL retraining, Functional transfer training, Endurance training, Patient/family training, Neuromuscular reeducation    Subjective   Previous Visit Info:  OT Last Visit  OT Received On: 01/26/24  General:  General  Reason for Referral: Pt is a 89 y/o female admitted with PNA  Past Medical History Relevant to Rehab: afib (on Xarelto), HFpEF (55%), COPD (on 6 LPM oxygen 24/7), KALPANA, DM2, HTN, HLD, TIA  Family/Caregiver Present: Yes (x2 visitors present)  Prior to Session Communication: Bedside nurse  Patient Position Received: Up in chair, Alarm on  General Comment: Pt seated in chair upon arrival and agreeable to treatment. Pt on 6L/min high flow O2. SPO2 ranged from 87-96%. Cues for PLB technique throughout. Pt declined ADLs this date, reporting already performed.  Precautions:  Hearing/Visual Limitations: Hard of hearing  Medical Precautions: Fall precautions, Oxygen therapy device and L/min  Vital Signs:  Vital Signs  SpO2: 96 % (end of session; SPO2 ranged from 87-96% throughout on 6L/min high flow O2)  Pain:  Pain Assessment  Pain Assessment: 0-10  Pain Score: 0 - No pain    Objective    Cognition:  Cognition  Overall Cognitive Status: Within Functional Limits  Orientation Level: Oriented X4    Functional Standing Tolerance:  Time: ~6 minutes  Activity: To  increase standing balance and functional endurance needed for increased independence with ADLs/IADLs, Pt completed x1 standing tral for ~6 minute duration while completing x1 set of 10 alternating B UE AROM exercises in all planes. Pt required SBA-CGA for balance. No LOB noted. Cues for PLB technique throughout.  Bed Mobility/Transfers: Transfers  Transfer: Yes  Transfer 1  Technique 1: Sit to stand, Stand to sit  Transfer Device 1: Walker  Transfer Level of Assistance 1: Close supervision, Minimal verbal cues  Trials/Comments 1: cues for safe hand placement and walker safety, x3 trials from chair         Standing Balance:  Static Standing Balance  Static Standing-Balance Support: Bilateral upper extremity supported  Static Standing-Level of Assistance: Close supervision  Static Standing-Comment/Number of Minutes: using FWW  Dynamic Standing Balance  Dynamic Standing-Comments: SBA-CGA using FWW    Therapy/Activity: Therapeutic Activity  Therapeutic Activity Performed: Yes  Therapeutic Activity 1: To increase functional endurance, balance and strength needed for increased independence with ADLs/IADLs, pt functionally navigated x mod household distance in room using FWW with SBA. Min cues for sequencing and walker safety. Assist to manage O2 tubing.      Outcome Measures:[facility] Daily Activity  Putting on and taking off regular lower body clothing: A little  Bathing (including washing, rinsing, drying): A little  Putting on and taking off regular upper body clothing: A little  Toileting, which includes using toilet, bedpan or urinal: A little  Taking care of personal grooming such as brushing teeth: A little  Eating Meals: None  Daily Activity - Total Score: 19        Education Documentation  Body Mechanics, taught by America Andrade OT at 1/26/2024  3:50 PM.  Learner: Patient  Readiness: Acceptance  Method: Explanation  Response: Verbalizes Understanding    Precautions, taught by America Andrade OT at 1/26/2024  3:50  PM.  Learner: Patient  Readiness: Acceptance  Method: Explanation  Response: Verbalizes Understanding    ADL Training, taught by America Andrade OT at 1/26/2024  3:50 PM.  Learner: Patient  Readiness: Acceptance  Method: Explanation  Response: Verbalizes Understanding    Education Comments  No comments found.        OP EDUCATION:       Goals:  Encounter Problems       Encounter Problems (Active)       ADLs       Patient will perform UB and LB bathing with modified independent level of assistance and grab bars, shower chair, and long-handled sponge. (Progressing)       Start:  01/19/24    Expected End:  01/27/24            Patient with complete upper body dressing with independent level of assistance donning and doffing all UE clothes with PRN adaptive equipment. (Progressing)       Start:  01/19/24    Expected End:  01/27/24            Patient with complete lower body dressing with modified independent level of assistance donning and doffing all LE clothes  with PRN adaptive equipment. (Progressing)       Start:  01/19/24    Expected End:  01/27/24            Patient will complete all daily grooming tasks with independent level of assistance and PRN adaptive equipment. (Progressing)       Start:  01/19/24    Expected End:  01/27/24            Patient will complete toileting including hygiene clothing management/hygiene with modified independent level of assistance and raised toilet seat and grab bars. (Progressing)       Start:  01/19/24    Expected End:  01/27/24                       MOBILITY       Patient will perform Functional mobility x Household distances/Community Distances with modified independent level of assistance and least restrictive device in order to improve safety and functional mobility. (Progressing)       Start:  01/19/24    Expected End:  01/27/24                       TRANSFERS       Patient will complete all functional transfers  with least restrictive device with modified independent level  of assistance. (Progressing)       Start:  01/19/24    Expected End:  01/27/24

## 2024-01-27 VITALS
WEIGHT: 167.55 LBS | SYSTOLIC BLOOD PRESSURE: 147 MMHG | DIASTOLIC BLOOD PRESSURE: 68 MMHG | BODY MASS INDEX: 33.78 KG/M2 | TEMPERATURE: 98.2 F | OXYGEN SATURATION: 95 % | HEIGHT: 59 IN | RESPIRATION RATE: 16 BRPM | HEART RATE: 72 BPM

## 2024-01-27 LAB
ALBUMIN SERPL BCP-MCNC: 3.1 G/DL (ref 3.4–5)
ANION GAP SERPL CALC-SCNC: 12 MMOL/L (ref 10–20)
BUN SERPL-MCNC: 29 MG/DL (ref 6–23)
CALCIUM SERPL-MCNC: 8.7 MG/DL (ref 8.6–10.3)
CHLORIDE SERPL-SCNC: 102 MMOL/L (ref 98–107)
CO2 SERPL-SCNC: 27 MMOL/L (ref 21–32)
CREAT SERPL-MCNC: 0.9 MG/DL (ref 0.5–1.05)
EGFRCR SERPLBLD CKD-EPI 2021: 60 ML/MIN/1.73M*2
ERYTHROCYTE [DISTWIDTH] IN BLOOD BY AUTOMATED COUNT: 13.6 % (ref 11.5–14.5)
GLUCOSE BLD MANUAL STRIP-MCNC: 160 MG/DL (ref 74–99)
GLUCOSE BLD MANUAL STRIP-MCNC: 169 MG/DL (ref 74–99)
GLUCOSE BLD MANUAL STRIP-MCNC: 243 MG/DL (ref 74–99)
GLUCOSE BLD MANUAL STRIP-MCNC: 270 MG/DL (ref 74–99)
GLUCOSE SERPL-MCNC: 148 MG/DL (ref 74–99)
HCT VFR BLD AUTO: 41.6 % (ref 36–46)
HGB BLD-MCNC: 13.2 G/DL (ref 12–16)
MCH RBC QN AUTO: 29 PG (ref 26–34)
MCHC RBC AUTO-ENTMCNC: 31.7 G/DL (ref 32–36)
MCV RBC AUTO: 91 FL (ref 80–100)
NRBC BLD-RTO: 0 /100 WBCS (ref 0–0)
PHOSPHATE SERPL-MCNC: 2 MG/DL (ref 2.5–4.9)
PLATELET # BLD AUTO: 327 X10*3/UL (ref 150–450)
POTASSIUM SERPL-SCNC: 4.2 MMOL/L (ref 3.5–5.3)
RBC # BLD AUTO: 4.55 X10*6/UL (ref 4–5.2)
SODIUM SERPL-SCNC: 137 MMOL/L (ref 136–145)
WBC # BLD AUTO: 14 X10*3/UL (ref 4.4–11.3)

## 2024-01-27 PROCEDURE — 99239 HOSP IP/OBS DSCHRG MGMT >30: CPT | Performed by: INTERNAL MEDICINE

## 2024-01-27 PROCEDURE — 85027 COMPLETE CBC AUTOMATED: CPT | Performed by: INTERNAL MEDICINE

## 2024-01-27 PROCEDURE — 2500000002 HC RX 250 W HCPCS SELF ADMINISTERED DRUGS (ALT 637 FOR MEDICARE OP, ALT 636 FOR OP/ED): Performed by: HOSPITALIST

## 2024-01-27 PROCEDURE — 2500000001 HC RX 250 WO HCPCS SELF ADMINISTERED DRUGS (ALT 637 FOR MEDICARE OP): Performed by: HOSPITALIST

## 2024-01-27 PROCEDURE — 2500000002 HC RX 250 W HCPCS SELF ADMINISTERED DRUGS (ALT 637 FOR MEDICARE OP, ALT 636 FOR OP/ED): Performed by: PHARMACIST

## 2024-01-27 PROCEDURE — 36415 COLL VENOUS BLD VENIPUNCTURE: CPT | Performed by: INTERNAL MEDICINE

## 2024-01-27 PROCEDURE — 2500000004 HC RX 250 GENERAL PHARMACY W/ HCPCS (ALT 636 FOR OP/ED): Performed by: HOSPITALIST

## 2024-01-27 PROCEDURE — 94640 AIRWAY INHALATION TREATMENT: CPT | Mod: MUE

## 2024-01-27 PROCEDURE — 2500000002 HC RX 250 W HCPCS SELF ADMINISTERED DRUGS (ALT 637 FOR MEDICARE OP, ALT 636 FOR OP/ED): Performed by: INTERNAL MEDICINE

## 2024-01-27 PROCEDURE — 80069 RENAL FUNCTION PANEL: CPT | Performed by: INTERNAL MEDICINE

## 2024-01-27 PROCEDURE — 82947 ASSAY GLUCOSE BLOOD QUANT: CPT

## 2024-01-27 PROCEDURE — 94668 MNPJ CHEST WALL SBSQ: CPT

## 2024-01-27 PROCEDURE — 2500000001 HC RX 250 WO HCPCS SELF ADMINISTERED DRUGS (ALT 637 FOR MEDICARE OP): Performed by: INTERNAL MEDICINE

## 2024-01-27 RX ORDER — POLYETHYLENE GLYCOL 3350 17 G/17G
17 POWDER, FOR SOLUTION ORAL 2 TIMES DAILY PRN
Qty: 60 PACKET | Refills: 0 | Status: SHIPPED | OUTPATIENT
Start: 2024-01-27 | End: 2024-02-27

## 2024-01-27 RX ADMIN — INSULIN LISPRO 2 UNITS: 100 INJECTION, SOLUTION INTRAVENOUS; SUBCUTANEOUS at 12:19

## 2024-01-27 RX ADMIN — OXYBUTYNIN CHLORIDE 5 MG: 5 TABLET ORAL at 09:00

## 2024-01-27 RX ADMIN — IPRATROPIUM BROMIDE AND ALBUTEROL SULFATE 3 ML: 2.5; .5 SOLUTION RESPIRATORY (INHALATION) at 15:21

## 2024-01-27 RX ADMIN — FORMOTEROL FUMARATE DIHYDRATE 20 MCG: 20 SOLUTION RESPIRATORY (INHALATION) at 20:24

## 2024-01-27 RX ADMIN — INSULIN GLARGINE 10 UNITS: 100 INJECTION, SOLUTION SUBCUTANEOUS at 08:55

## 2024-01-27 RX ADMIN — GUAIFENESIN 600 MG: 600 TABLET, EXTENDED RELEASE ORAL at 09:00

## 2024-01-27 RX ADMIN — AMIODARONE HYDROCHLORIDE 100 MG: 200 TABLET ORAL at 09:00

## 2024-01-27 RX ADMIN — FUROSEMIDE 40 MG: 40 TABLET ORAL at 09:00

## 2024-01-27 RX ADMIN — ATORVASTATIN CALCIUM 40 MG: 40 TABLET, FILM COATED ORAL at 20:52

## 2024-01-27 RX ADMIN — STANDARDIZED SENNA CONCENTRATE 8.6 MG: 8.6 TABLET ORAL at 20:52

## 2024-01-27 RX ADMIN — Medication 2000 UNITS: at 09:00

## 2024-01-27 RX ADMIN — GUAIFENESIN 600 MG: 600 TABLET, EXTENDED RELEASE ORAL at 20:52

## 2024-01-27 RX ADMIN — GABAPENTIN 200 MG: 100 CAPSULE ORAL at 09:00

## 2024-01-27 RX ADMIN — IPRATROPIUM BROMIDE AND ALBUTEROL SULFATE 3 ML: 2.5; .5 SOLUTION RESPIRATORY (INHALATION) at 20:23

## 2024-01-27 RX ADMIN — INSULIN LISPRO 2 UNITS: 100 INJECTION, SOLUTION INTRAVENOUS; SUBCUTANEOUS at 08:54

## 2024-01-27 RX ADMIN — GABAPENTIN 200 MG: 100 CAPSULE ORAL at 20:51

## 2024-01-27 RX ADMIN — IPRATROPIUM BROMIDE AND ALBUTEROL SULFATE 3 ML: 2.5; .5 SOLUTION RESPIRATORY (INHALATION) at 11:37

## 2024-01-27 RX ADMIN — BISACODYL 10 MG: 10 SUPPOSITORY RECTAL at 09:00

## 2024-01-27 RX ADMIN — APIXABAN 10 MG: 5 TABLET, FILM COATED ORAL at 08:59

## 2024-01-27 RX ADMIN — STANDARDIZED SENNA CONCENTRATE 8.6 MG: 8.6 TABLET ORAL at 09:00

## 2024-01-27 RX ADMIN — Medication 3 MG: at 20:52

## 2024-01-27 RX ADMIN — AMLODIPINE BESYLATE 2.5 MG: 5 TABLET ORAL at 09:00

## 2024-01-27 RX ADMIN — FORMOTEROL FUMARATE DIHYDRATE 20 MCG: 20 SOLUTION RESPIRATORY (INHALATION) at 08:06

## 2024-01-27 RX ADMIN — APIXABAN 10 MG: 5 TABLET, FILM COATED ORAL at 20:52

## 2024-01-27 RX ADMIN — IPRATROPIUM BROMIDE AND ALBUTEROL SULFATE 3 ML: 2.5; .5 SOLUTION RESPIRATORY (INHALATION) at 08:06

## 2024-01-27 ASSESSMENT — COGNITIVE AND FUNCTIONAL STATUS - GENERAL
MOVING TO AND FROM BED TO CHAIR: A LITTLE
HELP NEEDED FOR BATHING: A LITTLE
STANDING UP FROM CHAIR USING ARMS: A LITTLE
TURNING FROM BACK TO SIDE WHILE IN FLAT BAD: A LITTLE
DRESSING REGULAR UPPER BODY CLOTHING: A LITTLE
DAILY ACTIVITIY SCORE: 20
CLIMB 3 TO 5 STEPS WITH RAILING: A LOT
MOBILITY SCORE: 18
TOILETING: A LITTLE
DRESSING REGULAR LOWER BODY CLOTHING: A LITTLE
WALKING IN HOSPITAL ROOM: A LITTLE

## 2024-01-27 ASSESSMENT — PAIN - FUNCTIONAL ASSESSMENT: PAIN_FUNCTIONAL_ASSESSMENT: 0-10

## 2024-01-27 ASSESSMENT — PAIN SCALES - GENERAL: PAINLEVEL_OUTOF10: 0 - NO PAIN

## 2024-01-27 NOTE — NURSING NOTE
Went over discharge information with patient and family in the room. All questions answered and pt in agreement. Son will transport pt back to assisted living- but unable to pick her up until about 930/10 pm tonkirt. Dr. Doty is aware.

## 2024-01-27 NOTE — DISCHARGE SUMMARY
Discharge Diagnosis  Community acquired pneumonia, unspecified laterality    Issues Requiring Follow-Up  Follow up with PCP    Discharge Meds     Your medication list        START taking these medications        Instructions Last Dose Given Next Dose Due   apixaban 5 mg tablet  Commonly known as: Eliquis      Take 2 tablets (10 mg) by mouth 2 times a day for 8 doses.       apixaban 5 mg tablet  Commonly known as: Eliquis  Start taking on: January 31, 2024      Take 1 tablet (5 mg) by mouth 2 times a day. Do not start before January 31, 2024.       polyethylene glycol 17 gram packet  Commonly known as: Glycolax, Miralax      Take 17 g by mouth 2 times a day as needed (Constipation).              CONTINUE taking these medications        Instructions Last Dose Given Next Dose Due   acetaminophen 325 mg tablet  Commonly known as: Tylenol           albuterol 90 mcg/actuation inhaler           amiodarone 100 mg tablet  Commonly known as: Pacerone           amLODIPine 2.5 mg tablet  Commonly known as: Norvasc           atorvastatin 40 mg tablet  Commonly known as: Lipitor           Blood Glucose Test strip  Generic drug: blood sugar diagnostic           cholecalciferol 50 mcg (2,000 unit) capsule  Commonly known as: Vitamin D-3           cyclobenzaprine 10 mg tablet  Commonly known as: Flexeril           dapagliflozin propanediol 5 mg  Commonly known as: Farxiga           FREESTYLE BUSTER 14 DAY SENSOR MISC           furosemide 40 mg tablet  Commonly known as: Lasix           gabapentin 300 mg capsule  Commonly known as: Neurontin           guaiFENesin 600 mg 12 hr tablet  Commonly known as: Mucinex           oxybutynin 5 mg tablet  Commonly known as: Ditropan           oxygen gas therapy  Commonly known as: O2           potassium chloride CR 20 mEq ER tablet  Commonly known as: Klor-Con M20           sennosides 8.6 mg tablet  Commonly known as: Senokot           Spiriva with HandiHaler 18 mcg inhalation capsule  Generic  drug: tiotropium                  STOP taking these medications      rivaroxaban 20 mg tablet  Commonly known as: Xarelto                  Where to Get Your Medications        These medications were sent to [facility]/pharmacy #[id number] - [place] - [address]  [address] [place] [address]      Phone: [phone]   apixaban 5 mg tablet  apixaban 5 mg tablet  polyethylene glycol 17 gram packet         Test Results Pending At Discharge  Pending Labs       Order Current Status    Extra Tubes Collected (01/25/24 8948)            Hospital Course   Lissett Rodríguez is a 89 y.o.  female with a PMH of afib (on Xarelto), HFpEF (55%), COPD (on 6 LPM oxygen 24/7), KALPANA, DM2, HTN, HLD, TIA, presenting with fatigue, body aches and cough.  Has felt fatigued for the past 1 week, unable to perform her usual activities at her assisted living facility.   Has had a dry cough and body aches x 2 days.   She denies hemoptysis, shaking chills, fever, N/V/D, chest pain and LE edema.   Is still on 6 LPM oxygen.       In the ED her WBC ct is 16, BMP unremarkable.   CTPE showed LLL and PACO PNA, acute PE a segmental branch of the lingula.   She was given a therapeutic dose of Lovenox in the ED. Xarelto listed on her medication list, however, uncertain if she is getting it at her facility.     Patient was admitted for further management, with consultation of ID, pulmonary medicine and PT/OT  She was initially placed on Airvo for her respiratory failure, which gradually improved, and eventually getting to baseline.   For her pneumonia, she completed adequate antibiotic regimen with ceftriaxone, then Cefepime, after completing Azithromycin. Repeat imaging showed resolution of her pneumonia.   Pulmonary embolism versus pulmonary infarct: CT PE with positive PE in the segmental level of the lingula with wedge-shaped consolidation mid left lung concerning for infarct; occurred while on Xarelto therapy . She was transitioned  to Eliquis, starting with a loading dose 10 mg BID. Prescription given at discharge for completion of her 7 day loading dose and start of continue therapy with 5 mg BID (30 day supply given).   PT/OT seen and evaluated her daily, and recommended low intensity level of care.  Hospital stay was notable for constipation with KUB showing dilated loops of bowel, without obstruction, but presence of moderate stool. She was given Miralax 17g packet BID as needed, and start having bowel movement. Enema was considered, but as patient continued to have BM, it was discontinued.   Pt is being discharged in stable condition back to her assisted living with follow up with her PCP.      Pertinent Physical Exam At Time of Discharge  Physical Exam  Constitutional: Pleasant elderly female breathing on Airvo, alert active, cooperative not in acute distress  Eyes: PERRLA, clear sclera  ENMT: Moist mucosal membranes, no exudate  Head / Neck: Atraumatic, normocephalic, supple neck, JVP not visualized  Lungs: Patent airways, CTABL  Heart: RRR, S1S2, no murmurs appreciated, palpable pulses in all extremities  GI: Soft, NT, slightly tympanic, bowel sounds present in all quadrants  MSK: Moves all extremities freely, no restriction  of ROM, no joint edema  Extremities: Intact x 4, no peripheral edema  : No Arceo catheter inserted  Breast: Deferred  Neurological: AAO x 3 to person, place and date, facial muscles symmetrical, sensation intact, strength 4/4, no acute focal neurological deficits appreciated  Psychological: Appropriate mood and behavior    Outpatient Follow-Up  No future appointments.      Mikal Doty DO

## 2024-01-28 NOTE — CARE PLAN
Problem: Pain  Goal: My pain/discomfort is manageable  Outcome: Progressing     Problem: Safety  Goal: I will remain free of falls  Outcome: Progressing     Problem: Daily Care  Goal: Daily care needs are met  Outcome: Progressing     Problem: Psychosocial Needs  Goal: Demonstrates ability to cope with hospitalization/illness  Outcome: Progressing     The patient's goals for the shift include  maintain safety     The clinical goals for the shift include Maintain safety

## 2024-01-29 ENCOUNTER — PATIENT OUTREACH (OUTPATIENT)
Dept: PRIMARY CARE | Facility: CLINIC | Age: 89
End: 2024-01-29
Payer: MEDICARE

## 2024-01-29 NOTE — PROGRESS NOTES
Discharge Facility:[facility]  Discharge Diagnosis:CAP unspecified  Admission Date:1/19/24  Discharge Date: 1/27/24    PCP Appointment Date:Tasked to office staff  Specialist Appointment Date: N/A  Hospital Encounter and Summary: Linked                 2 attempts were made to reach patient to assess needs. No return call as of this note.   If patient schedules follow up within 14 days of discharge, visit is TCM billable.  Message sent to practice clinical pool to reach out to patient and schedule an appointment within 7-13 days from discharge date.    If patient meets criteria for moderately complex & has follow-up within 14 days-can bill [id number] for hospital follow up.   If patient meets criteria for highly complex & has follow-up visit within 7 days-can bill [phone] for hospital follow up      Nuris Perry LPN

## 2024-01-30 ENCOUNTER — OFFICE VISIT (OUTPATIENT)
Dept: PRIMARY CARE | Facility: CLINIC | Age: 89
End: 2024-01-30
Payer: MEDICARE

## 2024-01-30 VITALS
WEIGHT: 158 LBS | BODY MASS INDEX: 31.89 KG/M2 | TEMPERATURE: 98.5 F | SYSTOLIC BLOOD PRESSURE: 130 MMHG | DIASTOLIC BLOOD PRESSURE: 73 MMHG | HEART RATE: 70 BPM

## 2024-01-30 DIAGNOSIS — I26.99 OTHER ACUTE PULMONARY EMBOLISM, UNSPECIFIED WHETHER ACUTE COR PULMONALE PRESENT (MULTI): ICD-10-CM

## 2024-01-30 DIAGNOSIS — E08.00 DIABETES MELLITUS DUE TO UNDERLYING CONDITION WITH HYPEROSMOLARITY WITHOUT COMA, WITHOUT LONG-TERM CURRENT USE OF INSULIN (MULTI): ICD-10-CM

## 2024-01-30 DIAGNOSIS — J44.9 CHRONIC OBSTRUCTIVE PULMONARY DISEASE, UNSPECIFIED COPD TYPE (MULTI): Primary | ICD-10-CM

## 2024-01-30 DIAGNOSIS — I10 PRIMARY HYPERTENSION: ICD-10-CM

## 2024-01-30 DIAGNOSIS — I50.30 HEART FAILURE WITH PRESERVED EJECTION FRACTION, UNSPECIFIED HF CHRONICITY (MULTI): ICD-10-CM

## 2024-01-30 PROCEDURE — 99214 OFFICE O/P EST MOD 30 MIN: CPT | Performed by: INTERNAL MEDICINE

## 2024-01-30 PROCEDURE — 1157F ADVNC CARE PLAN IN RCRD: CPT | Performed by: INTERNAL MEDICINE

## 2024-01-30 PROCEDURE — 1126F AMNT PAIN NOTED NONE PRSNT: CPT | Performed by: INTERNAL MEDICINE

## 2024-01-30 PROCEDURE — 1036F TOBACCO NON-USER: CPT | Performed by: INTERNAL MEDICINE

## 2024-01-30 PROCEDURE — 3075F SYST BP GE 130 - 139MM HG: CPT | Performed by: INTERNAL MEDICINE

## 2024-01-30 PROCEDURE — 1111F DSCHRG MED/CURRENT MED MERGE: CPT | Performed by: INTERNAL MEDICINE

## 2024-01-30 PROCEDURE — 3078F DIAST BP <80 MM HG: CPT | Performed by: INTERNAL MEDICINE

## 2024-01-30 PROCEDURE — 1159F MED LIST DOCD IN RCRD: CPT | Performed by: INTERNAL MEDICINE

## 2024-01-30 ASSESSMENT — ENCOUNTER SYMPTOMS
BACK PAIN: 0
WEAKNESS: 1
NERVOUS/ANXIOUS: 0
DYSURIA: 0
NECK PAIN: 0
ACTIVITY CHANGE: 0
CHILLS: 0
COUGH: 1
FATIGUE: 1
PALPITATIONS: 0
SORE THROAT: 0
DIFFICULTY URINATING: 0
ARTHRALGIAS: 0
ABDOMINAL PAIN: 0
WHEEZING: 0
DIZZINESS: 0
APPETITE CHANGE: 0
SLEEP DISTURBANCE: 0
CHEST TIGHTNESS: 0
HEADACHES: 0
LIGHT-HEADEDNESS: 0
FLANK PAIN: 0
BLOOD IN STOOL: 0
FREQUENCY: 0
DECREASED CONCENTRATION: 0
UNEXPECTED WEIGHT CHANGE: 0
SHORTNESS OF BREATH: 1

## 2024-01-30 ASSESSMENT — PAIN SCALES - GENERAL: PAINLEVEL: 0-NO PAIN

## 2024-01-30 NOTE — PATIENT INSTRUCTIONS
You are seen after hospitalization  Continue Eliquis 5 mg twice daily for pulmonary embolism  Blood work in 2 weeks  Follow with pcp in 4-6 weeks

## 2024-01-30 NOTE — PROGRESS NOTES
Subjective   Patient ID: Lissett Rodríguez is a 89 y.o. female.    HPI this is a 89-year-old female, patient of Dr. Anderson.  She was admitted to the hospital for worsening weakness, cough, body aches.  She was diagnosed with pneumonia.  She went into respiratory failure.  Given IV antibiotics.  CT chest showed positive for PE.  She was given Eliquis loading dose and then discharged home with 5 mg twice daily.  Her medical history is significant for A-fib-on Xarelto, HFpEF, COPD-on home oxygen, diabetes, hypertension, KALPANA, history of TIA.  Today she is brought by her daughter-in-law.  She states that she feeling tired.  She lives in assisted living.    Review of Systems   Constitutional:  Positive for fatigue. Negative for activity change, appetite change, chills and unexpected weight change.   HENT:  Negative for congestion, postnasal drip and sore throat.    Eyes:  Negative for visual disturbance.   Respiratory:  Positive for cough and shortness of breath. Negative for chest tightness and wheezing.    Cardiovascular:  Negative for chest pain, palpitations and leg swelling.   Gastrointestinal:  Negative for abdominal pain and blood in stool.   Endocrine: Negative for cold intolerance and heat intolerance.   Genitourinary:  Negative for difficulty urinating, dysuria, flank pain and frequency.   Musculoskeletal:  Negative for arthralgias, back pain, gait problem and neck pain.   Skin:  Negative for rash.   Allergic/Immunologic: Negative for environmental allergies and food allergies.   Neurological:  Positive for weakness. Negative for dizziness, light-headedness and headaches.   Psychiatric/Behavioral:  Negative for decreased concentration and sleep disturbance. The patient is not nervous/anxious.        Objective   Physical Exam  Vitals reviewed.   Constitutional:       General: She is not in acute distress.     Appearance: Normal appearance.   HENT:      Head: Normocephalic and atraumatic.   Cardiovascular:       Rate and Rhythm: Normal rate and regular rhythm.      Pulses: Normal pulses.   Pulmonary:      Effort: Pulmonary effort is normal. No respiratory distress.      Breath sounds: Normal breath sounds.   Abdominal:      General: Bowel sounds are normal. There is no distension.      Tenderness: There is no abdominal tenderness.   Musculoskeletal:         General: No swelling or tenderness. Normal range of motion.      Cervical back: Normal range of motion.   Skin:     General: Skin is warm.   Neurological:      General: No focal deficit present.      Mental Status: She is alert.      Coordination: Coordination normal.      Gait: Gait normal.   Psychiatric:         Mood and Affect: Mood normal.         Behavior: Behavior normal.         Assessment/Plan   Diagnoses and all orders for this visit:  Chronic obstructive pulmonary disease, unspecified COPD type (CMS/[facility])  Comments:  Currently on home oxygen  Heart failure with preserved ejection fraction, unspecified HF chronicity (CMS/[facility])  Primary hypertension  Comments:  Stable  Continue with amlodipine  Blood work ordered-to be done in 2 weeks  Orders:  -     CBC; Future  -     Comprehensive Metabolic Panel; Future  Diabetes mellitus due to underlying condition with hyperosmolarity without coma, without long-term current use of insulin (CMS/[facility])  Comments:  Continue with Farxiga  Other acute pulmonary embolism, unspecified whether acute cor pulmonale present (CMS/[facility])  Comments:  Acute pulmonary embolism-while on Xarelto  Started on Eliquis-start 5 mg twice a day after the loading dose  Orders:  -     apixaban (Eliquis) 5 mg tablet; Take 1 tablet (5 mg) by mouth 2 times a day. Do not start before January 31, 2024.    Blood work to be done in 2 weeks  Follow-up with PCP in 4 to 6 weeks  Continue current medications

## 2024-02-08 ENCOUNTER — HOSPITAL ENCOUNTER (OUTPATIENT)
Dept: RADIOLOGY | Facility: CLINIC | Age: 89
Discharge: HOME | End: 2024-02-08
Payer: MEDICARE

## 2024-02-08 DIAGNOSIS — R10.9 ABDOMINAL PAIN, UNSPECIFIED ABDOMINAL LOCATION: ICD-10-CM

## 2024-02-08 PROCEDURE — 74019 RADEX ABDOMEN 2 VIEWS: CPT | Performed by: RADIOLOGY

## 2024-02-08 PROCEDURE — 74019 RADEX ABDOMEN 2 VIEWS: CPT

## 2024-02-09 ENCOUNTER — PATIENT OUTREACH (OUTPATIENT)
Dept: PRIMARY CARE | Facility: CLINIC | Age: 89
End: 2024-02-09
Payer: MEDICARE

## 2024-02-09 NOTE — PROGRESS NOTES
Unable to reach patient for call back after patient's follow up appointment with PCP.  LVM  with call back number for patient to call if needed

## 2024-02-20 ENCOUNTER — APPOINTMENT (OUTPATIENT)
Dept: RADIOLOGY | Facility: HOSPITAL | Age: 89
End: 2024-02-20
Payer: MEDICARE

## 2024-02-20 ENCOUNTER — APPOINTMENT (OUTPATIENT)
Dept: CARDIOLOGY | Facility: HOSPITAL | Age: 89
End: 2024-02-20
Payer: MEDICARE

## 2024-02-20 ENCOUNTER — HOSPITAL ENCOUNTER (EMERGENCY)
Facility: HOSPITAL | Age: 89
Discharge: HOME | End: 2024-02-20
Attending: EMERGENCY MEDICINE
Payer: MEDICARE

## 2024-02-20 VITALS
DIASTOLIC BLOOD PRESSURE: 71 MMHG | RESPIRATION RATE: 18 BRPM | HEART RATE: 92 BPM | TEMPERATURE: 98.1 F | SYSTOLIC BLOOD PRESSURE: 129 MMHG | WEIGHT: 153 LBS | BODY MASS INDEX: 30.84 KG/M2 | HEIGHT: 59 IN | OXYGEN SATURATION: 93 %

## 2024-02-20 DIAGNOSIS — R19.7 DIARRHEA, UNSPECIFIED TYPE: Primary | ICD-10-CM

## 2024-02-20 DIAGNOSIS — R14.0 ABDOMINAL BLOATING: ICD-10-CM

## 2024-02-20 LAB
ALBUMIN SERPL BCP-MCNC: 3.9 G/DL (ref 3.4–5)
ALP SERPL-CCNC: 80 U/L (ref 33–136)
ALT SERPL W P-5'-P-CCNC: 12 U/L (ref 7–45)
ANION GAP SERPL CALC-SCNC: 13 MMOL/L (ref 10–20)
AST SERPL W P-5'-P-CCNC: 8 U/L (ref 9–39)
BASOPHILS # BLD AUTO: 0.08 X10*3/UL (ref 0–0.1)
BASOPHILS NFR BLD AUTO: 1 %
BILIRUB SERPL-MCNC: 0.3 MG/DL (ref 0–1.2)
BUN SERPL-MCNC: 20 MG/DL (ref 6–23)
CALCIUM SERPL-MCNC: 8.8 MG/DL (ref 8.6–10.3)
CHLORIDE SERPL-SCNC: 103 MMOL/L (ref 98–107)
CO2 SERPL-SCNC: 28 MMOL/L (ref 21–32)
CREAT SERPL-MCNC: 1.06 MG/DL (ref 0.5–1.05)
EGFRCR SERPLBLD CKD-EPI 2021: 50 ML/MIN/1.73M*2
EOSINOPHIL # BLD AUTO: 0.17 X10*3/UL (ref 0–0.4)
EOSINOPHIL NFR BLD AUTO: 2.1 %
ERYTHROCYTE [DISTWIDTH] IN BLOOD BY AUTOMATED COUNT: 14.9 % (ref 11.5–14.5)
GLUCOSE SERPL-MCNC: 167 MG/DL (ref 74–99)
HCT VFR BLD AUTO: 44.7 % (ref 36–46)
HGB BLD-MCNC: 14.2 G/DL (ref 12–16)
IMM GRANULOCYTES # BLD AUTO: 0.06 X10*3/UL (ref 0–0.5)
IMM GRANULOCYTES NFR BLD AUTO: 0.7 % (ref 0–0.9)
LACTATE SERPL-SCNC: 1.6 MMOL/L (ref 0.4–2)
LIPASE SERPL-CCNC: 15 U/L (ref 9–82)
LYMPHOCYTES # BLD AUTO: 2.25 X10*3/UL (ref 0.8–3)
LYMPHOCYTES NFR BLD AUTO: 27.1 %
MCH RBC QN AUTO: 29 PG (ref 26–34)
MCHC RBC AUTO-ENTMCNC: 31.8 G/DL (ref 32–36)
MCV RBC AUTO: 91 FL (ref 80–100)
MONOCYTES # BLD AUTO: 0.6 X10*3/UL (ref 0.05–0.8)
MONOCYTES NFR BLD AUTO: 7.2 %
NEUTROPHILS # BLD AUTO: 5.13 X10*3/UL (ref 1.6–5.5)
NEUTROPHILS NFR BLD AUTO: 61.9 %
NRBC BLD-RTO: 0 /100 WBCS (ref 0–0)
PLATELET # BLD AUTO: 240 X10*3/UL (ref 150–450)
POTASSIUM SERPL-SCNC: 4.1 MMOL/L (ref 3.5–5.3)
PROT SERPL-MCNC: 6.7 G/DL (ref 6.4–8.2)
RBC # BLD AUTO: 4.9 X10*6/UL (ref 4–5.2)
SODIUM SERPL-SCNC: 140 MMOL/L (ref 136–145)
WBC # BLD AUTO: 8.3 X10*3/UL (ref 4.4–11.3)

## 2024-02-20 PROCEDURE — 36415 COLL VENOUS BLD VENIPUNCTURE: CPT | Performed by: EMERGENCY MEDICINE

## 2024-02-20 PROCEDURE — 99285 EMERGENCY DEPT VISIT HI MDM: CPT | Mod: 25

## 2024-02-20 PROCEDURE — 74176 CT ABD & PELVIS W/O CONTRAST: CPT

## 2024-02-20 PROCEDURE — 83605 ASSAY OF LACTIC ACID: CPT | Performed by: EMERGENCY MEDICINE

## 2024-02-20 PROCEDURE — 96360 HYDRATION IV INFUSION INIT: CPT

## 2024-02-20 PROCEDURE — 83690 ASSAY OF LIPASE: CPT | Performed by: EMERGENCY MEDICINE

## 2024-02-20 PROCEDURE — 96361 HYDRATE IV INFUSION ADD-ON: CPT

## 2024-02-20 PROCEDURE — 74176 CT ABD & PELVIS W/O CONTRAST: CPT | Performed by: RADIOLOGY

## 2024-02-20 PROCEDURE — 85025 COMPLETE CBC W/AUTO DIFF WBC: CPT | Performed by: EMERGENCY MEDICINE

## 2024-02-20 PROCEDURE — 2500000004 HC RX 250 GENERAL PHARMACY W/ HCPCS (ALT 636 FOR OP/ED): Performed by: EMERGENCY MEDICINE

## 2024-02-20 PROCEDURE — 93005 ELECTROCARDIOGRAM TRACING: CPT

## 2024-02-20 PROCEDURE — 80053 COMPREHEN METABOLIC PANEL: CPT | Performed by: EMERGENCY MEDICINE

## 2024-02-20 RX ORDER — LOPERAMIDE HYDROCHLORIDE 2 MG/1
2 CAPSULE ORAL 4 TIMES DAILY PRN
Qty: 28 CAPSULE | Refills: 0 | OUTPATIENT
Start: 2024-02-20 | End: 2024-02-27

## 2024-02-20 RX ADMIN — SODIUM CHLORIDE 1000 ML: 9 INJECTION, SOLUTION INTRAVENOUS at 20:40

## 2024-02-20 ASSESSMENT — ENCOUNTER SYMPTOMS
VOMITING: 0
ARTHRALGIAS: 0
HEMATURIA: 0
SEIZURES: 0
FEVER: 0
SHORTNESS OF BREATH: 0
BACK PAIN: 0
COUGH: 0
DYSURIA: 0
EYE PAIN: 0
DIARRHEA: 1
PALPITATIONS: 0
COLOR CHANGE: 0
SORE THROAT: 0
ABDOMINAL PAIN: 0
CHILLS: 0
ABDOMINAL DISTENTION: 1

## 2024-02-20 ASSESSMENT — PAIN - FUNCTIONAL ASSESSMENT: PAIN_FUNCTIONAL_ASSESSMENT: 0-10

## 2024-02-20 ASSESSMENT — PAIN SCALES - GENERAL: PAINLEVEL_OUTOF10: 0 - NO PAIN

## 2024-02-21 NOTE — ED PROVIDER NOTES
HPI   Chief Complaint   Patient presents with    Diarrhea     Pt to ED for continued diarrhea since the last admission to the hospital with discharge on 1/18. Pt did not enter with the diarrhea, had received antibiotics. Uncertain if she was tested for c-diff. No one mentioned it in her hospital stay and did not discuss an odor. No blood in the stool. Pt with distended abdomen and increased gas production.         89-year-old female, multiple medical problems on Xarelto for paroxysmal A-fib but multiple other comorbidities, recent hospitalization discharged on 1/27/2024 for pneumonia and small PE.  However since the hospitalization, it appears she has had diarrheal episodes.  Small pebble-like bowel movements now and then, multiple providers have told her she is more constipated than anything.  She is concerned though she is having voluminous watery brown diarrhea.  She cannot smell based on use of oxygen so no particular order to diarrhea.  No vomiting.  No hematemesis.  No melena or hematochezia with it.  This bloated abdominal feeling but no real pain.  Some gaseous feeling but no pain.  No urinary symptoms.  No one else around her ill.  Not currently on antibiotics but she had antibiotics with treatment of pneumonia while in the hospital.                          Jennifer Coma Scale Score: 15                  Patient History   Past Medical History:   Diagnosis Date    Angioneurotic edema, initial encounter 08/24/2018    Angioedema of lips    Calculus of gallbladder without cholecystitis without obstruction 02/22/2017    Calculus of gallbladder without cholecystitis without obstruction    Chronic obstructive pulmonary disease, unspecified (CMS/[facility]) 02/20/2017    Chronic obstructive pulmonary disease with hypoxia    Encounter for follow-up examination after completed treatment for conditions other than malignant neoplasm 02/20/2017    Hospital discharge follow-up    Encounter for gynecological examination (general)  (routine) without abnormal findings     Encounter for routine gynecological examination    Encounter for screening mammogram for malignant neoplasm of breast     Visit for screening mammogram    Eructation 06/16/2017    Burping    Local infection of the skin and subcutaneous tissue, unspecified 01/05/2017    Infection of skin    Other specified symptoms and signs involving the circulatory and respiratory systems 10/16/2018    Respiratory crackles at right lung base    Pain in unspecified knee 04/11/2014    Pain in patella    Personal history of diseases of the skin and subcutaneous tissue     History of urticaria    Personal history of other diseases of the musculoskeletal system and connective tissue 06/01/2015    History of low back pain    Personal history of other diseases of the respiratory system 04/27/2016    History of acute sinusitis    Personal history of other diseases of the respiratory system 05/19/2017    History of acute bronchitis with bronchospasm    Personal history of other specified conditions 01/05/2017    History of nausea and vomiting    Personal history of other specified conditions 01/09/2017    History of nausea and vomiting    Personal history of other specified conditions 02/23/2016    History of chest pain    Personal history of other specified conditions 04/11/2014    History of fatigue    Personal history of other specified conditions 06/13/2014    History of diarrhea    Personal history of transient ischemic attack (TIA), and cerebral infarction 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 10/16/2018    History of transient cerebral ischemia    Personal history of transient ischemic attack (TIA), and cerebral infarction 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 03/08/2016    History of transient cerebral ischemia    Pneumonia, unspecified organism 02/09/2018    CAP (community acquired pneumonia)    Trochanteric bursitis, unspecified hip 04/23/2015    Greater trochanteric bursitis    Type 2 diabetes mellitus without  complications (CMS/[facility]) 11/06/2017    Controlled type 2 diabetes mellitus without complication, without long-term current use of insulin     Past Surgical History:   Procedure Laterality Date    APPENDECTOMY  05/15/2013    Appendectomy    CATARACT EXTRACTION  05/15/2013    Cataract Surgery    COLONOSCOPY  04/11/2014    Complete Colonoscopy    HYSTERECTOMY  05/15/2013    Hysterectomy    MR HEAD ANGIO WO IV CONTRAST  1/8/2016    MR HEAD ANGIO WO IV CONTRAST 1/8/2016 [facility]    MR HEAD ANGIO WO IV CONTRAST  10/1/2012    MR HEAD ANGIO WO IV CONTRAST 10/1/2012 [facility]ICAL [facility]GAC[facility]    MR NECK ANGIO W IV CONTRAST  10/1/2012    MR NECK ANGIO W IV CONTRAST 10/1/2012 [facility] CLINICAL LEGACY    MR NECK ANGIO WO IV CONTRAST  1/8/2016    MR NECK ANGIO WO IV CONTRAST 1/8/2016 [facility]    OTHER SURGICAL HISTORY  05/15/2013    Cardiac Cath Procedure Outcome: Successful    OTHER SURGICAL HISTORY  02/28/2019    Cholecystectomy    TOTAL KNEE ARTHROPLASTY  05/29/2013    Knee Replacement     Family History   Problem Relation Name Age of Onset    Alzheimer's disease Mother      Heart failure Mother      Stroke Mother          ischemic stroke    Heart attack Father      Stroke Father          ischemic stroke    COPD Sister      Diabetes Sister          mellitus    Cancer Sister      Parkinsonism Sister      Other (previous cardiac prolems) Sister       Social History     Tobacco Use    Smoking status: Never    Smokeless tobacco: Never   Substance Use Topics    Alcohol use: Never    Drug use: Never       Review of Systems   Constitutional:  Negative for chills and fever.   HENT:  Negative for ear pain and sore throat.    Eyes:  Negative for pain and visual disturbance.   Respiratory:  Negative for cough and shortness of breath.    Cardiovascular:  Negative for chest pain and palpitations.   Gastrointestinal:  Positive for abdominal distention and diarrhea. Negative for abdominal pain and vomiting.   Genitourinary:   Negative for dysuria and hematuria.   Musculoskeletal:  Negative for arthralgias and back pain.   Skin:  Negative for color change and rash.   Neurological:  Negative for seizures and syncope.   All other systems reviewed and are negative.       Physical Exam   ED Triage Vitals   Temp Pulse Resp BP   -- -- -- --      SpO2 Temp src Heart Rate Source Patient Position   -- -- -- --      BP Location FiO2 (%)     -- --       Physical Exam  Vitals and nursing note reviewed.   Constitutional:       General: She is not in acute distress.     Appearance: She is well-developed.   HENT:      Head: Normocephalic and atraumatic.      Comments: Hard of hearing.     Right Ear: External ear normal.      Left Ear: External ear normal.      Nose: Nose normal.      Mouth/Throat:      Mouth: Mucous membranes are moist.      Pharynx: Oropharynx is clear.   Eyes:      Conjunctiva/sclera: Conjunctivae normal.      Pupils: Pupils are equal, round, and reactive to light.   Neck:      Vascular: No JVD.   Cardiovascular:      Rate and Rhythm: Normal rate and regular rhythm.      Pulses: Normal pulses.   Pulmonary:      Effort: Pulmonary effort is normal. No accessory muscle usage or respiratory distress.      Breath sounds: Normal breath sounds.   Abdominal:      General: Bowel sounds are normal. There is distension.      Palpations: There is no mass.      Tenderness: There is no abdominal tenderness. There is no guarding or rebound.      Hernia: No hernia is present.   Musculoskeletal:         General: Normal range of motion.      Cervical back: Normal range of motion and neck supple.   Lymphadenopathy:      Cervical: No cervical adenopathy.   Skin:     General: Skin is warm and dry.      Capillary Refill: Capillary refill takes less than 2 seconds.      Comments: No zoster rash seen   Neurological:      General: No focal deficit present.      Mental Status: She is alert. Mental status is at baseline.   Psychiatric:         Mood and Affect:  Mood normal.                 ECG if performed, ordered and interpreted by ED physician:        Labs Reviewed   CBC WITH AUTO DIFFERENTIAL - Abnormal       Result Value    WBC 8.3      nRBC 0.0      RBC 4.90      Hemoglobin 14.2      Hematocrit 44.7      MCV 91      MCH 29.0      MCHC 31.8 (*)     RDW 14.9 (*)     Platelets 240      Neutrophils % 61.9      Immature Granulocytes %, Automated 0.7      Lymphocytes % 27.1      Monocytes % 7.2      Eosinophils % 2.1      Basophils % 1.0      Neutrophils Absolute 5.13      Immature Granulocytes Absolute, Automated 0.06      Lymphocytes Absolute 2.25      Monocytes Absolute 0.60      Eosinophils Absolute 0.17      Basophils Absolute 0.08     COMPREHENSIVE METABOLIC PANEL - Abnormal    Glucose 167 (*)     Sodium 140      Potassium 4.1      Chloride 103      Bicarbonate 28      Anion Gap 13      Urea Nitrogen 20      Creatinine 1.06 (*)     eGFR 50 (*)     Calcium 8.8      Albumin 3.9      Alkaline Phosphatase 80      Total Protein 6.7      AST 8 (*)     Bilirubin, Total 0.3      ALT 12     LACTATE - Normal    Lactate 1.6      Narrative:     Venipuncture immediately after or during the administration of Metamizole may lead to falsely low results. Testing should be performed immediately  prior to Metamizole dosing.   LIPASE - Normal    Lipase 15      Narrative:     Venipuncture immediately after or during the administration of Metamizole may lead to falsely low results. Testing should be performed immediately prior to Metamizole dosing.   STOOL PATHOGEN PANEL, PCR   C. DIFFICILE, PCR          CT abdomen pelvis wo IV contrast   Final Result   No acute abdominal or pelvic process.        Redemonstration of mosaic attenuation of the lung parenchyma   suggestive of reactive airways disease. There is mild interlobular   septal thickening with diffuse ground-glass and nodule opacities.   This may relate to developing edema versus atypical   infectious/inflammatory process. Correlate  clinically.        Additional findings as described above.        MACRO:   None        Signed by: Apolinar Howell 2/20/2024 8:52 PM   Dictation workstation:   [id number]               ED Course & MDM     ED Medication Administration from 02/20/2024 1418 to 02/20/2024 2154         Date/Time Order Dose Route Action Action by     02/20/2024 2040 EST sodium chloride 0.9 % bolus 1,000 mL 1,000 mL intravenous JOSE Bernardo                   ED Course as of 02/20/24 2154 Tue Feb 20, 2024   1921 ECG 12 lead  EKG ordered interpreted by ED physician demonstrates sinus rhythm.  70 bpm.  Normal rate rhythm and axis.  No ischemic findings. [KS]      ED Course User Index  [KS] Jose Antonio Carey MD MPH         Diagnoses as of 02/20/24 2154   Diarrhea, unspecified type   Abdominal bloating       Medical Decision Making  Evaluation for abdominal discomfort and diarrhea.  Stool studies and C. difficile will be sent if she can produce stool.  Imaging and labs performed.  No acute process noted.  All labs unremarkable.  CT with no acute findings.  No evidence of colitis.  Moderate stool burden but otherwise negative.  No obvious reason for hospitalization.  Recommend Imodium and outpatient GI follow-up.          Procedure  Procedures         Jose Antonio Carey MD MPH  02/20/24 2154

## 2024-02-26 ENCOUNTER — APPOINTMENT (OUTPATIENT)
Dept: RADIOLOGY | Facility: HOSPITAL | Age: 89
End: 2024-02-26
Payer: MEDICARE

## 2024-02-26 ENCOUNTER — APPOINTMENT (OUTPATIENT)
Dept: CARDIOLOGY | Facility: HOSPITAL | Age: 89
End: 2024-02-26
Payer: MEDICARE

## 2024-02-26 ENCOUNTER — HOSPITAL ENCOUNTER (OUTPATIENT)
Facility: HOSPITAL | Age: 89
Setting detail: OBSERVATION
Discharge: NURSING, REHAB OR LONG-TERM CARE | End: 2024-02-27
Attending: EMERGENCY MEDICINE | Admitting: INTERNAL MEDICINE
Payer: MEDICARE

## 2024-02-26 DIAGNOSIS — I50.9 ACUTE CONGESTIVE HEART FAILURE, UNSPECIFIED HEART FAILURE TYPE (MULTI): ICD-10-CM

## 2024-02-26 DIAGNOSIS — R55 SYNCOPE, UNSPECIFIED SYNCOPE TYPE: ICD-10-CM

## 2024-02-26 DIAGNOSIS — R09.02 HYPOXIA: Primary | ICD-10-CM

## 2024-02-26 DIAGNOSIS — J44.9 CHRONIC OBSTRUCTIVE PULMONARY DISEASE, UNSPECIFIED COPD TYPE (MULTI): ICD-10-CM

## 2024-02-26 DIAGNOSIS — K59.00 CONSTIPATION, UNSPECIFIED CONSTIPATION TYPE: ICD-10-CM

## 2024-02-26 LAB
ALBUMIN SERPL BCP-MCNC: 3.7 G/DL (ref 3.4–5)
ALP SERPL-CCNC: 69 U/L (ref 33–136)
ALT SERPL W P-5'-P-CCNC: 10 U/L (ref 7–45)
ANION GAP BLDV CALCULATED.4IONS-SCNC: 10 MMOL/L (ref 10–25)
ANION GAP SERPL CALC-SCNC: 13 MMOL/L (ref 10–20)
AST SERPL W P-5'-P-CCNC: 9 U/L (ref 9–39)
BASE EXCESS BLDV CALC-SCNC: 5.5 MMOL/L (ref -2–3)
BASOPHILS # BLD AUTO: 0.09 X10*3/UL (ref 0–0.1)
BASOPHILS NFR BLD AUTO: 0.9 %
BILIRUB SERPL-MCNC: 0.5 MG/DL (ref 0–1.2)
BNP SERPL-MCNC: 49 PG/ML (ref 0–99)
BODY TEMPERATURE: 37 DEGREES CELSIUS
BUN SERPL-MCNC: 23 MG/DL (ref 6–23)
CA-I BLDV-SCNC: 1.17 MMOL/L (ref 1.1–1.33)
CALCIUM SERPL-MCNC: 8.4 MG/DL (ref 8.6–10.3)
CARDIAC TROPONIN I PNL SERPL HS: 5 NG/L (ref 0–13)
CARDIAC TROPONIN I PNL SERPL HS: 5 NG/L (ref 0–13)
CHLORIDE BLDV-SCNC: 100 MMOL/L (ref 98–107)
CHLORIDE SERPL-SCNC: 101 MMOL/L (ref 98–107)
CO2 SERPL-SCNC: 26 MMOL/L (ref 21–32)
CREAT SERPL-MCNC: 0.96 MG/DL (ref 0.5–1.05)
EGFRCR SERPLBLD CKD-EPI 2021: 56 ML/MIN/1.73M*2
EOSINOPHIL # BLD AUTO: 0.12 X10*3/UL (ref 0–0.4)
EOSINOPHIL NFR BLD AUTO: 1.2 %
ERYTHROCYTE [DISTWIDTH] IN BLOOD BY AUTOMATED COUNT: 14.5 % (ref 11.5–14.5)
GLUCOSE BLD MANUAL STRIP-MCNC: 128 MG/DL (ref 74–99)
GLUCOSE BLDV-MCNC: 143 MG/DL (ref 74–99)
GLUCOSE SERPL-MCNC: 134 MG/DL (ref 74–99)
HCO3 BLDV-SCNC: 30.6 MMOL/L (ref 22–26)
HCT VFR BLD AUTO: 42.7 % (ref 36–46)
HCT VFR BLD EST: 45 % (ref 36–46)
HGB BLD-MCNC: 13.8 G/DL (ref 12–16)
HGB BLDV-MCNC: 14.9 G/DL (ref 12–16)
HOLD SPECIMEN: NORMAL
IMM GRANULOCYTES # BLD AUTO: 0.05 X10*3/UL (ref 0–0.5)
IMM GRANULOCYTES NFR BLD AUTO: 0.5 % (ref 0–0.9)
INHALED O2 CONCENTRATION: 45 %
LACTATE BLDV-SCNC: 1 MMOL/L (ref 0.4–2)
LYMPHOCYTES # BLD AUTO: 2.26 X10*3/UL (ref 0.8–3)
LYMPHOCYTES NFR BLD AUTO: 22.6 %
MCH RBC QN AUTO: 29.1 PG (ref 26–34)
MCHC RBC AUTO-ENTMCNC: 32.3 G/DL (ref 32–36)
MCV RBC AUTO: 90 FL (ref 80–100)
MONOCYTES # BLD AUTO: 0.62 X10*3/UL (ref 0.05–0.8)
MONOCYTES NFR BLD AUTO: 6.2 %
NEUTROPHILS # BLD AUTO: 6.87 X10*3/UL (ref 1.6–5.5)
NEUTROPHILS NFR BLD AUTO: 68.6 %
NRBC BLD-RTO: 0 /100 WBCS (ref 0–0)
OXYHGB MFR BLDV: 69.7 % (ref 45–75)
PCO2 BLDV: 45 MM HG (ref 41–51)
PH BLDV: 7.44 PH (ref 7.33–7.43)
PLATELET # BLD AUTO: 199 X10*3/UL (ref 150–450)
PO2 BLDV: 45 MM HG (ref 35–45)
POTASSIUM BLDV-SCNC: 4.1 MMOL/L (ref 3.5–5.3)
POTASSIUM SERPL-SCNC: 3.9 MMOL/L (ref 3.5–5.3)
PROT SERPL-MCNC: 6.9 G/DL (ref 6.4–8.2)
RBC # BLD AUTO: 4.74 X10*6/UL (ref 4–5.2)
SAO2 % BLDV: 72 % (ref 45–75)
SODIUM BLDV-SCNC: 136 MMOL/L (ref 136–145)
SODIUM SERPL-SCNC: 136 MMOL/L (ref 136–145)
WBC # BLD AUTO: 10 X10*3/UL (ref 4.4–11.3)

## 2024-02-26 PROCEDURE — 84484 ASSAY OF TROPONIN QUANT: CPT | Performed by: EMERGENCY MEDICINE

## 2024-02-26 PROCEDURE — 71045 X-RAY EXAM CHEST 1 VIEW: CPT

## 2024-02-26 PROCEDURE — 80053 COMPREHEN METABOLIC PANEL: CPT | Performed by: EMERGENCY MEDICINE

## 2024-02-26 PROCEDURE — 85025 COMPLETE CBC W/AUTO DIFF WBC: CPT | Performed by: EMERGENCY MEDICINE

## 2024-02-26 PROCEDURE — 2500000005 HC RX 250 GENERAL PHARMACY W/O HCPCS: Performed by: EMERGENCY MEDICINE

## 2024-02-26 PROCEDURE — 70450 CT HEAD/BRAIN W/O DYE: CPT

## 2024-02-26 PROCEDURE — 71045 X-RAY EXAM CHEST 1 VIEW: CPT | Performed by: RADIOLOGY

## 2024-02-26 PROCEDURE — 84132 ASSAY OF SERUM POTASSIUM: CPT | Performed by: EMERGENCY MEDICINE

## 2024-02-26 PROCEDURE — 96365 THER/PROPH/DIAG IV INF INIT: CPT

## 2024-02-26 PROCEDURE — 96367 TX/PROPH/DG ADDL SEQ IV INF: CPT

## 2024-02-26 PROCEDURE — 93005 ELECTROCARDIOGRAM TRACING: CPT

## 2024-02-26 PROCEDURE — 82947 ASSAY GLUCOSE BLOOD QUANT: CPT | Mod: 91

## 2024-02-26 PROCEDURE — 83880 ASSAY OF NATRIURETIC PEPTIDE: CPT | Performed by: EMERGENCY MEDICINE

## 2024-02-26 PROCEDURE — 36415 COLL VENOUS BLD VENIPUNCTURE: CPT | Performed by: EMERGENCY MEDICINE

## 2024-02-26 PROCEDURE — 96375 TX/PRO/DX INJ NEW DRUG ADDON: CPT

## 2024-02-26 PROCEDURE — 72125 CT NECK SPINE W/O DYE: CPT

## 2024-02-26 PROCEDURE — 2500000004 HC RX 250 GENERAL PHARMACY W/ HCPCS (ALT 636 FOR OP/ED): Performed by: EMERGENCY MEDICINE

## 2024-02-26 PROCEDURE — 94640 AIRWAY INHALATION TREATMENT: CPT

## 2024-02-26 PROCEDURE — 72125 CT NECK SPINE W/O DYE: CPT | Performed by: RADIOLOGY

## 2024-02-26 PROCEDURE — 2500000002 HC RX 250 W HCPCS SELF ADMINISTERED DRUGS (ALT 637 FOR MEDICARE OP, ALT 636 FOR OP/ED): Performed by: EMERGENCY MEDICINE

## 2024-02-26 PROCEDURE — 99285 EMERGENCY DEPT VISIT HI MDM: CPT | Mod: 25

## 2024-02-26 PROCEDURE — 70450 CT HEAD/BRAIN W/O DYE: CPT | Performed by: RADIOLOGY

## 2024-02-26 RX ORDER — FUROSEMIDE 10 MG/ML
80 INJECTION INTRAMUSCULAR; INTRAVENOUS ONCE
Status: COMPLETED | OUTPATIENT
Start: 2024-02-26 | End: 2024-02-26

## 2024-02-26 RX ORDER — IPRATROPIUM BROMIDE AND ALBUTEROL SULFATE 2.5; .5 MG/3ML; MG/3ML
3 SOLUTION RESPIRATORY (INHALATION) ONCE
Status: COMPLETED | OUTPATIENT
Start: 2024-02-26 | End: 2024-02-26

## 2024-02-26 RX ORDER — CEFTRIAXONE 1 G/50ML
1 INJECTION, SOLUTION INTRAVENOUS ONCE
Status: COMPLETED | OUTPATIENT
Start: 2024-02-26 | End: 2024-02-26

## 2024-02-26 RX ADMIN — CEFTRIAXONE SODIUM 1 G: 1 INJECTION, SOLUTION INTRAVENOUS at 22:13

## 2024-02-26 RX ADMIN — Medication 6 L/MIN: at 22:05

## 2024-02-26 RX ADMIN — AZITHROMYCIN MONOHYDRATE 500 MG: 500 INJECTION, POWDER, LYOPHILIZED, FOR SOLUTION INTRAVENOUS at 23:09

## 2024-02-26 RX ADMIN — FUROSEMIDE 80 MG: 10 INJECTION, SOLUTION INTRAMUSCULAR; INTRAVENOUS at 22:13

## 2024-02-26 RX ADMIN — METHYLPREDNISOLONE SODIUM SUCCINATE 125 MG: 125 INJECTION, POWDER, FOR SOLUTION INTRAMUSCULAR; INTRAVENOUS at 22:14

## 2024-02-26 RX ADMIN — IPRATROPIUM BROMIDE AND ALBUTEROL SULFATE 3 ML: 2.5; .5 SOLUTION RESPIRATORY (INHALATION) at 22:05

## 2024-02-26 ASSESSMENT — COLUMBIA-SUICIDE SEVERITY RATING SCALE - C-SSRS
2. HAVE YOU ACTUALLY HAD ANY THOUGHTS OF KILLING YOURSELF?: NO
6. HAVE YOU EVER DONE ANYTHING, STARTED TO DO ANYTHING, OR PREPARED TO DO ANYTHING TO END YOUR LIFE?: NO
1. IN THE PAST MONTH, HAVE YOU WISHED YOU WERE DEAD OR WISHED YOU COULD GO TO SLEEP AND NOT WAKE UP?: NO

## 2024-02-26 ASSESSMENT — PAIN - FUNCTIONAL ASSESSMENT: PAIN_FUNCTIONAL_ASSESSMENT: 0-10

## 2024-02-26 ASSESSMENT — PAIN SCALES - GENERAL: PAINLEVEL_OUTOF10: 0 - NO PAIN

## 2024-02-27 ENCOUNTER — APPOINTMENT (OUTPATIENT)
Dept: RADIOLOGY | Facility: HOSPITAL | Age: 89
End: 2024-02-27
Payer: MEDICARE

## 2024-02-27 VITALS
HEART RATE: 88 BPM | RESPIRATION RATE: 16 BRPM | SYSTOLIC BLOOD PRESSURE: 140 MMHG | WEIGHT: 153 LBS | DIASTOLIC BLOOD PRESSURE: 89 MMHG | TEMPERATURE: 98.4 F | OXYGEN SATURATION: 96 % | HEIGHT: 59 IN | BODY MASS INDEX: 30.84 KG/M2

## 2024-02-27 PROBLEM — Z79.01 CHRONIC ANTICOAGULATION: Chronic | Status: ACTIVE | Noted: 2023-03-10

## 2024-02-27 PROBLEM — K59.00 CONSTIPATION: Status: ACTIVE | Noted: 2023-03-10

## 2024-02-27 PROBLEM — R42 POSTURAL DIZZINESS WITH PRESYNCOPE: Status: ACTIVE | Noted: 2024-02-27

## 2024-02-27 PROBLEM — I10 HYPERTENSION: Chronic | Status: ACTIVE | Noted: 2023-03-10

## 2024-02-27 PROBLEM — R55 SYNCOPE AND COLLAPSE: Status: ACTIVE | Noted: 2024-02-27

## 2024-02-27 PROBLEM — G47.33 OBSTRUCTIVE SLEEP APNEA SYNDROME: Chronic | Status: ACTIVE | Noted: 2023-03-10

## 2024-02-27 PROBLEM — J96.11 CHRONIC RESPIRATORY FAILURE WITH HYPOXIA (MULTI): Chronic | Status: ACTIVE | Noted: 2024-02-27

## 2024-02-27 PROBLEM — J44.9 CHRONIC OBSTRUCTIVE PULMONARY DISEASE (MULTI): Chronic | Status: ACTIVE | Noted: 2023-03-10

## 2024-02-27 PROBLEM — E78.5 HYPERLIPIDEMIA: Chronic | Status: ACTIVE | Noted: 2023-03-10

## 2024-02-27 PROBLEM — I48.91 ATRIAL FIBRILLATION (MULTI): Chronic | Status: ACTIVE | Noted: 2023-03-10

## 2024-02-27 LAB
ALBUMIN SERPL BCP-MCNC: 3.9 G/DL (ref 3.4–5)
ALP SERPL-CCNC: 74 U/L (ref 33–136)
ALT SERPL W P-5'-P-CCNC: 11 U/L (ref 7–45)
ANION GAP SERPL CALC-SCNC: 15 MMOL/L (ref 10–20)
APPEARANCE UR: CLEAR
AST SERPL W P-5'-P-CCNC: 6 U/L (ref 9–39)
BILIRUB SERPL-MCNC: 0.4 MG/DL (ref 0–1.2)
BILIRUB UR STRIP.AUTO-MCNC: NEGATIVE MG/DL
BUN SERPL-MCNC: 24 MG/DL (ref 6–23)
CALCIUM SERPL-MCNC: 8.9 MG/DL (ref 8.6–10.3)
CHLORIDE SERPL-SCNC: 100 MMOL/L (ref 98–107)
CO2 SERPL-SCNC: 27 MMOL/L (ref 21–32)
COLOR UR: COLORLESS
CREAT SERPL-MCNC: 0.98 MG/DL (ref 0.5–1.05)
CRP SERPL-MCNC: 0.96 MG/DL
EGFRCR SERPLBLD CKD-EPI 2021: 55 ML/MIN/1.73M*2
ERYTHROCYTE [DISTWIDTH] IN BLOOD BY AUTOMATED COUNT: 14.5 % (ref 11.5–14.5)
FLUAV RNA RESP QL NAA+PROBE: NOT DETECTED
FLUBV RNA RESP QL NAA+PROBE: NOT DETECTED
GLUCOSE SERPL-MCNC: 253 MG/DL (ref 74–99)
GLUCOSE UR STRIP.AUTO-MCNC: ABNORMAL MG/DL
HCT VFR BLD AUTO: 44.9 % (ref 36–46)
HGB BLD-MCNC: 14.5 G/DL (ref 12–16)
KETONES UR STRIP.AUTO-MCNC: NEGATIVE MG/DL
LEUKOCYTE ESTERASE UR QL STRIP.AUTO: NEGATIVE
MCH RBC QN AUTO: 29.1 PG (ref 26–34)
MCHC RBC AUTO-ENTMCNC: 32.3 G/DL (ref 32–36)
MCV RBC AUTO: 90 FL (ref 80–100)
NITRITE UR QL STRIP.AUTO: NEGATIVE
NRBC BLD-RTO: 0 /100 WBCS (ref 0–0)
PH UR STRIP.AUTO: 7 [PH]
PLATELET # BLD AUTO: 197 X10*3/UL (ref 150–450)
POTASSIUM SERPL-SCNC: 3.7 MMOL/L (ref 3.5–5.3)
PROCALCITONIN SERPL-MCNC: 0.14 NG/ML
PROT SERPL-MCNC: 7.2 G/DL (ref 6.4–8.2)
PROT UR STRIP.AUTO-MCNC: NEGATIVE MG/DL
RBC # BLD AUTO: 4.98 X10*6/UL (ref 4–5.2)
RBC # UR STRIP.AUTO: NEGATIVE /UL
RSV RNA RESP QL NAA+PROBE: NOT DETECTED
SARS-COV-2 RNA RESP QL NAA+PROBE: NOT DETECTED
SODIUM SERPL-SCNC: 138 MMOL/L (ref 136–145)
SP GR UR STRIP.AUTO: 1
TSH SERPL-ACNC: 1.11 MIU/L (ref 0.44–3.98)
UROBILINOGEN UR STRIP.AUTO-MCNC: <2 MG/DL
WBC # BLD AUTO: 7.9 X10*3/UL (ref 4.4–11.3)

## 2024-02-27 PROCEDURE — 2500000001 HC RX 250 WO HCPCS SELF ADMINISTERED DRUGS (ALT 637 FOR MEDICARE OP): Performed by: PHYSICIAN ASSISTANT

## 2024-02-27 PROCEDURE — 81003 URINALYSIS AUTO W/O SCOPE: CPT | Performed by: EMERGENCY MEDICINE

## 2024-02-27 PROCEDURE — 36415 COLL VENOUS BLD VENIPUNCTURE: CPT | Performed by: PHYSICIAN ASSISTANT

## 2024-02-27 PROCEDURE — G0378 HOSPITAL OBSERVATION PER HR: HCPCS

## 2024-02-27 PROCEDURE — 99239 HOSP IP/OBS DSCHRG MGMT >30: CPT | Performed by: INTERNAL MEDICINE

## 2024-02-27 PROCEDURE — 85027 COMPLETE CBC AUTOMATED: CPT | Performed by: PHYSICIAN ASSISTANT

## 2024-02-27 PROCEDURE — 2500000004 HC RX 250 GENERAL PHARMACY W/ HCPCS (ALT 636 FOR OP/ED): Performed by: PHYSICIAN ASSISTANT

## 2024-02-27 PROCEDURE — 99222 1ST HOSP IP/OBS MODERATE 55: CPT | Performed by: PHYSICIAN ASSISTANT

## 2024-02-27 PROCEDURE — 2500000002 HC RX 250 W HCPCS SELF ADMINISTERED DRUGS (ALT 637 FOR MEDICARE OP, ALT 636 FOR OP/ED): Performed by: PHYSICIAN ASSISTANT

## 2024-02-27 PROCEDURE — 80053 COMPREHEN METABOLIC PANEL: CPT | Performed by: PHYSICIAN ASSISTANT

## 2024-02-27 PROCEDURE — 84443 ASSAY THYROID STIM HORMONE: CPT | Performed by: NURSE PRACTITIONER

## 2024-02-27 PROCEDURE — 74018 RADEX ABDOMEN 1 VIEW: CPT

## 2024-02-27 PROCEDURE — 86140 C-REACTIVE PROTEIN: CPT | Performed by: INTERNAL MEDICINE

## 2024-02-27 PROCEDURE — 84145 PROCALCITONIN (PCT): CPT | Mod: AHULAB | Performed by: PHYSICIAN ASSISTANT

## 2024-02-27 PROCEDURE — 87637 SARSCOV2&INF A&B&RSV AMP PRB: CPT | Performed by: EMERGENCY MEDICINE

## 2024-02-27 PROCEDURE — 94660 CPAP INITIATION&MGMT: CPT

## 2024-02-27 PROCEDURE — 74018 RADEX ABDOMEN 1 VIEW: CPT | Performed by: RADIOLOGY

## 2024-02-27 PROCEDURE — 2500000001 HC RX 250 WO HCPCS SELF ADMINISTERED DRUGS (ALT 637 FOR MEDICARE OP): Performed by: INTERNAL MEDICINE

## 2024-02-27 PROCEDURE — 2500000005 HC RX 250 GENERAL PHARMACY W/O HCPCS: Performed by: EMERGENCY MEDICINE

## 2024-02-27 RX ORDER — LOPERAMIDE HYDROCHLORIDE 2 MG/1
2 CAPSULE ORAL EVERY 6 HOURS PRN
COMMUNITY
End: 2024-02-27

## 2024-02-27 RX ORDER — BISACODYL 5 MG
5 TABLET, DELAYED RELEASE (ENTERIC COATED) ORAL DAILY PRN
Qty: 30 TABLET | Refills: 0 | Status: SHIPPED | OUTPATIENT
Start: 2024-02-27 | End: 2024-04-04 | Stop reason: WASHOUT

## 2024-02-27 RX ORDER — BISACODYL 5 MG
5 TABLET, DELAYED RELEASE (ENTERIC COATED) ORAL ONCE
Status: COMPLETED | OUTPATIENT
Start: 2024-02-27 | End: 2024-02-27

## 2024-02-27 RX ORDER — PANTOPRAZOLE SODIUM 40 MG/10ML
40 INJECTION, POWDER, LYOPHILIZED, FOR SOLUTION INTRAVENOUS
Status: DISCONTINUED | OUTPATIENT
Start: 2024-02-27 | End: 2024-02-27 | Stop reason: HOSPADM

## 2024-02-27 RX ORDER — GABAPENTIN 300 MG/1
300 CAPSULE ORAL 3 TIMES DAILY
Status: DISCONTINUED | OUTPATIENT
Start: 2024-02-27 | End: 2024-02-27 | Stop reason: HOSPADM

## 2024-02-27 RX ORDER — IPRATROPIUM BROMIDE AND ALBUTEROL SULFATE 2.5; .5 MG/3ML; MG/3ML
3 SOLUTION RESPIRATORY (INHALATION)
Status: DISCONTINUED | OUTPATIENT
Start: 2024-02-27 | End: 2024-02-27 | Stop reason: HOSPADM

## 2024-02-27 RX ORDER — IPRATROPIUM BROMIDE AND ALBUTEROL SULFATE 2.5; .5 MG/3ML; MG/3ML
3 SOLUTION RESPIRATORY (INHALATION)
Status: DISCONTINUED | OUTPATIENT
Start: 2024-02-27 | End: 2024-02-27

## 2024-02-27 RX ORDER — POLYETHYLENE GLYCOL 3350 17 G/17G
17 POWDER, FOR SOLUTION ORAL 2 TIMES DAILY
Qty: 60 PACKET | Refills: 0 | Status: SHIPPED | OUTPATIENT
Start: 2024-02-27 | End: 2024-04-04 | Stop reason: WASHOUT

## 2024-02-27 RX ORDER — ATORVASTATIN CALCIUM 40 MG/1
40 TABLET, FILM COATED ORAL NIGHTLY
Status: DISCONTINUED | OUTPATIENT
Start: 2024-02-27 | End: 2024-02-27 | Stop reason: HOSPADM

## 2024-02-27 RX ORDER — CYCLOBENZAPRINE HCL 10 MG
10 TABLET ORAL 3 TIMES DAILY PRN
COMMUNITY
End: 2024-02-29 | Stop reason: WASHOUT

## 2024-02-27 RX ORDER — TALC
3 POWDER (GRAM) TOPICAL
Status: DISCONTINUED | OUTPATIENT
Start: 2024-02-27 | End: 2024-02-27 | Stop reason: HOSPADM

## 2024-02-27 RX ORDER — GUAIFENESIN 600 MG/1
1200 TABLET, EXTENDED RELEASE ORAL 2 TIMES DAILY
COMMUNITY
End: 2024-04-04 | Stop reason: WASHOUT

## 2024-02-27 RX ORDER — AMLODIPINE BESYLATE 5 MG/1
2.5 TABLET ORAL DAILY
Status: DISCONTINUED | OUTPATIENT
Start: 2024-02-27 | End: 2024-02-27 | Stop reason: HOSPADM

## 2024-02-27 RX ORDER — DEXTROMETHORPHAN POLISTIREX 30 MG/5 ML
1 SUSPENSION, EXTENDED RELEASE 12 HR ORAL ONCE
Status: COMPLETED | OUTPATIENT
Start: 2024-02-27 | End: 2024-02-27

## 2024-02-27 RX ORDER — FUROSEMIDE 40 MG/1
40 TABLET ORAL DAILY
Status: DISCONTINUED | OUTPATIENT
Start: 2024-02-27 | End: 2024-02-27 | Stop reason: HOSPADM

## 2024-02-27 RX ORDER — AMLODIPINE BESYLATE 2.5 MG/1
2.5 TABLET ORAL DAILY
COMMUNITY
End: 2024-02-27

## 2024-02-27 RX ORDER — IPRATROPIUM BROMIDE AND ALBUTEROL SULFATE 2.5; .5 MG/3ML; MG/3ML
3 SOLUTION RESPIRATORY (INHALATION) EVERY 4 HOURS PRN
Status: DISCONTINUED | OUTPATIENT
Start: 2024-02-27 | End: 2024-02-27 | Stop reason: HOSPADM

## 2024-02-27 RX ORDER — GUAIFENESIN 600 MG/1
600 TABLET, EXTENDED RELEASE ORAL 2 TIMES DAILY
Status: DISCONTINUED | OUTPATIENT
Start: 2024-02-27 | End: 2024-02-27 | Stop reason: HOSPADM

## 2024-02-27 RX ORDER — AMIODARONE HYDROCHLORIDE 200 MG/1
100 TABLET ORAL DAILY
Status: DISCONTINUED | OUTPATIENT
Start: 2024-02-27 | End: 2024-02-27 | Stop reason: HOSPADM

## 2024-02-27 RX ORDER — BUDESONIDE 0.5 MG/2ML
0.5 INHALANT ORAL
Status: DISCONTINUED | OUTPATIENT
Start: 2024-02-27 | End: 2024-02-27 | Stop reason: HOSPADM

## 2024-02-27 RX ORDER — PANTOPRAZOLE SODIUM 40 MG/1
40 TABLET, DELAYED RELEASE ORAL
Status: DISCONTINUED | OUTPATIENT
Start: 2024-02-27 | End: 2024-02-27 | Stop reason: HOSPADM

## 2024-02-27 RX ORDER — DOCUSATE SODIUM 100 MG/1
100 CAPSULE, LIQUID FILLED ORAL 2 TIMES DAILY PRN
Status: DISCONTINUED | OUTPATIENT
Start: 2024-02-27 | End: 2024-02-27 | Stop reason: HOSPADM

## 2024-02-27 RX ORDER — SYRING-NEEDL,DISP,INSUL,0.3 ML 29 G X1/2"
296 SYRINGE, EMPTY DISPOSABLE MISCELLANEOUS ONCE
Status: COMPLETED | OUTPATIENT
Start: 2024-02-27 | End: 2024-02-27

## 2024-02-27 RX ORDER — ACETAMINOPHEN 325 MG/1
950 TABLET ORAL EVERY 6 HOURS PRN
Status: DISCONTINUED | OUTPATIENT
Start: 2024-02-27 | End: 2024-02-27 | Stop reason: HOSPADM

## 2024-02-27 RX ADMIN — MINERAL OIL 1 ENEMA: 100 ENEMA RECTAL at 14:21

## 2024-02-27 RX ADMIN — BISACODYL 5 MG: 5 TABLET, COATED ORAL at 13:38

## 2024-02-27 RX ADMIN — BISACODYL 5 MG: 5 TABLET, COATED ORAL at 15:44

## 2024-02-27 RX ADMIN — APIXABAN 5 MG: 5 TABLET, FILM COATED ORAL at 13:39

## 2024-02-27 RX ADMIN — AMIODARONE HYDROCHLORIDE 100 MG: 200 TABLET ORAL at 13:38

## 2024-02-27 RX ADMIN — MAGNESIUM CITRATE 296 ML: 1.75 LIQUID ORAL at 15:44

## 2024-02-27 RX ADMIN — Medication 60 PERCENT: at 00:00

## 2024-02-27 RX ADMIN — GUAIFENESIN 600 MG: 600 TABLET, EXTENDED RELEASE ORAL at 13:39

## 2024-02-27 ASSESSMENT — ENCOUNTER SYMPTOMS
ENDOCRINE NEGATIVE: 1
ALLERGIC/IMMUNOLOGIC NEGATIVE: 1
GASTROINTESTINAL NEGATIVE: 1
CONSTITUTIONAL NEGATIVE: 1
HEMATOLOGIC/LYMPHATIC NEGATIVE: 1
PSYCHIATRIC NEGATIVE: 1
MUSCULOSKELETAL NEGATIVE: 1
EYES NEGATIVE: 1

## 2024-02-27 ASSESSMENT — ACTIVITIES OF DAILY LIVING (ADL): LACK_OF_TRANSPORTATION: NO

## 2024-02-27 NOTE — PROGRESS NOTES
Pharmacy Medication History Review    Lissett Rodríguez is a 89 y.o. female admitted for Syncope and collapse. Pharmacy reviewed the patient's [id number] medications and allergies for accuracy.    The list below reflectives the updated PTA list. Please review each medication in order reconciliation for additional clarification and justification.  Prior to Admission Medications   Prescriptions Last Dose Informant     acetaminophen (Tylenol) 325 mg tablet       Sig: Take 1 tablet (325 mg) by mouth every 6 hours if needed for mild pain (1 - 3).   albuterol 90 mcg/actuation inhaler       Sig: INHALE 1 TO 2 PUFFS EVERY 4 TO 6 HOURS AS NEEDED.             amLODIPine (Norvasc) 2.5 mg tablet       Sig: Take 1 tablet (2.5 mg) by mouth once daily.   amiodarone (Pacerone) 100 mg tablet       Sig: Take 1 tablet (100 mg) by mouth once daily.   apixaban (Eliquis) 5 mg tablet       Sig: Take 1 tablet (5 mg) by mouth 2 times a day. Do not start before January 31, 2024.   atorvastatin (Lipitor) 40 mg tablet       Sig: Take 1 tablet (40 mg) by mouth once daily at bedtime.   blood sugar diagnostic (Blood Glucose Test) strip       Sig: TEST 3 TIMES DAILY.   cholecalciferol (Vitamin D-3) 50 mcg (2,000 unit) capsule       Sig: Take 1 tablet by mouth once daily.             cyclobenzaprine (Flexeril) 10 mg tablet       Sig: Take 1 tablet (10 mg) by mouth 3 times a day as needed for muscle spasms.   dapagliflozin (Farxiga) 5 mg       Sig: Take 1 tablet (5 mg) by mouth once daily.   flash glucose sensor (FREESTYLE BUSTER 14 DAY SENSOR [facility])       Sig: Use to check blood sugar once daily   furosemide (Lasix) 40 mg tablet       Sig: Take 1 tablet (40 mg) by mouth once daily.   gabapentin (Neurontin) 300 mg capsule       Sig: Take 1 capsule (300 mg) by mouth 3 times a day.             guaiFENesin (Mucinex) 600 mg 12 hr tablet       Sig: Take 2 tablets (1,200 mg) by mouth 2 times a day. Do not crush, chew, or split.             loperamide  (Imodium) 2 mg capsule       Sig: Take 1 capsule (2 mg) by mouth every 6 hours if needed for diarrhea (for 7 days).   oxybutynin (Ditropan) 5 mg tablet       Sig: Take 1 tablet (5 mg) by mouth once daily.   oxygen (O2) gas therapy       Sig: Inhale 6 L/min continuously.   polyethylene glycol (Glycolax, Miralax) 17 gram packet       Sig: Take 17 g by mouth 2 times a day as needed (Constipation).             potassium chloride CR 20 mEq ER tablet       Sig: Take 1 tablet (20 mEq) by mouth once daily. Do not crush or chew.   sennosides (Senokot) 8.6 mg tablet       Sig: Take 1 tablet (8.6 mg) by mouth once daily as needed.   tiotropium (Spiriva with HandiHaler) 18 mcg inhalation capsule       Sig: INHALE CONTENTS OF 1 CAPSULE ONCE DAILY.      Facility-Administered Medications: None      Allergies  Reviewed by Disha Claros RN on 2/26/2024        Severity Reactions Comments    Lisinopril Not Specified Angioedema, Swelling     Tetracyclines Not Specified Hives     Vancomycin Not Specified Unknown     Penicillins Low Hives, Other Tetramycin            Below are additional concerns with the patient's PTA list.  Medication list sent from [facility] at [place]    Riya Peterson CPhT

## 2024-02-27 NOTE — DISCHARGE INSTRUCTIONS
I RECOMMEND STOPPING THE AMLODIPINE (FOR HIGH BLOOD PRESSURE); THIS MAY BE CAUSING YOUR SLEEPING SPELLS AFTER MEALS. IF YOUR BLOOD PRESSURE GETS CONSISTENTLY OVER 150, YOU MAY NEED TO RESUME IT.  FOR YOUR CONSTIPATION, TAKE MIRALAX 1-2 TIMES A DAY. IF YOU DONT HAVE A BOWEL MOVEMENT AFTER 2-3 DAYS, TAKE DULCOLAX ALSO. I WOULD AVOID IMODIUM.

## 2024-02-27 NOTE — PROGRESS NOTES
Transitional Care Coordination Progress Note:  Plan per Medical/Surgical team: treatment of PN, syncope with IV ATB, IV lasix, inhalers, duoneb, oxygen @ 6 liters [place] (this is her baseline liter flow @ [facility])  Status: Observation  Payor source: medicare A/B, [place]  Discharge disposition:  [facility] [phone]   Potential Barriers: glucose 253  ADOD: 2/29/2024  ALEC Winston RN, BSN Transitional Care Coordinator ED# [phone]      02/27/24 0739   Discharge Planning   Living Arrangements Alone   Support Systems Children   Assistance Needed ?qualifies for home oxygen, ATB plan   Type of Residence Assisted living   Number of Stairs to Enter Residence 0   Number of Stairs Within Residence 0   Do you have animals or pets at home? No   Care Facility Name [facility] [phone]   Home or Post Acute Services Post acute facilities (Rehab/SNF/etc)   Type of Post Acute Facility Services Assisted living   Patient expects to be discharged to: [facility] [phone]   Does the patient need discharge transport arranged? Yes   RoundTrip coordination needed? Yes   Has discharge transport been arranged? No   Financial Resource Strain   How hard is it for you to pay for the very basics like food, housing, medical care, and heating? Not hard   Housing Stability   In the last 12 months, was there a time when you were not able to pay the mortgage or rent on time? N   In the last 12 months, how many places have you lived? 1   In the last 12 months, was there a time when you did not have a steady place to sleep or slept in a shelter (including now)? N   Transportation Needs   In the past 12 months, has lack of transportation kept you from medical appointments or from getting medications? no   In the past 12 months, has lack of transportation kept you from meetings, work, or from getting things needed for daily living? No

## 2024-02-27 NOTE — PROGRESS NOTES
02/27/24 0739   [facility] Disability Status   Are you deaf or do you have serious difficulty hearing? N   Are you blind or do you have serious difficulty seeing, even when wearing glasses? N   Because of a physical, mental, or emotional condition, do you have serious difficulty concentrating, remembering, or making decisions? (5 years old or older) Y   Do you have serious difficulty walking or climbing stairs? Y   Do you have serious difficulty dressing or bathing? Y   Because of a physical, mental, or emotional condition, do you have serious difficulty doing errands alone such as visiting the doctor? Y

## 2024-02-27 NOTE — PROGRESS NOTES
[facility] @ [facility] [phone]      02/27/24 0738   Current Planned Discharge Disposition   Current Planned Discharge Disposition Home

## 2024-02-27 NOTE — DISCHARGE SUMMARY
"Admitting Provider: Tonya Razo MD  Discharge Provider: Bigg Quinteros MD  Primary Care Physician at Discharge: Radha Anderson [phone]   Admission Date: 2/26/2024     Discharge Date: 2/27/2024  Current Planned Discharge Disposition: Home    Discharge Diagnoses  Principal Problem:    Postural dizziness with presyncope  Active Problems:    (HFpEF) heart failure with preserved ejection fraction (CMS/[facility])    Atrial fibrillation (CMS/[facility])    Constipation    Chronic obstructive pulmonary disease (CMS/[facility])    Diabetes mellitus type 2 without retinopathy (CMS/[facility])    Hyperlipidemia    Hypertension    Obstructive sleep apnea syndrome    Chronic anticoagulation    Chronic respiratory failure with hypoxia (CMS/[facility])      Hospital Course  89 yoF with several complaints.    She says she's had several episodes where she became very sleepy and fatigued after eating lunch; may have been lightheaded as well, but no clear syncope seemed to occur. This sounds like post-prandial hypotension and/or \"carb coma.\" I advised she avoid the low-dose amlodipine unless her BP gets very high. She also says she has not had a bowel movement for a week; her belly is soft; KUB confirmed constipation. She was given an enema and dulcolax; I advised adding dulcolax daily, to her miralax regimen, till she gets her bowels moving. There was some concern about her pulse ox and breathing, but when I saw her, she was breathing normally with clear lungs, with good pulse ox on her 6 L oxygen. Discharged home in stable condition.     Test Results Pending At Discharge  Pending Labs       Order Current Status    Procalcitonin In process            Pertinent Physical Exam At Time of Discharge  BP (!) 140/97   Pulse 93   Temp 36.9 °C (98.4 °F) (Oral)   Resp 18   Ht 1.499 m (4' 11\")   Wt 69.4 kg (153 lb)   SpO2 94%   BMI 30.90 kg/m²   Physical Exam  Cardiovascular:      Rate and Rhythm: Normal rate and regular rhythm.      Heart sounds: Normal " heart sounds.   Pulmonary:      Breath sounds: Normal breath sounds.   Abdominal:      General: Bowel sounds are decreased.      Palpations: Abdomen is soft.   Musculoskeletal:         General: Normal range of motion.   Neurological:      General: No focal deficit present.      Mental Status: She is alert and oriented to person, place, and time.   Psychiatric:         Mood and Affect: Mood normal.         Home Medications     Medication List      START taking these medications     bisacodyl 5 mg EC tablet; Commonly known as: Dulcolax; Take 1 tablet (5   mg) by mouth once daily as needed for constipation. Do not crush, chew, or   split.     CHANGE how you take these medications     cyclobenzaprine 10 mg tablet; Commonly known as: Flexeril; What changed:   Another medication with the same name was removed. Continue taking this   medication, and follow the directions you see here.   guaiFENesin 600 mg 12 hr tablet; Commonly known as: Mucinex; What   changed: Another medication with the same name was removed. Continue   taking this medication, and follow the directions you see here.   polyethylene glycol 17 gram packet; Commonly known as: Glycolax,   Miralax; Take 17 g by mouth 2 times a day. Unless having diarrhea (then do   not take); What changed: when to take this, reasons to take this,   additional instructions     CONTINUE taking these medications     acetaminophen 325 mg tablet; Commonly known as: Tylenol   albuterol 90 mcg/actuation inhaler   amiodarone 100 mg tablet; Commonly known as: Pacerone   apixaban 5 mg tablet; Commonly known as: Eliquis; Take 1 tablet (5 mg)   by mouth 2 times a day. Do not start before January 31, 2024.   atorvastatin 40 mg tablet; Commonly known as: Lipitor   Blood Glucose Test strip; Generic drug: blood sugar diagnostic   cholecalciferol 50 mcg (2,000 unit) capsule; Commonly known as: Vitamin   D-3   dapagliflozin propanediol 5 mg; Commonly known as: Farxiga   FREESTYLE BUSTER 14 DAY  SENSOR MISC   furosemide 40 mg tablet; Commonly known as: Lasix   gabapentin 300 mg capsule; Commonly known as: Neurontin   oxybutynin 5 mg tablet; Commonly known as: Ditropan   oxygen gas therapy; Commonly known as: O2   potassium chloride CR 20 mEq ER tablet; Commonly known as: Klor-Con M20   sennosides 8.6 mg tablet; Commonly known as: Senokot   Spiriva with HandiHaler 18 mcg inhalation capsule; Generic drug:   tiotropium     STOP taking these medications     amLODIPine 2.5 mg tablet; Commonly known as: Norvasc   loperamide 2 mg capsule; Commonly known as: Imodium       Outpatient Follow-Up  Future Appointments   Date Time Provider Department Center   4/4/2024 10:40 AM Radha Anderson DO [id number] [facility]       Bigg Quinteros MD    I spent more than 30 min coordinating this patient's discharge.

## 2024-02-27 NOTE — H&P
"History Of Present Illness  Lissett Rodríguez is a 89 y.o. female with PMH significant for chronic respiratory with oxygen dependence, COPD, afib/PE on eliquis who presented with c/o syncopal episode. HPI limited as patient is drowsy and currently on CPAP.  She reports feeling \"woozy\" and she had some lightheadedness and feel on to her bottom.  She does not believe she lost consciousness.  She is on Eliquis.   Denies neck pain. She is able to ambulate.  Denies increased SOB, cough, or sputum production.      Past Medical History  Past Medical History:   Diagnosis Date    Angioneurotic edema, initial encounter 08/24/2018    Angioedema of lips    Calculus of gallbladder without cholecystitis without obstruction 02/22/2017    Calculus of gallbladder without cholecystitis without obstruction    Chronic obstructive pulmonary disease, unspecified (CMS/[facility]) 02/20/2017    Chronic obstructive pulmonary disease with hypoxia    Eructation 06/16/2017    Burping    Personal history of transient ischemic attack (TIA), and cerebral infarction 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 10/16/2018    History of transient cerebral ischemia    Personal history of transient ischemic attack (TIA), and cerebral infarction 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 03/08/2016    History of transient cerebral ischemia    Pneumonia, unspecified organism 02/09/2018    CAP (community acquired pneumonia)    Trochanteric bursitis, unspecified hip 04/23/2015    Greater trochanteric bursitis    Type 2 diabetes mellitus without complications (CMS/[facility]) 11/06/2017    Controlled type 2 diabetes mellitus without complication, without long-term current use of insulin       Surgical History  Past Surgical History:   Procedure Laterality Date    APPENDECTOMY  05/15/2013    Appendectomy    CATARACT EXTRACTION  05/15/2013    Cataract Surgery    COLONOSCOPY  04/11/2014    Complete Colonoscopy    HYSTERECTOMY  05/15/2013    Hysterectomy    MR HEAD ANGIO WO IV CONTRAST  1/8/2016    MR HEAD " ANGIO WO IV CONTRAST 1/8/2016 [facility]    MR HEAD ANGIO WO IV CONTRAST  10/1/2012    MR HEAD ANGIO WO IV CONTRAST 10/1/2012 [facility]    MR NECK ANGIO W IV CONTRAST  10/1/2012    MR NECK ANGIO W IV CONTRAST 10/1/2012 [facility]    MR NECK ANGIO WO IV CONTRAST  1/8/2016    MR NECK ANGIO WO IV CONTRAST 1/8/2016 [facility]    OTHER SURGICAL HISTORY  05/15/2013    Cardiac Cath Procedure Outcome: Successful    OTHER SURGICAL HISTORY  02/28/2019    Cholecystectomy    TOTAL KNEE ARTHROPLASTY  05/29/2013    Knee Replacement       Social History  Social History     Socioeconomic History    Marital status:      Spouse name: None    Number of children: None    Years of education: None    Highest education level: None   Occupational History    None   Tobacco Use    Smoking status: Never    Smokeless tobacco: Never   Substance and Sexual Activity    Alcohol use: Never    Drug use: Never    Sexual activity: None   Other Topics Concern    None   Social History Narrative    None     Social Determinants of Health     Financial Resource Strain: Low Risk  (1/19/2024)    Overall Financial Resource Strain (CARDIA)     Difficulty of Paying Living Expenses: Not hard at all   Food Insecurity: Not on file   Transportation Needs: No Transportation Needs (1/19/2024)    PRAPARE - Transportation     Lack of Transportation (Medical): No     Lack of Transportation (Non-Medical): No   Physical Activity: Not on file   Stress: Not on file   Social Connections: Not on file   Intimate Partner Violence: Not on file   Housing Stability: Low Risk  (1/19/2024)    Housing Stability Vital Sign     Unable to Pay for Housing in the Last Year: No     Number of Places Lived in the Last Year: 1     Unstable Housing in the Last Year: No        Family History  Family History   Problem Relation Name Age of Onset    Alzheimer's disease Mother      Heart failure Mother      Stroke Mother          ischemic stroke     Heart attack Father      Stroke Father          ischemic stroke    COPD Sister      Diabetes Sister          mellitus    Cancer Sister      Parkinsonism Sister      Other (previous cardiac prolems) Sister          Allergies  Allergies as of 02/26/2024 - Reviewed 02/26/2024   Allergen Reaction Noted    Lisinopril Angioedema and Swelling 03/10/2023    Tetracyclines Hives 03/10/2023    Vancomycin Unknown 03/10/2023    Penicillins Hives and Other 02/03/2023        Review of Systems  Review of Systems   Constitutional: Negative.    HENT: Negative.     Eyes: Negative.    Respiratory:          Chronic respiratory failure on oxygen  COPD  KALPANA on CPAP   Cardiovascular:  Negative for chest pain.   Gastrointestinal: Negative.    Endocrine: Negative.    Genitourinary: Negative.    Musculoskeletal: Negative.    Skin: Negative.    Allergic/Immunologic: Negative.    Neurological:  Positive for syncope.   Hematological: Negative.    Psychiatric/Behavioral: Negative.     All other systems reviewed and are negative.      Relevant results reviewed   PROVIDER notes  Nursing notes  MEDS:  Current Facility-Administered Medications   Medication Dose Route Frequency Provider Last Rate Last Admin    acetaminophen (Tylenol) tablet 975 mg  975 mg oral q6h PRN Payton Marquis PA-C        amiodarone (Pacerone) tablet 100 mg  100 mg oral Daily Payton Marquis PA-C        amLODIPine (Norvasc) tablet 2.5 mg  2.5 mg oral Daily Payton Marquis PA-C        apixaban (Eliquis) tablet 5 mg  5 mg oral BID Payton Marquis PA-C        atorvastatin (Lipitor) tablet 40 mg  40 mg oral Nightly Payton Marquis PA-C        budesonide (Pulmicort) 0.5 mg/2 mL nebulizer solution 0.5 mg  0.5 mg nebulization BID Payton Marquis PA-C        docusate sodium (Colace) capsule 100 mg  100 mg oral BID PRN Payton Marquis PA-C        furosemide (Lasix) tablet 40 mg  40 mg oral Daily Payton Marquis PA-C        gabapentin (Neurontin) capsule 300 mg  300 mg oral TID  Payton Marquis PA-C        guaiFENesin (Mucinex) 12 hr tablet 600 mg  600 mg oral BID Payton Marquis PA-C        ipratropium-albuteroL (Duo-Neb) 0.5-2.5 mg/3 mL nebulizer solution 3 mL  3 mL nebulization 4x daily Tonya Razo MD        ipratropium-albuteroL (Duo-Neb) 0.5-2.5 mg/3 mL nebulizer solution 3 mL  3 mL nebulization q4h PRN Tonya Razo MD        melatonin tablet 3 mg  3 mg oral Daily Payton Marquis PA-C        oxygen (O2) therapy   inhalation Continuous PRN - O2/gases Jasiel Dixon MD   Rate Change at 02/27/24 0302    oxygen (O2) therapy   inhalation Continuous PRN - O2/gases Jasiel Dixon MD        pantoprazole (ProtoNix) EC tablet 40 mg  40 mg oral Daily before breakfast Payton Marquis PA-C        Or    pantoprazole (ProtoNix) injection 40 mg  40 mg intravenous Daily before breakfast Payton Marquis PA-C        tiotropium (Spiriva Respimat) 2.5 mcg/actuation inhaler 2 puff  2 Inhalation inhalation Daily Payton Marquis PA-C         Current Outpatient Medications   Medication Sig Dispense Refill    acetaminophen (Tylenol) 325 mg tablet Take 1 tablet (325 mg) by mouth every 6 hours if needed for mild pain (1 - 3).      albuterol 90 mcg/actuation inhaler INHALE 1 TO 2 PUFFS EVERY 4 TO 6 HOURS AS NEEDED.      amiodarone (Pacerone) 100 mg tablet Take 1 tablet (100 mg) by mouth once daily.      amLODIPine (Norvasc) 2.5 mg tablet Take 1 tablet (2.5 mg) by mouth once daily.      apixaban (Eliquis) 5 mg tablet Take 1 tablet (5 mg) by mouth 2 times a day. Do not start before January 31, 2024. 60 tablet 1    atorvastatin (Lipitor) 40 mg tablet Take 1 tablet (40 mg) by mouth once daily at bedtime.      blood sugar diagnostic (Blood Glucose Test) strip TEST 3 TIMES DAILY.      cholecalciferol (Vitamin D-3) 50 mcg (2,000 unit) capsule Take 1 tablet by mouth once daily.      cyclobenzaprine (Flexeril) 10 mg tablet Take 1 tablet (10 mg) by mouth 3 times a day as needed.      dapagliflozin  (Farxiga) 5 mg Take 1 tablet (5 mg) by mouth once daily.      flash glucose sensor (FREESTYLE BUSTER 14 DAY SENSOR [facility]) Use to check blood sugar once daily      furosemide (Lasix) 40 mg tablet Take 1 tablet (40 mg) by mouth once daily.      gabapentin (Neurontin) 300 mg capsule Take 1 capsule (300 mg) by mouth 3 times a day.      guaiFENesin (Mucinex) 600 mg 12 hr tablet Take 1 tablet (600 mg) by mouth twice a day.      loperamide (Imodium) 2 mg capsule Take 1 capsule (2 mg) by mouth 4 times a day as needed for diarrhea for up to 7 days. 28 capsule 0    oxybutynin (Ditropan) 5 mg tablet Take 1 tablet (5 mg) by mouth once daily.      oxygen (O2) gas therapy Inhale 6 L/min continuously.      potassium chloride CR 20 mEq ER tablet Take 1 tablet (20 mEq) by mouth once daily. Do not crush or chew.      sennosides (Senokot) 8.6 mg tablet Take 1 tablet (8.6 mg) by mouth once daily as needed.      tiotropium (Spiriva with HandiHaler) 18 mcg inhalation capsule INHALE CONTENTS OF 1 CAPSULE ONCE DAILY.        LABS:  Results for orders placed or performed during the hospital encounter of 02/26/24 (from the past 24 hour(s))   CBC and Auto Differential   Result Value Ref Range    WBC 10.0 4.4 - 11.3 x10*3/uL    nRBC 0.0 0.0 - 0.0 /100 WBCs    RBC 4.74 4.00 - 5.20 x10*6/uL    Hemoglobin 13.8 12.0 - 16.0 g/dL    Hematocrit 42.7 36.0 - 46.0 %    MCV 90 80 - 100 fL    MCH 29.1 26.0 - 34.0 pg    MCHC 32.3 32.0 - 36.0 g/dL    RDW 14.5 11.5 - 14.5 %    Platelets 199 150 - 450 x10*3/uL    Neutrophils % 68.6 40.0 - 80.0 %    Immature Granulocytes %, Automated 0.5 0.0 - 0.9 %    Lymphocytes % 22.6 13.0 - 44.0 %    Monocytes % 6.2 2.0 - 10.0 %    Eosinophils % 1.2 0.0 - 6.0 %    Basophils % 0.9 0.0 - 2.0 %    Neutrophils Absolute 6.87 (H) 1.60 - 5.50 x10*3/uL    Immature Granulocytes Absolute, Automated 0.05 0.00 - 0.50 x10*3/uL    Lymphocytes Absolute 2.26 0.80 - 3.00 x10*3/uL    Monocytes Absolute 0.62 0.05 - 0.80 x10*3/uL    Eosinophils  Absolute 0.12 0.00 - 0.40 x10*3/uL    Basophils Absolute 0.09 0.00 - 0.10 x10*3/uL   Comprehensive Metabolic Panel   Result Value Ref Range    Glucose 134 (H) 74 - 99 mg/dL    Sodium 136 136 - 145 mmol/L    Potassium 3.9 3.5 - 5.3 mmol/L    Chloride 101 98 - 107 mmol/L    Bicarbonate 26 21 - 32 mmol/L    Anion Gap 13 10 - 20 mmol/L    Urea Nitrogen 23 6 - 23 mg/dL    Creatinine 0.96 0.50 - 1.05 mg/dL    eGFR 56 (L) >60 mL/min/1.73m*2    Calcium 8.4 (L) 8.6 - 10.3 mg/dL    Albumin 3.7 3.4 - 5.0 g/dL    Alkaline Phosphatase 69 33 - 136 U/L    Total Protein 6.9 6.4 - 8.2 g/dL    AST 9 9 - 39 U/L    Bilirubin, Total 0.5 0.0 - 1.2 mg/dL    ALT 10 7 - 45 U/L   Troponin I, High Sensitivity, Initial   Result Value Ref Range    Troponin I, High Sensitivity 5 0 - 13 ng/L   B-type natriuretic peptide   Result Value Ref Range    BNP 49 0 - 99 pg/mL   Light Blue Top   Result Value Ref Range    Extra Tube Hold for add-ons.    BLOOD GAS VENOUS FULL PANEL   Result Value Ref Range    POCT pH, Venous 7.44 (H) 7.33 - 7.43 pH    POCT pCO2, Venous 45 41 - 51 mm Hg    POCT pO2, Venous 45 35 - 45 mm Hg    POCT SO2, Venous 72 45 - 75 %    POCT Oxy Hemoglobin, Venous 69.7 45.0 - 75.0 %    POCT Hematocrit Calculated, Venous 45.0 36.0 - 46.0 %    POCT Sodium, Venous 136 136 - 145 mmol/L    POCT Potassium, Venous 4.1 3.5 - 5.3 mmol/L    POCT Chloride, Venous 100 98 - 107 mmol/L    POCT Ionized Calicum, Venous 1.17 1.10 - 1.33 mmol/L    POCT Glucose, Venous 143 (H) 74 - 99 mg/dL    POCT Lactate, Venous 1.0 0.4 - 2.0 mmol/L    POCT Base Excess, Venous 5.5 (H) -2.0 - 3.0 mmol/L    POCT HCO3 Calculated, Venous 30.6 (H) 22.0 - 26.0 mmol/L    POCT Hemoglobin, Venous 14.9 12.0 - 16.0 g/dL    POCT Anion Gap, Venous 10.0 10.0 - 25.0 mmol/L    Patient Temperature 37.0 degrees Celsius    FiO2 45 %   POCT GLUCOSE   Result Value Ref Range    POCT Glucose 128 (H) 74 - 99 mg/dL   Troponin, High Sensitivity, 1 Hour   Result Value Ref Range    Troponin I, High  Sensitivity 5 0 - 13 ng/L   Sars-CoV-2 and Influenza A/B PCR   Result Value Ref Range    Flu A Result Not Detected Not Detected    Flu B Result Not Detected Not Detected    Coronavirus 2019, PCR Not Detected Not Detected   RSV PCR   Result Value Ref Range    RSV PCR Not Detected Not Detected   Urinalysis with Reflex Culture and Microscopic   Result Value Ref Range    Color, Urine Colorless (N) Straw, Yellow    Appearance, Urine Clear Clear    Specific Gravity, Urine 1.003 (N) 1.005 - 1.035    pH, Urine 7.0 5.0, 5.5, 6.0, 6.5, 7.0, 7.5, 8.0    Protein, Urine NEGATIVE NEGATIVE mg/dL    Glucose, Urine >=500 (3+) (A) NEGATIVE mg/dL    Blood, Urine NEGATIVE NEGATIVE    Ketones, Urine NEGATIVE NEGATIVE mg/dL    Bilirubin, Urine NEGATIVE NEGATIVE    Urobilinogen, Urine <2.0 <2.0 mg/dL    Nitrite, Urine NEGATIVE NEGATIVE    Leukocyte Esterase, Urine NEGATIVE NEGATIVE   CBC   Result Value Ref Range    WBC 7.9 4.4 - 11.3 x10*3/uL    nRBC 0.0 0.0 - 0.0 /100 WBCs    RBC 4.98 4.00 - 5.20 x10*6/uL    Hemoglobin 14.5 12.0 - 16.0 g/dL    Hematocrit 44.9 36.0 - 46.0 %    MCV 90 80 - 100 fL    MCH 29.1 26.0 - 34.0 pg    MCHC 32.3 32.0 - 36.0 g/dL    RDW 14.5 11.5 - 14.5 %    Platelets 197 150 - 450 x10*3/uL   Comprehensive metabolic panel   Result Value Ref Range    Glucose 253 (H) 74 - 99 mg/dL    Sodium 138 136 - 145 mmol/L    Potassium 3.7 3.5 - 5.3 mmol/L    Chloride 100 98 - 107 mmol/L    Bicarbonate 27 21 - 32 mmol/L    Anion Gap 15 10 - 20 mmol/L    Urea Nitrogen 24 (H) 6 - 23 mg/dL    Creatinine 0.98 0.50 - 1.05 mg/dL    eGFR 55 (L) >60 mL/min/1.73m*2    Calcium 8.9 8.6 - 10.3 mg/dL    Albumin 3.9 3.4 - 5.0 g/dL    Alkaline Phosphatase 74 33 - 136 U/L    Total Protein 7.2 6.4 - 8.2 g/dL    AST 6 (L) 9 - 39 U/L    Bilirubin, Total 0.4 0.0 - 1.2 mg/dL    ALT 11 7 - 45 U/L      IMAGING:  CT head wo IV contrast   Final Result   No acute intracranial hemorrhage, mass effect or midline shift.        Nonspecific scattered white  "matter hypodensities favored to represent   sequela of small vessel ischemia. Cervical spondylosis without acute   loss of vertebral body height or traumatic malalignment.        Thickened interlobular septal markings and ground-glass attenuation   in the visualized upper lobes, which may represent pulmonary edema.        MACRO:   None.        Signed by: Evan Finkelstein 2/26/2024 9:37 PM   Dictation workstation:   [id number]      CT cervical spine wo IV contrast   Final Result   No acute intracranial hemorrhage, mass effect or midline shift.        Nonspecific scattered white matter hypodensities favored to represent   sequela of small vessel ischemia. Cervical spondylosis without acute   loss of vertebral body height or traumatic malalignment.        Thickened interlobular septal markings and ground-glass attenuation   in the visualized upper lobes, which may represent pulmonary edema.        MACRO:   None.        Signed by: Evan Finkelstein 2/26/2024 9:37 PM   Dictation workstation:   [id number]      XR chest 1 view   Final Result   Interval improvement in aeration of the lungs. There are however   persistent bilateral opacities. Focal appearing opacity at the right   lung base may in part relate to rib end or nipple shadow, however   focal infiltrate or nodule is not excluded and attention on   short-term progress imaging is recommended.        MACRO:   None        Signed by: Cole Childress 2/26/2024 8:48 PM   Dictation workstation:   [id number]             PHYSICAL EXAM  /65   Pulse 61   Temp 36.9 °C (98.4 °F) (Oral)   Resp 19   Ht 1.499 m (4' 11\")   Wt 69.4 kg (153 lb)   SpO2 96%   BMI 30.90 kg/m²   PHYSICAL EXAM:  GENERAL: Alert, drowsy/fatigued   SKIN: Warm and dry.  No suspicious lesions or rashes.  HEENT:  NCAT, PERRLA, EOMI, nonicteric sclera, MMM, neck supple, trachea midline  LUNGS: CPAP in place, +conversational dyspnea, significantly diminished, but no wheezing, rhonchi, or rales " appreciated    CARDS: RRR  GI: Soft, NTND, BS+, no rebound, no guarding   MS/Extremities: WWP, no significant pitting edema, distal pulses intact, moves all extremities  NEURO: A&Ox3, grossly nonfocal exam, speech fluent, follows commands, answers questions appropriately  PSYCH: mood and behavior appropriate    Assessment/Plan:   Principal Problem:    Syncope and collapse  Active Problems:    Atrial fibrillation (CMS/HCC)    Chronic obstructive pulmonary disease (CMS/HCC)    Hyperlipidemia    Hypertension    Obstructive sleep apnea syndrome    Chronic anticoagulation    Chronic respiratory failure with hypoxia (CMS/HCC)  -CBC: no leukocytosis, no acute anemia, no thrombocytopenia  -CMP: glucose 134, no acute electrolyte abnormalities, no acute renal or liver dysfunction appreciated   -viral swabs negative   -CTH no acute processes  -CT c-spine: no acute processes  -CXR: Interval improvement in aeration of the lungs. There are however persistent bilateral opacities.  She was recently treated for pneumonia  during her last admit (1/18-1/27/24) --> received rocephin and azithro in the ER --> check procal --> defer further antibiotics to day team upon reassessment   -CPAP  -RT consult, duonebs, BPH  -repeat labs in AM   -resume home meds as appropriate  -GI ppx: Protonix, bowel regimen  -VTE ppx: Eliquis        Total time spent [including but not limited to]: Obtaining and reviewing patient medical records/history, obtaining a separate history,  examining and assessing the patient, providing  and education, placing pertinent orders for labs/tests/medications,  communicating with the patient/family/health team, documenting in the patient's EMR/formulation of this note, independently interpreting results and data, coordinating care:   55 minutes, with greater than 50% spent in personal discussion with patient and/or family    Payton Marquis PA-C

## 2024-02-27 NOTE — ED TRIAGE NOTES
TRIAGE NOTE   I saw the patient as the Clinician in Triage and performed a brief history and physical exam, established acuity, and ordered appropriate tests to develop basic plan of care. Patient will be seen by an JOE, resident and/or physician who will independently evaluate the patient. Please see subsequent provider notes for further details and disposition.     Brief HPI: In brief, Lissett Rodríguez is a 89 y.o. female that presents for syncopal type episode.   89-year-old female, COPD, prior PE, on Eliquis, at assisted living.  Apparently got sleepy at lunchtime.  Someone helped her to her room.  She slept for several hours.  She then woke up.  Went to another resident's room, felt woozy she states, lightheaded dizzy sensation perhaps, and then collapsed or fell.  She does not really recall any cardiopulmonary symptoms.  Questionable syncopal type episode that happened today.  Denies any injury.  She is been ambulatory since the incident.  Weightbearing on the legs without pain.  She denies striking her head.  No neck pain.  No back pain.  No current complaints.  Stable on 6 L O2, no dyspneic feeling.    Focused Physical exam:   Awake alert communicative.  Vital signs relatively stable for patient.  On 6 L nasal cannula anywhere from 87 to 91% pulse oximetry.  There are no outward signs of trauma.  Heart appears regular.  Lungs anteriorly lungs are relatively diminished but relatively clear with no significant adventitial sounds.  All extremities move without range of motion pain.  No outward evidence of head injury or head strike.  Neurologic exam nonfocal at this time.    Plan/MDM:   Syncopal type episode.  89 years old on Eliquis.   Denies significant injury or trauma, but will do baseline labs, head imaging.  Cardiac evaluation.    Please see subsequent provider note for further details and disposition

## 2024-02-27 NOTE — ED PROVIDER NOTES
HPI   Chief Complaint   Patient presents with    Syncope       HPI  Patient presents with shortness of breath and syncope.  The patient is on 6 L oxygen at baseline.  She has both history of CHF and COPD.  She is DNR and does not want to be intubated or be on the ventilator.  She was on CPAP while in the hospital the last visit.  The patient states that she was very tired today and when she got up from sleep she fell hit her bottom.                  [place] Coma Scale Score: 15                     Patient History   Past Medical History:   Diagnosis Date    Angioneurotic edema, initial encounter 08/24/2018    Angioedema of lips    Calculus of gallbladder without cholecystitis without obstruction 02/22/2017    Calculus of gallbladder without cholecystitis without obstruction    Chronic obstructive pulmonary disease, unspecified (CMS/[facility]) 02/20/2017    Chronic obstructive pulmonary disease with hypoxia    Encounter for follow-up examination after completed treatment for conditions other than malignant neoplasm 02/20/2017    Hospital discharge follow-up    Encounter for gynecological examination (general) (routine) without abnormal findings     Encounter for routine gynecological examination    Encounter for screening mammogram for malignant neoplasm of breast     Visit for screening mammogram    Eructation 06/16/2017    Burping    Local infection of the skin and subcutaneous tissue, unspecified 01/05/2017    Infection of skin    Other specified symptoms and signs involving the circulatory and respiratory systems 10/16/2018    Respiratory crackles at right lung base    Pain in unspecified knee 04/11/2014    Pain in patella    Personal history of diseases of the skin and subcutaneous tissue     History of urticaria    Personal history of other diseases of the musculoskeletal system and connective tissue 06/01/2015    History of low back pain    Personal history of other diseases of the respiratory system 04/27/2016     History of acute sinusitis    Personal history of other diseases of the respiratory system 05/19/2017    History of acute bronchitis with bronchospasm    Personal history of other specified conditions 01/05/2017    History of nausea and vomiting    Personal history of other specified conditions 01/09/2017    History of nausea and vomiting    Personal history of other specified conditions 02/23/2016    History of chest pain    Personal history of other specified conditions 04/11/2014    History of fatigue    Personal history of other specified conditions 06/13/2014    History of diarrhea    Personal history of transient ischemic attack (TIA), and cerebral infarction 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 10/16/2018    History of transient cerebral ischemia    Personal history of transient ischemic attack (TIA), and cerebral infarction 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 03/08/2016    History of transient cerebral ischemia    Pneumonia, unspecified organism 02/09/2018    CAP (community acquired pneumonia)    Trochanteric bursitis, unspecified hip 04/23/2015    Greater trochanteric bursitis    Type 2 diabetes mellitus without complications (CMS/HCC) 11/06/2017    Controlled type 2 diabetes mellitus without complication, without long-term current use of insulin     Past Surgical History:   Procedure Laterality Date    APPENDECTOMY  05/15/2013    Appendectomy    CATARACT EXTRACTION  05/15/2013    Cataract Surgery    COLONOSCOPY  04/11/2014    Complete Colonoscopy    HYSTERECTOMY  05/15/2013    Hysterectomy    MR HEAD ANGIO WO IV CONTRAST  1/8/2016    MR HEAD ANGIO WO IV CONTRAST 1/8/2016 [facility]    MR HEAD ANGIO WO IV CONTRAST  10/1/2012    MR HEAD ANGIO WO IV CONTRAST 10/1/2012 [facility]    MR NECK ANGIO W IV CONTRAST  10/1/2012    MR NECK ANGIO W IV CONTRAST 10/1/2012 [facility]GAC[facility]    MR NECK ANGIO WO IV CONTRAST  1/8/2016    MR NECK ANGIO WO IV CONTRAST 1/8/2016 [facility]    OTHER SURGICAL HISTORY   05/15/2013    Cardiac Cath Procedure Outcome: Successful    OTHER SURGICAL HISTORY  02/28/2019    Cholecystectomy    TOTAL KNEE ARTHROPLASTY  05/29/2013    Knee Replacement     Family History   Problem Relation Name Age of Onset    Alzheimer's disease Mother      Heart failure Mother      Stroke Mother          ischemic stroke    Heart attack Father      Stroke Father          ischemic stroke    COPD Sister      Diabetes Sister          mellitus    Cancer Sister      Parkinsonism Sister      Other (previous cardiac prolems) Sister       Social History     Tobacco Use    Smoking status: Never    Smokeless tobacco: Never   Substance Use Topics    Alcohol use: Never    Drug use: Never       Physical Exam   ED Triage Vitals   Temperature Heart Rate Respirations BP   02/26/24 1941 02/26/24 1941 02/26/24 1941 02/26/24 1941   36.9 °C (98.4 °F) 67 18 103/57      Pulse Ox Temp Source Heart Rate Source Patient Position   02/26/24 1941 02/26/24 1941 02/26/24 1941 02/26/24 1941   (!) 87 % Oral Monitor Sitting      BP Location FiO2 (%)     02/26/24 1941 02/27/24 0000     Left arm 60 %       Physical Exam  Vitals and nursing note reviewed.   Constitutional:       General: She is not in acute distress.     Appearance: She is well-developed.   HENT:      Head: Normocephalic and atraumatic.   Eyes:      Conjunctiva/sclera: Conjunctivae normal.   Cardiovascular:      Rate and Rhythm: Normal rate and regular rhythm.      Heart sounds: No murmur heard.  Pulmonary:      Effort: Pulmonary effort is normal. No respiratory distress.      Breath sounds: Normal breath sounds.   Abdominal:      Palpations: Abdomen is soft.      Tenderness: There is no abdominal tenderness.   Musculoskeletal:         General: No swelling.      Cervical back: Neck supple.   Skin:     General: Skin is warm and dry.      Capillary Refill: Capillary refill takes less than 2 seconds.   Neurological:      Mental Status: She is alert.   Psychiatric:         Mood and  Affect: Mood normal.         ED Course & MDM   ED Course as of 02/27/24 0307   Mon Feb 26, 2024 1956 EKG ordered interpreted by ED physician demonstrates sinus rhythm.  65 bpm.  Normal rate rhythm and axis.  No ischemic findings. [KS]      ED Course User Index  [KS] Jose Antonio Carey MD MPH         Diagnoses as of 02/27/24 0307   Hypoxia   Acute congestive heart failure, unspecified heart failure type (CMS/HCC)   Chronic obstructive pulmonary disease, unspecified COPD type (CMS/HCC)   Syncope, unspecified syncope type       Medical Decision Making  I discussed with the family and the patient themselves about whether or not she would be willing to be on a ventilator.  They were opposed to this.  They are okay with the CPAP/BiPAP mask.  They are given medications but would not want to have compressions or intubation at this time.    EKG interpreted by myself.  Normal sinus rhythm at a rate of 65 bpm.  Normal intervals.  Normal axis.  No signs of acute ischemia.      Procedure  Procedures     Jasiel Dixon MD  02/27/24 0309

## 2024-02-27 NOTE — ED TRIAGE NOTES
Pt presents to the ED for a syncopal episode that happened at her assisted living facility. Pt stated around lunch time she wasn't able to stay awake and then went to her room after and then later in the day she fell landing on her butt, pt states she did not loose consciousness. pt states she's on eliquis. Pt is chronically on 6L NC. Pt has COPD.    normal... Well appearing, well nourished, awake, alert, oriented to person, place, time/situation and in no apparent distress.

## 2024-02-28 ENCOUNTER — PATIENT OUTREACH (OUTPATIENT)
Dept: PRIMARY CARE | Facility: CLINIC | Age: 89
End: 2024-02-28
Payer: MEDICARE

## 2024-02-28 NOTE — PROGRESS NOTES
Discharge Facility:[facility]  Discharge Diagnosis:Postural Dizziness  Admission Date:2/26/24  Discharge Date: 2/27/24    PCP Appointment Date:2/29/24  Specialist Appointment Date: N/A  Hospital Encounter and Summary: Linked   See discharge assessment below for further details  Engagement  Call Start Time: 1331 (2/28/2024  1:31 PM)    Medications  Medications reviewed with patient/caregiver?: Yes (2/28/2024  1:31 PM)  Is the patient having any side effects they believe may be caused by any medication additions or changes?: No (2/28/2024  1:31 PM)  Does the patient have all medications ordered at discharge?: Yes (2/28/2024  1:31 PM)  Prescription Comments: START taking: bisacodyl (Dulcolax)  CHANGE how you take: cyclobenzaprine (Flexeril) guaiFENesin (Mucinex) polyethylene glycol (Glycolax, Miralax) STOP taking: amLODIPine 2.5 mg tablet (Norvasc) loperamide 2 mg capsule (Imodium (2/28/2024  1:31 PM)  Is the patient taking all medications as directed (includes completed medication regime)?: Yes (2/28/2024  1:31 PM)  Medication Comments: see med list (2/28/2024  1:31 PM)    Appointments  Does the patient have a primary care provider?: Yes (2/28/2024  1:31 PM)  Care Management Interventions: Verified appointment date/time/provider ([facility] 2/29/24) (2/28/2024  1:31 PM)  Care Management Interventions: Advised patient to keep appointment (2/28/2024  1:31 PM)    Patient Teaching  Does the patient have access to their discharge instructions?: Yes (2/28/2024  1:31 PM)  Care Management Interventions: Reviewed instructions with patient (2/28/2024  1:31 PM)  What is the patient's perception of their health status since discharge?: Improving (2/28/2024  1:31 PM)  Is the patient/caregiver able to teach back the hierarchy of who to call/visit for symptoms/problems? PCP, Specialist, Home Health nurse, Urgent Care, ED, 911: Yes (2/28/2024  1:31 PM)      Nuris Perry LPN

## 2024-02-29 ENCOUNTER — OFFICE VISIT (OUTPATIENT)
Dept: PRIMARY CARE | Facility: CLINIC | Age: 89
End: 2024-02-29
Payer: MEDICARE

## 2024-02-29 ENCOUNTER — LAB (OUTPATIENT)
Dept: LAB | Facility: LAB | Age: 89
End: 2024-02-29
Payer: MEDICARE

## 2024-02-29 VITALS
BODY MASS INDEX: 30.7 KG/M2 | SYSTOLIC BLOOD PRESSURE: 109 MMHG | WEIGHT: 152 LBS | DIASTOLIC BLOOD PRESSURE: 64 MMHG | OXYGEN SATURATION: 91 % | HEART RATE: 72 BPM

## 2024-02-29 DIAGNOSIS — I26.99 OTHER PULMONARY EMBOLISM WITHOUT ACUTE COR PULMONALE, UNSPECIFIED CHRONICITY (MULTI): ICD-10-CM

## 2024-02-29 DIAGNOSIS — F33.9 RECURRENT MAJOR DEPRESSIVE DISORDER, REMISSION STATUS UNSPECIFIED (CMS-HCC): ICD-10-CM

## 2024-02-29 DIAGNOSIS — R55 POSTURAL DIZZINESS WITH PRESYNCOPE: ICD-10-CM

## 2024-02-29 DIAGNOSIS — N18.31 STAGE 3A CHRONIC KIDNEY DISEASE (MULTI): ICD-10-CM

## 2024-02-29 DIAGNOSIS — R42 POSTURAL DIZZINESS WITH PRESYNCOPE: ICD-10-CM

## 2024-02-29 DIAGNOSIS — J96.11 CHRONIC RESPIRATORY FAILURE WITH HYPOXIA (MULTI): Chronic | ICD-10-CM

## 2024-02-29 DIAGNOSIS — I50.30 HEART FAILURE WITH PRESERVED EJECTION FRACTION, UNSPECIFIED HF CHRONICITY (MULTI): ICD-10-CM

## 2024-02-29 DIAGNOSIS — Z87.898: ICD-10-CM

## 2024-02-29 DIAGNOSIS — I26.99 OTHER ACUTE PULMONARY EMBOLISM, UNSPECIFIED WHETHER ACUTE COR PULMONALE PRESENT (MULTI): ICD-10-CM

## 2024-02-29 DIAGNOSIS — K92.1 BLACK STOOL: ICD-10-CM

## 2024-02-29 DIAGNOSIS — Z00.00 ENCOUNTER FOR PREVENTATIVE ADULT HEALTH CARE EXAMINATION: Primary | ICD-10-CM

## 2024-02-29 DIAGNOSIS — Z00.00 ENCOUNTER FOR PREVENTATIVE ADULT HEALTH CARE EXAMINATION: ICD-10-CM

## 2024-02-29 DIAGNOSIS — I10 PRIMARY HYPERTENSION: ICD-10-CM

## 2024-02-29 DIAGNOSIS — J18.9 COMMUNITY ACQUIRED PNEUMONIA, UNSPECIFIED LATERALITY: ICD-10-CM

## 2024-02-29 LAB
ALBUMIN SERPL BCP-MCNC: 4 G/DL (ref 3.4–5)
ALP SERPL-CCNC: 75 U/L (ref 33–136)
ALT SERPL W P-5'-P-CCNC: 16 U/L (ref 7–45)
ANION GAP SERPL CALC-SCNC: 17 MMOL/L (ref 10–20)
AST SERPL W P-5'-P-CCNC: 10 U/L (ref 9–39)
ATRIAL RATE: 65 BPM
ATRIAL RATE: 78 BPM
BASOPHILS # BLD AUTO: 0.1 X10*3/UL (ref 0–0.1)
BASOPHILS NFR BLD AUTO: 0.8 %
BILIRUB SERPL-MCNC: 0.5 MG/DL (ref 0–1.2)
BUN SERPL-MCNC: 27 MG/DL (ref 6–23)
CALCIUM SERPL-MCNC: 9.3 MG/DL (ref 8.6–10.6)
CHLORIDE SERPL-SCNC: 99 MMOL/L (ref 98–107)
CO2 SERPL-SCNC: 27 MMOL/L (ref 21–32)
CREAT SERPL-MCNC: 1.01 MG/DL (ref 0.5–1.05)
EGFRCR SERPLBLD CKD-EPI 2021: 53 ML/MIN/1.73M*2
EOSINOPHIL # BLD AUTO: 0.08 X10*3/UL (ref 0–0.4)
EOSINOPHIL NFR BLD AUTO: 0.6 %
ERYTHROCYTE [DISTWIDTH] IN BLOOD BY AUTOMATED COUNT: 14.7 % (ref 11.5–14.5)
FERRITIN SERPL-MCNC: 259 NG/ML (ref 8–150)
GLUCOSE SERPL-MCNC: 152 MG/DL (ref 74–99)
HCT VFR BLD AUTO: 49.3 % (ref 36–46)
HGB BLD-MCNC: 14.9 G/DL (ref 12–16)
IMM GRANULOCYTES # BLD AUTO: 0.05 X10*3/UL (ref 0–0.5)
IMM GRANULOCYTES NFR BLD AUTO: 0.4 % (ref 0–0.9)
IRON SATN MFR SERPL: 26 % (ref 25–45)
IRON SERPL-MCNC: 71 UG/DL (ref 35–150)
LYMPHOCYTES # BLD AUTO: 2.49 X10*3/UL (ref 0.8–3)
LYMPHOCYTES NFR BLD AUTO: 19.8 %
MCH RBC QN AUTO: 27.7 PG (ref 26–34)
MCHC RBC AUTO-ENTMCNC: 30.2 G/DL (ref 32–36)
MCV RBC AUTO: 92 FL (ref 80–100)
MONOCYTES # BLD AUTO: 0.85 X10*3/UL (ref 0.05–0.8)
MONOCYTES NFR BLD AUTO: 6.8 %
NEUTROPHILS # BLD AUTO: 9 X10*3/UL (ref 1.6–5.5)
NEUTROPHILS NFR BLD AUTO: 71.6 %
NRBC BLD-RTO: 0 /100 WBCS (ref 0–0)
P AXIS: 40 DEGREES
P AXIS: 58 DEGREES
P OFFSET: 187 MS
P OFFSET: 202 MS
P ONSET: 126 MS
P ONSET: 142 MS
PLATELET # BLD AUTO: 264 X10*3/UL (ref 150–450)
POTASSIUM SERPL-SCNC: 4.1 MMOL/L (ref 3.5–5.3)
PR INTERVAL: 170 MS
PR INTERVAL: 176 MS
PROT SERPL-MCNC: 6.9 G/DL (ref 6.4–8.2)
Q ONSET: 214 MS
Q ONSET: 227 MS
QRS COUNT: 10 BEATS
QRS COUNT: 13 BEATS
QRS DURATION: 78 MS
QRS DURATION: 88 MS
QT INTERVAL: 392 MS
QT INTERVAL: 430 MS
QTC CALCULATION(BAZETT): 446 MS
QTC CALCULATION(BAZETT): 447 MS
QTC FREDERICIA: 427 MS
QTC FREDERICIA: 441 MS
R AXIS: -5 DEGREES
R AXIS: -8 DEGREES
RBC # BLD AUTO: 5.37 X10*6/UL (ref 4–5.2)
SODIUM SERPL-SCNC: 139 MMOL/L (ref 136–145)
T AXIS: 60 DEGREES
T AXIS: 63 DEGREES
T OFFSET: 410 MS
T OFFSET: 442 MS
TIBC SERPL-MCNC: 269 UG/DL (ref 240–445)
UIBC SERPL-MCNC: 198 UG/DL (ref 110–370)
VENTRICULAR RATE: 65 BPM
VENTRICULAR RATE: 78 BPM
WBC # BLD AUTO: 12.6 X10*3/UL (ref 4.4–11.3)

## 2024-02-29 PROCEDURE — 3074F SYST BP LT 130 MM HG: CPT | Performed by: INTERNAL MEDICINE

## 2024-02-29 PROCEDURE — 1159F MED LIST DOCD IN RCRD: CPT | Performed by: INTERNAL MEDICINE

## 2024-02-29 PROCEDURE — 99495 TRANSJ CARE MGMT MOD F2F 14D: CPT | Performed by: INTERNAL MEDICINE

## 2024-02-29 PROCEDURE — 83540 ASSAY OF IRON: CPT

## 2024-02-29 PROCEDURE — 3078F DIAST BP <80 MM HG: CPT | Performed by: INTERNAL MEDICINE

## 2024-02-29 PROCEDURE — 1126F AMNT PAIN NOTED NONE PRSNT: CPT | Performed by: INTERNAL MEDICINE

## 2024-02-29 PROCEDURE — 83550 IRON BINDING TEST: CPT

## 2024-02-29 PROCEDURE — 82728 ASSAY OF FERRITIN: CPT

## 2024-02-29 PROCEDURE — 80053 COMPREHEN METABOLIC PANEL: CPT

## 2024-02-29 PROCEDURE — 1036F TOBACCO NON-USER: CPT | Performed by: INTERNAL MEDICINE

## 2024-02-29 PROCEDURE — 85025 COMPLETE CBC W/AUTO DIFF WBC: CPT

## 2024-02-29 PROCEDURE — 1160F RVW MEDS BY RX/DR IN RCRD: CPT | Performed by: INTERNAL MEDICINE

## 2024-02-29 PROCEDURE — 36415 COLL VENOUS BLD VENIPUNCTURE: CPT

## 2024-02-29 PROCEDURE — 1157F ADVNC CARE PLAN IN RCRD: CPT | Performed by: INTERNAL MEDICINE

## 2024-02-29 RX ORDER — PANTOPRAZOLE SODIUM 40 MG/1
40 TABLET, DELAYED RELEASE ORAL DAILY
Qty: 90 TABLET | Refills: 1 | Status: SHIPPED | OUTPATIENT
Start: 2024-02-29 | End: 2024-04-04 | Stop reason: WASHOUT

## 2024-02-29 ASSESSMENT — PAIN SCALES - GENERAL: PAINLEVEL: 0-NO PAIN

## 2024-02-29 ASSESSMENT — PATIENT HEALTH QUESTIONNAIRE - PHQ9
SUM OF ALL RESPONSES TO PHQ9 QUESTIONS 1 AND 2: 0
2. FEELING DOWN, DEPRESSED OR HOPELESS: NOT AT ALL
1. LITTLE INTEREST OR PLEASURE IN DOING THINGS: NOT AT ALL

## 2024-02-29 NOTE — PATIENT INSTRUCTIONS
Lissett,   Constipation   Add dulcolax to your miralax every day.  For dark stools, check blood test today   Start PANTOPRAZOLE once per day to protect the stomach   If concerning, we may want to have you see a gastroenterologist    I am referring you to pulmonology. I recommend Dr. Sriram Beatty or Dr. Mal Brown.  Followup 3 months for ANNUAL WELLNESS VISIT         Ways to Help Prevent Falls at Home    Quick Tips   ? Ask for help if you need it. Most people want to help!   ? Get up slowly after sitting or laying down   ? Wear a medical alert device or keep cell phone in your pocket   ? Use night lights, especially areas near a bathroom   ? Keep the items you use often within reach on a small stool or end table   ? Use an assistive device such as walker or cane, as directed by provider/physical therapy   ? Use a non-slip mat and grab bars in your bathroom. Look for home health sections for best options     Other Areas to Focus On   ? Exercise and nutrition: Regular exercise or taking a falls prevention class are great ways improve strength and balance. Don’t forget to stay hydrated and bring a snack!   ? Medicine side effects: Some medicines can make you sleepy or dizzy, which could cause a fall. Ask your healthcare provider about the side effects your medicines could cause. Be sure to let them know if you take any vitamins or supplements as well.   ? Tripping hazards: Remove items you could trip on, such as loose mats, rugs, cords, and clutter. Wear closed toe shoes with rubber soles.   ? Health and wellness: Get regular checkups with your healthcare provider, plus routine vision and hearing screenings. Talk with your healthcare provider about:   o Your medicines and the possible side effects - bring them in a bag if that is easier!   o Problems with balance or feeling dizzy   o Ways to promote bone health, such as Vitamin D and calcium supplements   o Questions or concerns about falling     *Ask your healthcare  team if you have questions     ©[facility], 2022

## 2024-02-29 NOTE — PROGRESS NOTES
"Patient: Lissett Rodríguez  : 1933  PCP: Radha Anderson DO  MRN: [id number]  Program: No linked episodes     Lissett Rodríguez is a 89 y.o. female presenting today for follow-up after being discharged from the hospital 2 days ago. The main problem requiring admission was postprandial hypotension the discharge summary and/or Transitional Care Management documentation was reviewed. Medication reconciliation was performed as indicated via the \"Mic as Reviewed\" timestamp.     Lissett Rodríguez was contacted by Transitional Care Management services two days after her discharge. This encounter and supporting documentation was reviewed.    The complexity of medical decision making for this patient's transitional care is moderate.    89-year-old female here posthospital follow-up, admitted  discharged  with a diagnosis of postural dizziness with presyncope.  She had noted being sleepy and fatigued after eating with possible lightheadedness diagnosed with likely postprandial hypertension, recommended discontinuation of amlodipine.  There is also concern regarding constipation recommended Dulcolax in addition to MiraLAX and subsequently discharged.  Prior to this she was admitted to the hospital in January discharged on  subsequently seen by Dr. Guzmán.  She was diagnosed with pneumonia, PE and respiratory failure treated with IV antibiotics, discharged on Eliquis given that she was already on Xarelto.    Today she notes increased eructation and bloating. Has noted some black \"tootsie rolls\" and brown stools, also with intermittent diarrhea and then a well formed but soft bowel movement. This tends to occur in cycles, though not necessarily one after the other. Denies pain associated with bowel movements. The black stools occur ever since she left the hospital on the . Bowel movements are not every day typically every 2-3 days and alternating.    PMHx:    - Bilateral neck and right shoulder pain -completed " PT.   - HfPEF with two hospitalizations due to exacerbation - On lasix daily as well as Farxiga\, previously in cardiac rehab continues home exercises as well as group exercises   - COPD with hypoxia - with MANUEL thought multifactorial due to pulmonary edema and COPD with HFpEF and mediastinal adenopathy -seen by pulmonology recommended discontinuation of Symbicort, followed by pulmonology on 5L home O2.   - HTN stopped amlodipine in the hospital out of concern for postprandial hypotension.   - KALPANA on CPAP no issues with it present.  - Hearing loss - getting hearing aids   - Acquired Deafness recommended hearing aids needs repair   - AFib on eliquis  and amiodarone   - DM2 with neuropathy  on Farxiga  - last A1C 7.8%, referred to neurology and OT for management of neuropathy   - HLD on atorvastatin 40mg   - TIA   - Memory loss   - Poor vision   - Obesity      Social:   - Lives in [facility] alone, son lives nearby who checks in regularly   - 14 grandchildren and 7 great-grandchildren  - Has a boyfriend at her facility   Physical Exam  General: Appears comfortable, NAD, appropriate affect, speaking full sentences,   HEENT: NCAT, EOMI, pupils symmetric, no conjunctival erythema, NC in place   Neck: Supple, no LAD   Heart: RRR S1 S2 no murmurs appreciated   Lungs: diminished breath sounds   Abdomen: Soft, NT/ND, no rebound or guarding, NABS   Extremities: no cyanosis or edema appreciated  Neuro: AAO x 3, answers questions appropriately, no FND, gait slow, uses walker.   Rectal: no external anal lesions, rectal exam revealing soft brown stool, guiac negative, Chaperone offered and declined.        Assessment/Plan   Problem List Items Addressed This Visit             ICD-10-CM    (HFpEF) heart failure with preserved ejection fraction (CMS/HCC) I50.30     Without current evidence of exacerbation, continue daily lasix and farxiga. Continues home exercises, completed cardiac rehab.         Postural dizziness with  presyncope R42, R55     Orthostatics negative today.         Chronic respiratory failure with hypoxia (CMS/[facility]) (Chronic) J96.11     Stable on current O2, needs to re-establish care with pulmonology.         Relevant Orders    Follow Up In Advanced Primary Care - PCP - Medicare Annual    RESOLVED: Recurrent major depressive disorder, remission status unspecified (CMS/[facility]) F33.9    Stage 3a chronic kidney disease (CMS/[facility]) N18.31     Recheck to ensure stability.         Other pulmonary embolism without acute cor pulmonale (CMS/[facility]) I26.99     While on xarelto, now switched to eliquis           Other Visit Diagnoses         Codes    Encounter for preventative adult health care examination    -  Primary Z00.00    Relevant Orders    CBC and Auto Differential (Completed)    Black stool     K92.1    Guiac negaitve today, though concern regarding GI. Obtain bloodwork start PPI, consider GI referral. Warning signs reviewed .     Relevant Medications    pantoprazole (ProtoNix) 40 mg EC tablet    Other Relevant Orders    Ferritin (Completed)    Iron and TIBC (Completed)    Occult Blood, Stool    H/O blood loss     Z87.898    Relevant Orders    CBC and Auto Differential (Completed)            Review of Systems    Family History   Problem Relation Name Age of Onset    Alzheimer's disease Mother      Heart failure Mother      Stroke Mother          ischemic stroke    Heart attack Father      Stroke Father          ischemic stroke    COPD Sister      Diabetes Sister          mellitus    Cancer Sister      Parkinsonism Sister      Other (previous cardiac prolems) Sister         Engagement  Call Start Time: 1331 (2/28/2024  1:31 PM)    Medications  Medications reviewed with patient/caregiver?: Yes (2/28/2024  1:31 PM)  Is the patient having any side effects they believe may be caused by any medication additions or changes?: No (2/28/2024  1:31 PM)  Does the patient have all medications ordered at discharge?: Yes (2/28/2024  1:31  PM)  Prescription Comments: START taking: bisacodyl (Dulcolax)  CHANGE how you take: cyclobenzaprine (Flexeril) guaiFENesin (Mucinex) polyethylene glycol (Glycolax, Miralax) STOP taking: amLODIPine 2.5 mg tablet (Norvasc) loperamide 2 mg capsule (Imodium (2/28/2024  1:31 PM)  Is the patient taking all medications as directed (includes completed medication regime)?: Yes (2/28/2024  1:31 PM)  Medication Comments: see med list (2/28/2024  1:31 PM)    Appointments  Does the patient have a primary care provider?: Yes (2/28/2024  1:31 PM)  Care Management Interventions: Verified appointment date/time/provider ([facility] 2/29/24) (2/28/2024  1:31 PM)  Care Management Interventions: Advised patient to keep appointment (2/28/2024  1:31 PM)    Patient Teaching  Does the patient have access to their discharge instructions?: Yes (2/28/2024  1:31 PM)  Care Management Interventions: Reviewed instructions with patient (2/28/2024  1:31 PM)  What is the patient's perception of their health status since discharge?: Improving (2/28/2024  1:31 PM)  Is the patient/caregiver able to teach back the hierarchy of who to call/visit for symptoms/problems? PCP, Specialist, Home Health nurse, Urgent Care, ED, 911: Yes (2/28/2024  1:31 PM)        No follow-ups on file.    Followup 3 months Patient was identified as a fall risk. Risk prevention instructions provided.

## 2024-03-01 NOTE — ASSESSMENT & PLAN NOTE
Without current evidence of exacerbation, continue daily lasix and farxiga. Continues home exercises, completed cardiac rehab.

## 2024-03-02 ENCOUNTER — LAB (OUTPATIENT)
Dept: LAB | Facility: LAB | Age: 89
End: 2024-03-02
Payer: MEDICARE

## 2024-03-02 PROCEDURE — 82270 OCCULT BLOOD FECES: CPT | Performed by: INTERNAL MEDICINE

## 2024-03-02 PROCEDURE — 87493 C DIFF AMPLIFIED PROBE: CPT | Mod: 59 | Performed by: EMERGENCY MEDICINE

## 2024-03-02 PROCEDURE — 87506 IADNA-DNA/RNA PROBE TQ 6-11: CPT | Performed by: EMERGENCY MEDICINE

## 2024-03-03 LAB
C COLI+JEJ+UPSA DNA STL QL NAA+PROBE: NOT DETECTED
C DIF TOX TCDA+TCDB STL QL NAA+PROBE: NOT DETECTED
EC STX1 GENE STL QL NAA+PROBE: NOT DETECTED
EC STX2 GENE STL QL NAA+PROBE: NOT DETECTED
HEMOCCULT SP1 STL QL: NEGATIVE
NOROVIRUS GI + GII RNA STL NAA+PROBE: NOT DETECTED
RV RNA STL NAA+PROBE: NOT DETECTED
SALMONELLA DNA STL QL NAA+PROBE: NOT DETECTED
SHIGELLA DNA SPEC QL NAA+PROBE: NOT DETECTED
V CHOLERAE DNA STL QL NAA+PROBE: NOT DETECTED
Y ENTEROCOL DNA STL QL NAA+PROBE: NOT DETECTED

## 2024-03-27 ENCOUNTER — PATIENT OUTREACH (OUTPATIENT)
Dept: PRIMARY CARE | Facility: CLINIC | Age: 89
End: 2024-03-27
Payer: MEDICARE

## 2024-04-04 ENCOUNTER — OFFICE VISIT (OUTPATIENT)
Dept: PRIMARY CARE | Facility: CLINIC | Age: 89
End: 2024-04-04
Payer: MEDICARE

## 2024-04-04 VITALS
HEART RATE: 94 BPM | OXYGEN SATURATION: 84 % | WEIGHT: 157 LBS | BODY MASS INDEX: 31.71 KG/M2 | SYSTOLIC BLOOD PRESSURE: 115 MMHG | DIASTOLIC BLOOD PRESSURE: 55 MMHG

## 2024-04-04 DIAGNOSIS — Z91.81 AT HIGH RISK FOR FALLS: ICD-10-CM

## 2024-04-04 DIAGNOSIS — H91.93 HEARING DIFFICULTY OF BOTH EARS: ICD-10-CM

## 2024-04-04 DIAGNOSIS — I48.91 ATRIAL FIBRILLATION, UNSPECIFIED TYPE (MULTI): Chronic | ICD-10-CM

## 2024-04-04 DIAGNOSIS — R26.9 GAIT DIFFICULTY: ICD-10-CM

## 2024-04-04 DIAGNOSIS — I10 PRIMARY HYPERTENSION: Chronic | ICD-10-CM

## 2024-04-04 DIAGNOSIS — Z23 IMMUNIZATION DUE: ICD-10-CM

## 2024-04-04 DIAGNOSIS — I26.99 OTHER PULMONARY EMBOLISM WITHOUT ACUTE COR PULMONALE, UNSPECIFIED CHRONICITY (MULTI): ICD-10-CM

## 2024-04-04 DIAGNOSIS — G89.29 CHRONIC LOW BACK PAIN, UNSPECIFIED BACK PAIN LATERALITY, UNSPECIFIED WHETHER SCIATICA PRESENT: ICD-10-CM

## 2024-04-04 DIAGNOSIS — M54.50 CHRONIC LOW BACK PAIN, UNSPECIFIED BACK PAIN LATERALITY, UNSPECIFIED WHETHER SCIATICA PRESENT: ICD-10-CM

## 2024-04-04 DIAGNOSIS — E11.9 DIABETES MELLITUS TYPE 2 WITHOUT RETINOPATHY (MULTI): ICD-10-CM

## 2024-04-04 DIAGNOSIS — Z00.00 ROUTINE GENERAL MEDICAL EXAMINATION AT HEALTH CARE FACILITY: Primary | ICD-10-CM

## 2024-04-04 DIAGNOSIS — R42 POSTURAL DIZZINESS WITH PRESYNCOPE: ICD-10-CM

## 2024-04-04 DIAGNOSIS — Z78.0 ASYMPTOMATIC MENOPAUSAL STATE: ICD-10-CM

## 2024-04-04 DIAGNOSIS — I50.30 HEART FAILURE WITH PRESERVED EJECTION FRACTION, UNSPECIFIED HF CHRONICITY (MULTI): ICD-10-CM

## 2024-04-04 DIAGNOSIS — R55 POSTURAL DIZZINESS WITH PRESYNCOPE: ICD-10-CM

## 2024-04-04 PROBLEM — K21.9 ACID REFLUX: Status: RESOLVED | Noted: 2023-03-10 | Resolved: 2024-04-04

## 2024-04-04 PROBLEM — N17.9 ACUTE KIDNEY INJURY (CMS-HCC): Status: RESOLVED | Noted: 2023-08-29 | Resolved: 2024-04-04

## 2024-04-04 PROBLEM — R60.0 PEDAL EDEMA: Status: RESOLVED | Noted: 2023-03-10 | Resolved: 2024-04-04

## 2024-04-04 PROBLEM — R18.8 ASCITES: Status: RESOLVED | Noted: 2023-03-10 | Resolved: 2024-04-04

## 2024-04-04 PROBLEM — I87.8 VENOUS STASIS: Status: RESOLVED | Noted: 2023-03-10 | Resolved: 2024-04-04

## 2024-04-04 PROBLEM — R93.89 ABNORMAL CT OF THE CHEST: Status: RESOLVED | Noted: 2023-03-10 | Resolved: 2024-04-04

## 2024-04-04 PROBLEM — S29.001A: Status: RESOLVED | Noted: 2023-03-10 | Resolved: 2024-04-04

## 2024-04-04 PROBLEM — R10.9 ABDOMINAL PAIN: Status: RESOLVED | Noted: 2023-03-10 | Resolved: 2024-04-04

## 2024-04-04 PROBLEM — Z79.899 HIGH RISK MEDICATION USE: Status: RESOLVED | Noted: 2023-08-29 | Resolved: 2024-04-04

## 2024-04-04 PROBLEM — R10.32 LEFT GROIN PAIN: Status: RESOLVED | Noted: 2023-03-10 | Resolved: 2024-04-04

## 2024-04-04 LAB — POC HEMOGLOBIN A1C: 7.6 % (ref 4.2–6.5)

## 2024-04-04 PROCEDURE — 1124F ACP DISCUSS-NO DSCNMKR DOCD: CPT | Performed by: INTERNAL MEDICINE

## 2024-04-04 PROCEDURE — 1160F RVW MEDS BY RX/DR IN RCRD: CPT | Performed by: INTERNAL MEDICINE

## 2024-04-04 PROCEDURE — 1170F FXNL STATUS ASSESSED: CPT | Performed by: INTERNAL MEDICINE

## 2024-04-04 PROCEDURE — 99214 OFFICE O/P EST MOD 30 MIN: CPT | Performed by: INTERNAL MEDICINE

## 2024-04-04 PROCEDURE — G0439 PPPS, SUBSEQ VISIT: HCPCS | Performed by: INTERNAL MEDICINE

## 2024-04-04 PROCEDURE — 83036 HEMOGLOBIN GLYCOSYLATED A1C: CPT | Performed by: INTERNAL MEDICINE

## 2024-04-04 PROCEDURE — 1157F ADVNC CARE PLAN IN RCRD: CPT | Performed by: INTERNAL MEDICINE

## 2024-04-04 PROCEDURE — 90677 PCV20 VACCINE IM: CPT | Performed by: INTERNAL MEDICINE

## 2024-04-04 PROCEDURE — 1159F MED LIST DOCD IN RCRD: CPT | Performed by: INTERNAL MEDICINE

## 2024-04-04 PROCEDURE — G0009 ADMIN PNEUMOCOCCAL VACCINE: HCPCS | Performed by: INTERNAL MEDICINE

## 2024-04-04 PROCEDURE — 3078F DIAST BP <80 MM HG: CPT | Performed by: INTERNAL MEDICINE

## 2024-04-04 PROCEDURE — 3074F SYST BP LT 130 MM HG: CPT | Performed by: INTERNAL MEDICINE

## 2024-04-04 PROCEDURE — 1036F TOBACCO NON-USER: CPT | Performed by: INTERNAL MEDICINE

## 2024-04-04 PROCEDURE — 1126F AMNT PAIN NOTED NONE PRSNT: CPT | Performed by: INTERNAL MEDICINE

## 2024-04-04 RX ORDER — METFORMIN HYDROCHLORIDE 1000 MG/1
TABLET ORAL
COMMUNITY
Start: 2010-10-29 | End: 2024-04-04 | Stop reason: WASHOUT

## 2024-04-04 RX ORDER — MULTIVITAMIN
TABLET ORAL
COMMUNITY
Start: 2007-12-07 | End: 2024-04-04 | Stop reason: WASHOUT

## 2024-04-04 RX ORDER — DEXTROSE MONOHYDRATE 100 MG/ML
INJECTION, SOLUTION INTRAVENOUS
COMMUNITY
Start: 2024-01-19 | End: 2024-04-04 | Stop reason: WASHOUT

## 2024-04-04 RX ORDER — BISACODYL 10 MG/1
SUPPOSITORY RECTAL
COMMUNITY

## 2024-04-04 RX ORDER — SAXAGLIPTIN 5 MG/1
TABLET, FILM COATED ORAL
COMMUNITY
End: 2024-04-04 | Stop reason: WASHOUT

## 2024-04-04 RX ORDER — LISINOPRIL AND HYDROCHLOROTHIAZIDE 20; 25 MG/1; MG/1
TABLET ORAL
COMMUNITY
End: 2024-04-04 | Stop reason: WASHOUT

## 2024-04-04 RX ORDER — ASPIRIN 81 MG/1
TABLET ORAL
COMMUNITY
End: 2024-04-04 | Stop reason: WASHOUT

## 2024-04-04 RX ORDER — LOSARTAN POTASSIUM AND HYDROCHLOROTHIAZIDE 25; 100 MG/1; MG/1
TABLET ORAL
COMMUNITY
Start: 2010-10-29 | End: 2024-04-04 | Stop reason: WASHOUT

## 2024-04-04 RX ORDER — NAPROXEN SODIUM 220 MG
TABLET ORAL EVERY 12 HOURS
COMMUNITY
End: 2024-04-04 | Stop reason: WASHOUT

## 2024-04-04 RX ORDER — METOPROLOL TARTRATE 25 MG/1
TABLET, FILM COATED ORAL EVERY 12 HOURS
COMMUNITY
Start: 2016-02-19 | End: 2024-04-04 | Stop reason: WASHOUT

## 2024-04-04 RX ORDER — EZETIMIBE 10 MG/1
TABLET ORAL
COMMUNITY
Start: 2010-10-29 | End: 2024-04-04 | Stop reason: WASHOUT

## 2024-04-04 RX ORDER — TALC
POWDER (GRAM) TOPICAL
COMMUNITY
Start: 2024-01-19 | End: 2024-04-04 | Stop reason: WASHOUT

## 2024-04-04 RX ORDER — FAMOTIDINE 40 MG/1
TABLET, FILM COATED ORAL
COMMUNITY
Start: 2018-07-20 | End: 2024-04-04 | Stop reason: WASHOUT

## 2024-04-04 RX ORDER — FORMOTEROL FUMARATE DIHYDRATE 20 UG/2ML
SOLUTION RESPIRATORY (INHALATION) EVERY 12 HOURS
COMMUNITY
Start: 2024-01-19 | End: 2024-04-04 | Stop reason: WASHOUT

## 2024-04-04 RX ORDER — FLUTICASONE PROPIONATE 110 UG/1
AEROSOL, METERED RESPIRATORY (INHALATION)
COMMUNITY
Start: 2010-05-10 | End: 2024-04-04 | Stop reason: WASHOUT

## 2024-04-04 RX ORDER — OXYBUTYNIN CHLORIDE 5 MG/1
TABLET, EXTENDED RELEASE ORAL
COMMUNITY
Start: 2024-01-03 | End: 2024-04-04 | Stop reason: WASHOUT

## 2024-04-04 RX ORDER — DOCUSATE SODIUM 100 MG/1
CAPSULE, LIQUID FILLED ORAL 2 TIMES DAILY PRN
COMMUNITY
Start: 2017-02-03 | End: 2024-04-04 | Stop reason: WASHOUT

## 2024-04-04 RX ORDER — WARFARIN SODIUM 5 MG/1
TABLET ORAL
COMMUNITY
End: 2024-04-04 | Stop reason: WASHOUT

## 2024-04-04 ASSESSMENT — PATIENT HEALTH QUESTIONNAIRE - PHQ9
SUM OF ALL RESPONSES TO PHQ9 QUESTIONS 1 AND 2: 0
SUM OF ALL RESPONSES TO PHQ9 QUESTIONS 1 AND 2: 0
2. FEELING DOWN, DEPRESSED OR HOPELESS: NOT AT ALL
1. LITTLE INTEREST OR PLEASURE IN DOING THINGS: NOT AT ALL
1. LITTLE INTEREST OR PLEASURE IN DOING THINGS: NOT AT ALL
2. FEELING DOWN, DEPRESSED OR HOPELESS: NOT AT ALL

## 2024-04-04 ASSESSMENT — ACTIVITIES OF DAILY LIVING (ADL)
BATHING: INDEPENDENT
DRESSING: INDEPENDENT
GROCERY_SHOPPING: NEEDS ASSISTANCE
DOING_HOUSEWORK: NEEDS ASSISTANCE
TAKING_MEDICATION: INDEPENDENT
MANAGING_FINANCES: TOTAL CARE

## 2024-04-04 ASSESSMENT — PAIN SCALES - GENERAL: PAINLEVEL: 0-NO PAIN

## 2024-04-04 NOTE — PATIENT INSTRUCTIONS
Lissett,   Try increasing fiber in the diet - add 4 prunes per day OR 2 kiwis. You can also take metamucil or benefiber (which is a fiber supplement) that can be found over the counter.  Physical therapy referrals for balance training and back pain   Referral to ophthalmology for diabetic eye exam   Bone density test - please call [phone] or stop by the radiology department on the 1st floor to have this scheduled.   Followup 3 months

## 2024-04-04 NOTE — PROGRESS NOTES
Subjective     Patient ID: Lissett Rodríguez is a 89 y.o. female who presents for Follow-up and Medicare Annual Wellness Visit Subsequent, annual wellness visit and followup of chronic conditions.   HPI    89-year-old female here for annual wellness visit and follow-up of chronic conditions, last seen last month after hospital discharge diagnosed with postprandial hypotension and constipation.  At that time there was noted worsening eructation and bloating with concern for dark stools.  She was started on PPI, labs were unremarkable. Symptoms resolved, then noted a formed bowel movement, a few episodes of light brown diarrhea and then a darker less formed stool today, denies pain, weakness, watery bowel movements, no fevers, no chills. Diet consists of salad, meats, vegetables, potatoes, rare desserts.     PMHx:    - Bilateral neck and right shoulder pain -completed PT.   - HfPEF with two hospitalizations due to exacerbation - On lasix daily as well as Farxiga, previously in cardiac rehab continues home exercises as well as group exercises Followed by Dr. Hinkle last seen in November   - COPD with hypoxia - with MANUEL thought multifactorial due to pulmonary edema and COPD with HFpEF and mediastinal adenopathy - followed by pulmonology on 5L home O2. Stable, upcoming visit scheduled in May 8 .   - PE -  in January/24  along with pneumonia noted, already on Eliquis   - HTN - no longer on medications   - KALPANA - compliant on CPAP no issues with it present.  - Hearing loss - with acquired deafness - has hearing aids notes persistent trouble hearing.  - AFib - on eliquis and amiodarone   - DM2 - with neuropathy on Farxiga - last A1C 8.2%, referred to neurology and OT for management of neuropathy metformin   - HLD -  on atorvastatin 40mg   - TIA history in 2016   - Obesity   - CKD3a   - Ascending aortic enlargement - most recent TTE noted at 3.9cm 1/24      Social:   - Lives in [facility] alone, son lives nearby  who checks in regularly   - 5 children, one passed away. Has a daughter who she gave up for adoption recently reunited lives in [place], 14 grandchildren and 7 great-grandchildren    Lifestyle   - Diet - extremely healthy   - Exercise - regularly every day, does stretching exercises. Wal[place] frequently around the [place]rium     Patient Care Team:  Radha Anderson DO as PCP - General (Internal Medicine)  Nuris Perry LPN as Care Manager (Case Management)      Objective       Current Outpatient Medications:     albuterol 90 mcg/actuation inhaler, INHALE 1 TO 2 PUFFS EVERY 4 TO 6 HOURS AS NEEDED., Disp: , Rfl:     amiodarone (Pacerone) 100 mg tablet, Take 1 tablet (100 mg) by mouth once daily., Disp: , Rfl:     atorvastatin (Lipitor) 40 mg tablet, Take 1 tablet (40 mg) by mouth once daily at bedtime., Disp: , Rfl:     dapagliflozin (Farxiga) 5 mg, Take 1 tablet (5 mg) by mouth once daily., Disp: , Rfl:     furosemide (Lasix) 40 mg tablet, Take 1 tablet (40 mg) by mouth once daily., Disp: , Rfl:     gabapentin (Neurontin) 300 mg capsule, Take 1 capsule (300 mg) by mouth 3 times a day., Disp: , Rfl:     potassium chloride CR 20 mEq ER tablet, Take 1 tablet (20 mEq) by mouth once daily. Do not crush or chew., Disp: , Rfl:     tiotropium (Spiriva with HandiHaler) 18 mcg inhalation capsule, INHALE CONTENTS OF 1 CAPSULE ONCE DAILY., Disp: , Rfl:     apixaban (Eliquis) 5 mg tablet, Take 1 tablet (5 mg) by mouth 2 times a day. Do not start before January 31, 2024., Disp: 60 tablet, Rfl: 1    bisacodyl (Dulcolax) 10 mg suppository, INSERT 1 SUPPOSITORY RECTALLY EVERY 12 HOURS UNTIL BOWEL MOVEMENT, Disp: , Rfl:     blood sugar diagnostic (Blood Glucose Test) strip, TEST 3 TIMES DAILY., Disp: , Rfl:     cholecalciferol (Vitamin D-3) 50 mcg (2,000 unit) capsule, Take 1 tablet by mouth once daily., Disp: , Rfl:     oxygen (O2) gas therapy, Inhale 6 L/min continuously., Disp: , Rfl:       /55   Pulse 94   Wt 71.2  kg (157 lb)   SpO2 (!) 84%   BMI 31.71 kg/m²       Physical Examination:   General: Awake, alert, appears stated age, oxygen in place    Head/eyes/ears: NCAT, EOMI, PERRL, TM WNL, no cerumen, has hearing aids   Throat: moist mucus membranes, no pharyngeal erythema  Neck: Supple, nontender, no lymphadenopathy, thyroid exam unremarkable   Heart: RRR, no murmurs, rubs or gallops  Lungs: Reduced breath sounds   Abdomen: Soft, mildly distended, NABS, no rebound no guarding   Extremities: No edema, 2+ DP pulses   Skin: No concerning skin lesions on visualized skin   Neuro: AAO x 3, no FND, gait antalgic     Assessment/Plan   Problem List Items Addressed This Visit       (HFpEF) heart failure with preserved ejection fraction (CMS/HCC)     Without current evidence of exacerbation, continue daily lasix and farxiga. Continues home exercises, completed cardiac rehab.         Hearing difficulty     Persistent trouble with hearing despite hearing aids, advised followup with audiology.         Atrial fibrillation (CMS/HCC) (Chronic)    Back pain    Relevant Orders    Referral to Physical Therapy    Referral to Physical Therapy    Diabetes mellitus type 2 without retinopathy (CMS/HCC)  Continue SGLT2 inhibitor, A1C improved, refer for diabetic eye exam.    Relevant Orders    POCT glycosylated hemoglobin (Hb A1C) manually resulted (Completed)    Referral to Ophthalmology    Gait difficulty     Interested in physical therapy referral for balance training and straightening exercises.          Hypertension (Chronic)     Discontinued all medications, blood pressure readings remain well controlled.          Relevant Orders    Follow Up In [facility] - PCP - Established    RESOLVED: Postural dizziness with presyncope    Other pulmonary embolism without acute cor pulmonale (CMS/HCC)     Other Visit Diagnoses       Routine general medical examination at health care facility    -  Primary    Asymptomatic menopausal state         Relevant Orders    XR DEXA bone density    At high risk for falls        Relevant Orders    Referral to Physical Therapy    Referral to Physical Therapy    Immunization due        Relevant Orders    Pneumococcal conjugate vaccine, 20-valent (PREVNAR 20) (Completed)        Bloating   Nonspecific symptoms, clinically stable no e/o bleeding, recommended increasing fiber intake.     Adult Health Examination  Age appropriate screening performed   Healthy lifestyle reviewed.   Depression screen negative   No additional pertinent family history or toxic habits   Cancer screening: n/a   Immunizations: UTD with flu, COVID, RSV, shingrix, Given high risk and has been >5 years since last pneumococcal vaccine, will administer Prevnar 20.   Dental and visual examinations UTD   DEXA ordered

## 2024-05-08 ENCOUNTER — OFFICE VISIT (OUTPATIENT)
Dept: PULMONOLOGY | Facility: CLINIC | Age: 89
End: 2024-05-08
Payer: MEDICARE

## 2024-05-08 VITALS
OXYGEN SATURATION: 94 % | SYSTOLIC BLOOD PRESSURE: 136 MMHG | BODY MASS INDEX: 31.71 KG/M2 | TEMPERATURE: 97.3 F | WEIGHT: 157 LBS | DIASTOLIC BLOOD PRESSURE: 71 MMHG | HEART RATE: 86 BPM

## 2024-05-08 DIAGNOSIS — J44.9 CHRONIC OBSTRUCTIVE PULMONARY DISEASE, UNSPECIFIED COPD TYPE (MULTI): ICD-10-CM

## 2024-05-08 PROCEDURE — 1159F MED LIST DOCD IN RCRD: CPT | Performed by: INTERNAL MEDICINE

## 2024-05-08 PROCEDURE — 1036F TOBACCO NON-USER: CPT | Performed by: INTERNAL MEDICINE

## 2024-05-08 PROCEDURE — 1126F AMNT PAIN NOTED NONE PRSNT: CPT | Performed by: INTERNAL MEDICINE

## 2024-05-08 PROCEDURE — 99215 OFFICE O/P EST HI 40 MIN: CPT | Performed by: INTERNAL MEDICINE

## 2024-05-08 PROCEDURE — 3078F DIAST BP <80 MM HG: CPT | Performed by: INTERNAL MEDICINE

## 2024-05-08 PROCEDURE — 99205 OFFICE O/P NEW HI 60 MIN: CPT | Performed by: INTERNAL MEDICINE

## 2024-05-08 PROCEDURE — 1157F ADVNC CARE PLAN IN RCRD: CPT | Performed by: INTERNAL MEDICINE

## 2024-05-08 PROCEDURE — 1160F RVW MEDS BY RX/DR IN RCRD: CPT | Performed by: INTERNAL MEDICINE

## 2024-05-08 PROCEDURE — 3075F SYST BP GE 130 - 139MM HG: CPT | Performed by: INTERNAL MEDICINE

## 2024-05-08 ASSESSMENT — ENCOUNTER SYMPTOMS
WEAKNESS: 0
DIFFICULTY URINATING: 0
SINUS PAIN: 0
WHEEZING: 0
NERVOUS/ANXIOUS: 0
FREQUENCY: 0
NAUSEA: 0
RHINORRHEA: 0
AGITATION: 0
DYSURIA: 0
SINUS PRESSURE: 0
JOINT SWELLING: 0
SHORTNESS OF BREATH: 1
LIGHT-HEADEDNESS: 0
NUMBNESS: 0
DIZZINESS: 0
FEVER: 0
SLEEP DISTURBANCE: 0
PALPITATIONS: 0
SPEECH DIFFICULTY: 0
EYE REDNESS: 0
HEMATURIA: 0
CONSTIPATION: 0
FACIAL SWELLING: 0
UNEXPECTED WEIGHT CHANGE: 0
BRUISES/BLEEDS EASILY: 0
COUGH: 0
APNEA: 0
ABDOMINAL DISTENTION: 0
TREMORS: 0
EYE DISCHARGE: 0
FATIGUE: 0
ARTHRALGIAS: 0
STRIDOR: 0
CHOKING: 0
HEADACHES: 0
ADENOPATHY: 0

## 2024-05-08 ASSESSMENT — PAIN SCALES - GENERAL: PAINLEVEL: 0-NO PAIN

## 2024-05-08 NOTE — PATIENT INSTRUCTIONS
I am going to obtain a follow-up HRCT scan of the chest to assess the status of the groundglass changes pneumonia etc.  Once the results become available discussed them with the patient.    The HRCT scan of the chest can be scheduled by calling [phone]

## 2024-05-08 NOTE — LETTER
Lissett Rodríguez is a 89 y.o. year old patient referred for pulmonary evaluation.  My summary is attached of his/her current complaints along with their  pertinent past medical history. Also, I have outlined my diagnostic assessment/plan. If you have any questions please feel free to contact me and as always, thank you for the referral.       Mal Brown MD, MPH

## 2024-05-08 NOTE — LETTER
May 8, 2024     Payton Henriquez MD  [address] [facility] [address]    Patient: Lissett Rodríguez   YOB: 1933   Date of Visit: 5/8/2024       Dear Dr. Payton Henriquez MD:    Thank you for referring Lissett Rodríguez to me for evaluation. Below are my notes for this consultation.  If you have questions, please do not hesitate to call me. I look forward to following your patient along with you.       Sincerely,     Mal Brown MD MPH      CC: No Recipients  ______________________________________________________________________________________    @PULMONARY CONSULTATION@         Reason for Consult:  Chronic respiratory failure    ASSESSMENT:     The patient is a 89-year-old with a history of atrial fibrillation, obstructive sleep apnea, preserved ejection fraction heart failure with a small pulmonary embolization in January 2024.  She also was felt to have pneumonia at that time with consolidative opacities.  There was also question of pneumonia and she was treated with antibiotics.  She continues on anticoagulation.  She also had over the years groundglass changes which have been persistent which should be noted that she has been on amiodarone for an extended timeframe and has a significant degree of hypoxemia and PFTs in the past from 2022 revealed no obstruction with an isolated decrease in DLCO.  There is no restriction.  I suspect this relates to her interstitial lung disease which potentially could relate the amiodarone.      PLAN:   Will obtain a follow-up CT scan of the chest with high-resolution cuts with PFTs to follow-up          HISTORY OF PRESENT ILLNESS:        The patient is a 89-year-old with a history of atrial fibrillation who was admitted to the hospital in January 2024 with pneumonia and found to have a small pulm embolization.  She also was admitted on February 26, 2024 until February 27, 2024 with dizziness and presyncope.  She has a history of  "preserved ejection fraction heart failure and she had constipation.  She also said that of COPD diabetes hyperlipidemia hypertension sleep apnea with chronic respiratory failure and hypoxemia.  Her blood pressure medications were adjusted and a question of reactive hypoglycemia was raised.  It is noted that she has COPD with hypoxemia and has been on home oxygen at 5 L/min.  She also is on CPAP for sleep apnea and has been compliant.    Previously, she was followed by Dr. Glynn last saw her on March 2, 2023.  He summarized the following    ex smoker (35 pack year smoker) h/o DM, heart failure with PEF, TIA, Atrial fibrillation on Xarelto,KALPANA on CPAP, COPD on 5lpm of nasal oxygen, here for evaluation of respiratory symptoms.  Patient reports being diagnosed with COPD for about 5 years and has been on oxygen for about the same time. Patient reports exertional SOB \"when she does not get enough oxygen\"  CT scan of the chest shows septal thickening, mosaic attenuation and ground glass opacities on both lung fields with areas of bronchial wall thickening. LA dilation also noted on CT chest.  BNP - 67  Echo - Feb 2022 EF 65% - Impaired Diastolic dysfunction, mild LA dilation, RVSP 31 normal IVC  +BD change 14%, air trapping, but no obstruction,.     Assessment:  1) Dyspnea on exertion - multifactorial, likely contributed by combination of pulmonary edema in the setting of HFREF and small airway disease/COPD.  2) HFPEF  3) COPD  4) Mediastinal adenopathy - with diffuse bilateral GGOs  5) Hypoxic respiratory failure - on 6lpm     The CT scan of the chest from January 18, 2024 revealed the following    Acute pulmonary embolus within a segmental branch of the lingula.      Ground-glass opacities scattered diffusely throughout the lungs with  more consolidative opacities in the left upper lobe and left lower  lobe concerning for pneumonia. 1 of the opacities in the lingula is  somewhat wedge-shaped and may also represent a " component of pulmonary  infarction.      Left lower lobe nodular density measuring up to 8 mm. While findings  may be related to the underlying likely infectious process, recommend  repeat imaging after treatment to assess for resolution and/or  stability.      Nonspecific enlarged mediastinal and hilar lymph nodes, which may be  reactive.      The chest x-ray from February 26 2024 revealed the following  Interval improvement in aeration of the lungs. There are however  persistent bilateral opacities. Focal appearing opacity at the right  lung base may in part relate to rib end or nipple shadow, however  focal infiltrate or nodule is not excluded and attention on  short-term progress imaging is recommended.    PFTs in the past in November 10, 2022 The FVC was 1.64 L 131% predicted with an FEV1 of 1.16 L 120% predicted with an FEV1/FVC ratio of 71%.  The FEV1 improved 14% after bronchodilators.  Her TLC was 197% predicted and the RV/TLC was 148% predicted.  Her DLCO was 62% predicted.        The patient reports that she probably smoked around 20 pack years off and on over the years considering pregnancies.  She has been on oxygen at least 6 years and she has chronic congestive heart failure with atrial fibrillation on amiodarone.  She has no chest pains or pressures.  She has no swelling of her legs.  She has no wheezing.  She does note shortness of breath although she is is quite active and she walks and exercises for 30 minutes every day utilizing her oxygen.  She lives in assisted living and she also has sleep apnea utilizing CPAP at night.  She has no occupational environmental exposures.  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           Allergies   Allergen Reactions   • Lisinopril Angioedema and Swelling   • Tetracyclines Hives   • Vancomycin Unknown   • Penicillins Hives and Other     Tetramycin        PAST MEDICAL HISTORY:   history of atrial fibrillation who was admitted to the hospital in  January 2024 with pneumonia and found to have a small pulm embolization.  She also was admitted on February 26, 2024 until February 27, 2024 with dizziness and presyncope.  She has a history of preserved ejection fraction heart failure and she had constipation.  She also said that of COPD diabetes hyperlipidemia hypertension sleep apnea with chronic respiratory failure and hypoxemia.  Her blood pressure medications were adjusted and a question of reactive hypoglycemia was raised.  It is noted that she has COPD with hypoxemia and has been on home oxygen at 5 L/min.      Current Outpatient Medications:   •  albuterol 90 mcg/actuation inhaler, INHALE 1 TO 2 PUFFS EVERY 4 TO 6 HOURS AS NEEDED., Disp: , Rfl:   •  amiodarone (Pacerone) 100 mg tablet, Take 1 tablet (100 mg) by mouth once daily., Disp: , Rfl:   •  apixaban (Eliquis) 5 mg tablet, Take 1 tablet (5 mg) by mouth 2 times a day. Do not start before January 31, 2024., Disp: 60 tablet, Rfl: 1  •  atorvastatin (Lipitor) 40 mg tablet, Take 1 tablet (40 mg) by mouth once daily at bedtime., Disp: , Rfl:   •  bisacodyl (Dulcolax) 10 mg suppository, INSERT 1 SUPPOSITORY RECTALLY EVERY 12 HOURS UNTIL BOWEL MOVEMENT, Disp: , Rfl:   •  blood sugar diagnostic (Blood Glucose Test) strip, TEST 3 TIMES DAILY., Disp: , Rfl:   •  cholecalciferol (Vitamin D-3) 50 mcg (2,000 unit) capsule, Take 1 tablet by mouth once daily., Disp: , Rfl:   •  dapagliflozin (Farxiga) 5 mg, Take 1 tablet (5 mg) by mouth once daily., Disp: , Rfl:   •  furosemide (Lasix) 40 mg tablet, Take 1 tablet (40 mg) by mouth once daily., Disp: , Rfl:   •  gabapentin (Neurontin) 300 mg capsule, Take 1 capsule (300 mg) by mouth 3 times a day., Disp: , Rfl:   •  oxygen (O2) gas therapy, Inhale 6 L/min continuously., Disp: , Rfl:   •  potassium chloride CR 20 mEq ER tablet, Take 1 tablet (20 mEq) by mouth once daily. Do not crush or chew., Disp: , Rfl:   •  tiotropium (Spiriva with HandiHaler) 18 mcg inhalation  capsule, INHALE CONTENTS OF 1 CAPSULE ONCE DAILY., Disp: , Rfl:      FAMILY HISTORY:   Non contributory   SOCIAL HISTORY:  Around 20 + years of smoking over the years   EXPOSURE AND WORK HISTORY:  Patient sold real estate over the years.  She lives in assisted living at the present time.    Review of Systems   Constitutional:  Negative for fatigue, fever and unexpected weight change.   HENT:  Positive for hearing loss. Negative for congestion, facial swelling, nosebleeds, postnasal drip, rhinorrhea, sinus pressure and sinus pain.    Eyes:  Negative for discharge, redness and visual disturbance.   Respiratory:  Positive for shortness of breath. Negative for apnea, cough, choking, wheezing and stridor.    Cardiovascular:  Negative for chest pain, palpitations and leg swelling.   Gastrointestinal:  Negative for abdominal distention, constipation and nausea.   Endocrine: Negative for cold intolerance and heat intolerance.   Genitourinary:  Negative for difficulty urinating, dysuria, frequency and hematuria.   Musculoskeletal:  Negative for arthralgias, gait problem and joint swelling.   Allergic/Immunologic: Negative for environmental allergies, food allergies and immunocompromised state.   Neurological:  Negative for dizziness, tremors, syncope, speech difficulty, weakness, light-headedness, numbness and headaches.   Hematological:  Negative for adenopathy. Does not bruise/bleed easily.   Psychiatric/Behavioral:  Negative for agitation, behavioral problems and sleep disturbance. The patient is not nervous/anxious.         Vitals:    05/08/24 0940   BP: 136/71   Pulse: 86   Temp: 36.3 °C (97.3 °F)   SpO2: 94%        Physical Exam  Vitals reviewed.   Constitutional:       Appearance: Normal appearance.   HENT:      Head: Normocephalic and atraumatic.   Eyes:      Extraocular Movements: Extraocular movements intact.   Cardiovascular:      Rate and Rhythm: Normal rate and regular rhythm.      Heart sounds: No murmur  heard.     No friction rub. No gallop.   Pulmonary:      Effort: Pulmonary effort is normal. No respiratory distress.      Breath sounds: Normal breath sounds. No stridor. No wheezing, rhonchi or rales.   Chest:      Chest wall: No tenderness.   Abdominal:      General: Abdomen is flat. There is no distension.      Palpations: Abdomen is soft. There is no mass.      Tenderness: There is no abdominal tenderness.   Musculoskeletal:         General: Normal range of motion.      Cervical back: Normal range of motion.      Right lower leg: No edema.      Left lower leg: No edema.   Skin:     General: Skin is warm and dry.   Neurological:      Mental Status: She is alert and oriented to person, place, and time.   Psychiatric:         Mood and Affect: Mood normal.         Behavior: Behavior normal.

## 2024-05-08 NOTE — PROGRESS NOTES
@PULMONARY CONSULTATION@         Reason for Consult:  Chronic respiratory failure    ASSESSMENT:     The patient is a 89-year-old with a history of atrial fibrillation, obstructive sleep apnea, preserved ejection fraction heart failure with a small pulmonary embolization in January 2024.  She also was felt to have pneumonia at that time with consolidative opacities.  There was also question of pneumonia and she was treated with antibiotics.  She continues on anticoagulation.  She also had over the years groundglass changes which have been persistent which should be noted that she has been on amiodarone for an extended timeframe and has a significant degree of hypoxemia and PFTs in the past from 2022 revealed no obstruction with an isolated decrease in DLCO.  There is no restriction.  I suspect this relates to her interstitial lung disease which potentially could relate the amiodarone.      PLAN:   Will obtain a follow-up CT scan of the chest with high-resolution cuts with PFTs to follow-up          HISTORY OF PRESENT ILLNESS:        The patient is a 89-year-old with a history of atrial fibrillation who was admitted to the hospital in January 2024 with pneumonia and found to have a small pulm embolization.  She also was admitted on February 26, 2024 until February 27, 2024 with dizziness and presyncope.  She has a history of preserved ejection fraction heart failure and she had constipation.  She also said that of COPD diabetes hyperlipidemia hypertension sleep apnea with chronic respiratory failure and hypoxemia.  Her blood pressure medications were adjusted and a question of reactive hypoglycemia was raised.  It is noted that she has COPD with hypoxemia and has been on home oxygen at 5 L/min.  She also is on CPAP for sleep apnea and has been compliant.    Previously, she was followed by Dr. Glynn last saw her on March 2, 2023.  He summarized the following    ex smoker (35 pack year smoker) h/o DM, heart failure with  "PEF, TIA, Atrial fibrillation on Xarelto,KALPANA on CPAP, COPD on 5lpm of nasal oxygen, here for evaluation of respiratory symptoms.  Patient reports being diagnosed with COPD for about 5 years and has been on oxygen for about the same time. Patient reports exertional SOB \"when she does not get enough oxygen\"  CT scan of the chest shows septal thickening, mosaic attenuation and ground glass opacities on both lung fields with areas of bronchial wall thickening. LA dilation also noted on CT chest.  BNP - 67  Echo - Feb 2022 EF 65% - Impaired Diastolic dysfunction, mild LA dilation, RVSP 31 normal IVC  +BD change 14%, air trapping, but no obstruction,.     Assessment:  1) Dyspnea on exertion - multifactorial, likely contributed by combination of pulmonary edema in the setting of HFREF and small airway disease/COPD.  2) HFPEF  3) COPD  4) Mediastinal adenopathy - with diffuse bilateral GGOs  5) Hypoxic respiratory failure - on 6lpm     The CT scan of the chest from January 18, 2024 revealed the following    Acute pulmonary embolus within a segmental branch of the lingula.      Ground-glass opacities scattered diffusely throughout the lungs with  more consolidative opacities in the left upper lobe and left lower  lobe concerning for pneumonia. 1 of the opacities in the lingula is  somewhat wedge-shaped and may also represent a component of pulmonary  infarction.      Left lower lobe nodular density measuring up to 8 mm. While findings  may be related to the underlying likely infectious process, recommend  repeat imaging after treatment to assess for resolution and/or  stability.      Nonspecific enlarged mediastinal and hilar lymph nodes, which may be  reactive.      The chest x-ray from February 26 2024 revealed the following  Interval improvement in aeration of the lungs. There are however  persistent bilateral opacities. Focal appearing opacity at the right  lung base may in part relate to rib end or nipple shadow, " however  focal infiltrate or nodule is not excluded and attention on  short-term progress imaging is recommended.    PFTs in the past in November 10, 2022 The FVC was 1.64 L 131% predicted with an FEV1 of 1.16 L 120% predicted with an FEV1/FVC ratio of 71%.  The FEV1 improved 14% after bronchodilators.  Her TLC was 197% predicted and the RV/TLC was 148% predicted.  Her DLCO was 62% predicted.        The patient reports that she probably smoked around 20 pack years off and on over the years considering pregnancies.  She has been on oxygen at least 6 years and she has chronic congestive heart failure with atrial fibrillation on amiodarone.  She has no chest pains or pressures.  She has no swelling of her legs.  She has no wheezing.  She does note shortness of breath although she is is quite active and she walks and exercises for 30 minutes every day utilizing her oxygen.  She lives in assisted living and she also has sleep apnea utilizing CPAP at night.  She has no occupational environmental exposures.  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           Allergies   Allergen Reactions    Lisinopril Angioedema and Swelling    Tetracyclines Hives    Vancomycin Unknown    Penicillins Hives and Other     Tetramycin        PAST MEDICAL HISTORY:   history of atrial fibrillation who was admitted to the hospital in January 2024 with pneumonia and found to have a small pulm embolization.  She also was admitted on February 26, 2024 until February 27, 2024 with dizziness and presyncope.  She has a history of preserved ejection fraction heart failure and she had constipation.  She also said that of COPD diabetes hyperlipidemia hypertension sleep apnea with chronic respiratory failure and hypoxemia.  Her blood pressure medications were adjusted and a question of reactive hypoglycemia was raised.  It is noted that she has COPD with hypoxemia and has been on home oxygen at 5 L/min.      Current Outpatient  Medications:     albuterol 90 mcg/actuation inhaler, INHALE 1 TO 2 PUFFS EVERY 4 TO 6 HOURS AS NEEDED., Disp: , Rfl:     amiodarone (Pacerone) 100 mg tablet, Take 1 tablet (100 mg) by mouth once daily., Disp: , Rfl:     apixaban (Eliquis) 5 mg tablet, Take 1 tablet (5 mg) by mouth 2 times a day. Do not start before January 31, 2024., Disp: 60 tablet, Rfl: 1    atorvastatin (Lipitor) 40 mg tablet, Take 1 tablet (40 mg) by mouth once daily at bedtime., Disp: , Rfl:     bisacodyl (Dulcolax) 10 mg suppository, INSERT 1 SUPPOSITORY RECTALLY EVERY 12 HOURS UNTIL BOWEL MOVEMENT, Disp: , Rfl:     blood sugar diagnostic (Blood Glucose Test) strip, TEST 3 TIMES DAILY., Disp: , Rfl:     cholecalciferol (Vitamin D-3) 50 mcg (2,000 unit) capsule, Take 1 tablet by mouth once daily., Disp: , Rfl:     dapagliflozin (Farxiga) 5 mg, Take 1 tablet (5 mg) by mouth once daily., Disp: , Rfl:     furosemide (Lasix) 40 mg tablet, Take 1 tablet (40 mg) by mouth once daily., Disp: , Rfl:     gabapentin (Neurontin) 300 mg capsule, Take 1 capsule (300 mg) by mouth 3 times a day., Disp: , Rfl:     oxygen (O2) gas therapy, Inhale 6 L/min continuously., Disp: , Rfl:     potassium chloride CR 20 mEq ER tablet, Take 1 tablet (20 mEq) by mouth once daily. Do not crush or chew., Disp: , Rfl:     tiotropium (Spiriva with HandiHaler) 18 mcg inhalation capsule, INHALE CONTENTS OF 1 CAPSULE ONCE DAILY., Disp: , Rfl:      FAMILY HISTORY:   Non contributory   SOCIAL HISTORY:  Around 20 + years of smoking over the years   EXPOSURE AND WORK HISTORY:  Patient sold real estate over the years.  She lives in assisted living at the present time.    Review of Systems   Constitutional:  Negative for fatigue, fever and unexpected weight change.   HENT:  Positive for hearing loss. Negative for congestion, facial swelling, nosebleeds, postnasal drip, rhinorrhea, sinus pressure and sinus pain.    Eyes:  Negative for discharge, redness and visual disturbance.    Respiratory:  Positive for shortness of breath. Negative for apnea, cough, choking, wheezing and stridor.    Cardiovascular:  Negative for chest pain, palpitations and leg swelling.   Gastrointestinal:  Negative for abdominal distention, constipation and nausea.   Endocrine: Negative for cold intolerance and heat intolerance.   Genitourinary:  Negative for difficulty urinating, dysuria, frequency and hematuria.   Musculoskeletal:  Negative for arthralgias, gait problem and joint swelling.   Allergic/Immunologic: Negative for environmental allergies, food allergies and immunocompromised state.   Neurological:  Negative for dizziness, tremors, syncope, speech difficulty, weakness, light-headedness, numbness and headaches.   Hematological:  Negative for adenopathy. Does not bruise/bleed easily.   Psychiatric/Behavioral:  Negative for agitation, behavioral problems and sleep disturbance. The patient is not nervous/anxious.         Vitals:    05/08/24 0940   BP: 136/71   Pulse: 86   Temp: 36.3 °C (97.3 °F)   SpO2: 94%        Physical Exam  Vitals reviewed.   Constitutional:       Appearance: Normal appearance.   HENT:      Head: Normocephalic and atraumatic.   Eyes:      Extraocular Movements: Extraocular movements intact.   Cardiovascular:      Rate and Rhythm: Normal rate and regular rhythm.      Heart sounds: No murmur heard.     No friction rub. No gallop.   Pulmonary:      Effort: Pulmonary effort is normal. No respiratory distress.      Breath sounds: Normal breath sounds. No stridor. No wheezing, rhonchi or rales.   Chest:      Chest wall: No tenderness.   Abdominal:      General: Abdomen is flat. There is no distension.      Palpations: Abdomen is soft. There is no mass.      Tenderness: There is no abdominal tenderness.   Musculoskeletal:         General: Normal range of motion.      Cervical back: Normal range of motion.      Right lower leg: No edema.      Left lower leg: No edema.   Skin:     General: Skin  is warm and dry.   Neurological:      Mental Status: She is alert and oriented to person, place, and time.   Psychiatric:         Mood and Affect: Mood normal.         Behavior: Behavior normal.

## 2024-05-24 ENCOUNTER — HOSPITAL ENCOUNTER (OUTPATIENT)
Dept: RADIOLOGY | Facility: CLINIC | Age: 89
Discharge: HOME | End: 2024-05-24
Payer: MEDICARE

## 2024-05-24 DIAGNOSIS — J44.9 CHRONIC OBSTRUCTIVE PULMONARY DISEASE, UNSPECIFIED COPD TYPE (MULTI): ICD-10-CM

## 2024-05-24 PROCEDURE — 71250 CT THORAX DX C-: CPT | Performed by: STUDENT IN AN ORGANIZED HEALTH CARE EDUCATION/TRAINING PROGRAM

## 2024-05-24 PROCEDURE — 71250 CT THORAX DX C-: CPT

## 2024-05-30 ENCOUNTER — PATIENT OUTREACH (OUTPATIENT)
Dept: PRIMARY CARE | Facility: CLINIC | Age: 89
End: 2024-05-30
Payer: MEDICARE

## 2024-05-30 NOTE — PROGRESS NOTES
Final call.  CM called and spoke with pt to address any final questions or concerns regarding hospitalization.  Pt reports doing well and has no concerns at this time.  Pt given my contact info  in the event questions should arise.

## 2024-05-31 DIAGNOSIS — R91.1 LUNG NODULE: Primary | ICD-10-CM

## 2024-06-07 ENCOUNTER — HOSPITAL ENCOUNTER (OUTPATIENT)
Dept: RADIOLOGY | Facility: CLINIC | Age: 89
Discharge: HOME | End: 2024-06-07
Payer: MEDICARE

## 2024-06-07 DIAGNOSIS — Z78.0 ASYMPTOMATIC MENOPAUSAL STATE: ICD-10-CM

## 2024-07-03 ENCOUNTER — HOSPITAL ENCOUNTER (OUTPATIENT)
Dept: RADIOLOGY | Facility: HOSPITAL | Age: 89
Discharge: HOME | End: 2024-07-03
Payer: MEDICARE

## 2024-07-03 PROCEDURE — 77080 DXA BONE DENSITY AXIAL: CPT

## 2024-07-03 PROCEDURE — 77080 DXA BONE DENSITY AXIAL: CPT | Performed by: RADIOLOGY

## 2024-08-06 ENCOUNTER — APPOINTMENT (OUTPATIENT)
Dept: PRIMARY CARE | Facility: CLINIC | Age: 89
End: 2024-08-06
Payer: MEDICARE

## 2024-08-14 ENCOUNTER — APPOINTMENT (OUTPATIENT)
Dept: OPHTHALMOLOGY | Facility: CLINIC | Age: 89
End: 2024-08-14
Payer: MEDICARE

## 2024-08-14 DIAGNOSIS — H52.03 HYPEROPIA WITH PRESBYOPIA OF BOTH EYES: ICD-10-CM

## 2024-08-14 DIAGNOSIS — H52.4 HYPEROPIA WITH PRESBYOPIA OF BOTH EYES: ICD-10-CM

## 2024-08-14 DIAGNOSIS — E11.9 DIABETES MELLITUS TYPE 2 WITHOUT RETINOPATHY (MULTI): Primary | ICD-10-CM

## 2024-08-14 DIAGNOSIS — Z96.1 BILATERAL ARTIFICIAL LENS IMPLANT: ICD-10-CM

## 2024-08-14 DIAGNOSIS — H40.003 GLAUCOMA SUSPECT OF BOTH EYES: ICD-10-CM

## 2024-08-14 PROBLEM — H54.7 POOR VISION: Status: RESOLVED | Noted: 2023-03-10 | Resolved: 2024-08-14

## 2024-08-14 PROCEDURE — 92004 COMPRE OPH EXAM NEW PT 1/>: CPT | Performed by: STUDENT IN AN ORGANIZED HEALTH CARE EDUCATION/TRAINING PROGRAM

## 2024-08-14 PROCEDURE — 92133 CPTRZD OPH DX IMG PST SGM ON: CPT | Performed by: STUDENT IN AN ORGANIZED HEALTH CARE EDUCATION/TRAINING PROGRAM

## 2024-08-14 PROCEDURE — 92015 DETERMINE REFRACTIVE STATE: CPT | Performed by: STUDENT IN AN ORGANIZED HEALTH CARE EDUCATION/TRAINING PROGRAM

## 2024-08-14 ASSESSMENT — REFRACTION_MANIFEST
OS_AXIS: 095
OD_AXIS: 021
OD_ADD: +2.75
OD_SPHERE: +0.50
OS_ADD: +2.75
OD_CYLINDER: -0.25
OS_SPHERE: +0.50
OS_CYLINDER: -0.25

## 2024-08-14 ASSESSMENT — ENCOUNTER SYMPTOMS
EYES NEGATIVE: 0
CARDIOVASCULAR NEGATIVE: 0
CONSTITUTIONAL NEGATIVE: 0
ENDOCRINE NEGATIVE: 0
RESPIRATORY NEGATIVE: 0
PSYCHIATRIC NEGATIVE: 0
HEMATOLOGIC/LYMPHATIC NEGATIVE: 0
MUSCULOSKELETAL NEGATIVE: 0
ALLERGIC/IMMUNOLOGIC NEGATIVE: 0
NEUROLOGICAL NEGATIVE: 0
GASTROINTESTINAL NEGATIVE: 0

## 2024-08-14 ASSESSMENT — CONF VISUAL FIELD
OS_INFERIOR_TEMPORAL_RESTRICTION: 0
OD_SUPERIOR_TEMPORAL_RESTRICTION: 0
OS_SUPERIOR_TEMPORAL_RESTRICTION: 0
OD_SUPERIOR_NASAL_RESTRICTION: 0
OD_INFERIOR_TEMPORAL_RESTRICTION: 0
OD_NORMAL: 1
OD_INFERIOR_NASAL_RESTRICTION: 0
METHOD: COUNTING FINGERS
OS_NORMAL: 1
OS_INFERIOR_NASAL_RESTRICTION: 0
OS_SUPERIOR_NASAL_RESTRICTION: 0

## 2024-08-14 ASSESSMENT — CUP TO DISC RATIO
OS_RATIO: .50
OD_RATIO: .40

## 2024-08-14 ASSESSMENT — VISUAL ACUITY
OD_CC: J2
OS_CC: 20/25-1
OS_CC: J2
OD_CC: 20/25-1
METHOD: SNELLEN - LINEAR

## 2024-08-14 ASSESSMENT — REFRACTION_WEARINGRX
OD_ADD: +2.75
OS_AXIS: 095
SPECS_TYPE: PAL
OS_ADD: +2.75
OS_SPHERE: +0.50
OD_CYLINDER: -0.25
OS_CYLINDER: -0.25
OD_AXIS: 021
OD_SPHERE: +0.50

## 2024-08-14 ASSESSMENT — TONOMETRY
OS_IOP_MMHG: 12
OD_IOP_MMHG: 14
IOP_METHOD: GOLDMANN APPLANATION

## 2024-08-14 ASSESSMENT — EXTERNAL EXAM - LEFT EYE: OS_EXAM: NORMAL

## 2024-08-14 ASSESSMENT — SLIT LAMP EXAM - LIDS
COMMENTS: DERMATOCHALASIS
COMMENTS: DERMATOCHALASIS

## 2024-08-14 ASSESSMENT — EXTERNAL EXAM - RIGHT EYE: OD_EXAM: NORMAL

## 2024-08-14 NOTE — PROGRESS NOTES
Assessment/Plan   Diagnoses and all orders for this visit:  Diabetes mellitus type 2 without retinopathy (Multi)  -no retinopathy observed on exam today od/os, pt ed to continue good BGlc, blood pressure and lipid control, rtc with any changes in vision, otherwise monitor 1 year  Glaucoma suspect of both eyes  -IOP today 14/12 c TMAX 17/15   -enlarged C/D  -OCT RNFL today 8/2024 wnl OU; sup quad progression OS to 2019 however still wnl  -with stable ONH appearance, normal IOP, and overall stable OCT RNFL low risk suspect-monitor annually  Bilateral artificial lens implant  Hyperopia with presbyopia of both eyes  -New spec rx released today per patient request.  Monitor 1 year or sooner prn. Refraction billed today.  -Patient uses a Trifocal    RTC 1 year for annual with DFE, MRX, OCT RNFL

## 2024-08-27 ENCOUNTER — OFFICE VISIT (OUTPATIENT)
Dept: PULMONOLOGY | Facility: CLINIC | Age: 89
End: 2024-08-27
Payer: MEDICARE

## 2024-08-27 VITALS
SYSTOLIC BLOOD PRESSURE: 119 MMHG | HEART RATE: 71 BPM | WEIGHT: 153.2 LBS | OXYGEN SATURATION: 91 % | RESPIRATION RATE: 16 BRPM | BODY MASS INDEX: 30.94 KG/M2 | TEMPERATURE: 97 F | DIASTOLIC BLOOD PRESSURE: 75 MMHG

## 2024-08-27 DIAGNOSIS — J44.9 CHRONIC OBSTRUCTIVE PULMONARY DISEASE, UNSPECIFIED COPD TYPE (MULTI): Primary | ICD-10-CM

## 2024-08-27 PROCEDURE — 3074F SYST BP LT 130 MM HG: CPT | Performed by: INTERNAL MEDICINE

## 2024-08-27 PROCEDURE — 1160F RVW MEDS BY RX/DR IN RCRD: CPT | Performed by: INTERNAL MEDICINE

## 2024-08-27 PROCEDURE — 99214 OFFICE O/P EST MOD 30 MIN: CPT | Performed by: INTERNAL MEDICINE

## 2024-08-27 PROCEDURE — 1159F MED LIST DOCD IN RCRD: CPT | Performed by: INTERNAL MEDICINE

## 2024-08-27 PROCEDURE — 1157F ADVNC CARE PLAN IN RCRD: CPT | Performed by: INTERNAL MEDICINE

## 2024-08-27 PROCEDURE — 1036F TOBACCO NON-USER: CPT | Performed by: INTERNAL MEDICINE

## 2024-08-27 PROCEDURE — 3078F DIAST BP <80 MM HG: CPT | Performed by: INTERNAL MEDICINE

## 2024-08-27 ASSESSMENT — ENCOUNTER SYMPTOMS
STRIDOR: 0
WHEEZING: 0
DYSURIA: 0
ABDOMINAL PAIN: 0
LOSS OF SENSATION IN FEET: 1
WEAKNESS: 0
UNEXPECTED WEIGHT CHANGE: 0
OCCASIONAL FEELINGS OF UNSTEADINESS: 1
EYE DISCHARGE: 0
CHOKING: 0
HEMATURIA: 0
JOINT SWELLING: 0
ADENOPATHY: 0
LIGHT-HEADEDNESS: 0
FEVER: 0
PALPITATIONS: 0
SHORTNESS OF BREATH: 1
APNEA: 0
SLEEP DISTURBANCE: 0
HEADACHES: 0
SINUS PAIN: 0
SINUS PRESSURE: 0
TREMORS: 0
RHINORRHEA: 0
NUMBNESS: 0
ABDOMINAL DISTENTION: 0
BRUISES/BLEEDS EASILY: 0
SPEECH DIFFICULTY: 0
COUGH: 0
FREQUENCY: 0
AGITATION: 0
DIZZINESS: 0
EYE REDNESS: 0
DIFFICULTY URINATING: 0
FATIGUE: 0
NAUSEA: 0
DEPRESSION: 1
FACIAL SWELLING: 0
NERVOUS/ANXIOUS: 0
CONSTIPATION: 0
ARTHRALGIAS: 0

## 2024-08-27 NOTE — PROGRESS NOTES
@PULMONARY FOLLOW-UP@       PROBLEM:    ASSESSMENT:  The patient is a 89-year-old with atrial fibrillation, obstructive sleep apnea, preserved ejection fraction heart failure history of embolization in January 2024.  She also has CT scan findings of groundglass changes which have been persistent.  She has been on amiodarone and in the past, PFTs were normal with an isolated decrease in DLCO.  In addition her recent CT scan demonstrates a pleural-based nodular area that needs to be followed.  Correlation of pulmonary function tests with her interstitial  CT scan findings is important.  She has an ex-smoker.    PLAN:  Pulmonary function test will be obtained.  She also will obtain a follow-up CT scan of the chest in several months.      HISTORY OF PRESENT ILLNESS:  The patient is a 89-year-old with a history of atrial fibrillation who was admitted to the hospital in January 2024 with pneumonia and found to have a small pulm embolization.  She also was admitted on February 26, 2024 until February 27, 2024 with dizziness and presyncope.  She has a history of preserved ejection fraction heart failure and she had constipation.  She also said that of COPD diabetes hyperlipidemia hypertension sleep apnea with chronic respiratory failure and hypoxemia.  Her blood pressure medications were adjusted and a question of reactive hypoglycemia was raised.  It is noted that she has COPD with hypoxemia and has been on home oxygen at 5 L/min.  She also is on CPAP for sleep apnea and has been compliant.     Previously, she was followed by Dr. Glynn last saw her on March 2, 2023.  He summarized the following     ex smoker (35 pack year smoker) h/o DM, heart failure with PEF, TIA, Atrial fibrillation on Xarelto,KALPANA on CPAP, COPD on 5lpm of nasal oxygen, here for evaluation of respiratory symptoms.  Patient reports being diagnosed with COPD for about 5 years and has been on oxygen for about the same time. Patient reports exertional SOB  "\"when she does not get enough oxygen\"  CT scan of the chest shows septal thickening, mosaic attenuation and ground glass opacities on both lung fields with areas of bronchial wall thickening. LA dilation also noted on CT chest.  BNP - 67  Echo - Feb 2022 EF 65% - Impaired Diastolic dysfunction, mild LA dilation, RVSP 31 normal IVC  +BD change 14%, air trapping, but no obstruction,.     Assessment:  1) Dyspnea on exertion - multifactorial, likely contributed by combination of pulmonary edema in the setting of HFREF and small airway disease/COPD.  2) HFPEF  3) COPD  4) Mediastinal adenopathy - with diffuse bilateral GGOs  5) Hypoxic respiratory failure - on 6lpm     The CT scan of the chest from January 18, 2024 revealed the following  Acute pulmonary embolus within a segmental branch of the lingula.      Ground-glass opacities scattered diffusely throughout the lungs with  more consolidative opacities in the left upper lobe and left lower  lobe concerning for pneumonia. 1 of the opacities in the lingula is  somewhat wedge-shaped and may also represent a component of pulmonary  infarction.      Left lower lobe nodular density measuring up to 8 mm. While findings  may be related to the underlying likely infectious process, recommend  repeat imaging after treatment to assess for resolution and/or  stability.      Nonspecific enlarged mediastinal and hilar lymph nodes, which may be  reactive.    The chest x-ray from February 26 2024 revealed the following  Interval improvement in aeration of the lungs. There are however  persistent bilateral opacities. Focal appearing opacity at the right  lung base may in part relate to rib end or nipple shadow, however  focal infiltrate or nodule is not excluded and attention on  short-term progress imaging is recommended.     PFTs in the past in November 10, 2022 The FVC was 1.64 L 131% predicted with an FEV1 of 1.16 L 120% predicted with an FEV1/FVC ratio of 71%.  The FEV1 improved " 14% after bronchodilators.  Her TLC was 197% predicted and the RV/TLC was 148% predicted.  Her DLCO was 62% predicted.    The CT scan from January 19, 2024 revealed the following   1. Left ventricular systolic function is normal with a 60-65% estimated ejection fraction.   2. There is moderate mitral annular calcification.   3. Ascending aorta mildly enlarged at 3.9 cm.        On May 8, 2024 it was reported that she probably smoked around 20 pack years off and on over the years considering pregnancies.  She has been on oxygen at least 6 years and she has chronic congestive heart failure with atrial fibrillation on amiodarone.  She has no chest pains or pressures.  She has no swelling of her legs.  She has no wheezing.  She does note shortness of breath although she is is quite active and she walks and exercises for 30 minutes every day utilizing her oxygen.  She lives in assisted living and she also has sleep apnea utilizing CPAP at night.  She has no occupational environmental exposures.  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           The HRCT scan from May 24 2024 revealed the following  1. Interval resolution of previously noted multifocal pneumonia. New  airspace consolidation.  2. Similar severe diffuse mosaic attenuation pattern consistent with  air trapping from patient's known COPD.  3. Diffuse multifocal micronodularity suspicious for smoking-related  respiratory bronchiolitis.  4. Interval development of a pleural-based solid right middle lobe  nodule measuring 0.6 cm. Recommend follow-up in 6-12 months.  5. Redemonstration of diffusely and severely patulous esophagus  raising concern for motility disorder. Consider esophagram or  endoscopy for further evaluation.  6. Main pulmonary artery dilatation which can be seen in the setting  of pulmonary hypertension.    Currently, the patient is on 6 L/min and her respiratory status per her description is generally stable.  She has no  swelling of her legs wheezing or coughing.  She is more concerned about her osteoporosis cardiac evaluation etc.  She has not had pulmonary function test repeated yet.  She is aware that her CT scan needs to be repeated in 2 or 3 months.      Allergies   Allergen Reactions    Lisinopril Angioedema and Swelling    Tetracyclines Hives    Vancomycin Unknown    Penicillins Hives and Other     Tetramycin            Current Outpatient Medications:     albuterol 90 mcg/actuation inhaler, INHALE 1 TO 2 PUFFS EVERY 4 TO 6 HOURS AS NEEDED., Disp: , Rfl:     amiodarone (Pacerone) 100 mg tablet, Take 1 tablet (100 mg) by mouth once daily., Disp: 90 tablet, Rfl: 0    amLODIPine (Norvasc) 2.5 mg tablet, , Disp: , Rfl:     atorvastatin (Lipitor) 40 mg tablet, Take 1 tablet (40 mg) by mouth once daily at bedtime., Disp: 90 tablet, Rfl: 1    bisacodyl (Dulcolax) 10 mg suppository, INSERT 1 SUPPOSITORY RECTALLY EVERY 12 HOURS UNTIL BOWEL MOVEMENT, Disp: , Rfl:     blood sugar diagnostic (Blood Glucose Test) strip, TEST 3 TIMES DAILY., Disp: , Rfl:     cholecalciferol (Vitamin D-3) 50 mcg (2,000 unit) capsule, Take 1 tablet by mouth once daily., Disp: , Rfl:     dapagliflozin propanediol (Farxiga) 5 mg, Take 1 tablet (5 mg) by mouth once daily., Disp: 90 tablet, Rfl: 1    furosemide (Lasix) 40 mg tablet, Take 1 tablet (40 mg) by mouth once daily., Disp: , Rfl:     gabapentin (Neurontin) 300 mg capsule, Take 1 capsule (300 mg) by mouth 3 times a day., Disp: , Rfl:     guaiFENesin (Mucinex) 600 mg 12 hr tablet, Take by mouth every 12 hours., Disp: , Rfl:     oxybutynin XL (Ditropan-XL) 5 mg 24 hr tablet, , Disp: , Rfl:     oxygen (O2) gas therapy, Inhale 6 L/min continuously., Disp: , Rfl:     potassium chloride CR 20 mEq ER tablet, Take 1 tablet (20 mEq) by mouth once daily. Do not crush or chew., Disp: , Rfl:     tiotropium (Spiriva with HandiHaler) 18 mcg inhalation capsule, INHALE CONTENTS OF 1 CAPSULE ONCE DAILY., Disp: , Rfl:      Xarelto 20 mg tablet, , Disp: , Rfl:     apixaban (Eliquis) 5 mg tablet, Take 1 tablet (5 mg) by mouth 2 times a day. Do not start before January 31, 2024., Disp: 60 tablet, Rfl: 1          Review of Systems   Constitutional:  Negative for fatigue, fever and unexpected weight change.   HENT:  Negative for congestion, facial swelling, nosebleeds, postnasal drip, rhinorrhea, sinus pressure and sinus pain.    Eyes:  Negative for discharge, redness and visual disturbance.   Respiratory:  Positive for shortness of breath. Negative for apnea, cough, choking, wheezing and stridor.    Cardiovascular:  Negative for chest pain, palpitations and leg swelling.   Gastrointestinal:  Negative for abdominal distention, abdominal pain, constipation and nausea.   Endocrine: Negative for cold intolerance and heat intolerance.   Genitourinary:  Negative for difficulty urinating, dysuria, frequency and hematuria.   Musculoskeletal:  Negative for arthralgias, gait problem and joint swelling.   Allergic/Immunologic: Negative for environmental allergies, food allergies and immunocompromised state.   Neurological:  Negative for dizziness, tremors, syncope, speech difficulty, weakness, light-headedness, numbness and headaches.   Hematological:  Negative for adenopathy. Does not bruise/bleed easily.   Psychiatric/Behavioral:  Negative for agitation, behavioral problems and sleep disturbance. The patient is not nervous/anxious.         Vitals:    08/27/24 1056   BP: 119/75   Pulse: 71   Resp: 16   Temp: 36.1 °C (97 °F)   SpO2: 91%        Physical Exam  Vitals reviewed.   Constitutional:       Comments: Elderly in no distress on oxygen   HENT:      Head: Normocephalic and atraumatic.   Eyes:      Extraocular Movements: Extraocular movements intact.   Cardiovascular:      Rate and Rhythm: Normal rate and regular rhythm.      Heart sounds: No murmur heard.     No friction rub. No gallop.   Pulmonary:      Effort: Pulmonary effort is normal. No  respiratory distress.      Breath sounds: Normal breath sounds. No stridor. No wheezing, rhonchi or rales.   Chest:      Chest wall: No tenderness.   Abdominal:      General: Abdomen is flat. There is no distension.      Palpations: Abdomen is soft. There is no mass.      Tenderness: There is no abdominal tenderness.   Musculoskeletal:         General: Normal range of motion.      Cervical back: Normal range of motion.      Right lower leg: No edema.      Left lower leg: No edema.   Skin:     General: Skin is warm and dry.   Neurological:      Mental Status: She is alert and oriented to person, place, and time.   Psychiatric:         Mood and Affect: Mood normal.         Behavior: Behavior normal.

## 2024-08-27 NOTE — PATIENT INSTRUCTIONS
Will obtain pulmonary function test by calling       In addition, I would like a follow-up CT scan of the chest in several months by calling

## 2024-08-28 ENCOUNTER — LAB (OUTPATIENT)
Dept: LAB | Facility: LAB | Age: 89
End: 2024-08-28
Payer: MEDICARE

## 2024-08-28 ENCOUNTER — APPOINTMENT (OUTPATIENT)
Dept: PRIMARY CARE | Facility: CLINIC | Age: 89
End: 2024-08-28
Payer: MEDICARE

## 2024-08-28 VITALS
DIASTOLIC BLOOD PRESSURE: 61 MMHG | SYSTOLIC BLOOD PRESSURE: 112 MMHG | HEART RATE: 73 BPM | OXYGEN SATURATION: 86 % | WEIGHT: 154 LBS | BODY MASS INDEX: 31.1 KG/M2

## 2024-08-28 DIAGNOSIS — J44.9 CHRONIC OBSTRUCTIVE PULMONARY DISEASE, UNSPECIFIED COPD TYPE (MULTI): Chronic | ICD-10-CM

## 2024-08-28 DIAGNOSIS — J34.89 RHINORRHEA: Primary | ICD-10-CM

## 2024-08-28 DIAGNOSIS — M81.6 LOCALIZED OSTEOPOROSIS WITHOUT CURRENT PATHOLOGICAL FRACTURE: ICD-10-CM

## 2024-08-28 DIAGNOSIS — Z91.81 AT HIGH RISK FOR FALLS: ICD-10-CM

## 2024-08-28 DIAGNOSIS — E11.9 DIABETES MELLITUS TYPE 2 WITHOUT RETINOPATHY (MULTI): ICD-10-CM

## 2024-08-28 DIAGNOSIS — H91.93 HEARING DIFFICULTY OF BOTH EARS: ICD-10-CM

## 2024-08-28 DIAGNOSIS — Z00.00 ENCOUNTER FOR PREVENTATIVE ADULT HEALTH CARE EXAMINATION: ICD-10-CM

## 2024-08-28 LAB
25(OH)D3 SERPL-MCNC: 46 NG/ML (ref 30–100)
ALBUMIN SERPL BCP-MCNC: 3.9 G/DL (ref 3.4–5)
ALP SERPL-CCNC: 87 U/L (ref 33–136)
ALT SERPL W P-5'-P-CCNC: 11 U/L (ref 7–45)
ANION GAP SERPL CALC-SCNC: 18 MMOL/L (ref 10–20)
AST SERPL W P-5'-P-CCNC: 8 U/L (ref 9–39)
BILIRUB SERPL-MCNC: 0.4 MG/DL (ref 0–1.2)
BUN SERPL-MCNC: 22 MG/DL (ref 6–23)
CALCIUM SERPL-MCNC: 8.8 MG/DL (ref 8.6–10.6)
CHLORIDE SERPL-SCNC: 104 MMOL/L (ref 98–107)
CO2 SERPL-SCNC: 23 MMOL/L (ref 21–32)
CREAT SERPL-MCNC: 1.18 MG/DL (ref 0.5–1.05)
EGFRCR SERPLBLD CKD-EPI 2021: 44 ML/MIN/1.73M*2
GLUCOSE SERPL-MCNC: 122 MG/DL (ref 74–99)
POTASSIUM SERPL-SCNC: 4.3 MMOL/L (ref 3.5–5.3)
PROT SERPL-MCNC: 7 G/DL (ref 6.4–8.2)
PTH-INTACT SERPL-MCNC: 87.4 PG/ML (ref 18.5–88)
SODIUM SERPL-SCNC: 141 MMOL/L (ref 136–145)

## 2024-08-28 PROCEDURE — 1036F TOBACCO NON-USER: CPT | Performed by: INTERNAL MEDICINE

## 2024-08-28 PROCEDURE — 3078F DIAST BP <80 MM HG: CPT | Performed by: INTERNAL MEDICINE

## 2024-08-28 PROCEDURE — 1157F ADVNC CARE PLAN IN RCRD: CPT | Performed by: INTERNAL MEDICINE

## 2024-08-28 PROCEDURE — 82306 VITAMIN D 25 HYDROXY: CPT

## 2024-08-28 PROCEDURE — 99214 OFFICE O/P EST MOD 30 MIN: CPT | Performed by: INTERNAL MEDICINE

## 2024-08-28 PROCEDURE — 1160F RVW MEDS BY RX/DR IN RCRD: CPT | Performed by: INTERNAL MEDICINE

## 2024-08-28 PROCEDURE — 83970 ASSAY OF PARATHORMONE: CPT

## 2024-08-28 PROCEDURE — 36415 COLL VENOUS BLD VENIPUNCTURE: CPT

## 2024-08-28 PROCEDURE — 80053 COMPREHEN METABOLIC PANEL: CPT

## 2024-08-28 PROCEDURE — 1159F MED LIST DOCD IN RCRD: CPT | Performed by: INTERNAL MEDICINE

## 2024-08-28 PROCEDURE — 1126F AMNT PAIN NOTED NONE PRSNT: CPT | Performed by: INTERNAL MEDICINE

## 2024-08-28 PROCEDURE — 3074F SYST BP LT 130 MM HG: CPT | Performed by: INTERNAL MEDICINE

## 2024-08-28 PROCEDURE — G2211 COMPLEX E/M VISIT ADD ON: HCPCS | Performed by: INTERNAL MEDICINE

## 2024-08-28 RX ORDER — MOMETASONE FUROATE MONOHYDRATE 50 UG/1
1 SPRAY, METERED NASAL 2 TIMES DAILY
Qty: 17 G | Refills: 11 | Status: SHIPPED | OUTPATIENT
Start: 2024-08-28 | End: 2025-08-28

## 2024-08-28 RX ORDER — ALENDRONATE SODIUM 70 MG/1
70 TABLET ORAL
Qty: 12 TABLET | Refills: 3 | Status: SHIPPED | OUTPATIENT
Start: 2024-08-28 | End: 2025-07-30

## 2024-08-28 ASSESSMENT — PAIN SCALES - GENERAL: PAINLEVEL: 0-NO PAIN

## 2024-08-28 NOTE — ASSESSMENT & PLAN NOTE
Last A1C at goal <8% on SGLT2 inhibitor, interested in rechecking levels.   Orders:    Hemoglobin A1C; Future    Albumin-Creatinine Ratio, Urine Random; Future

## 2024-08-28 NOTE — PROGRESS NOTES
Subjective   Patient ID: Lissett Rodríguez is a 89 y.o. female who presents for Follow-up.  HPI  89-year-old female here for follow-up visit, last seen on April for annual wellness visit.  She was referred to physical therapy for balance training as well as low back pain treatment.  She had a recent bone density performed last month notable for osteoporosis with a bone density of -3.5 at the left femoral neck.    - Concerned about brittle nails   - Increased mucus production in the morning. Uses mucinex.   - Milk allergy? After milk felt sleepy and could not keep her eyes open. Has since discontinued milk and is no longer sleepy. She discontinued this 1 week ago.     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  Seen by Optho earlier this month no retinopathy noted  - Postprandial hypotension with hospitalization earlier this year  - HfPEF - with two hospitalizations due to exacerbation - On lasix daily and Farxiga, previously in cardiac rehab continues home exercises as well as group exercises Followed by Dr. Hinkle last seen in November   - COPD with hypoxic respiratory failure - with MANUEL thought multifactorial due HFpEF, interstitial lung disease possibly related to amiodarone seen by pulmonology in May, high-resolution CT notable for a pleural-based nodular area for which she was recommended repeat in a few months as well as follow-up PFTs-seen by Dr. Brown yesterday.  -  PE -  in January/24  along with pneumonia noted, already on Eliquis   - HTN - no longer on medications   - KALPANA - compliant on CPAP no issues with it present.  - Hearing loss - with acquired deafness - has hearing aids notes persistent trouble hearing.  - HLD -  on atorvastatin 40mg   - TIA - history in 2016   - Obesity   - CKD3a   - Ascending aortic enlargement - most recent TTE noted at 3.9cm 1/24     CT chest 5/24: Interval  resolution of previously noted multifocal pneumonia.  New airspace consolidation.  Similar severe diffuse mosaic attenuation pattern consistent with air trapping from patient's known COPD.  Diffuse multifocal micronodularity suspicious for smoking-related respiratory bronchiolitis.  Interval development of a pleural placed solid right middle lobe nodule measuring 0.6 cm.  Recommend follow-up in 6 to 12 months.  Redemonstration of a diffusely and severely patulous esophagus raising concern for motility disorder.  Consider esophagram or endoscopy for further evaluation.  Main pulmonary artery dilation which can be seen in the setting of pulmonary hypertension.     Social:   - Lives in [facility] alone, son lives nearby who checks in regularly   - 5 children, one passed away. Has a daughter who she gave up for adoption recently reunited lives in [place], 14 grandchildren and 7 great-grandchildren  Current Outpatient Medications   Medication Instructions    albuterol 90 mcg/actuation inhaler INHALE 1 TO 2 PUFFS EVERY 4 TO 6 HOURS AS NEEDED.    alendronate (FOSAMAX) 70 mg, oral, Every 7 days, Take in the morning with a full glass of water, on an empty stomach, and do not take anything else by mouth or lie down for the next 30 min.    amiodarone (PACERONE) 100 mg, oral, Daily    amLODIPine (Norvasc) 2.5 mg tablet     apixaban (ELIQUIS) 5 mg, oral, 2 times daily    atorvastatin (LIPITOR) 40 mg, oral, Nightly    bisacodyl (Dulcolax) 10 mg suppository INSERT 1 SUPPOSITORY RECTALLY EVERY 12 HOURS UNTIL BOWEL MOVEMENT    blood sugar diagnostic (Blood Glucose Test) strip TEST 3 TIMES DAILY.    cholecalciferol (Vitamin D-3) 50 mcg (2,000 unit) capsule 1 tablet, oral, Daily    dapagliflozin propanediol (FARXIGA) 5 mg, oral, Daily    furosemide (LASIX) 40 mg, oral, Daily    gabapentin (Neurontin) 300 mg capsule 1 capsule, oral, 3 times daily    guaiFENesin (Mucinex) 600 mg 12 hr tablet oral, Every 12 hours     mometasone (Nasonex) 50 mcg/actuation nasal spray 1 spray, Each Nostril, 2 times daily    oxybutynin XL (Ditropan-XL) 5 mg 24 hr tablet     oxygen (O2) 6 L/min, inhalation, Continuous    potassium chloride CR 20 mEq ER tablet 20 mEq, oral, Daily, Do not crush or chew.    tiotropium (Spiriva with HandiHaler) 18 mcg inhalation capsule INHALE CONTENTS OF 1 CAPSULE ONCE DAILY.    Xarelto 20 mg tablet         Objective     /61   Pulse 73   Wt 69.9 kg (154 lb)   SpO2 (!) 86%   BMI 31.10 kg/m²     Physical Exam  General: Appears comfortable, NAD, appropriate affect,   HEENT: NCAT, EOMI, pupils symmetric, no conjunctival erythema, hearing difficulty  Heart: RRR S1 S2 no murmurs appreciated   Lungs: Bibasilar crackles, otherwise clear, NC in place, speaking full sentences  Abdomen: Soft, NT  Extremities: no cyanosis or edema appreciated  Neuro: AAO x 3, answers questions appropriately, no FND, gait antalgic, walks with walker      Assessment/Plan     Assessment & Plan  Rhinorrhea  With persistent mucus in the morning, trial of topical nasal steroid. Counseling on appropriate use provided.   Orders:    mometasone (Nasonex) 50 mcg/actuation nasal spray; Administer 1 spray into each nostril 2 times a day.    At high risk for falls  Strongly recommend balance and strengthening exercises, will refer.  Has been undergoing OT as well.    Orders:    Referral to Physical Therapy; Future    Localized osteoporosis without current pathological fracture  Extensively discussed, recently completed all major dental work.   Discussed risks of osteoporosis and recommendation to consider management.   Rule out secondary conditions, recheck renal function to ensure stability.   Ensure Vitamin D adequate, discussed calcium intake   Refer to PT for fall risk reduction   Start alendronate weekly x 5 years, potential side effects and management expectations reviewed. Administration reviewed.   Orders:    Vitamin D 25-Hydroxy,Total (for  eval of Vitamin D levels); Future    PTH, intact; Future    Comprehensive Metabolic Panel; Future    Referral to Physical Therapy; Future    alendronate (Fosamax) 70 mg tablet; Take 1 tablet (70 mg) by mouth every 7 days. Take in the morning with a full glass of water, on an empty stomach, and do not take anything else by mouth or lie down for the next 30 min.    Diabetes mellitus type 2 without retinopathy (Multi)  Last A1C at goal <8% on SGLT2 inhibitor, interested in rechecking levels.   Orders:    Hemoglobin A1C; Future    Albumin-Creatinine Ratio, Urine Random; Future    Hearing difficulty of both ears  Persistent trouble with hearing despite hearing aids, advised followup with audiology.         Chronic obstructive pulmonary disease, unspecified COPD type (Multi)  On appropriate treatment, recently seen by pulmonology   Continue O2 via NC 6L   Subpleural pulmonary nodule - recommended repeat CT chest which is already ordered.   Pending PFTs for further evaluation of interstitial findings on CT chest.          Health Maintenance  Cancer Screening: N/A  Immunizations: Flu shot and COVID booster recommended when available.    DEXA 7/24 osteoporosis    Followup 3 months

## 2024-08-28 NOTE — PATIENT INSTRUCTIONS
Lissett,   I have ordered blood and/or urine tests for you to do today. The lab can be found on this floor (2nd floor) next to the pharmacy across from the elevators.    Brittle nails   Try moisturizing your hands more   Add biotin supplement   Increase protein intake   Nasal discharge - start nasal spray mometasone.   Constipation   Add metamucil or benefiber   Natural   Increase water intake.   Osteoporosis   If bloodwork is ok, we will start ALENDRONATE once per week   Be sure to take it with a tall glass of water and remain upright for 30-60 minutes after starting it.   We will plan on repeating a bone density test in 2 years   To prevent falls - referral to physical therapy for balance training and strengthening exercises.

## 2024-08-29 NOTE — ASSESSMENT & PLAN NOTE
On appropriate treatment, recently seen by pulmonology   Continue O2 via NC 6L   Subpleural pulmonary nodule - recommended repeat CT chest which is already ordered.   Pending PFTs for further evaluation of interstitial findings on CT chest.

## 2024-09-01 DIAGNOSIS — Z00.00 ENCOUNTER FOR PREVENTATIVE ADULT HEALTH CARE EXAMINATION: ICD-10-CM

## 2024-09-01 DIAGNOSIS — E11.9 DIABETES MELLITUS TYPE 2 WITHOUT RETINOPATHY (MULTI): ICD-10-CM

## 2024-09-01 DIAGNOSIS — R79.89 BLOOD CREATININE INCREASED COMPARED WITH PRIOR MEASUREMENT: Primary | ICD-10-CM

## 2024-09-11 ENCOUNTER — OFFICE VISIT (OUTPATIENT)
Dept: URGENT CARE | Age: 89
End: 2024-09-11
Payer: MEDICARE

## 2024-09-11 VITALS
OXYGEN SATURATION: 90 % | DIASTOLIC BLOOD PRESSURE: 67 MMHG | HEART RATE: 77 BPM | TEMPERATURE: 100 F | RESPIRATION RATE: 14 BRPM | SYSTOLIC BLOOD PRESSURE: 116 MMHG

## 2024-09-11 DIAGNOSIS — U07.1 COVID-19: ICD-10-CM

## 2024-09-11 DIAGNOSIS — R05.1 ACUTE COUGH: Primary | ICD-10-CM

## 2024-09-11 DIAGNOSIS — R09.81 NASAL CONGESTION: ICD-10-CM

## 2024-09-12 NOTE — PROGRESS NOTES
HPI:  Pt is here with granddaughter.  Per granddaughter pt lives in assisted living and yesterday started having cough with increased sputum production, chills, congestion.  Covid test at the facility was positive.  No new SOB; pt is on 6L of O2 at home.  Oa baseline is 88-90 % on O2.  Pt had Covid in the past about a year ago and took Paxlovid.  Pt requests Paxlovid today and declines repeat testing.    ROS:  Chills/fever  Cough with sputum production  Fatigue  No CP  No new SOB       PE:  NAD, sitting comfortably in a wheel chair  No conjunctival erythema  +boggy mucosa  +pnd  Sinus rhythm  O2 by NC, normal respiratory effort, raspy cough on exam  Normal judgement    A/P:  Cough  Covid 19    Discuss Paxlovid drug interactions with pharmacist (script for renal dose provided).  Monitor O2 at home.  Tylenol as needed for fever.  Coracidin as needed.  Keep a diary of symptoms.  Quarantine per current CDC recommendations.  Recheck with your doctor in 4-5 days.  Go to the ER if starts getting worse.

## 2024-09-13 ENCOUNTER — TELEPHONE (OUTPATIENT)
Dept: PRIMARY CARE | Facility: CLINIC | Age: 89
End: 2024-09-13

## 2024-09-26 ENCOUNTER — HOSPITAL ENCOUNTER (OUTPATIENT)
Dept: RESPIRATORY THERAPY | Facility: HOSPITAL | Age: 89
Discharge: HOME | End: 2024-09-26
Payer: MEDICARE

## 2024-09-26 DIAGNOSIS — J44.9 CHRONIC OBSTRUCTIVE PULMONARY DISEASE, UNSPECIFIED COPD TYPE (MULTI): ICD-10-CM

## 2024-09-26 PROCEDURE — 94726 PLETHYSMOGRAPHY LUNG VOLUMES: CPT

## 2024-09-26 PROCEDURE — 94060 EVALUATION OF WHEEZING: CPT | Performed by: INTERNAL MEDICINE

## 2024-09-26 PROCEDURE — 94729 DIFFUSING CAPACITY: CPT | Performed by: INTERNAL MEDICINE

## 2024-09-26 PROCEDURE — 94726 PLETHYSMOGRAPHY LUNG VOLUMES: CPT | Performed by: INTERNAL MEDICINE

## 2024-09-27 ENCOUNTER — DOCUMENTATION (OUTPATIENT)
Dept: PULMONOLOGY | Facility: HOSPITAL | Age: 89
End: 2024-09-27
Payer: MEDICARE

## 2024-09-27 NOTE — PROGRESS NOTES
The PFTs outlined an FVC of 1.94 L 191% predicted with an FEV1 of 1.33 L 161% predicted and the TLC is 156% predicted the DLCO was 55% predicted.  The test were performed with variable efforts.  The DLCO seems somewhat down compared to 2022.  Will obtain a follow-up CT scan.

## 2024-09-30 ENCOUNTER — TELEPHONE (OUTPATIENT)
Dept: PULMONOLOGY | Facility: CLINIC | Age: 89
End: 2024-09-30
Payer: MEDICARE

## 2024-11-04 DIAGNOSIS — E11.610 DIABETIC NEUROPATHIC ARTHROPATHY (MULTI): Primary | ICD-10-CM

## 2024-11-04 RX ORDER — GABAPENTIN 300 MG/1
300 CAPSULE ORAL 3 TIMES DAILY
Qty: 90 CAPSULE | Refills: 0 | Status: SHIPPED | OUTPATIENT
Start: 2024-11-04

## 2024-12-03 ENCOUNTER — LAB (OUTPATIENT)
Dept: LAB | Facility: LAB | Age: 89
End: 2024-12-03
Payer: MEDICARE

## 2024-12-03 ENCOUNTER — APPOINTMENT (OUTPATIENT)
Dept: PRIMARY CARE | Facility: CLINIC | Age: 89
End: 2024-12-03
Payer: MEDICARE

## 2024-12-03 VITALS
WEIGHT: 150.2 LBS | HEART RATE: 89 BPM | TEMPERATURE: 97.1 F | BODY MASS INDEX: 30.34 KG/M2 | DIASTOLIC BLOOD PRESSURE: 64 MMHG | SYSTOLIC BLOOD PRESSURE: 122 MMHG

## 2024-12-03 DIAGNOSIS — H91.93 HEARING DIFFICULTY OF BOTH EARS: ICD-10-CM

## 2024-12-03 DIAGNOSIS — R20.2 NUMBNESS AND TINGLING: ICD-10-CM

## 2024-12-03 DIAGNOSIS — R20.0 NUMBNESS AND TINGLING: ICD-10-CM

## 2024-12-03 DIAGNOSIS — E11.610 DIABETIC NEUROPATHIC ARTHROPATHY (MULTI): ICD-10-CM

## 2024-12-03 DIAGNOSIS — R26.9 GAIT DIFFICULTY: ICD-10-CM

## 2024-12-03 DIAGNOSIS — I26.99 OTHER ACUTE PULMONARY EMBOLISM, UNSPECIFIED WHETHER ACUTE COR PULMONALE PRESENT (MULTI): ICD-10-CM

## 2024-12-03 DIAGNOSIS — M54.50 CHRONIC LOW BACK PAIN WITHOUT SCIATICA, UNSPECIFIED BACK PAIN LATERALITY: Primary | ICD-10-CM

## 2024-12-03 DIAGNOSIS — Z00.00 ENCOUNTER FOR PREVENTATIVE ADULT HEALTH CARE EXAMINATION: ICD-10-CM

## 2024-12-03 DIAGNOSIS — R19.8 CHANGE IN BOWEL MOVEMENT: ICD-10-CM

## 2024-12-03 DIAGNOSIS — Z02.9 ADMINISTRATIVE ENCOUNTER: ICD-10-CM

## 2024-12-03 DIAGNOSIS — E11.9 DIABETES MELLITUS TYPE 2 WITHOUT RETINOPATHY (MULTI): ICD-10-CM

## 2024-12-03 DIAGNOSIS — R79.89 BLOOD CREATININE INCREASED COMPARED WITH PRIOR MEASUREMENT: ICD-10-CM

## 2024-12-03 DIAGNOSIS — G89.29 CHRONIC LOW BACK PAIN WITHOUT SCIATICA, UNSPECIFIED BACK PAIN LATERALITY: Primary | ICD-10-CM

## 2024-12-03 PROBLEM — M85.80 OSTEOPENIA: Status: RESOLVED | Noted: 2023-03-10 | Resolved: 2024-12-03

## 2024-12-03 LAB
CREAT UR-MCNC: 39.8 MG/DL (ref 20–320)
MICROALBUMIN UR-MCNC: <7 MG/L
MICROALBUMIN/CREAT UR: NORMAL MG/G{CREAT}
VIT B12 SERPL-MCNC: 465 PG/ML (ref 211–911)

## 2024-12-03 PROCEDURE — 3074F SYST BP LT 130 MM HG: CPT | Performed by: INTERNAL MEDICINE

## 2024-12-03 PROCEDURE — 3078F DIAST BP <80 MM HG: CPT | Performed by: INTERNAL MEDICINE

## 2024-12-03 PROCEDURE — G2211 COMPLEX E/M VISIT ADD ON: HCPCS | Performed by: INTERNAL MEDICINE

## 2024-12-03 PROCEDURE — 82607 VITAMIN B-12: CPT

## 2024-12-03 PROCEDURE — 80048 BASIC METABOLIC PNL TOTAL CA: CPT

## 2024-12-03 PROCEDURE — 1157F ADVNC CARE PLAN IN RCRD: CPT | Performed by: INTERNAL MEDICINE

## 2024-12-03 PROCEDURE — 99214 OFFICE O/P EST MOD 30 MIN: CPT | Performed by: INTERNAL MEDICINE

## 2024-12-03 PROCEDURE — 80061 LIPID PANEL: CPT

## 2024-12-03 PROCEDURE — 82570 ASSAY OF URINE CREATININE: CPT

## 2024-12-03 PROCEDURE — 82043 UR ALBUMIN QUANTITATIVE: CPT

## 2024-12-03 PROCEDURE — 1159F MED LIST DOCD IN RCRD: CPT | Performed by: INTERNAL MEDICINE

## 2024-12-03 PROCEDURE — 1160F RVW MEDS BY RX/DR IN RCRD: CPT | Performed by: INTERNAL MEDICINE

## 2024-12-03 PROCEDURE — 83036 HEMOGLOBIN GLYCOSYLATED A1C: CPT

## 2024-12-03 PROCEDURE — 36415 COLL VENOUS BLD VENIPUNCTURE: CPT

## 2024-12-03 RX ORDER — GABAPENTIN 100 MG/1
300 CAPSULE ORAL 3 TIMES DAILY
Qty: 180 CAPSULE | Refills: 0 | Status: SHIPPED | OUTPATIENT
Start: 2024-12-03

## 2024-12-03 RX ORDER — DICLOFENAC SODIUM 10 MG/G
4 GEL TOPICAL 4 TIMES DAILY
Qty: 100 G | Refills: 1 | Status: SHIPPED | OUTPATIENT
Start: 2024-12-03

## 2024-12-03 NOTE — ASSESSMENT & PLAN NOTE
Longstanding, no alarm symptoms improved with supportive measures and voltaren gel.   Has been partaking in PT   Orders:    diclofenac sodium (Voltaren) 1 % gel; Apply 4.5 inches (4 g) topically 4 times a day.    Follow Up In [facility] - PCP - Established; Future

## 2024-12-03 NOTE — PATIENT INSTRUCTIONS
Lissett,   Referral to pharmacy team to see if cost for medications can be helped.   Schedule repeat CT as recommended by Dr. Brown.   Followup with Dr. Hinkle.   Make sure you are taking the alendronate once a week.   Try increasing fiber intake in the diet   4 prunes or 2 kiwis per day OR benefiber or metamucil   Reduce gabapentin to 200mg three times a day for 2 weeks, then 100mg three times a day for 2 weeks, then 100mg at bedtime.   Labs including bloodwork and/or urine is ordered today. The lab can be found on this floor (2nd floor) next to the pharmacy across from the elevators.        Followup 3 months

## 2024-12-03 NOTE — ASSESSMENT & PLAN NOTE
No significant response to gabapentin, interested in tapering with intent to discontinue.   Orders:    gabapentin (Neurontin) 100 mg capsule; Take 3 capsules (300 mg) by mouth 3 times a day.    Hemoglobin A1C; Future    Albumin-Creatinine Ratio, Urine Random; Future    Lipid Panel; Future

## 2024-12-03 NOTE — PROGRESS NOTES
Subjective   Patient ID: Lissett Rodríguez is a 89 y.o. female who presents for MEDICATION QUESTIONS and BOWEL ISSUES.  HPI  89-year-old female here for follow-up visit, last seen in August, recommended Flonase for rhinorrhea and referred to physical therapy due to significant fall risk.    8/24: renal function worsening repeat with urine studies, did not draw A1c.   Has been noting changes in bowel movements where one day has a formed bowel mvoement, then looser then next day but still formed and a lighter brown, then the following day has a diarrhea. One bowel movement a day, no hematochezia or melena, no pain associated with it, no significant weight changes, no nausea or vomiting, no fever or chills, no change in food intake. Diet consistently a meat, vegetable sometimes fruit. Breakfast with egg, sausage and toast. Mostly drinks tea and once in a while coffee. No recent travel or sick contacts. Symptoms have been ongoing since February after discharge from her hospital. No incontinence   Concerned about cost of medications     Low back pain - pain when waking up in the morning, no radiation, going on for years, uses heating pad and stretching exercises. Finds Voltaren gel to be very helpful.     Took an OTC sonuvita with improvement in numbness in her fingers, has not noted a change in her hearing.    PMHx:  - AFib - on eliquis and amiodarone UTD with eye exam  no significant concerns.   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  Seen by Optho earlier this month no retinopathy noted. Was taking gabapentin but was a period without it and has not noted a significant change in symptom.s   - Postprandial hypotension with hospitalization earlier this year  - HfPEF - with two hospitalizations due to exacerbation - On lasix daily and Farxiga, previously in cardiac rehab continues home exercises as well as group exercises Followed by Dr. Arin baker  seen in November   - COPD/ hypoxic respiratory failure - MANUEL multifactorial due HFpEF, interstitial lung disease possibly related to amiodarone -  seen by pulmonology Dr. Brown, high-resolution CT notable for a pleural-based nodular area, repeat CT recommended  PFTs were also performed.  -  PE -  in January/24  along with pneumonia noted, already on Eliquis   - HTN - no longer on medications   - KALPANA - compliant on CPAP no issues with it present.  - Hearing loss - with acquired deafness - has hearing aids notes persistent trouble hearing.  - HLD -  on atorvastatin 40mg   - TIA - history in 2016   - Obesity   - CKD3a   - Ascending aortic enlargement - most recent TTE noted at 3.9cm 1/24   - Osteoporosis -started alendronate at last visit in August     CT chest 5/24: Interval resolution of previously noted multifocal pneumonia.  New airspace consolidation.  Similar severe diffuse mosaic attenuation pattern consistent with air trapping from patient's known COPD.  Diffuse multifocal micronodularity suspicious for smoking-related respiratory bronchiolitis.  Interval development of a pleural placed solid right middle lobe nodule measuring 0.6 cm.  Recommend follow-up in 6 to 12 months.  Redemonstration of a diffusely and severely patulous esophagus raising concern for motility disorder.  Consider esophagram or endoscopy for further evaluation.  Main pulmonary artery dilation which can be seen in the setting of pulmonary hypertension.     Social:   - Lives in [facility] alone, son lives nearby (retired ) who checks in regularly, sister lives in [place].   - 5 children, one passed away. Has a daughter who she gave up for adoption recently reunited lives in [place], 14 grandchildren and 7 great-grandchildren  Current Outpatient Medications   Medication Instructions    albuterol 90 mcg/actuation inhaler INHALE 1 TO 2 PUFFS EVERY 4 TO 6 HOURS AS NEEDED.    alendronate (FOSAMAX) 70 mg, oral,  Every 7 days, Take in the morning with a full glass of water, on an empty stomach, and do not take anything else by mouth or lie down for the next 30 min.    amiodarone (PACERONE) 100 mg, oral, Daily    amLODIPine (Norvasc) 2.5 mg tablet     apixaban (ELIQUIS) 5 mg, oral, 2 times daily    atorvastatin (LIPITOR) 40 mg, oral, Nightly    bisacodyl (Dulcolax) 10 mg suppository INSERT 1 SUPPOSITORY RECTALLY EVERY 12 HOURS UNTIL BOWEL MOVEMENT    blood sugar diagnostic (Blood Glucose Test) strip TEST 3 TIMES DAILY.    cholecalciferol (Vitamin D-3) 50 mcg (2,000 unit) capsule 1 tablet, oral, Daily    dapagliflozin propanediol (FARXIGA) 5 mg, oral, Daily    furosemide (LASIX) 40 mg, oral, Daily    gabapentin (NEURONTIN) 300 mg, oral, 3 times daily    guaiFENesin (Mucinex) 600 mg 12 hr tablet oral, Every 12 hours    mometasone (Nasonex) 50 mcg/actuation nasal spray 1 spray, Each Nostril, 2 times daily    oxybutynin XL (Ditropan-XL) 5 mg 24 hr tablet     oxygen (O2) 6 L/min, inhalation, Continuous    potassium chloride CR 20 mEq ER tablet 20 mEq, oral, Daily, Do not crush or chew.    tiotropium (Spiriva with HandiHaler) 18 mcg inhalation capsule INHALE CONTENTS OF 1 CAPSULE ONCE DAILY.    Xarelto 20 mg tablet         Objective     /64   Pulse 89   Temp 36.2 °C (97.1 °F) (Temporal)   Wt 68.1 kg (150 lb 3.2 oz)   BMI 30.34 kg/m²     Physical Exam  General: Appears comfortable, NAD, appropriate affect  HEENT: NCAT, EOMI, pupils symmetric, no conjunctival erythema   Neck: Supple, no LAD   Heart: RRR S1 S2 no murmurs appreciated   Lungs: Bibasilar crackles, otherwise clear, NC in place, speaking full sentences   Abdomen: Soft, NT/ND, no rebound or guarding, NABS   Extremities: no cyanosis or edema appreciated  Neuro: AAO x 3, answers questions appropriately, no FND, gait unremarkable     Lab Results   Component Value Date    WBC 12.6 (H) 02/29/2024    HGB 14.9 02/29/2024    HCT 49.3 (H) 02/29/2024     02/29/2024     CHOL 168 09/23/2021    TRIG 187 (H) 09/23/2021    HDL 38.0 (A) 09/23/2021    ALT 11 08/28/2024    AST 8 (L) 08/28/2024     08/28/2024    K 4.3 08/28/2024     08/28/2024    CREATININE 1.18 (H) 08/28/2024    BUN 22 08/28/2024    CO2 23 08/28/2024    TSH 1.11 02/27/2024    INR 2.9 (H) 03/25/2023    HGBA1C 7.6 (A) 04/04/2024     Independently reviewed most recent bloodwork        Assessment/Plan     Assessment & Plan  Other acute pulmonary embolism, unspecified whether acute cor pulmonale present (Multi)  Reach out to pharmacy team to assess cost concerns   Orders:    apixaban (Eliquis) 5 mg tablet; Take 1 tablet (5 mg) by mouth 2 times a day.    Chronic low back pain without sciatica, unspecified back pain laterality  Longstanding, no alarm symptoms improved with supportive measures and voltaren gel.   Has been partaking in PT   Orders:    diclofenac sodium (Voltaren) 1 % gel; Apply 4.5 inches (4 g) topically 4 times a day.    Follow Up In [facility] - PCP - Established; Future    Hearing difficulty of both ears  Persistent trouble with hearing despite hearing aids, advised followup with audiology.         Gait difficulty  Followed by PT          Diabetic neuropathic arthropathy (Multi)  No significant response to gabapentin, interested in tapering with intent to discontinue.   Orders:    gabapentin (Neurontin) 100 mg capsule; Take 3 capsules (300 mg) by mouth 3 times a day.    Hemoglobin A1C; Future    Albumin-Creatinine Ratio, Urine Random; Future    Lipid Panel; Future    Numbness and tingling  Improved with B12 supplementation, will recheck levels   Orders:    Vitamin B12; Future    Administrative encounter    Orders:    Referral to Clinical Pharmacy; Future    Change in bowel movement  Without alarm symptoms suggest trial of increase fiber intake as well as hydration, monitor for improvement.         Health Maintenance  Cancer Screening: N/A  Immunizations: UTD with flu, covid, shots, RSV    DEXA 7/24 osteoporosis     Follow-up 3 months

## 2024-12-04 LAB
ANION GAP SERPL CALC-SCNC: 14 MMOL/L (ref 10–20)
BUN SERPL-MCNC: 23 MG/DL (ref 6–23)
CALCIUM SERPL-MCNC: 9.1 MG/DL (ref 8.6–10.6)
CHLORIDE SERPL-SCNC: 102 MMOL/L (ref 98–107)
CHOLEST SERPL-MCNC: 174 MG/DL (ref 0–199)
CHOLESTEROL/HDL RATIO: 4.8
CO2 SERPL-SCNC: 28 MMOL/L (ref 21–32)
CREAT SERPL-MCNC: 1.16 MG/DL (ref 0.5–1.05)
EGFRCR SERPLBLD CKD-EPI 2021: 45 ML/MIN/1.73M*2
EST. AVERAGE GLUCOSE BLD GHB EST-MCNC: 163 MG/DL
GLUCOSE SERPL-MCNC: 131 MG/DL (ref 74–99)
HBA1C MFR BLD: 7.3 %
HDLC SERPL-MCNC: 36.3 MG/DL
LDLC SERPL CALC-MCNC: 91 MG/DL
NON HDL CHOLESTEROL: 138 MG/DL (ref 0–149)
POTASSIUM SERPL-SCNC: 4.4 MMOL/L (ref 3.5–5.3)
SODIUM SERPL-SCNC: 140 MMOL/L (ref 136–145)
TRIGL SERPL-MCNC: 233 MG/DL (ref 0–149)
VLDL: 47 MG/DL (ref 0–40)

## 2024-12-06 ENCOUNTER — OFFICE VISIT (OUTPATIENT)
Dept: CARDIOLOGY | Facility: CLINIC | Age: 89
End: 2024-12-06
Payer: MEDICARE

## 2024-12-06 VITALS
WEIGHT: 152.6 LBS | SYSTOLIC BLOOD PRESSURE: 131 MMHG | DIASTOLIC BLOOD PRESSURE: 75 MMHG | HEART RATE: 80 BPM | BODY MASS INDEX: 30.76 KG/M2 | HEIGHT: 59 IN | TEMPERATURE: 97.6 F

## 2024-12-06 DIAGNOSIS — I50.32 CHRONIC HEART FAILURE WITH PRESERVED EJECTION FRACTION: Primary | ICD-10-CM

## 2024-12-06 PROCEDURE — 1157F ADVNC CARE PLAN IN RCRD: CPT | Performed by: INTERNAL MEDICINE

## 2024-12-06 PROCEDURE — 3075F SYST BP GE 130 - 139MM HG: CPT | Performed by: INTERNAL MEDICINE

## 2024-12-06 PROCEDURE — 99213 OFFICE O/P EST LOW 20 MIN: CPT | Performed by: INTERNAL MEDICINE

## 2024-12-06 PROCEDURE — G2211 COMPLEX E/M VISIT ADD ON: HCPCS | Performed by: INTERNAL MEDICINE

## 2024-12-06 PROCEDURE — 1159F MED LIST DOCD IN RCRD: CPT | Performed by: INTERNAL MEDICINE

## 2024-12-06 PROCEDURE — 3078F DIAST BP <80 MM HG: CPT | Performed by: INTERNAL MEDICINE

## 2024-12-06 PROCEDURE — 1036F TOBACCO NON-USER: CPT | Performed by: INTERNAL MEDICINE

## 2024-12-06 NOTE — PATIENT INSTRUCTIONS
Thank you for coming to see us today! To reach Dr. Hinkle's office please call [phone]. Fax [phone]. Call [phone] to schedule an appointment. You may also contact the HF RNs at [email or web address]  (Please include your name and date of birth)  For MEDICATION REFILLS, please call [phone] option [phone] then option 1.

## 2024-12-06 NOTE — PROGRESS NOTES
Denies fatigue, chest pain, chest pressure, \shortness of breath, dyspnea on exertion, orthopnea, PND. No edema noted in BLE.   Denies headaches, dizziness, lightheadedness, and falls.    Her biggest symptoms is fatigue/feeling tired.   She has palpitations on occasion. She states she will go in and out of a fib.

## 2024-12-06 NOTE — PROGRESS NOTES
"    Heart Failure Cardiology    Lissett Rodríguez is a 89 y.o. female from [place], [place], resident of an [facility], here for a routine visit.      Since I last saw her a year ago she was admitted for management of pneumonia. She does not recall having any issues related to worsening heart failure.    Otherwise she is at her baseline.    Exam: /75 (BP Location: Right arm, Patient Position: Sitting, BP Cuff Size: Adult)   Pulse 80   Temp 36.4 °C (97.6 °F) (Temporal)   Ht 1.499 m (4' 11\")   Wt 69.2 kg (152 lb 9.6 oz)   BMI 30.82 kg/m²   No JVD  Light diffuse crackles on lung exam  No LE edema    Current Outpatient Medications   Medication Instructions    albuterol 90 mcg/actuation inhaler INHALE 1 TO 2 PUFFS EVERY 4 TO 6 HOURS AS NEEDED.    alendronate (FOSAMAX) 70 mg, oral, Every 7 days, Take in the morning with a full glass of water, on an empty stomach, and do not take anything else by mouth or lie down for the next 30 min.    amiodarone (PACERONE) 100 mg, oral, Daily    amLODIPine (Norvasc) 2.5 mg tablet Take 1 tablet (2.5 mg) by mouth once daily.    apixaban (ELIQUIS) 5 mg, oral, 2 times daily    atorvastatin (LIPITOR) 40 mg, oral, Nightly    bisacodyl (Dulcolax) 10 mg suppository INSERT 1 SUPPOSITORY RECTALLY EVERY 12 HOURS UNTIL BOWEL MOVEMENT    blood sugar diagnostic (Blood Glucose Test) strip TEST 3 TIMES DAILY.    cholecalciferol (Vitamin D-3) 50 mcg (2,000 unit) capsule 1 tablet, oral, Daily    dapagliflozin propanediol (FARXIGA) 5 mg, oral, Daily    diclofenac sodium (VOLTAREN) 4 g, Topical, 4 times daily    furosemide (LASIX) 40 mg, oral, Daily    gabapentin (NEURONTIN) 300 mg, oral, 3 times daily    guaiFENesin (Mucinex) 600 mg 12 hr tablet oral, Every 12 hours    mometasone (Nasonex) 50 mcg/actuation nasal spray 1 spray, Each Nostril, 2 times daily    oxybutynin XL (Ditropan-XL) 5 mg 24 hr tablet     oxygen (O2) 6 L/min, inhalation, Continuous    potassium chloride CR 20 mEq ER tablet 20 mEq, " oral, Daily, Do not crush or chew.    tiotropium (Spiriva with HandiHaler) 18 mcg inhalation capsule INHALE CONTENTS OF 1 CAPSULE ONCE DAILY.     Past medical history (non-cardiac):  -- Obesity  -- DM2  -- KALPANA  -- OA  -- History of TIA  -- COPD, former smoker on home O2 6L nc    Cardiovascular history:  -- PAF on amiodarone suppression, and DOAC  -- HFpEF (mild LA dilatation, mild MV regurgitation) Stage C and stable; NYHA class IV symptoms related to lung disease / home O2    Assessment: 89 y.o. WF with history of HFpEF and PAF (on DOAC). She is doing quite well.    Plan:  -- No changes recommended today  -- Return back to see me if needed, otherwise continue care with her primary provider    Keri Hinkle MD, MPH  Advanced Heart Failure and Transplant Cardiology  [facility]  [facility]

## 2024-12-26 ENCOUNTER — APPOINTMENT (OUTPATIENT)
Dept: PHARMACY | Facility: HOSPITAL | Age: 89
End: 2024-12-26
Payer: MEDICARE

## 2024-12-30 NOTE — PROGRESS NOTES
Subjective   Patient ID: Lissett Rodríguez is a 89 y.o. female who presents for management of her Afib and CHF.  - Patient would like cost assistance  - A.Fib on Eliquis 5 mg bid  - DM2 and HFpEF A1c 7.6% on Farxiga 5 mg   - CrCl 27 ml    Assessment/Plan   ANTICOAGULATION/DIABETES ASSESSMENT     PMH REVIEW:  - PMH of Pancreatitis: No  - PMH of Retinopathy: No  - PMH of MTC: No    HEALTH MAINTENANCE:   Foot Exam: No  Eye Exam: Yes  Urine Albumin: No    PRIMARY/SECONDARY PREVENTION:   - Statin? Yes, Atorvastatin 40 mg  - ACE-I/ARB? No, hx of intolerance   - Aspirin? No    Last Recorded Vitals:  BP Readings from Last 6 Encounters:   12/06/24 131/75   12/03/24 122/64   09/11/24 116/67   08/28/24 112/61   08/27/24 119/75   05/08/24 136/71       Wt Readings from Last 6 Encounters:   12/06/24 69.2 kg (152 lb 9.6 oz)   12/03/24 68.1 kg (150 lb 3.2 oz)   08/28/24 69.9 kg (154 lb)   08/27/24 69.5 kg (153 lb 3.2 oz)   05/08/24 71.2 kg (157 lb)   04/04/24 71.2 kg (157 lb)       Lab Results   Component Value Date    HGBA1C 7.3 (H) 12/03/2024       The ASCVD Risk score (Christy DK, et al., 2019) failed to calculate for the following reasons:    The 2019 ASCVD risk score is only valid for ages 40 to 79    DIAGNOSIS: treatment of nonvalvular atrial fibrilliation stroke and systemic embolism  - Patient is projected to be on anticoagulation indefinitely   - [id number] Score: [8] (only included if diagnosis is atrial fibrillation)   Age: [<65 (0)] [65-74 (+1)] [> 75 (+2)]: 2  Sex: [Male/Female (+1)]: 1  CHF history: [No/Yes(+1)]: 1  Hypertension history: [No/Yes(+1)]: 1  Stroke/TIA/thromboembolism history: [No/Yes(+2)]: 2  Vascular disease history (prior MI, peripheral artery disease, aortic plaque): [No/Yes(+1)]: 0  Diabetes history: [No/Yes(+1)]: 1    CURRENT PHARMACOTHERAPY:    Farxiga 5 mg once daily   Eliquis 5 mg bid     Affordability/Accessibility: Eliquis $35/month, Farxiga $20/month    EDUCATION/COUNSELING:   - Counseled  patient on MOA, expectations, duration of therapy, contraindications, administration, and monitoring parameters  - Counseled patient of side effects that are indicative of bleeding such as dark tarry stool, unexplainable bruising, or vomiting up a coffee ground like substance     Patient Assistance Program (PAP)    Application for program to be submitted for the following medications: Eliquis 5 mg and Farxiga 5 mg.     County of Permanent Address: [facility]    Prescription Insurance:   Yes   Members of Household: 1   Files Taxes: No       Patient will be email financial information to pharmacist directly at [email or web address].    Patient verbally reports monthly or yearly income which is less than 400% federal poverty level    Patient aware this process may take up to 6 weeks.     If approved medication must be filled through [facility] and MEDICATION WILL BE MAILED TO PATIENT.      Assessment   - In complete past medical history assessment due to patient being hard of hearing.   - We discussed  PAP program and patient believes she would qualify.   - I have sent out an initial email to [email or web address] to collect her [id number] social security benefit statement.     Plan     Continue:  Eliquis 5 mg twice daily  Farxiga 5 mg once daily   Continue making healthy lifestyle modifications  Provided pharmacist's number ([phone]) for any questions prior to follow up.     Follow up with Clinical Pharmacy Team 1/16/25 at 11 am    Time spent with pt: Total length of time 25 (minutes) of the encounter and more than 50% was spent counseling the patient.    Continue all meds under the continuation of care with the referring provider and clinical pharmacy team.    Please reach out to the Clinical Pharmacy Team if there are any further questions.     Verbal consent to manage patient's drug therapy was obtained from patient. They were informed they may decline to participate or withdraw from participation  in pharmacy services at any time.    Rosie Boothe, PharmD  PGY-1 Pharmacy Resident  [phone]

## 2025-01-02 ENCOUNTER — TELEMEDICINE (OUTPATIENT)
Dept: PHARMACY | Facility: HOSPITAL | Age: OVER 89
End: 2025-01-02
Payer: MEDICARE

## 2025-01-02 DIAGNOSIS — Z02.9 ADMINISTRATIVE ENCOUNTER: ICD-10-CM

## 2025-01-03 NOTE — PROGRESS NOTES
I reviewed the progress note and agree with the resident’s findings and plans as written. Case discussed with resident.    Mariely Alfaro, PharmD  Clinical Pharmacy Specialist   [phone]

## 2025-01-16 ENCOUNTER — APPOINTMENT (OUTPATIENT)
Dept: PHARMACY | Facility: HOSPITAL | Age: OVER 89
End: 2025-01-16
Payer: MEDICARE

## 2025-01-23 ENCOUNTER — APPOINTMENT (OUTPATIENT)
Dept: PHARMACY | Facility: HOSPITAL | Age: OVER 89
End: 2025-01-23
Payer: MEDICARE

## 2025-01-30 ENCOUNTER — APPOINTMENT (OUTPATIENT)
Dept: PHARMACY | Facility: HOSPITAL | Age: OVER 89
End: 2025-01-30
Payer: MEDICARE

## 2025-01-30 NOTE — PROGRESS NOTES
Subjective   Patient ID: Lissett Rodríguez is a 89 y.o. female who presents for management of her Afib and CHF.  - Patient would like cost assistance  - A.Fib on Eliquis 5 mg bid  - DM2 and HFpEF A1c 7.6% on Farxiga 5 mg   - CrCl 27 ml    Assessment/Plan   ANTICOAGULATION/DIABETES ASSESSMENT     PMH REVIEW:  - PMH of Pancreatitis: No  - PMH of Retinopathy: No  - PMH of MTC: No    HEALTH MAINTENANCE:   Foot Exam: No  Eye Exam: Yes  Urine Albumin: No    PRIMARY/SECONDARY PREVENTION:   - Statin? Yes, Atorvastatin 40 mg  - ACE-I/ARB? No, hx of intolerance   - Aspirin? No    Last Recorded Vitals:  BP Readings from Last 6 Encounters:   12/06/24 131/75   12/03/24 122/64   09/11/24 116/67   08/28/24 112/61   08/27/24 119/75   05/08/24 136/71       Wt Readings from Last 6 Encounters:   12/06/24 69.2 kg (152 lb 9.6 oz)   12/03/24 68.1 kg (150 lb 3.2 oz)   08/28/24 69.9 kg (154 lb)   08/27/24 69.5 kg (153 lb 3.2 oz)   05/08/24 71.2 kg (157 lb)   04/04/24 71.2 kg (157 lb)       Lab Results   Component Value Date    HGBA1C 7.3 (H) 12/03/2024       The ASCVD Risk score (Christy DK, et al., 2019) failed to calculate for the following reasons:    The 2019 ASCVD risk score is only valid for ages 40 to 79    DIAGNOSIS: treatment of nonvalvular atrial fibrilliation stroke and systemic embolism  - Patient is projected to be on anticoagulation indefinitely   - [id number] Score: [8] (only included if diagnosis is atrial fibrillation)   Age: [<65 (0)] [65-74 (+1)] [> 75 (+2)]: 2  Sex: [Male/Female (+1)]: 1  CHF history: [No/Yes(+1)]: 1  Hypertension history: [No/Yes(+1)]: 1  Stroke/TIA/thromboembolism history: [No/Yes(+2)]: 2  Vascular disease history (prior MI, peripheral artery disease, aortic plaque): [No/Yes(+1)]: 0  Diabetes history: [No/Yes(+1)]: 1    CURRENT PHARMACOTHERAPY:   Farxiga 5 mg once daily   Eliquis 5 mg bid     Affordability/Accessibility: Eliquis $35/month, Farxiga $20/month    EDUCATION/COUNSELING:   - Counseled  patient on MOA, expectations, duration of therapy, contraindications, administration, and monitoring parameters  - Counseled patient of side effects that are indicative of bleeding such as dark tarry stool, unexplainable bruising, or vomiting up a coffee ground like substance     Patient Assistance Program (PAP)    Application for program to be submitted for the following medications: Eliquis 5 mg and Farxiga 5 mg.     County of Permanent Address: [facility]    Prescription Insurance:   Yes   Members of Household: 1   Files Taxes: No       Patient will be email financial information to pharmacist directly at [email or web address].    Patient verbally reports monthly or yearly income which is less than 400% federal poverty level    Patient aware this process may take up to 6 weeks.     If approved medication must be filled through [facility] and MEDICATION WILL BE MAILED TO PATIENT.      Assessment   - In complete past medical history assessment due to patient being hard of hearing.   - We discussed  PAP program and patient believes she would qualify.   - I have sent out an initial email to [email or web address] to collect her [id number] social security benefit statement.     Plan     Continue:  Eliquis 5 mg twice daily  Farxiga 5 mg once daily   Continue making healthy lifestyle modifications  Provided pharmacist's number ([phone]) for any questions prior to follow up.     Follow up with Clinical Pharmacy Team ***    Time spent with pt: Total length of time *** (minutes) of the encounter and more than 50% was spent counseling the patient.    Continue all meds under the continuation of care with the referring provider and clinical pharmacy team.    Please reach out to the Clinical Pharmacy Team if there are any further questions.     Verbal consent to manage patient's drug therapy was obtained from {Patient vs Caregiver:62046}. They were informed they may decline to participate or withdraw from  participation in pharmacy services at any time.    Rosie Boothe, PharmD  PGY-1 Pharmacy Resident  [phone]

## 2025-02-12 ENCOUNTER — APPOINTMENT (OUTPATIENT)
Dept: PHARMACY | Facility: HOSPITAL | Age: OVER 89
End: 2025-02-12
Payer: MEDICARE

## 2025-02-21 ENCOUNTER — OFFICE VISIT (OUTPATIENT)
Dept: URGENT CARE | Age: OVER 89
End: 2025-02-21
Payer: MEDICARE

## 2025-02-21 VITALS
HEART RATE: 90 BPM | BODY MASS INDEX: 30.7 KG/M2 | OXYGEN SATURATION: 90 % | SYSTOLIC BLOOD PRESSURE: 152 MMHG | WEIGHT: 152 LBS | RESPIRATION RATE: 16 BRPM | TEMPERATURE: 97.9 F | DIASTOLIC BLOOD PRESSURE: 77 MMHG

## 2025-02-21 DIAGNOSIS — R51.9 FACIAL PAIN, ACUTE: Primary | ICD-10-CM

## 2025-02-21 ASSESSMENT — ENCOUNTER SYMPTOMS
DEPRESSION: 0
OCCASIONAL FEELINGS OF UNSTEADINESS: 1
LOSS OF SENSATION IN FEET: 0

## 2025-02-21 NOTE — PROGRESS NOTES
Subjective   Patient ID: Lissett Rodríguez is a 89 y.o. female. They present today with a chief complaint of Facial Pain (Right side ).      Past Medical History  Allergies as of 02/21/2025 - Reviewed 02/21/2025   Allergen Reaction Noted    Lisinopril Angioedema and Swelling 03/10/2023    Tetracyclines Hives 03/10/2023    Vancomycin Unknown 03/10/2023    Penicillins Hives and Other 02/03/2023       (Not in a hospital admission)       Past Medical History:   Diagnosis Date    Angioneurotic edema, initial encounter 08/24/2018    Angioedema of lips    Calculus of gallbladder without cholecystitis without obstruction 02/22/2017    Calculus of gallbladder without cholecystitis without obstruction    Chronic obstructive pulmonary disease, unspecified 02/20/2017    Chronic obstructive pulmonary disease with hypoxia    Eructation 06/16/2017    Burping    Personal history of transient ischemic attack (TIA), and cerebral infarction 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 10/16/2018    History of transient cerebral ischemia    Personal history of transient ischemic attack (TIA), and cerebral infarction 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 03/08/2016    History of transient cerebral ischemia    Pneumonia, unspecified organism 02/09/2018    CAP (community acquired pneumonia)    Trochanteric bursitis, unspecified hip 04/23/2015    Greater trochanteric bursitis    Type 2 diabetes mellitus without complications (Multi) 11/06/2017    Controlled type 2 diabetes mellitus without complication, without long-term current use of insulin       Past Surgical History:   Procedure Laterality Date    APPENDECTOMY  05/15/2013    Appendectomy    CATARACT EXTRACTION  05/15/2013    Cataract Surgery    COLONOSCOPY  04/11/2014    Complete Colonoscopy    HYSTERECTOMY  05/15/2013    Hysterectomy    MR HEAD ANGIO WO IV CONTRAST  1/8/2016    MR HEAD ANGIO WO IV CONTRAST 1/8/2016 [facility]    MR HEAD ANGIO WO IV CONTRAST  10/1/2012    MR HEAD ANGIO WO IV CONTRAST  10/1/2012 [facility]    MR NECK ANGIO W IV CONTRAST  10/1/2012    MR NECK ANGIO W IV CONTRAST 10/1/2012 [facility]    MR NECK ANGIO WO IV CONTRAST  1/8/2016    MR NECK ANGIO WO IV CONTRAST 1/8/2016 [facility]    OTHER SURGICAL HISTORY  05/15/2013    Cardiac Cath Procedure Outcome: Successful    OTHER SURGICAL HISTORY  02/28/2019    Cholecystectomy    TOTAL KNEE ARTHROPLASTY  05/29/2013    Knee Replacement        reports that she has never smoked. She has never used smokeless tobacco. She reports that she does not drink alcohol and does not use drugs.    Review of Systems  Review of Systems                               Objective    Vitals:    02/21/25 1147   BP: 152/77   Pulse: 90   Resp: 16   Temp: 36.6 °C (97.9 °F)   SpO2: 90%   Weight: 68.9 kg (152 lb)     No LMP recorded. Patient is postmenopausal.    Physical Exam  Vitals reviewed.   Constitutional:       Appearance: She is not toxic-appearing.   HENT:      Head: Normocephalic and atraumatic.      Right Ear: Tympanic membrane and ear canal normal. No tenderness.      Left Ear: Tympanic membrane and ear canal normal. No tenderness.      Nose: Nose normal.      Comments: On NC     Mouth/Throat:      Mouth: Mucous membranes are moist.      Pharynx: Oropharynx is clear. Uvula midline. No pharyngeal swelling, oropharyngeal exudate or posterior oropharyngeal erythema.   Eyes:      General: Lids are normal.      Extraocular Movements: Extraocular movements intact.      Conjunctiva/sclera: Conjunctivae normal.      Right eye: Right conjunctiva is not injected.      Left eye: Left conjunctiva is not injected.      Pupils: Pupils are equal, round, and reactive to light.   Cardiovascular:      Rate and Rhythm: Normal rate and regular rhythm.   Pulmonary:      Effort: Pulmonary effort is normal.      Breath sounds: Normal breath sounds.   Skin:     General: Skin is warm.      Findings: No rash.      Comments: On face, neck, or scalp    Neurological:      Mental Status: She is alert and oriented to person, place, and time.   Psychiatric:         Mood and Affect: Mood normal.         Behavior: Behavior normal.             Point of Care Test & Imaging Results from this visit  No results found for this visit on 02/21/25.   No results found.    Diagnostic study results (if any) were reviewed by Britni Fall PA-C.    Assessment/Plan   Allergies, medications, history, and pertinent labs/EKGs/Imaging reviewed by Britni Fall PA-C.     Medical Decision Making      Orders and Diagnoses  Diagnoses and all orders for this visit:  Facial pain, acute      Medical Admin Record  Patient presents for facial pain on the right side of her forehead, cheeks and behind her right ear ear.  States that when she slept on her right side last night she had an excruciating pain.  But pain was relieved when she flipped sides and slept on the left side.  She did not have pain afterwards.  She denies facial pain during the visit.  She did not take anything to alleviate her pain.  She denies dizziness, visual and auditory changes, chest pain, shortness of breath, palpitations.  Patient physical exam is normal, no signs of a rash, ear infection, eye infection.  I advised patient to start on Tylenol if the pain comes back.  -         Patient is educated about their diagnoses.     -          Discussed medications benefits and adverse effects.     -          Answered all patient’s questions.     -          Patient will call 911 or go to the nearest ED if worsen symptoms .     -          Patient is agreeable to the plan of care and is deemed stable upon discharge.     -          Follow up with your primary care provider in two days.    Patient disposition: Home    Electronically signed by Britni Fall PA-C  12:01 PM

## 2025-03-12 PROBLEM — R41.3 MEMORY LOSS: Status: RESOLVED | Noted: 2023-03-10 | Resolved: 2025-03-12

## 2025-03-12 PROBLEM — Z96.1 BILATERAL ARTIFICIAL LENS IMPLANT: Status: RESOLVED | Noted: 2023-03-10 | Resolved: 2025-03-12

## 2025-03-12 PROBLEM — B37.2 CANDIDIASIS OF SKIN: Status: RESOLVED | Noted: 2023-03-10 | Resolved: 2025-03-12

## 2025-03-12 PROBLEM — J18.9 COMMUNITY ACQUIRED PNEUMONIA, UNSPECIFIED LATERALITY: Status: RESOLVED | Noted: 2024-01-18 | Resolved: 2025-03-12

## 2025-03-12 PROBLEM — K59.00 CONSTIPATION: Status: RESOLVED | Noted: 2023-03-10 | Resolved: 2025-03-12

## 2025-03-13 ENCOUNTER — APPOINTMENT (OUTPATIENT)
Dept: PRIMARY CARE | Facility: CLINIC | Age: OVER 89
End: 2025-03-13
Payer: MEDICARE

## 2025-03-13 VITALS
HEIGHT: 59 IN | DIASTOLIC BLOOD PRESSURE: 71 MMHG | TEMPERATURE: 97.5 F | WEIGHT: 150 LBS | SYSTOLIC BLOOD PRESSURE: 109 MMHG | HEART RATE: 97 BPM | BODY MASS INDEX: 30.24 KG/M2

## 2025-03-13 DIAGNOSIS — E11.610 DIABETIC NEUROPATHIC ARTHROPATHY (MULTI): ICD-10-CM

## 2025-03-13 DIAGNOSIS — I48.91 ATRIAL FIBRILLATION, UNSPECIFIED TYPE (MULTI): ICD-10-CM

## 2025-03-13 DIAGNOSIS — J44.9 CHRONIC OBSTRUCTIVE PULMONARY DISEASE, UNSPECIFIED COPD TYPE (MULTI): Chronic | ICD-10-CM

## 2025-03-13 DIAGNOSIS — M67.912 DYSFUNCTION OF LEFT ROTATOR CUFF: ICD-10-CM

## 2025-03-13 DIAGNOSIS — M81.6 LOCALIZED OSTEOPOROSIS WITHOUT CURRENT PATHOLOGICAL FRACTURE: ICD-10-CM

## 2025-03-13 DIAGNOSIS — N18.31 STAGE 3A CHRONIC KIDNEY DISEASE (MULTI): ICD-10-CM

## 2025-03-13 DIAGNOSIS — I50.30 HEART FAILURE WITH PRESERVED EJECTION FRACTION, UNSPECIFIED HF CHRONICITY: ICD-10-CM

## 2025-03-13 DIAGNOSIS — E53.8 VITAMIN B12 DEFICIENCY: Primary | ICD-10-CM

## 2025-03-13 DIAGNOSIS — J96.11 CHRONIC RESPIRATORY FAILURE WITH HYPOXIA (MULTI): Chronic | ICD-10-CM

## 2025-03-13 DIAGNOSIS — Z00.00 ENCOUNTER FOR PREVENTATIVE ADULT HEALTH CARE EXAMINATION: ICD-10-CM

## 2025-03-13 PROCEDURE — 99214 OFFICE O/P EST MOD 30 MIN: CPT | Performed by: INTERNAL MEDICINE

## 2025-03-13 PROCEDURE — 1157F ADVNC CARE PLAN IN RCRD: CPT | Performed by: INTERNAL MEDICINE

## 2025-03-13 PROCEDURE — 3078F DIAST BP <80 MM HG: CPT | Performed by: INTERNAL MEDICINE

## 2025-03-13 PROCEDURE — G2211 COMPLEX E/M VISIT ADD ON: HCPCS | Performed by: INTERNAL MEDICINE

## 2025-03-13 PROCEDURE — 1160F RVW MEDS BY RX/DR IN RCRD: CPT | Performed by: INTERNAL MEDICINE

## 2025-03-13 PROCEDURE — 1159F MED LIST DOCD IN RCRD: CPT | Performed by: INTERNAL MEDICINE

## 2025-03-13 PROCEDURE — 1036F TOBACCO NON-USER: CPT | Performed by: INTERNAL MEDICINE

## 2025-03-13 PROCEDURE — 3074F SYST BP LT 130 MM HG: CPT | Performed by: INTERNAL MEDICINE

## 2025-03-13 RX ORDER — GABAPENTIN 100 MG/1
300 CAPSULE ORAL 3 TIMES DAILY
Qty: 270 CAPSULE | Refills: 3 | Status: SHIPPED | OUTPATIENT
Start: 2025-03-13 | End: 2025-07-11

## 2025-03-13 RX ORDER — ALENDRONATE SODIUM 70 MG/1
70 TABLET ORAL
Qty: 12 TABLET | Refills: 3 | Status: SHIPPED | OUTPATIENT
Start: 2025-03-13 | End: 2026-02-12

## 2025-03-13 NOTE — ASSESSMENT & PLAN NOTE
Improvement in B12 levels after supplementation.  Unclear if she was taking that recheck levels at future visit

## 2025-03-13 NOTE — ASSESSMENT & PLAN NOTE
Stable on current O2, followed by pulmonology    Orders:    Referral to Clinical Pharmacy; Future

## 2025-03-13 NOTE — ASSESSMENT & PLAN NOTE
On appropriate treatment, recently seen by pulmonology   Continue O2 via NC 6L   CTs performed in September with unusual pattern suggesting possibly mixed restrictive and obstructive features recommended obtaining CT chest as previously recommended particularly to follow-up on subpleural pulmonary nodule.  Follow-up with pulmonology    Orders:    Referral to Clinical Pharmacy; Future

## 2025-03-13 NOTE — PROGRESS NOTES
Subjective   Patient ID: Lissett Rodríguez is a 89 y.o. female who presents for Follow-up.  HPI  89-year-old female here for follow-up visit, last seen in December.    12/24: b12 465, LDL 90s, trigs high, A1C down to 7.3% from 8.2%     She presented to urgent care last month out of concern for exquisite facial pain when laying her head on the pillow, resolved when laying on the other side, the next improved but still there and then resolved, recommended supportive measures. Symptoms were concentrated on her entire face.   - Left shoulder pain with radiation to her left upper arm, constant no improvement despite exercises and massage. Has been going on for the last few weeks. Denies inciting event or injury, no weakness, no neuropathy, gabapentin helps somewhat but does not take anything else. Pain is worst when lifting her arm up past 90 degrees. No stiffness in the joint itself. Nof    PMHx:  - AFib - on eliquis and amiodarone UTD with eye exam  no significant concerns.  Referred to pharmacy team for cost concerns. Optho UTD   - DM2 - with neuropathy - on Farxiga - last A1C 7.3% 12/24, referred to neurology and OT for management of neuropathy.  Ophtho 12/24, continuing gabapentin this helps symptoms of sciatica and neuropathy   - Sciatica R>L   - HfPEF  - On lasix and Farxiga, previously in cardiac rehab continues home exercises as well as group exercises Followed by Dr. Hinkle last seen in December recommended as needed follow-up  - COPD/ hypoxic respiratory failure on 6L home O2- MAUNEL multifactorial due HFpEF, interstitial lung disease possibly related to amiodarone -  seen by Dr. Brown, high-resolution CT notable for a pleural-based nodular area, repeat CT recommended  PFTs were also performed.  - B12 supplement improvement in levels  - PE -  1/24 on Eliquis  - Postprandial hypotension - with hospitalization earlier this year  - HTN -no longer treated  - KALPANA - compliant on CPAP no issues with it present.  -  Hearing loss - with acquired deafness - has hearing aids notes persistent trouble hearing.  - HLD -  on atorvastatin 40mg   - TIA - history in 2016   - Obesity   - CKD3a   - Ascending aortic enlargement - most recent TTE noted at 3.9cm 1/24   - Osteoporosis -started alendronate 8/24 DEXA 7/24 though does not recall taking this medication.       Social:   - Lives in [facility] alone, son lives nearby (retired ) who checks in regularly, sister lives in [place].   - 5 children, one passed away. Has a daughter who she gave up for adoption recently reunited lives in [place], 14 grandchildren and 7 great-grandchildren  Current Outpatient Medications   Medication Instructions    albuterol 90 mcg/actuation inhaler INHALE 1 TO 2 PUFFS EVERY 4 TO 6 HOURS AS NEEDED.    alendronate (FOSAMAX) 70 mg, oral, Every 7 days, Take in the morning with a full glass of water, on an empty stomach, and do not take anything else by mouth or lie down for the next 30 min.    amiodarone (PACERONE) 100 mg, oral, Daily    amLODIPine (Norvasc) 2.5 mg tablet Take 1 tablet (2.5 mg) by mouth once daily.    apixaban (ELIQUIS) 5 mg, oral, 2 times daily    atorvastatin (LIPITOR) 40 mg, oral, Nightly    blood sugar diagnostic (Blood Glucose Test) strip TEST 3 TIMES DAILY.    cholecalciferol (Vitamin D-3) 50 mcg (2,000 unit) capsule 1 tablet, Daily    dapagliflozin propanediol (FARXIGA) 5 mg, oral, Daily    diclofenac sodium (VOLTAREN) 4 g, Topical, 4 times daily    furosemide (LASIX) 40 mg, Daily    gabapentin (NEURONTIN) 300 mg, oral, 3 times daily    guaiFENesin (Mucinex) 600 mg 12 hr tablet Every 12 hours    mometasone (Nasonex) 50 mcg/actuation nasal spray 1 spray, Each Nostril, 2 times daily    oxybutynin XL (Ditropan-XL) 5 mg 24 hr tablet     oxygen (O2) 6 L/min, Continuous    potassium chloride CR 20 mEq ER tablet 20 mEq, Daily    tiotropium (Spiriva with HandiHaler) 18 mcg inhalation capsule INHALE CONTENTS OF 1  "CAPSULE ONCE DAILY.        Objective     /71   Pulse 97   Temp 36.4 °C (97.5 °F) (Temporal)   Ht 1.499 m (4' 11\")   Wt 68 kg (150 lb)   BMI 30.30 kg/m²     Physical Exam  General: Appears comfortable, NAD, appropriate affect, oxygen via nasal cannula  HEENT: NCAT, EOMI, pupils symmetric, no conjunctival erythema   Neck: Supple, no LAD   Heart: RRR S1 S2 no murmurs appreciated   Lungs: Crackles at bases otherwise clear  Abdomen: Soft, NT/ND, no rebound or guarding,   Extremities: no cyanosis or edema appreciated  Neuro: AAO x 3, answers questions appropriately, no FND, gait antalgic uses a walker  Shoulder: no overt abnormalities on visual examination. + tenderness at AC joint. Full ROM with active external rotation with reproducible pain, empty can test negative. Negative internal and external rotation lag tests, though with reproducible pain.  Positive Valentin test.       Lab Results   Component Value Date    WBC 12.6 (H) 02/29/2024    HGB 14.9 02/29/2024    HCT 49.3 (H) 02/29/2024     02/29/2024    CHOL 174 12/03/2024    TRIG 233 (H) 12/03/2024    HDL 36.3 12/03/2024    ALT 11 08/28/2024    AST 8 (L) 08/28/2024     12/03/2024    K 4.4 12/03/2024     12/03/2024    CREATININE 1.16 (H) 12/03/2024    BUN 23 12/03/2024    CO2 28 12/03/2024    TSH 1.11 02/27/2024    INR 2.9 (H) 03/25/2023    HGBA1C 7.3 (H) 12/03/2024     Independently reviewed most recent bloodwork        Assessment/Plan     Assessment & Plan  Vitamin B12 deficiency  Improvement in B12 levels after supplementation.  Unclear if she was taking that recheck levels at future visit         Chronic respiratory failure with hypoxia (Multi)  Stable on current O2, followed by pulmonology    Orders:    Referral to Clinical Pharmacy; Future    Chronic obstructive pulmonary disease, unspecified COPD type (Multi)  On appropriate treatment, recently seen by pulmonology   Continue O2 via NC 6L   CTs performed in September with unusual " pattern suggesting possibly mixed restrictive and obstructive features recommended obtaining CT chest as previously recommended particularly to follow-up on subpleural pulmonary nodule.  Follow-up with pulmonology    Orders:    Referral to Clinical Pharmacy; Future    Heart failure with preserved ejection fraction, unspecified HF chronicity  Without current evidence of exacerbation, continue daily lasix and farxiga. Continues home exercises, completed cardiac rehab.  Recommended follow-up as needed with cardiology    Orders:    CBC and Auto Differential; Future    Comprehensive Metabolic Panel; Future    Diabetic neuropathic arthropathy (Multi)  Finds benefit with the gabapentin we will continue  Orders:    gabapentin (Neurontin) 100 mg capsule; Take 3 capsules (300 mg) by mouth 3 times a day.    Albumin-Creatinine Ratio, Urine Random; Future    Encounter for preventative adult health care examination    Orders:    TSH with reflex to Free T4 if abnormal; Future    Localized osteoporosis without current pathological fracture  Unclear if she has been taking this, will send to known pharmacy  Orders:    alendronate (Fosamax) 70 mg tablet; Take 1 tablet (70 mg) by mouth every 7 days. Take in the morning with a full glass of water, on an empty stomach, and do not take anything else by mouth or lie down for the next 30 min.    Dysfunction of left rotator cuff    Orders:    Referral to Physical Therapy; Future    Atrial fibrillation, unspecified type (Multi)  on eliquis and amiodarone UTD with eye exam  no significant concerns.  Referred to pharmacy team for cost concerns. Optho UTD   , Cardiology  Monitor TFTs         Stage 3a chronic kidney disease (Multi)  Stable, continue to monitor  Control for risk factors         Immunizations: Consider COVID booster if 6 months since last   Follow-up 3 months for annual wellness visit

## 2025-03-13 NOTE — PATIENT INSTRUCTIONS
Lissett,   - Bring in all your medications with you at next visit   Schedule CT of the chest ordered by Dr. Brown    Schedule followup with him afterwards   Labs including bloodwork and/or urine is ordered today. The lab can be found on this floor (2nd floor) next to the pharmacy across from the elevators.   Left shoulder - referral to physical therapy   Continue to take vitamin B12 1000mcg every day or every other day     Followup 3 months for annual wellness visit

## 2025-03-13 NOTE — ASSESSMENT & PLAN NOTE
Without current evidence of exacerbation, continue daily lasix and farxiga. Continues home exercises, completed cardiac rehab.  Recommended follow-up as needed with cardiology    Orders:    CBC and Auto Differential; Future    Comprehensive Metabolic Panel; Future

## 2025-03-13 NOTE — ASSESSMENT & PLAN NOTE
Finds benefit with the gabapentin we will continue  Orders:    gabapentin (Neurontin) 100 mg capsule; Take 3 capsules (300 mg) by mouth 3 times a day.    Albumin-Creatinine Ratio, Urine Random; Future

## 2025-03-13 NOTE — ASSESSMENT & PLAN NOTE
on eliquis and amiodarone UTD with eye exam  no significant concerns.  Referred to pharmacy team for cost concerns. Optho UTD   , Cardiology  Monitor TFTs

## 2025-03-14 LAB
ALBUMIN SERPL-MCNC: 4.1 G/DL (ref 3.6–5.1)
ALBUMIN/CREAT UR: 18 MG/G CREAT
ALP SERPL-CCNC: 76 U/L (ref 37–153)
ALT SERPL-CCNC: 13 U/L (ref 6–29)
ANION GAP SERPL CALCULATED.4IONS-SCNC: 12 MMOL/L (CALC) (ref 7–17)
AST SERPL-CCNC: 5 U/L (ref 10–35)
BASOPHILS # BLD AUTO: 91 CELLS/UL (ref 0–200)
BASOPHILS NFR BLD AUTO: 0.8 %
BILIRUB SERPL-MCNC: 0.6 MG/DL (ref 0.2–1.2)
BUN SERPL-MCNC: 22 MG/DL (ref 7–25)
CALCIUM SERPL-MCNC: 9.2 MG/DL (ref 8.6–10.4)
CHLORIDE SERPL-SCNC: 103 MMOL/L (ref 98–110)
CO2 SERPL-SCNC: 25 MMOL/L (ref 20–32)
CREAT SERPL-MCNC: 1.02 MG/DL (ref 0.6–0.95)
CREAT UR-MCNC: 34 MG/DL (ref 20–275)
EGFRCR SERPLBLD CKD-EPI 2021: 52 ML/MIN/1.73M2
EOSINOPHIL # BLD AUTO: 80 CELLS/UL (ref 15–500)
EOSINOPHIL NFR BLD AUTO: 0.7 %
ERYTHROCYTE [DISTWIDTH] IN BLOOD BY AUTOMATED COUNT: 13.5 % (ref 11–15)
GLUCOSE SERPL-MCNC: 155 MG/DL (ref 65–139)
HCT VFR BLD AUTO: 48.7 % (ref 35–45)
HGB BLD-MCNC: 15.4 G/DL (ref 11.7–15.5)
LYMPHOCYTES # BLD AUTO: 1585 CELLS/UL (ref 850–3900)
LYMPHOCYTES NFR BLD AUTO: 13.9 %
MCH RBC QN AUTO: 29 PG (ref 27–33)
MCHC RBC AUTO-ENTMCNC: 31.6 G/DL (ref 32–36)
MCV RBC AUTO: 91.7 FL (ref 80–100)
MICROALBUMIN UR-MCNC: 0.6 MG/DL
MONOCYTES # BLD AUTO: 490 CELLS/UL (ref 200–950)
MONOCYTES NFR BLD AUTO: 4.3 %
NEUTROPHILS # BLD AUTO: 9154 CELLS/UL (ref 1500–7800)
NEUTROPHILS NFR BLD AUTO: 80.3 %
PLATELET # BLD AUTO: 252 THOUSAND/UL (ref 140–400)
PMV BLD REES-ECKER: 11.1 FL (ref 7.5–12.5)
POTASSIUM SERPL-SCNC: 4.1 MMOL/L (ref 3.5–5.3)
PROT SERPL-MCNC: 7.2 G/DL (ref 6.1–8.1)
RBC # BLD AUTO: 5.31 MILLION/UL (ref 3.8–5.1)
SODIUM SERPL-SCNC: 140 MMOL/L (ref 135–146)
TSH SERPL-ACNC: 1.64 MIU/L (ref 0.4–4.5)
WBC # BLD AUTO: 11.4 THOUSAND/UL (ref 3.8–10.8)

## 2025-03-26 ENCOUNTER — APPOINTMENT (OUTPATIENT)
Dept: PHARMACY | Facility: HOSPITAL | Age: OVER 89
End: 2025-03-26
Payer: MEDICARE

## 2025-04-14 ENCOUNTER — TELEPHONE (OUTPATIENT)
Dept: RADIOLOGY | Facility: HOSPITAL | Age: OVER 89
End: 2025-04-14
Payer: MEDICARE

## 2025-05-07 ENCOUNTER — APPOINTMENT (OUTPATIENT)
Dept: PHARMACY | Facility: HOSPITAL | Age: OVER 89
End: 2025-05-07
Payer: MEDICARE

## 2025-05-14 ENCOUNTER — APPOINTMENT (OUTPATIENT)
Dept: PHARMACY | Facility: HOSPITAL | Age: OVER 89
End: 2025-05-14
Payer: MEDICARE

## 2025-06-08 ENCOUNTER — APPOINTMENT (OUTPATIENT)
Dept: URGENT CARE | Age: OVER 89
End: 2025-06-08
Payer: MEDICARE

## 2025-06-08 ENCOUNTER — APPOINTMENT (OUTPATIENT)
Dept: RADIOLOGY | Facility: HOSPITAL | Age: OVER 89
DRG: 190 | End: 2025-06-08
Payer: MEDICARE

## 2025-06-08 ENCOUNTER — APPOINTMENT (OUTPATIENT)
Dept: CARDIOLOGY | Facility: HOSPITAL | Age: OVER 89
DRG: 190 | End: 2025-06-08
Payer: MEDICARE

## 2025-06-08 ENCOUNTER — HOSPITAL ENCOUNTER (INPATIENT)
Facility: HOSPITAL | Age: OVER 89
End: 2025-06-08
Attending: EMERGENCY MEDICINE | Admitting: STUDENT IN AN ORGANIZED HEALTH CARE EDUCATION/TRAINING PROGRAM
Payer: MEDICARE

## 2025-06-08 VITALS
BODY MASS INDEX: 30.24 KG/M2 | HEIGHT: 59 IN | SYSTOLIC BLOOD PRESSURE: 123 MMHG | TEMPERATURE: 97.2 F | HEART RATE: 92 BPM | RESPIRATION RATE: 26 BRPM | WEIGHT: 150 LBS | DIASTOLIC BLOOD PRESSURE: 81 MMHG | OXYGEN SATURATION: 91 %

## 2025-06-08 DIAGNOSIS — J44.1 COPD EXACERBATION (MULTI): Primary | ICD-10-CM

## 2025-06-08 DIAGNOSIS — J18.9 PNEUMONIA DUE TO INFECTIOUS ORGANISM, UNSPECIFIED LATERALITY, UNSPECIFIED PART OF LUNG: ICD-10-CM

## 2025-06-08 LAB
ALBUMIN SERPL BCP-MCNC: 4.1 G/DL (ref 3.4–5)
ALP SERPL-CCNC: 79 U/L (ref 33–136)
ALT SERPL W P-5'-P-CCNC: 15 U/L (ref 7–45)
ANION GAP BLDV CALCULATED.4IONS-SCNC: 12 MMOL/L (ref 10–25)
ANION GAP SERPL CALC-SCNC: 13 MMOL/L (ref 10–20)
AST SERPL W P-5'-P-CCNC: 13 U/L (ref 9–39)
BASE EXCESS BLDV CALC-SCNC: 1.9 MMOL/L (ref -2–3)
BASOPHILS # BLD AUTO: 0.06 X10*3/UL (ref 0–0.1)
BASOPHILS NFR BLD AUTO: 0.9 %
BILIRUB SERPL-MCNC: 0.6 MG/DL (ref 0–1.2)
BNP SERPL-MCNC: 88 PG/ML (ref 0–99)
BODY TEMPERATURE: 37 DEGREES CELSIUS
BUN SERPL-MCNC: 21 MG/DL (ref 6–23)
CA-I BLDV-SCNC: 1.12 MMOL/L (ref 1.1–1.33)
CALCIUM SERPL-MCNC: 8.5 MG/DL (ref 8.6–10.3)
CARDIAC TROPONIN I PNL SERPL HS: 7 NG/L (ref 0–13)
CHLORIDE BLDV-SCNC: 100 MMOL/L (ref 98–107)
CHLORIDE SERPL-SCNC: 105 MMOL/L (ref 98–107)
CO2 SERPL-SCNC: 26 MMOL/L (ref 21–32)
CREAT SERPL-MCNC: 1.13 MG/DL (ref 0.5–1.05)
EGFRCR SERPLBLD CKD-EPI 2021: 46 ML/MIN/1.73M*2
EOSINOPHIL # BLD AUTO: 0.07 X10*3/UL (ref 0–0.4)
EOSINOPHIL NFR BLD AUTO: 1 %
ERYTHROCYTE [DISTWIDTH] IN BLOOD BY AUTOMATED COUNT: 14.3 % (ref 11.5–14.5)
FLUAV RNA RESP QL NAA+PROBE: NOT DETECTED
FLUBV RNA RESP QL NAA+PROBE: NOT DETECTED
GLUCOSE BLDV-MCNC: 262 MG/DL (ref 74–99)
GLUCOSE SERPL-MCNC: 232 MG/DL (ref 74–99)
HCO3 BLDV-SCNC: 27.3 MMOL/L (ref 22–26)
HCT VFR BLD AUTO: 45.6 % (ref 36–46)
HCT VFR BLD EST: 54 % (ref 36–46)
HGB BLD-MCNC: 14.8 G/DL (ref 12–16)
HGB BLDV-MCNC: 18.1 G/DL (ref 12–16)
IMM GRANULOCYTES # BLD AUTO: 0.02 X10*3/UL (ref 0–0.5)
IMM GRANULOCYTES NFR BLD AUTO: 0.3 % (ref 0–0.9)
INHALED O2 CONCENTRATION: 21 %
LACTATE BLDV-SCNC: 1.9 MMOL/L (ref 0.4–2)
LYMPHOCYTES # BLD AUTO: 1.23 X10*3/UL (ref 0.8–3)
LYMPHOCYTES NFR BLD AUTO: 18.2 %
MAGNESIUM SERPL-MCNC: 2.11 MG/DL (ref 1.6–2.4)
MCH RBC QN AUTO: 29.2 PG (ref 26–34)
MCHC RBC AUTO-ENTMCNC: 32.5 G/DL (ref 32–36)
MCV RBC AUTO: 90 FL (ref 80–100)
MONOCYTES # BLD AUTO: 0.58 X10*3/UL (ref 0.05–0.8)
MONOCYTES NFR BLD AUTO: 8.6 %
MRSA DNA SPEC QL NAA+PROBE: NOT DETECTED
NEUTROPHILS # BLD AUTO: 4.79 X10*3/UL (ref 1.6–5.5)
NEUTROPHILS NFR BLD AUTO: 71 %
NRBC BLD-RTO: 0 /100 WBCS (ref 0–0)
OXYHGB MFR BLDV: 63.3 % (ref 45–75)
PCO2 BLDV: 44 MM HG (ref 41–51)
PH BLDV: 7.4 PH (ref 7.33–7.43)
PLATELET # BLD AUTO: 183 X10*3/UL (ref 150–450)
PO2 BLDV: 39 MM HG (ref 35–45)
POTASSIUM BLDV-SCNC: 3.9 MMOL/L (ref 3.5–5.3)
POTASSIUM SERPL-SCNC: 4.1 MMOL/L (ref 3.5–5.3)
PROT SERPL-MCNC: 6.7 G/DL (ref 6.4–8.2)
RBC # BLD AUTO: 5.06 X10*6/UL (ref 4–5.2)
RSV RNA RESP QL NAA+PROBE: NOT DETECTED
SAO2 % BLDV: 65 % (ref 45–75)
SARS-COV-2 RNA RESP QL NAA+PROBE: NOT DETECTED
SODIUM BLDV-SCNC: 135 MMOL/L (ref 136–145)
SODIUM SERPL-SCNC: 140 MMOL/L (ref 136–145)
WBC # BLD AUTO: 6.8 X10*3/UL (ref 4.4–11.3)

## 2025-06-08 PROCEDURE — 2500000002 HC RX 250 W HCPCS SELF ADMINISTERED DRUGS (ALT 637 FOR MEDICARE OP, ALT 636 FOR OP/ED)

## 2025-06-08 PROCEDURE — 87637 SARSCOV2&INF A&B&RSV AMP PRB: CPT

## 2025-06-08 PROCEDURE — 99223 1ST HOSP IP/OBS HIGH 75: CPT | Performed by: STUDENT IN AN ORGANIZED HEALTH CARE EDUCATION/TRAINING PROGRAM

## 2025-06-08 PROCEDURE — 1200000002 HC GENERAL ROOM WITH TELEMETRY DAILY

## 2025-06-08 PROCEDURE — 36415 COLL VENOUS BLD VENIPUNCTURE: CPT

## 2025-06-08 PROCEDURE — 2550000001 HC RX 255 CONTRASTS: Performed by: EMERGENCY MEDICINE

## 2025-06-08 PROCEDURE — 85025 COMPLETE CBC W/AUTO DIFF WBC: CPT

## 2025-06-08 PROCEDURE — 71275 CT ANGIOGRAPHY CHEST: CPT

## 2025-06-08 PROCEDURE — 71275 CT ANGIOGRAPHY CHEST: CPT | Performed by: RADIOLOGY

## 2025-06-08 PROCEDURE — 2500000002 HC RX 250 W HCPCS SELF ADMINISTERED DRUGS (ALT 637 FOR MEDICARE OP, ALT 636 FOR OP/ED): Performed by: STUDENT IN AN ORGANIZED HEALTH CARE EDUCATION/TRAINING PROGRAM

## 2025-06-08 PROCEDURE — 2500000004 HC RX 250 GENERAL PHARMACY W/ HCPCS (ALT 636 FOR OP/ED): Performed by: STUDENT IN AN ORGANIZED HEALTH CARE EDUCATION/TRAINING PROGRAM

## 2025-06-08 PROCEDURE — 36415 COLL VENOUS BLD VENIPUNCTURE: CPT | Performed by: STUDENT IN AN ORGANIZED HEALTH CARE EDUCATION/TRAINING PROGRAM

## 2025-06-08 PROCEDURE — 94640 AIRWAY INHALATION TREATMENT: CPT

## 2025-06-08 PROCEDURE — 71045 X-RAY EXAM CHEST 1 VIEW: CPT | Performed by: RADIOLOGY

## 2025-06-08 PROCEDURE — 93005 ELECTROCARDIOGRAM TRACING: CPT

## 2025-06-08 PROCEDURE — 83880 ASSAY OF NATRIURETIC PEPTIDE: CPT | Performed by: STUDENT IN AN ORGANIZED HEALTH CARE EDUCATION/TRAINING PROGRAM

## 2025-06-08 PROCEDURE — 83735 ASSAY OF MAGNESIUM: CPT

## 2025-06-08 PROCEDURE — 84145 PROCALCITONIN (PCT): CPT | Mod: AHULAB | Performed by: STUDENT IN AN ORGANIZED HEALTH CARE EDUCATION/TRAINING PROGRAM

## 2025-06-08 PROCEDURE — 2500000001 HC RX 250 WO HCPCS SELF ADMINISTERED DRUGS (ALT 637 FOR MEDICARE OP): Performed by: STUDENT IN AN ORGANIZED HEALTH CARE EDUCATION/TRAINING PROGRAM

## 2025-06-08 PROCEDURE — 82435 ASSAY OF BLOOD CHLORIDE: CPT

## 2025-06-08 PROCEDURE — 5A09357 ASSISTANCE WITH RESPIRATORY VENTILATION, LESS THAN 24 CONSECUTIVE HOURS, CONTINUOUS POSITIVE AIRWAY PRESSURE: ICD-10-PCS

## 2025-06-08 PROCEDURE — 2500000004 HC RX 250 GENERAL PHARMACY W/ HCPCS (ALT 636 FOR OP/ED): Performed by: EMERGENCY MEDICINE

## 2025-06-08 PROCEDURE — 71045 X-RAY EXAM CHEST 1 VIEW: CPT

## 2025-06-08 PROCEDURE — 84484 ASSAY OF TROPONIN QUANT: CPT

## 2025-06-08 PROCEDURE — 99285 EMERGENCY DEPT VISIT HI MDM: CPT | Mod: 25 | Performed by: EMERGENCY MEDICINE

## 2025-06-08 PROCEDURE — 82805 BLOOD GASES W/O2 SATURATION: CPT

## 2025-06-08 PROCEDURE — 84132 ASSAY OF SERUM POTASSIUM: CPT

## 2025-06-08 PROCEDURE — 87641 MR-STAPH DNA AMP PROBE: CPT | Performed by: EMERGENCY MEDICINE

## 2025-06-08 PROCEDURE — 2500000005 HC RX 250 GENERAL PHARMACY W/O HCPCS: Performed by: STUDENT IN AN ORGANIZED HEALTH CARE EDUCATION/TRAINING PROGRAM

## 2025-06-08 PROCEDURE — 94660 CPAP INITIATION&MGMT: CPT

## 2025-06-08 PROCEDURE — 96374 THER/PROPH/DIAG INJ IV PUSH: CPT

## 2025-06-08 RX ORDER — FUROSEMIDE 10 MG/ML
40 INJECTION INTRAMUSCULAR; INTRAVENOUS
Status: DISCONTINUED | OUTPATIENT
Start: 2025-06-08 | End: 2025-06-08

## 2025-06-08 RX ORDER — IPRATROPIUM BROMIDE AND ALBUTEROL SULFATE 2.5; .5 MG/3ML; MG/3ML
3 SOLUTION RESPIRATORY (INHALATION) ONCE
Status: COMPLETED | OUTPATIENT
Start: 2025-06-08 | End: 2025-06-08

## 2025-06-08 RX ORDER — POLYETHYLENE GLYCOL 3350 17 G/17G
17 POWDER, FOR SOLUTION ORAL DAILY PRN
Status: ACTIVE | OUTPATIENT
Start: 2025-06-08

## 2025-06-08 RX ORDER — IPRATROPIUM BROMIDE AND ALBUTEROL SULFATE 2.5; .5 MG/3ML; MG/3ML
3 SOLUTION RESPIRATORY (INHALATION)
Status: DISCONTINUED | OUTPATIENT
Start: 2025-06-08 | End: 2025-06-08

## 2025-06-08 RX ORDER — FLUTICASONE PROPIONATE 50 MCG
2 SPRAY, SUSPENSION (ML) NASAL DAILY
Status: DISPENSED | OUTPATIENT
Start: 2025-06-09

## 2025-06-08 RX ORDER — AMLODIPINE BESYLATE 5 MG/1
2.5 TABLET ORAL DAILY
Status: DISCONTINUED | OUTPATIENT
Start: 2025-06-09 | End: 2025-06-08

## 2025-06-08 RX ORDER — AMIODARONE HYDROCHLORIDE 200 MG/1
100 TABLET ORAL DAILY
Status: ACTIVE | OUTPATIENT
Start: 2025-06-09

## 2025-06-08 RX ORDER — GUAIFENESIN 600 MG/1
600 TABLET, EXTENDED RELEASE ORAL 2 TIMES DAILY
Status: DISPENSED | OUTPATIENT
Start: 2025-06-08

## 2025-06-08 RX ORDER — ATORVASTATIN CALCIUM 40 MG/1
40 TABLET, FILM COATED ORAL NIGHTLY
Status: DISPENSED | OUTPATIENT
Start: 2025-06-08

## 2025-06-08 RX ORDER — PREDNISONE 20 MG/1
40 TABLET ORAL DAILY
Status: DISPENSED | OUTPATIENT
Start: 2025-06-08

## 2025-06-08 RX ORDER — IPRATROPIUM BROMIDE AND ALBUTEROL SULFATE 2.5; .5 MG/3ML; MG/3ML
3 SOLUTION RESPIRATORY (INHALATION)
Status: ACTIVE | OUTPATIENT
Start: 2025-06-09

## 2025-06-08 RX ORDER — POTASSIUM CHLORIDE 20 MEQ/1
20 TABLET, EXTENDED RELEASE ORAL DAILY
Status: ACTIVE | OUTPATIENT
Start: 2025-06-09

## 2025-06-08 RX ORDER — GABAPENTIN 300 MG/1
300 CAPSULE ORAL 3 TIMES DAILY
Status: DISPENSED | OUTPATIENT
Start: 2025-06-08

## 2025-06-08 RX ORDER — IPRATROPIUM BROMIDE AND ALBUTEROL SULFATE 2.5; .5 MG/3ML; MG/3ML
3 SOLUTION RESPIRATORY (INHALATION) EVERY 2 HOUR PRN
Status: ACTIVE | OUTPATIENT
Start: 2025-06-08

## 2025-06-08 RX ORDER — MEROPENEM AND SODIUM CHLORIDE 500 MG/50ML
500 INJECTION, SOLUTION INTRAVENOUS ONCE
Status: COMPLETED | OUTPATIENT
Start: 2025-06-08 | End: 2025-06-08

## 2025-06-08 RX ORDER — FUROSEMIDE 40 MG/1
40 TABLET ORAL DAILY
Status: ACTIVE | OUTPATIENT
Start: 2025-06-08

## 2025-06-08 RX ORDER — DAPAGLIFLOZIN 10 MG/1
5 TABLET, FILM COATED ORAL DAILY
Status: ACTIVE | OUTPATIENT
Start: 2025-06-09

## 2025-06-08 RX ADMIN — Medication 6 L/MIN: at 19:55

## 2025-06-08 RX ADMIN — APIXABAN 5 MG: 5 TABLET, FILM COATED ORAL at 21:52

## 2025-06-08 RX ADMIN — GUAIFENESIN 600 MG: 600 TABLET ORAL at 19:55

## 2025-06-08 RX ADMIN — GABAPENTIN 300 MG: 300 CAPSULE ORAL at 21:51

## 2025-06-08 RX ADMIN — MEROPENEM AND SODIUM CHLORIDE 500 MG: 500 INJECTION, SOLUTION INTRAVENOUS at 17:50

## 2025-06-08 RX ADMIN — IPRATROPIUM BROMIDE AND ALBUTEROL SULFATE 3 ML: 2.5; .5 SOLUTION RESPIRATORY (INHALATION) at 15:23

## 2025-06-08 RX ADMIN — ATORVASTATIN CALCIUM 40 MG: 40 TABLET, FILM COATED ORAL at 21:52

## 2025-06-08 RX ADMIN — PREDNISONE 40 MG: 20 TABLET ORAL at 19:52

## 2025-06-08 RX ADMIN — IOHEXOL 75 ML: 350 INJECTION, SOLUTION INTRAVENOUS at 16:42

## 2025-06-08 RX ADMIN — IPRATROPIUM BROMIDE AND ALBUTEROL SULFATE 3 ML: 2.5; .5 SOLUTION RESPIRATORY (INHALATION) at 19:52

## 2025-06-08 SDOH — SOCIAL STABILITY: SOCIAL INSECURITY: DOES ANYONE TRY TO KEEP YOU FROM HAVING/CONTACTING OTHER FRIENDS OR DOING THINGS OUTSIDE YOUR HOME?: NO

## 2025-06-08 SDOH — SOCIAL STABILITY: SOCIAL INSECURITY: WERE YOU ABLE TO COMPLETE ALL THE BEHAVIORAL HEALTH SCREENINGS?: YES

## 2025-06-08 SDOH — SOCIAL STABILITY: SOCIAL INSECURITY: DO YOU FEEL UNSAFE GOING BACK TO THE PLACE WHERE YOU ARE LIVING?: NO

## 2025-06-08 SDOH — SOCIAL STABILITY: SOCIAL INSECURITY: ARE YOU OR HAVE YOU BEEN THREATENED OR ABUSED PHYSICALLY, EMOTIONALLY, OR SEXUALLY BY ANYONE?: NO

## 2025-06-08 SDOH — SOCIAL STABILITY: SOCIAL INSECURITY: ARE THERE ANY APPARENT SIGNS OF INJURIES/BEHAVIORS THAT COULD BE RELATED TO ABUSE/NEGLECT?: NO

## 2025-06-08 SDOH — SOCIAL STABILITY: SOCIAL INSECURITY: HAVE YOU HAD ANY THOUGHTS OF HARMING ANYONE ELSE?: NO

## 2025-06-08 SDOH — SOCIAL STABILITY: SOCIAL INSECURITY: DO YOU FEEL ANYONE HAS EXPLOITED OR TAKEN ADVANTAGE OF YOU FINANCIALLY OR OF YOUR PERSONAL PROPERTY?: NO

## 2025-06-08 SDOH — SOCIAL STABILITY: SOCIAL INSECURITY: HAS ANYONE EVER THREATENED TO HURT YOUR FAMILY OR YOUR PETS?: NO

## 2025-06-08 SDOH — SOCIAL STABILITY: SOCIAL INSECURITY: ABUSE: ADULT

## 2025-06-08 SDOH — SOCIAL STABILITY: SOCIAL INSECURITY: HAVE YOU HAD THOUGHTS OF HARMING ANYONE ELSE?: NO

## 2025-06-08 ASSESSMENT — ENCOUNTER SYMPTOMS
COUGH: 1
ABDOMINAL PAIN: 0
VOMITING: 0
FEVER: 0
SHORTNESS OF BREATH: 0
ABDOMINAL DISTENTION: 0

## 2025-06-08 ASSESSMENT — COGNITIVE AND FUNCTIONAL STATUS - GENERAL
TOILETING: A LITTLE
TURNING FROM BACK TO SIDE WHILE IN FLAT BAD: A LITTLE
STANDING UP FROM CHAIR USING ARMS: A LITTLE
CLIMB 3 TO 5 STEPS WITH RAILING: A LITTLE
WALKING IN HOSPITAL ROOM: A LITTLE
MOBILITY SCORE: 19
DAILY ACTIVITIY SCORE: 21
MOVING TO AND FROM BED TO CHAIR: A LITTLE
HELP NEEDED FOR BATHING: A LITTLE
DRESSING REGULAR LOWER BODY CLOTHING: A LITTLE
PATIENT BASELINE BEDBOUND: NO

## 2025-06-08 ASSESSMENT — ACTIVITIES OF DAILY LIVING (ADL)
HEARING - LEFT EAR: HEARING AID
WALKS IN HOME: INDEPENDENT
FEEDING YOURSELF: INDEPENDENT
DRESSING YOURSELF: INDEPENDENT
PATIENT'S MEMORY ADEQUATE TO SAFELY COMPLETE DAILY ACTIVITIES?: YES
JUDGMENT_ADEQUATE_SAFELY_COMPLETE_DAILY_ACTIVITIES: NO
ADEQUATE_TO_COMPLETE_ADL: YES
BATHING: NEEDS ASSISTANCE
GROOMING: INDEPENDENT
HEARING - RIGHT EAR: HEARING AID
TOILETING: NEEDS ASSISTANCE
ASSISTIVE_DEVICE: WALKER

## 2025-06-08 ASSESSMENT — LIFESTYLE VARIABLES
AUDIT-C TOTAL SCORE: 3
AUDIT-C TOTAL SCORE: 3
HOW MANY STANDARD DRINKS CONTAINING ALCOHOL DO YOU HAVE ON A TYPICAL DAY: 1 OR 2
HOW OFTEN DO YOU HAVE A DRINK CONTAINING ALCOHOL: 2-3 TIMES A WEEK
SKIP TO QUESTIONS 9-10: 1
HOW OFTEN DO YOU HAVE 6 OR MORE DRINKS ON ONE OCCASION: NEVER

## 2025-06-08 ASSESSMENT — COLUMBIA-SUICIDE SEVERITY RATING SCALE - C-SSRS
2. HAVE YOU ACTUALLY HAD ANY THOUGHTS OF KILLING YOURSELF?: NO
1. IN THE PAST MONTH, HAVE YOU WISHED YOU WERE DEAD OR WISHED YOU COULD GO TO SLEEP AND NOT WAKE UP?: NO
6. HAVE YOU EVER DONE ANYTHING, STARTED TO DO ANYTHING, OR PREPARED TO DO ANYTHING TO END YOUR LIFE?: NO

## 2025-06-08 ASSESSMENT — PATIENT HEALTH QUESTIONNAIRE - PHQ9
SUM OF ALL RESPONSES TO PHQ9 QUESTIONS 1 & 2: 0
2. FEELING DOWN, DEPRESSED OR HOPELESS: NOT AT ALL
1. LITTLE INTEREST OR PLEASURE IN DOING THINGS: NOT AT ALL

## 2025-06-08 ASSESSMENT — PAIN SCALES - GENERAL
PAINLEVEL_OUTOF10: 0 - NO PAIN
PAINLEVEL_OUTOF10: 0 - NO PAIN

## 2025-06-08 ASSESSMENT — PAIN - FUNCTIONAL ASSESSMENT
PAIN_FUNCTIONAL_ASSESSMENT: 0-10
PAIN_FUNCTIONAL_ASSESSMENT: 0-10

## 2025-06-08 NOTE — H&P
[facility] Hospitalist H&P    Between 7AM-7PM please message me via Epic Secure Chat.  After 7PM please page Nocturnist on call.      Assessment/Plan     Acute Problems    Cough and congestion - suspect viral URI    Chronic Problems    COPD/respiratory failure on 6L NC  A fib on Eliquis and Amio  HFpEf  CKD 3  HTN  DM2  KALPANA  OA  Hx TIA    Plan    - admit to med tele  - nebz, RT eval. Hold off on systemic steroids for now. Angie mucinex  - hold off on abx, check procal, follow up BNP. Will give 1x dose of IV lasix  - home meds    Fluids: None  Electrolytes: Replete as needed  Nutrition: Low salt  Arceo: None  Invasive lines: None  Drains: None  O2: Chronic NC      DVT Prophylaxis:  Eliquis    BMI Classification: There is no height or weight on file to calculate BMI.     Disposition: I have discussed the medical care of this patient with the ED provider and assume responsibility of this patient's medical care. This patient will be admitted due to the acuity/severity of their current medical condition. We will await further test results, await consultant recommendations, and await clinical improvement    Plan of care was discussed with patient    Total time spent: 77 minutes providing counseling or in coordination of care.  Total time on this day of visit includes record and documentation review before and after visit including documentation and time not explicitly included on EMR time stamp.      History Of Present Illness  Lissett Rodríguez is a 89 y.o. female presenting with cough and congestion.     She denies SOB or wheezing. She says her son sent her in due to concern she was having PNA    Her chest CT shows no PE, no PNA/PTX/pulmonary edema. Chronic changes    Her O2 is at baseline, 6L    She is resting comfortably in bed    VS, labs, imaging reviewed     Past Medical History  She has a past medical history of Angioneurotic edema, initial encounter (08/24/2018), Calculus of gallbladder without cholecystitis without  obstruction (02/22/2017), Chronic obstructive pulmonary disease, unspecified (02/20/2017), Eructation (06/16/2017), Personal history of transient ischemic attack (TIA), and cerebral infarction 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 (10/16/2018), Personal history of transient ischemic attack (TIA), and cerebral infarction 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 (03/08/2016), Pneumonia, unspecified organism (02/09/2018), Trochanteric bursitis, unspecified hip (04/23/2015), and Type 2 diabetes mellitus without complications (Multi) (11/06/2017).    Surgical History  She has a past surgical history that includes Appendectomy (05/15/2013); Cataract extraction (05/15/2013); Hysterectomy (05/15/2013); Other surgical history (05/15/2013); Other surgical history (02/28/2019); Colonoscopy (04/11/2014); Total knee arthroplasty (05/29/2013); MR angio head wo IV contrast (1/8/2016); MR angio neck wo IV contrast (1/8/2016); MR angio head wo IV contrast (10/1/2012); and MR angio neck w IV contrast (10/1/2012).     Social History  She reports that she has never smoked. She has never used smokeless tobacco. She reports that she does not drink alcohol and does not use drugs.    Family History  Family History[1]     Allergies  Lisinopril, Tetracyclines, Vancomycin, and Penicillins    Review of Systems   Constitutional:  Negative for fever.   Respiratory:  Positive for cough. Negative for shortness of breath.    Cardiovascular:  Negative for chest pain.   Gastrointestinal:  Negative for abdominal distention, abdominal pain and vomiting.        Physical Exam  Vitals reviewed.   Constitutional:       General: She is not in acute distress.  Cardiovascular:      Rate and Rhythm: Normal rate and regular rhythm.   Pulmonary:      Effort: Pulmonary effort is normal.      Breath sounds: Rales present.   Abdominal:      General: There is no distension.      Palpations: Abdomen is soft.   Neurological:      Mental Status: She is alert. Mental status is at baseline.           Last Recorded Vitals  /65   Pulse 79   Temp 36.7 °C (98 °F) (Oral)   Resp (!) 23   SpO2 (!) 87%     Relevant Results    Scheduled Medications[2]    Continuous Medications[3]    PRN Medications[4]    Kolby Smith MD  [facility]         [1]   Family History  Problem Relation Name Age of Onset    Alzheimer's disease Mother      Heart failure Mother      Stroke Mother          ischemic stroke    Heart attack Father      Stroke Father          ischemic stroke    COPD Sister      Diabetes Sister          mellitus    Cancer Sister      Parkinsonism Sister      Other (previous cardiac prolems) Sister     [2] predniSONE, 40 mg, oral, Daily  [3]    [4]

## 2025-06-08 NOTE — ED PROVIDER NOTES
History of Present Illness     History provided by: Patient  Limitations to History: None  External Records Reviewed with Brief Summary: Discharge notes    HPI:  Lissett Rodríguez is a 89 y.o. female with past medical history of A-fib on Eliquis, type 2 diabetes, sciatica, HFpEF, COPD on 6 L at baseline, history of PE, hypertension, KALPANA, hyperlipidemia, history of TIA, CKD the presents emergency room for shortness of breath.  Patient states that for the past 5 days she has been having some cough with productive yellow sputum per EMR, congestion and feeling short of breath.  Patient denies any chest pain, fevers, chills.  Patient states that when she is at doctors offices that she does have around 84%.     Physical Exam   Triage vitals:  T 36.7 °C (98 °F)  HR 90  /66  RR (!) 26  O2 (!) 88 % Supplemental oxygen    General: Awake, alert, in no acute distress  Eyes: Gaze conjugate.  No scleral icterus or injection  HENT: Normo-cephalic, atraumatic. No stridor  CV: Regular rate, regular rhythm. Radial pulses 2+ bilaterally  Resp: Breathing non-labored, speaking in full sentences.  Right basilar minimal rales, no wheezing no crackles,   GI: Soft, non-distended, non-tender. No rebound or guarding.  : Deferred  MSK/Extremities: No gross bony deformities. Moving all extremities  Skin: Warm. Appropriate color  Neuro: Alert. Oriented. Face symmetric. Speech is fluent.  Gross strength and sensation intact in b/l UE and LEs  Psych: Appropriate mood and affect    Medical Decision Making & ED Course   Medical Decision Makin y.o. female with past medical history of A-fib on Eliquis, type 2 diabetes, sciatica, HFpEF, COPD on 6 L at baseline, history of PE, hypertension, KALPANA, hyperlipidemia, history of TIA, CKD the presents emergency room for shortness of breath.  Patient presents today manage 88% on 6 L nasal cannula today, afebrile and normotensive.    This patient presents with dyspnea, most likely secondary to  COPD exacerbation versus pneumonia. Differential diagnosis includes COPD exacerbation secondary to upper respiratory viral infection, pneumonia, PE, congestive heart failure. Presentation not consistent with acute cardiac etiologies to include ACS (HEART score ), CHF, pericardial effusion / tamponade . Presentation not consistent with acute respiratory etiologies to include acute PE (Wells low risk 4), pneumothorax , asthma, allergic etiologies. Presentation also not consistent with non-cardiopulmonary causes to include toxidromes, metabolic etiologies such as acidemia or electrolyte derangements, sepsis, neurologic causes (i.e. demyelinating diseases).    Plan: supplemental O2, CXR, labs, troponin, close hemodynamic monitoring, serial reassessment, DuoNeb    Chest x-ray shows persistent bilateral patchy interstitial and ground-glass infiltrates similar in distribution to the previous study though mildly increased.  CT PE shows no evidence of pulmonary embolism.  VBG shows a pH of 7.40, CO2 40, bicarb of 27.  COVID, influenza, RSV is negative.  BNP is within normals.  Troponin is negative.  CMP with slight elevated creatinine however no evidence of ANGE.  CBC shows no leukocytosis.    EKG shows normal sinus rhythm, no axis deviation, no interval abnormalities, no ST elevations, no ST depressions, no T wave inversions.  EKG looks similar to previous EKGs.  No evidence of any ischemic changes.    Given the chest x-ray showed wall it was possible evidence of pneumonia.  We added on meropenem IV.  We spoke to the general medicine team and recommends observation with telemetry.    ----  Scoring Tools Utilized: none     Social Determinants of Health which Significantly Impact Care: None identified The following actions were taken to address these social determinants: none    EKG Independent Interpretation: EKG interpreted by myself. Please see ED Course for full interpretation.    Independent Result Review and  Interpretation: Relevant laboratory and radiographic results were reviewed and independently interpreted by myself.  As necessary, they are commented on in the ED Course.    Chronic conditions affecting the patient's care: As documented above in [facility]    The patient was discussed with the following consultants/services: None    Care Considerations: As documented above in [facility]    ED Course:  Diagnoses as of 06/08/25 1903   COPD exacerbation (Multi)   Pneumonia due to infectious organism, unspecified laterality, unspecified part of lung     Disposition   As a result of their workup, the patient will require admission to the hospital.  The patient was informed of her diagnosis.  The patient was given the opportunity to ask questions and I answered them. The patient agreed to be admitted to the hospital.    Procedures   Procedures    Patient seen and discussed with ED attending physician.    Luciana Tellez MD  Emergency Medicine     Luciana Tellez MD  Resident  06/08/25 1936

## 2025-06-08 NOTE — ED TRIAGE NOTES
Pt is coming in with cough, congestion, shortness of breath for 4-5 days.     She has history of COPD and on 6L NC at baseline. She reports when she gets her Spo2 levels checked at the doctors it is around 84%    Denies fevers

## 2025-06-09 ENCOUNTER — NURSE ONLY (OUTPATIENT)
Dept: INPATIENT UNIT | Facility: HOSPITAL | Age: OVER 89
End: 2025-06-09
Payer: MEDICARE

## 2025-06-09 LAB
ANION GAP SERPL CALC-SCNC: 16 MMOL/L (ref 10–20)
ATRIAL RATE: 86 BPM
BUN SERPL-MCNC: 19 MG/DL (ref 6–23)
CALCIUM SERPL-MCNC: 8.8 MG/DL (ref 8.6–10.3)
CHLORIDE SERPL-SCNC: 106 MMOL/L (ref 98–107)
CO2 SERPL-SCNC: 21 MMOL/L (ref 21–32)
CREAT SERPL-MCNC: 0.98 MG/DL (ref 0.5–1.05)
EGFRCR SERPLBLD CKD-EPI 2021: 54 ML/MIN/1.73M*2
ERYTHROCYTE [DISTWIDTH] IN BLOOD BY AUTOMATED COUNT: 14.1 % (ref 11.5–14.5)
GLUCOSE BLD MANUAL STRIP-MCNC: 210 MG/DL (ref 74–99)
GLUCOSE SERPL-MCNC: 203 MG/DL (ref 74–99)
HCT VFR BLD AUTO: 46.2 % (ref 36–46)
HGB BLD-MCNC: 14.6 G/DL (ref 12–16)
MAGNESIUM SERPL-MCNC: 2.14 MG/DL (ref 1.6–2.4)
MCH RBC QN AUTO: 29.3 PG (ref 26–34)
MCHC RBC AUTO-ENTMCNC: 31.6 G/DL (ref 32–36)
MCV RBC AUTO: 93 FL (ref 80–100)
NRBC BLD-RTO: 0 /100 WBCS (ref 0–0)
P AXIS: 66 DEGREES
P OFFSET: 205 MS
P ONSET: 145 MS
PLATELET # BLD AUTO: 175 X10*3/UL (ref 150–450)
POTASSIUM SERPL-SCNC: 4.4 MMOL/L (ref 3.5–5.3)
PR INTERVAL: 160 MS
PROCALCITONIN SERPL-MCNC: 0.02 NG/ML
Q ONSET: 225 MS
QRS COUNT: 14 BEATS
QRS DURATION: 80 MS
QT INTERVAL: 386 MS
QTC CALCULATION(BAZETT): 461 MS
QTC FREDERICIA: 435 MS
R AXIS: 61 DEGREES
RBC # BLD AUTO: 4.98 X10*6/UL (ref 4–5.2)
SODIUM SERPL-SCNC: 139 MMOL/L (ref 136–145)
T AXIS: 75 DEGREES
T OFFSET: 418 MS
VENTRICULAR RATE: 86 BPM
WBC # BLD AUTO: 6.6 X10*3/UL (ref 4.4–11.3)

## 2025-06-09 PROCEDURE — 2500000005 HC RX 250 GENERAL PHARMACY W/O HCPCS: Performed by: STUDENT IN AN ORGANIZED HEALTH CARE EDUCATION/TRAINING PROGRAM

## 2025-06-09 PROCEDURE — 94640 AIRWAY INHALATION TREATMENT: CPT

## 2025-06-09 PROCEDURE — 2500000005 HC RX 250 GENERAL PHARMACY W/O HCPCS: Performed by: HOSPITALIST

## 2025-06-09 PROCEDURE — 2500000002 HC RX 250 W HCPCS SELF ADMINISTERED DRUGS (ALT 637 FOR MEDICARE OP, ALT 636 FOR OP/ED): Performed by: STUDENT IN AN ORGANIZED HEALTH CARE EDUCATION/TRAINING PROGRAM

## 2025-06-09 PROCEDURE — 83735 ASSAY OF MAGNESIUM: CPT | Performed by: STUDENT IN AN ORGANIZED HEALTH CARE EDUCATION/TRAINING PROGRAM

## 2025-06-09 PROCEDURE — 2500000001 HC RX 250 WO HCPCS SELF ADMINISTERED DRUGS (ALT 637 FOR MEDICARE OP): Performed by: HOSPITALIST

## 2025-06-09 PROCEDURE — 2500000004 HC RX 250 GENERAL PHARMACY W/ HCPCS (ALT 636 FOR OP/ED): Performed by: STUDENT IN AN ORGANIZED HEALTH CARE EDUCATION/TRAINING PROGRAM

## 2025-06-09 PROCEDURE — 85027 COMPLETE CBC AUTOMATED: CPT | Performed by: STUDENT IN AN ORGANIZED HEALTH CARE EDUCATION/TRAINING PROGRAM

## 2025-06-09 PROCEDURE — 94660 CPAP INITIATION&MGMT: CPT

## 2025-06-09 PROCEDURE — 99232 SBSQ HOSP IP/OBS MODERATE 35: CPT | Performed by: STUDENT IN AN ORGANIZED HEALTH CARE EDUCATION/TRAINING PROGRAM

## 2025-06-09 PROCEDURE — 97165 OT EVAL LOW COMPLEX 30 MIN: CPT | Mod: GO

## 2025-06-09 PROCEDURE — 1200000002 HC GENERAL ROOM WITH TELEMETRY DAILY

## 2025-06-09 PROCEDURE — 94664 DEMO&/EVAL PT USE INHALER: CPT

## 2025-06-09 PROCEDURE — 2500000001 HC RX 250 WO HCPCS SELF ADMINISTERED DRUGS (ALT 637 FOR MEDICARE OP): Performed by: STUDENT IN AN ORGANIZED HEALTH CARE EDUCATION/TRAINING PROGRAM

## 2025-06-09 PROCEDURE — 82947 ASSAY GLUCOSE BLOOD QUANT: CPT

## 2025-06-09 PROCEDURE — 80048 BASIC METABOLIC PNL TOTAL CA: CPT | Performed by: STUDENT IN AN ORGANIZED HEALTH CARE EDUCATION/TRAINING PROGRAM

## 2025-06-09 PROCEDURE — 97161 PT EVAL LOW COMPLEX 20 MIN: CPT | Mod: GP

## 2025-06-09 PROCEDURE — 36415 COLL VENOUS BLD VENIPUNCTURE: CPT | Performed by: STUDENT IN AN ORGANIZED HEALTH CARE EDUCATION/TRAINING PROGRAM

## 2025-06-09 RX ORDER — TRAMADOL HYDROCHLORIDE 50 MG/1
50 TABLET, FILM COATED ORAL ONCE
Status: COMPLETED | OUTPATIENT
Start: 2025-06-09 | End: 2025-06-09

## 2025-06-09 RX ADMIN — Medication 6 L/MIN: at 07:23

## 2025-06-09 RX ADMIN — GUAIFENESIN 600 MG: 600 TABLET ORAL at 20:17

## 2025-06-09 RX ADMIN — IPRATROPIUM BROMIDE AND ALBUTEROL SULFATE 3 ML: 2.5; .5 SOLUTION RESPIRATORY (INHALATION) at 07:23

## 2025-06-09 RX ADMIN — DAPAGLIFLOZIN 5 MG: 10 TABLET, FILM COATED ORAL at 08:54

## 2025-06-09 RX ADMIN — POTASSIUM CHLORIDE 20 MEQ: 1500 TABLET, EXTENDED RELEASE ORAL at 08:57

## 2025-06-09 RX ADMIN — AMIODARONE HYDROCHLORIDE 100 MG: 200 TABLET ORAL at 08:54

## 2025-06-09 RX ADMIN — IPRATROPIUM BROMIDE AND ALBUTEROL SULFATE 3 ML: 2.5; .5 SOLUTION RESPIRATORY (INHALATION) at 20:33

## 2025-06-09 RX ADMIN — Medication 8 L/MIN: at 22:36

## 2025-06-09 RX ADMIN — IPRATROPIUM BROMIDE AND ALBUTEROL SULFATE 3 ML: 2.5; .5 SOLUTION RESPIRATORY (INHALATION) at 12:03

## 2025-06-09 RX ADMIN — Medication 5 L/MIN: at 20:19

## 2025-06-09 RX ADMIN — GABAPENTIN 300 MG: 300 CAPSULE ORAL at 20:17

## 2025-06-09 RX ADMIN — APIXABAN 5 MG: 5 TABLET, FILM COATED ORAL at 08:54

## 2025-06-09 RX ADMIN — FLUTICASONE PROPIONATE 2 SPRAY: 50 SPRAY, METERED NASAL at 08:57

## 2025-06-09 RX ADMIN — APIXABAN 5 MG: 5 TABLET, FILM COATED ORAL at 20:17

## 2025-06-09 RX ADMIN — ATORVASTATIN CALCIUM 40 MG: 40 TABLET, FILM COATED ORAL at 20:17

## 2025-06-09 RX ADMIN — GUAIFENESIN 600 MG: 600 TABLET ORAL at 08:54

## 2025-06-09 RX ADMIN — GABAPENTIN 300 MG: 300 CAPSULE ORAL at 15:01

## 2025-06-09 RX ADMIN — TRAMADOL HYDROCHLORIDE 50 MG: 50 TABLET, COATED ORAL at 00:59

## 2025-06-09 RX ADMIN — FUROSEMIDE 40 MG: 40 TABLET ORAL at 08:54

## 2025-06-09 RX ADMIN — GABAPENTIN 300 MG: 300 CAPSULE ORAL at 08:54

## 2025-06-09 SDOH — SOCIAL STABILITY: SOCIAL INSECURITY
WITHIN THE LAST YEAR, HAVE YOU BEEN KICKED, HIT, SLAPPED, OR OTHERWISE PHYSICALLY HURT BY YOUR PARTNER OR EX-PARTNER?: NO

## 2025-06-09 SDOH — SOCIAL STABILITY: SOCIAL INSECURITY
WITHIN THE LAST YEAR, HAVE YOU BEEN RAPED OR FORCED TO HAVE ANY KIND OF SEXUAL ACTIVITY BY YOUR PARTNER OR EX-PARTNER?: NO

## 2025-06-09 SDOH — ECONOMIC STABILITY: HOUSING INSECURITY: AT ANY TIME IN THE PAST 12 MONTHS, WERE YOU HOMELESS OR LIVING IN A SHELTER (INCLUDING NOW)?: NO

## 2025-06-09 SDOH — SOCIAL STABILITY: SOCIAL INSECURITY: WITHIN THE LAST YEAR, HAVE YOU BEEN HUMILIATED OR EMOTIONALLY ABUSED IN OTHER WAYS BY YOUR PARTNER OR EX-PARTNER?: NO

## 2025-06-09 SDOH — ECONOMIC STABILITY: FOOD INSECURITY: HOW HARD IS IT FOR YOU TO PAY FOR THE VERY BASICS LIKE FOOD, HOUSING, MEDICAL CARE, AND HEATING?: NOT VERY HARD

## 2025-06-09 SDOH — ECONOMIC STABILITY: FOOD INSECURITY: WITHIN THE PAST 12 MONTHS, THE FOOD YOU BOUGHT JUST DIDN'T LAST AND YOU DIDN'T HAVE MONEY TO GET MORE.: NEVER TRUE

## 2025-06-09 SDOH — ECONOMIC STABILITY: HOUSING INSECURITY: IN THE LAST 12 MONTHS, WAS THERE A TIME WHEN YOU WERE NOT ABLE TO PAY THE MORTGAGE OR RENT ON TIME?: NO

## 2025-06-09 SDOH — ECONOMIC STABILITY: TRANSPORTATION INSECURITY: IN THE PAST 12 MONTHS, HAS LACK OF TRANSPORTATION KEPT YOU FROM MEDICAL APPOINTMENTS OR FROM GETTING MEDICATIONS?: NO

## 2025-06-09 SDOH — ECONOMIC STABILITY: INCOME INSECURITY: IN THE PAST 12 MONTHS HAS THE ELECTRIC, GAS, OIL, OR WATER COMPANY THREATENED TO SHUT OFF SERVICES IN YOUR HOME?: NO

## 2025-06-09 SDOH — SOCIAL STABILITY: SOCIAL INSECURITY: WITHIN THE LAST YEAR, HAVE YOU BEEN AFRAID OF YOUR PARTNER OR EX-PARTNER?: NO

## 2025-06-09 SDOH — ECONOMIC STABILITY: FOOD INSECURITY: WITHIN THE PAST 12 MONTHS, YOU WORRIED THAT YOUR FOOD WOULD RUN OUT BEFORE YOU GOT THE MONEY TO BUY MORE.: NEVER TRUE

## 2025-06-09 SDOH — ECONOMIC STABILITY: HOUSING INSECURITY: IN THE PAST 12 MONTHS, HOW MANY TIMES HAVE YOU MOVED WHERE YOU WERE LIVING?: 0

## 2025-06-09 ASSESSMENT — COGNITIVE AND FUNCTIONAL STATUS - GENERAL
WALKING IN HOSPITAL ROOM: A LITTLE
MOVING TO AND FROM BED TO CHAIR: A LITTLE
WALKING IN HOSPITAL ROOM: A LITTLE
DRESSING REGULAR UPPER BODY CLOTHING: A LITTLE
DRESSING REGULAR LOWER BODY CLOTHING: A LITTLE
STANDING UP FROM CHAIR USING ARMS: A LITTLE
MOBILITY SCORE: 18
MOVING FROM LYING ON BACK TO SITTING ON SIDE OF FLAT BED WITH BEDRAILS: A LITTLE
MOVING FROM LYING ON BACK TO SITTING ON SIDE OF FLAT BED WITH BEDRAILS: A LITTLE
TOILETING: A LITTLE
MOVING TO AND FROM BED TO CHAIR: A LITTLE
DRESSING REGULAR UPPER BODY CLOTHING: A LITTLE
CLIMB 3 TO 5 STEPS WITH RAILING: A LITTLE
MOVING TO AND FROM BED TO CHAIR: A LITTLE
DAILY ACTIVITIY SCORE: 19
STANDING UP FROM CHAIR USING ARMS: A LITTLE
CLIMB 3 TO 5 STEPS WITH RAILING: A LOT
HELP NEEDED FOR BATHING: A LITTLE
DRESSING REGULAR LOWER BODY CLOTHING: A LITTLE
TURNING FROM BACK TO SIDE WHILE IN FLAT BAD: A LITTLE
PERSONAL GROOMING: A LITTLE
MOBILITY SCORE: 19
WALKING IN HOSPITAL ROOM: A LITTLE
TOILETING: A LITTLE
TURNING FROM BACK TO SIDE WHILE IN FLAT BAD: A LITTLE
DAILY ACTIVITIY SCORE: 20
DRESSING REGULAR LOWER BODY CLOTHING: A LITTLE
HELP NEEDED FOR BATHING: A LITTLE
STANDING UP FROM CHAIR USING ARMS: A LITTLE
HELP NEEDED FOR BATHING: A LITTLE
CLIMB 3 TO 5 STEPS WITH RAILING: A LOT
MOBILITY SCORE: 17
DAILY ACTIVITIY SCORE: 22

## 2025-06-09 ASSESSMENT — PAIN - FUNCTIONAL ASSESSMENT
PAIN_FUNCTIONAL_ASSESSMENT: 0-10

## 2025-06-09 ASSESSMENT — ACTIVITIES OF DAILY LIVING (ADL)
LACK_OF_TRANSPORTATION: NO
ADL_ASSISTANCE: INDEPENDENT
ADL_ASSISTANCE: INDEPENDENT
LACK_OF_TRANSPORTATION: NO

## 2025-06-09 ASSESSMENT — PAIN SCALES - GENERAL
PAINLEVEL_OUTOF10: 0 - NO PAIN
PAINLEVEL_OUTOF10: 10 - WORST POSSIBLE PAIN
PAINLEVEL_OUTOF10: 0 - NO PAIN

## 2025-06-09 ASSESSMENT — ENCOUNTER SYMPTOMS
SHORTNESS OF BREATH: 0
COUGH: 1
FEVER: 0
VOMITING: 0
ABDOMINAL DISTENTION: 0
ABDOMINAL PAIN: 0

## 2025-06-09 ASSESSMENT — PAIN DESCRIPTION - LOCATION: LOCATION: BACK

## 2025-06-09 ASSESSMENT — PAIN DESCRIPTION - ORIENTATION: ORIENTATION: LOWER

## 2025-06-09 ASSESSMENT — PAIN DESCRIPTION - DESCRIPTORS: DESCRIPTORS: ACHING

## 2025-06-09 NOTE — PROGRESS NOTES
Protocol Title:     A PHASE 4 STUDY USING A TEST-NEGATIVE DESIGN TO EVALUATE THE EFFECTIVENESS OF A 20-VALENT PNEUMOCOCCAL CONJUGATE VACCINE AGAINST VACCINE-TYPE RADIOLOGICALLY-CONFIRMED COMMUNITY-ACQUIRED PNEUMONIA IN ADULTS >=65 YEARS OF AGE         IRB Number:  [id number] ([id number])           :  Yohannes Herman MD    Patient Name & MRN: Lissett Rodríguez [id number] Study Visit Date: 06/09/25                                       Informed Consent (ICF) Obtained 06/09/25  Informed Consent (ICF) Obtained by: Von Leal      Date Signed: 06/09/25   FORM COMPLETED BY: Carlos Alberto Doran RN  DATE: 06/09/25

## 2025-06-09 NOTE — CARE PLAN
The patient's goals for the shift include feel better    The clinical goals for the shift include reduce oxygen needs

## 2025-06-09 NOTE — CARE PLAN
Pt keeps desaturating into 70's while asleep with cpap on, Had to increase oxygen bleed in from 6 liters to 15 liters to get SpO2 back above 90%. Will wean oxygen down as pt tolerates.  In notes that pts baseline on 6 liters nasal cannula is 85-88% and pt desaturates into 70's when she lays flat

## 2025-06-09 NOTE — PROGRESS NOTES
Occupational Therapy    Evaluation    Patient Name: Lissett Rodríguez  MRN: [id number]  Department: [place] [id number]  Room: [address] [place] [address]  Today's Date: 6/9/2025  Time Calculation  Start Time: 1426  Stop Time: 1439  Time Calculation (min): 13 min        Assessment:  OT Assessment: Pt presents with reduced balance, endurace and safety awareness, impeding ADL performance and functional mobility. Pt would benefit from skilled OT services to address these deficits and facilitate highest level of function.  Prognosis: Good  Barriers to Discharge Home: No anticipated barriers  Evaluation/Treatment Tolerance: Patient tolerated treatment well  Medical Staff Made Aware: Yes  End of Session Communication: Bedside nurse  End of Session Patient Position: Up in chair, Alarm on  OT Assessment Results: Decreased ADL status, Decreased safe judgment during ADL, Decreased endurance, Decreased functional mobility  Prognosis: Good  Barriers to Discharge: None  Evaluation/Treatment Tolerance: Patient tolerated treatment well  Medical Staff Made Aware: Yes  Strengths: Ability to acquire knowledge, Insight into problems, Living arrangement secure, Physical health, Premorbid level of function, Rehab experience, Support of Caregivers  Barriers to Participation: Comorbidities  Plan:  Treatment Interventions: ADL retraining, Functional transfer training, UE strengthening/ROM, Endurance training, Patient/family training, Equipment evaluation/education, Compensatory technique education  OT Frequency: 2 times per week  OT Discharge Recommendations: Low intensity level of continued care  OT Recommended Transfer Status: Stand by assist  OT - OK to Discharge: Yes (Per POC)  Treatment Interventions: ADL retraining, Functional transfer training, UE strengthening/ROM, Endurance training, Patient/family training, Equipment evaluation/education, Compensatory technique education    Subjective     OT Visit Info:  OT Received On: 06/09/25  General:  General  Reason for  Referral: 89 y.o. female presenting with SOB and COPD exacerbation.  Referred By: Kolby Smith MD  Past Medical History Relevant to Rehab: Medical History[1]  Family/Caregiver Present: No  Co-Treatment: PT  Co-Treatment Reason: To maximize pt safety and mobility  Prior to Session Communication: Bedside nurse  Patient Position Received: Up in chair, Alarm on  Preferred Learning Style: auditory, verbal, visual  General Comment: Pt pleasant and agreeable to OT eval.  Precautions:  Medical Precautions: Fall precautions, Oxygen therapy device and L/min (6 L/min (same as baseline))      Pain:  Pain Assessment  Pain Assessment: 0-10  0-10 (Numeric) Pain Score: 0 - No pain    Objective   Cognition:  Overall Cognitive Status: Within Functional Limits  Orientation Level: Oriented X4  Insight: Mild  Impulsive: Moderately           Home Living:  Type of Home: Assisted living  Lives With: Alone  Home Adaptive Equipment: Cane, Walker rolling or standard (Rollator)  Home Layout: One level  Home Access: Elevator  Bathroom Shower/Tub: Walk-in shower  Bathroom Toilet: Standard  Bathroom Equipment: Grab bars in shower, Built-in shower seat, Grab bars around toilet  Prior Function:  Level of [place]: Independent with ADLs and functional transfers, Needs assistance with homemaking  Receives Help From:  ([facility] staff)  ADL Assistance: Independent  Homemaking Assistance: Needs assistance ([facility] provides and completes iADLs)  Ambulatory Assistance: Independent (Use of rollator, intermittent use of cane within apt)  Prior Function Comments: Pt denies any recent falls.     ADL:  LE Dressing Assistance: Stand by  LE Dressing Deficit:  (Don/doff socks using figure four position)  Activity Tolerance:  Endurance: Tolerates 10 - 20 min exercise with multiple rests  Bed Mobility/Transfers: Bed Mobility  Bed Mobility: No (Pt seated at start and end of session)    Transfers  Transfer: Yes  Transfer 1  Technique 1: Sit to stand, Stand to  sit  Transfer Device 1:  (Rollator)  Transfer Level of Assistance 1: Distant supervision  Trials/Comments 1: Cues for proper hand placement and body positioning prior to sit<>stand transfer      Functional Mobility:  Functional Mobility  Functional Mobility Performed: Yes  Functional Mobility 1  Device 1: Rollator  Assistance 1: Close supervision, Distant supervision  Comments 1: Pt completed around room functional mobility x2 reps with rollator as well as demonstrated dynamic standing balance throughout, SBA for safety awareness d/t impulsivity  Sitting Balance:  Static Sitting Balance  Static Sitting-Balance Support: Feet supported  Static Sitting-Level of Assistance: Modified Independent  Static Sitting-Comment/Number of Minutes: Up in chair  Dynamic Sitting Balance  Dynamic Sitting-Balance Support: Feet supported  Dynamic Sitting-Level of Assistance: Distant supervision  Dynamic Sitting-Balance: Reaching for objects, Reaching across midline, Forward lean  Dynamic Sitting-Comments: Up in chair  Standing Balance:  Static Standing Balance  Static Standing-Balance Support: Bilateral upper extremity supported  Static Standing-Level of Assistance: Distant supervision  Static Standing-Comment/Number of Minutes: with rollator  Dynamic Standing Balance  Dynamic Standing-Balance Support: Bilateral upper extremity supported  Dynamic Standing-Level of Assistance: Distant supervision, Close supervision  Dynamic Standing-Balance: Turning  Dynamic Standing-Comments: With rollator      Vision:Vision - Basic Assessment  Current Vision: Wears glasses all the time  Sensation:  Light Touch: No apparent deficits  Strength:  Strength Comments: WFL  Perception:  Inattention/Neglect: Appears intact  Coordination:  Movements are Fluid and Coordinated: Yes   Hand Function:  Gross Grasp: Functional  Coordination: Functional  Extremities: RUE   RUE : Within Functional Limits  RUE Strength  RUE Overall Strength: Within Functional Limits -  able to perform ADL tasks with strength and LUE   LUE: Within Functional Limits  LUE Strength  LUE Overall Strength: Within Functional Limits - able to perform ADL tasks with strength    Outcome Measures:[facility] Daily Activity  Putting on and taking off regular lower body clothing: A little  Bathing (including washing, rinsing, drying): A little  Putting on and taking off regular upper body clothing: A little  Toileting, which includes using toilet, bedpan or urinal: A little  Taking care of personal grooming such as brushing teeth: A little  Eating Meals: None  Daily Activity - Total Score: 19        Education Documentation  Body Mechanics, taught by Greer Harvey OT at 6/9/2025  3:47 PM.  Learner: Patient  Readiness: Acceptance  Method: Explanation  Response: Verbalizes Understanding    Precautions, taught by Greer Harvey OT at 6/9/2025  3:47 PM.  Learner: Patient  Readiness: Acceptance  Method: Explanation  Response: Verbalizes Understanding    ADL Training, taught by Greer Harvey OT at 6/9/2025  3:47 PM.  Learner: Patient  Readiness: Acceptance  Method: Explanation  Response: Verbalizes Understanding    Education Comments  No comments found.        OP EDUCATION:       Goals:  Encounter Problems       Encounter Problems (Active)       ADLs       Patient will perform UB and LB sponge bathing with set-up t level of assistance while seated and use of adaptive techniques/equipment.       Start:  06/09/25    Expected End:  06/23/25            Patient with complete upper body dressing with modified independent level of assistance donning and doffing all UE clothes with PRN adaptive equipment while seated.       Start:  06/09/25    Expected End:  06/23/25            Patient with complete lower body dressing with modified independent level of assistance donning and doffing all LE clothes  with PRN adaptive equipment while seated.       Start:  06/09/25    Expected End:  06/23/25            Patient will complete  daily grooming tasks with modified independent level of assistance and PRN adaptive equipment while standing.       Start:  06/09/25    Expected End:  06/23/25            Patient will complete toileting including hygiene clothing management/hygiene with modified independent level of assistance and raised toilet seat and grab bars.       Start:  06/09/25    Expected End:  06/23/25               EXERCISE/STRENGTHENING       Patient will complete BUE exercises for 3 sets and 10 reps in order to improve strength and activity for ADL performance.        Start:  06/09/25    Expected End:  06/23/25               MOBILITY       Patient will perform Functional mobility x Household distances/Community Distances with modified independent level of assistance and least restrictive device in order to improve safety and functional mobility.       Start:  06/09/25    Expected End:  06/23/25               TRANSFERS       Patient will perform bed mobility modified independent level of assistance and bed rails in order to improve safety and independence with mobility       Start:  06/09/25    Expected End:  06/23/25            Patient will complete sit to stand transfer with modified independent level of assistance and least restrictive device in order to improve safety and prepare for out of bed mobility.       Start:  06/09/25    Expected End:  06/23/25                                     [1]   Past Medical History:  Diagnosis Date    Angioneurotic edema, initial encounter 08/24/2018    Angioedema of lips    Calculus of gallbladder without cholecystitis without obstruction 02/22/2017    Calculus of gallbladder without cholecystitis without obstruction    Chronic obstructive pulmonary disease, unspecified 02/20/2017    Chronic obstructive pulmonary disease with hypoxia    Eructation 06/16/2017    Burping    Personal history of transient ischemic attack (TIA), and cerebral infarction 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 10/16/2018    History of  transient cerebral ischemia    Personal history of transient ischemic attack (TIA), and cerebral infarction 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 03/08/2016    History of transient cerebral ischemia    Pneumonia, unspecified organism 02/09/2018    CAP (community acquired pneumonia)    Trochanteric bursitis, unspecified hip 04/23/2015    Greater trochanteric bursitis    Type 2 diabetes mellitus without complications (Multi) 11/06/2017    Controlled type 2 diabetes mellitus without complication, without long-term current use of insulin

## 2025-06-09 NOTE — PROGRESS NOTES
Pharmacy Medication History    Spoke to the patients son.    Source of Information:     Additional concerns with the patient's PTA list.     Notified Provider via Haiku : No    The following updates were made to the Prior to Admission medication list:     Medications ADDED:     Medications CHANGED:    Medications REMOVED:   Norvasc  Medications NOT TAKING:       Allergy reviewed : Yes    Meds 2 Beds : No    Outpatient pharmacy confirmed and updated in chart : Yes    Pharm[facility] name: [facility]    The list below reflectives the updated PTA list. Please review each medication in order reconciliation for additional clarification and justification.    Prior to Admission Medications   Prescriptions Last Dose   albuterol 90 mcg/actuation inhaler    Sig: INHALE 1 TO 2 PUFFS EVERY 4 TO 6 HOURS AS NEEDED.   alendronate (Fosamax) 70 mg tablet    Sig: Take 1 tablet (70 mg) by mouth every 7 days. Take in the morning with a full glass of water, on an empty stomach, and do not take anything else by mouth or lie down for the next 30 min.   amiodarone (Pacerone) 100 mg tablet    Sig: Take 1 tablet (100 mg) by mouth once daily.   apixaban (Eliquis) 5 mg tablet 6/8/2025   Sig: Take 1 tablet (5 mg) by mouth 2 times a day.   atorvastatin (Lipitor) 40 mg tablet    Sig: Take 1 tablet (40 mg) by mouth once daily at bedtime.   blood sugar diagnostic (Blood Glucose Test) strip    Sig: TEST 3 TIMES DAILY.   cholecalciferol (Vitamin D-3) 50 mcg (2,000 unit) capsule    Sig: Take 1 tablet by mouth once daily.   dapagliflozin propanediol (Farxiga) 5 mg 6/8/2025 Morning   Sig: Take 1 tablet (5 mg) by mouth once daily.   diclofenac sodium (Voltaren) 1 % gel    Sig: Apply 4.5 inches (4 g) topically 4 times a day.   furosemide (Lasix) 40 mg tablet    Sig: Take 1 tablet (40 mg) by mouth once daily.   gabapentin (Neurontin) 100 mg capsule 6/8/2025 Morning   Sig: Take 3 capsules (300 mg) by mouth 3 times a day.   guaiFENesin (Mucinex) 600 mg 12 hr tablet     Sig: Take by mouth every 12 hours.   mometasone (Nasonex) 50 mcg/actuation nasal spray    Sig: Administer 1 spray into each nostril 2 times a day.   oxybutynin XL (Ditropan-XL) 5 mg 24 hr tablet    Sig: Take 1 tablet (5 mg) by mouth once daily.   oxygen (O2) gas therapy    Sig: Inhale 6 L/min continuously.   potassium chloride CR 20 mEq ER tablet    Sig: Take 1 tablet (20 mEq) by mouth once daily. Do not crush or chew.   tiotropium (Spiriva with HandiHaler) 18 mcg inhalation capsule    Sig: INHALE CONTENTS OF 1 CAPSULE ONCE DAILY.      Facility-Administered Medications: None       The list below reflectives the updated allergy list. Please review each documented allergy for additional clarification and justification.    Allergies   Allergen Reactions    Lisinopril Angioedema and Swelling    Tetracyclines Hives    Vancomycin Unknown    Penicillins Hives and Other     Tetramycin  Tolerated ceftriaxone and cefepime          06/09/25 at 11:59 AM - Shruti Sanders

## 2025-06-09 NOTE — CARE PLAN
The patient's goals for the shift include      The clinical goals for the shift include remain safe    Over the shift, the patient did make progress toward the following goals.   Problem: Pain - Adult  Goal: Verbalizes/displays adequate comfort level or baseline comfort level  Outcome: Progressing     Problem: Safety - Adult  Goal: Free from fall injury  Outcome: Progressing     Problem: Discharge Planning  Goal: Discharge to home or other facility with appropriate resources  Outcome: Progressing     Problem: Chronic Conditions and Co-morbidities  Goal: Patient's chronic conditions and co-morbidity symptoms are monitored and maintained or improved  Outcome: Progressing     Problem: Nutrition  Goal: Nutrient intake appropriate for maintaining nutritional needs  Outcome: Progressing     Problem: Skin  Goal: Decreased wound size/increased tissue granulation at next dressing change  Outcome: Progressing  Goal: Participates in plan/prevention/treatment measures  Outcome: Progressing  Goal: Prevent/manage excess moisture  Outcome: Progressing  Goal: Prevent/minimize sheer/friction injuries  Outcome: Progressing  Goal: Promote/optimize nutrition  Outcome: Progressing  Goal: Promote skin healing  Outcome: Progressing     Problem: Fall/Injury  Goal: Not fall by end of shift  Outcome: Progressing  Goal: Be free from injury by end of the shift  Outcome: Progressing  Goal: Verbalize understanding of personal risk factors for fall in the hospital  Outcome: Progressing  Goal: Verbalize understanding of risk factor reduction measures to prevent injury from fall in the home  Outcome: Progressing  Goal: Use assistive devices by end of the shift  Outcome: Progressing  Goal: Pace activities to prevent fatigue by end of the shift  Outcome: Progressing

## 2025-06-09 NOTE — NURSING NOTE
Continuous probe not reading correctly.  Intermittent finger probe reads 96% on 6L. Continuous probe removed.

## 2025-06-09 NOTE — PROGRESS NOTES
06/09/25 0803   Discharge Planning   Living Arrangements Other (Comment)  ([facility]/ has HHC through [facility])   Support Systems Children   Assistance Needed Daily ADL's / uses a walker has home o2 baseline 6L   Type of Residence Assisted living   Do you have animals or pets at home? No   Who is requesting discharge planning? Patient   Home or Post Acute Services None   Expected Discharge Disposition Home Health   Does the patient need discharge transport arranged? No   Financial Resource Strain   How hard is it for you to pay for the very basics like food, housing, medical care, and heating? Not very   Housing Stability   In the last 12 months, was there a time when you were not able to pay the mortgage or rent on time? N   At any time in the past 12 months, were you homeless or living in a shelter (including now)? N   Transportation Needs   In the past 12 months, has lack of transportation kept you from medical appointments or from getting medications? no   In the past 12 months, has lack of transportation kept you from meetings, work, or from getting things needed for daily living? No   Patient Choice   Provider Choice list and CMS website (https://medicare.gov/care-compare#search) for post-acute Quality and Resource Measure Data were provided and reviewed with: Patient     I met with this patient at her bedside to discuss discharge planing at her baseline she is independent with her ADL's she does live at [facility], she receives HHC through [facility], I have sent a referral to agency, she stated uses a walker and has home o2, her baseline is 6L, she stated she will not need transportation home her son should be able to take her. She did verify all her information on her face sheet was correct, I will continue to monitor for discharge planing and needs.

## 2025-06-09 NOTE — PROGRESS NOTES
[facility] Hospitalist Progress Note      Between 7AM-7PM please message me via Epic Secure Chat.  After 7PM please page Nocturnist on call.        Assessment/Plan     Acute Problems     Cough and congestion - suspect viral URI. COVID/Flu/RSV negative. CT PE study with no PE/PTX/PNA/Pulmonary edema. BNP wnl     Chronic Problems     COPD/respiratory failure on 6L NC  A fib on Eliquis and Amio  HFpEf  CKD 3  HTN  DM2  KALPANA - CPAP at night  OA  Hx TIA     Plan     - continue nebz, RT eval. Hold off on systemic steroids for now. Angie mucinex  - hold off on abx, procal 0.02  - home meds ordered  - maintain home O2, CPAP at night  - PT/OT evaluations to see if she can return to [place]  - attempted to call son/POA but no answer at this time     Fluids: None  Electrolytes: Replete as needed  Nutrition: Low salt  Arceo: None  Invasive lines: None  Drains: None  O2: Chronic NC       Discharge Planning: Pending PT/OT can return to [place], she is medically ready    Plan of care was discussed with patient    Total time spent: At least 38 minutes, providing counseling or in coordination of care. Total time on this day of visit includes record and documentation review before and after visit including documentation and time not explicitly included on EMR time stamp.      Subjective     Lissett Rodríguez is a 89 y.o. female on day 1 of admission presenting with COPD exacerbation (Multi).    NAEON. Overall stable, does not complain of new acute issues. Discussed trying to get her up and moving around more today to make sure she is not getting short of breath    Review of Systems   Constitutional:  Negative for fever.   Respiratory:  Positive for cough. Negative for shortness of breath.    Cardiovascular:  Negative for chest pain.   Gastrointestinal:  Negative for abdominal distention, abdominal pain and vomiting.       Objective     Physical Exam  Vitals reviewed.   Constitutional:       General: She is not in acute distress.  Cardiovascular:       "Rate and Rhythm: Normal rate and regular rhythm.   Pulmonary:      Effort: Pulmonary effort is normal.      Breath sounds: Rales present.   Abdominal:      General: There is no distension.      Palpations: Abdomen is soft.   Neurological:      Mental Status: She is alert. Mental status is at baseline.         Last Recorded Vitals  Blood pressure 115/63, pulse 92, temperature 36.6 °C (97.9 °F), temperature source Temporal, resp. rate 22, height 1.499 m (4' 11\"), weight 68 kg (150 lb), SpO2 90%.    Medications  Scheduled Medications[1]   PRN Medications[2]                Kolby Smith MD  [facility] Medicine       [1] amiodarone, 100 mg, oral, Daily  apixaban, 5 mg, oral, BID  atorvastatin, 40 mg, oral, Nightly  dapagliflozin propanediol, 5 mg, oral, Daily  fluticasone, 2 spray, Each Nostril, Daily  furosemide, 40 mg, oral, Daily  gabapentin, 300 mg, oral, TID  guaiFENesin, 600 mg, oral, BID  ipratropium-albuteroL, 3 mL, nebulization, TID  oxygen, , inhalation, Continuous - Inhalation  potassium chloride CR, 20 mEq, oral, Daily  [Held by provider] predniSONE, 40 mg, oral, Daily    [2] PRN medications: ipratropium-albuteroL, polyethylene glycol    "

## 2025-06-09 NOTE — PROGRESS NOTES
Physical Therapy    Physical Therapy Evaluation    Patient Name: Lissett Rodríguez  MRN: [id number]  Department: [place] [id number]  Room: [address] [place] [address]  Today's Date: 6/9/2025   Time Calculation  Start Time: 1426  Stop Time: 1439  Time Calculation (min): 13 min    Assessment/Plan   PT Assessment  PT Assessment Results: Decreased strength, Decreased endurance, Impaired balance, Decreased mobility  Rehab Prognosis: Good  Barriers to Discharge Home: No anticipated barriers  Evaluation/Treatment Tolerance: Patient tolerated treatment well  Medical Staff Made Aware: Yes  Strengths: Ability to acquire knowledge, Insight into problems, Living arrangement secure, Physical health, Premorbid level of function, Rehab experience, Support of Caregivers  Barriers to Participation: Comorbidities  End of Session Communication: Bedside nurse  Assessment Comment: Pt demonstrating deficits in generalized strength, endurance and balance as well as increased pain resulting in impaired functional mobility, gait and self care. Due to the impairments listed above, pt would benefit from skilled physical therapy services in acute care setting as well as additional therapy in LOW intensity therapy setting  End of Session Patient Position: Up in chair, Alarm on  IP OR SWING BED PT PLAN  Inpatient or Swing Bed: Inpatient  PT Plan  Treatment/Interventions: Bed mobility, Transfer training, Gait training, Stair training, Balance training, Neuromuscular re-education, Strengthening, Endurance training, Therapeutic exercise, Therapeutic activity, Home exercise program  PT Plan: Ongoing PT  PT Frequency: 2 times per week  PT Discharge Recommendations: Low intensity level of continued care  Equipment Recommended upon Discharge:  (no needs anticipated)  PT Recommended Transfer Status: Assist x1  PT - OK to Discharge: Yes (PT POC initiated this date)    Subjective     PT Visit Info:  PT Received On: 06/09/25  General Visit Information:  General  Reason for Referral: 89  y.o. female presenting with SOB and COPD exacerbation.  Referred By: Kolby Smith MD  Past Medical History Relevant to Rehab: Medical History[1]    Family/Caregiver Present: No  Co-Treatment: OT  Co-Treatment Reason: To maximize pt safety and mobility  Prior to Session Communication: Bedside nurse  Patient Position Received: Up in chair, Alarm on  Preferred Learning Style: auditory, verbal, visual  General Comment: Pt pleasant and agreeable to PT eval.  Home Living:  Home Living  Type of Home: Assisted living  Lives With: Alone  Home Adaptive Equipment: Cane, Walker rolling or standard (Rollator)  Home Layout: One level  Home Access: Elevator  Bathroom Shower/Tub: Walk-in shower  Bathroom Toilet: Standard  Bathroom Equipment: Grab bars in shower, Built-in shower seat, Grab bars around toilet  Prior Level of Function:  Prior Function Per Pt/Caregiver Report  Level of Person: Independent with ADLs and functional transfers, Needs assistance with homemaking  Receives Help From:  ([facility] staff)  ADL Assistance: Independent  Homemaking Assistance: Needs assistance ([facility] provides and completes iADLs)  Ambulatory Assistance: Independent (Use of rollator, intermittent use of cane within apt)  Prior Function Comments: Pt denies any recent falls.  Precautions:  Precautions  Medical Precautions: Fall precautions, Oxygen therapy device and L/min (6 L/min (same as baseline))             Objective   Pain:  Pain Assessment  Pain Assessment: 0-10  0-10 (Numeric) Pain Score: 0 - No pain  Cognition:  Cognition  Overall Cognitive Status: Within Functional Limits  Orientation Level: Oriented X4  Insight: Mild  Impulsive: Mildly    General Assessments:  Activity Tolerance  Endurance: Tolerates 10 - 20 min exercise with multiple rests    Sensation  Light Touch: No apparent deficits    Coordination  Movements are Fluid and Coordinated: Yes    Static Sitting Balance  Static Sitting-Balance Support: No upper extremity supported, Feet  supported  Static Sitting-Level of Assistance: Close supervision  Static Sitting-Comment/Number of Minutes: 2 min    Static Standing Balance  Static Standing-Balance Support: Bilateral upper extremity supported (rollator)  Static Standing-Level of Assistance: Close supervision  Static Standing-Comment/Number of Minutes: 2 min  Functional Assessments:  Bed Mobility  Bed Mobility: No (Pt seated at start and end of session)    Transfers  Transfer: Yes  Transfer 1  Transfer From 1: Sit to  Transfer to 1: Stand  Technique 1: Sit to stand, Stand to sit  Transfer Device 1: Gait belt  Transfer Level of Assistance 1: Distant supervision    Ambulation/Gait Training  Ambulation/Gait Training Performed: Yes  Ambulation/Gait Training 1  Surface 1: Level tile  Device 1:  (rollator)  Gait Support Devices: Gait belt  Assistance 1: Close supervision  Quality of Gait 1:  (reduced B step length and gait hemal. Frequent VCs for safety and O2 line management due to impulsively tangling self in O2 line)  Comments/Distance (ft) 1: 75ft  Extremity/Trunk Assessments:  RLE   RLE : Within Functional Limits  LLE   LLE : Within Functional Limits  Outcome Measures:  [facility] Basic Mobility  Turning from your back to your side while in a flat bed without using bedrails: A little  Moving from lying on your back to sitting on the side of a flat bed without using bedrails: A little  Moving to and from bed to chair (including a wheelchair): A little  Standing up from a chair using your arms (e.g. wheelchair or bedside chair): A little  To walk in hospital room: A little  Climbing 3-5 steps with railing: A little  Basic Mobility - Total Score: 18    Encounter Problems       Encounter Problems (Active)       Mobility       STG - Patient will ambulate       Start:  06/09/25    Expected End:  06/23/25       Mod Ind rollator >150ft and managing own IV line            PT Transfers       STG - Patient will perform bed mobility       Start:  06/09/25     Expected End:  06/23/25       Mod ind         STG - Patient will transfer sit to and from stand       Start:  06/09/25    Expected End:  06/23/25       Mod INd            Pain - Adult              Education Documentation  Body Mechanics, taught by Rajinder Jefferson, PT at 6/9/2025  3:09 PM.  Learner: Patient  Readiness: Acceptance  Method: Explanation  Response: Verbalizes Understanding  Comment: see above    Mobility Training, taught by Rajinder Jefferson, PT at 6/9/2025  3:09 PM.  Learner: Patient  Readiness: Acceptance  Method: Explanation  Response: Verbalizes Understanding  Comment: see above    Education Comments  No comments found.                 [1]   Past Medical History:  Diagnosis Date    Angioneurotic edema, initial encounter 08/24/2018    Angioedema of lips    Calculus of gallbladder without cholecystitis without obstruction 02/22/2017    Calculus of gallbladder without cholecystitis without obstruction    Chronic obstructive pulmonary disease, unspecified 02/20/2017    Chronic obstructive pulmonary disease with hypoxia    Eructation 06/16/2017    Burping    Personal history of transient ischemic attack (TIA), and cerebral infarction 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 10/16/2018    History of transient cerebral ischemia    Personal history of transient ischemic attack (TIA), and cerebral infarction 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 03/08/2016    History of transient cerebral ischemia    Pneumonia, unspecified organism 02/09/2018    CAP (community acquired pneumonia)    Trochanteric bursitis, unspecified hip 04/23/2015    Greater trochanteric bursitis    Type 2 diabetes mellitus without complications (Multi) 11/06/2017    Controlled type 2 diabetes mellitus without complication, without long-term current use of insulin

## 2025-06-10 VITALS
DIASTOLIC BLOOD PRESSURE: 71 MMHG | WEIGHT: 150 LBS | TEMPERATURE: 97.9 F | BODY MASS INDEX: 30.24 KG/M2 | OXYGEN SATURATION: 96 % | RESPIRATION RATE: 16 BRPM | HEART RATE: 80 BPM | SYSTOLIC BLOOD PRESSURE: 127 MMHG | HEIGHT: 59 IN

## 2025-06-10 PROCEDURE — 2500000004 HC RX 250 GENERAL PHARMACY W/ HCPCS (ALT 636 FOR OP/ED): Performed by: STUDENT IN AN ORGANIZED HEALTH CARE EDUCATION/TRAINING PROGRAM

## 2025-06-10 PROCEDURE — 94640 AIRWAY INHALATION TREATMENT: CPT

## 2025-06-10 PROCEDURE — 94664 DEMO&/EVAL PT USE INHALER: CPT

## 2025-06-10 PROCEDURE — 2500000005 HC RX 250 GENERAL PHARMACY W/O HCPCS: Performed by: HOSPITALIST

## 2025-06-10 PROCEDURE — 94660 CPAP INITIATION&MGMT: CPT

## 2025-06-10 PROCEDURE — 99238 HOSP IP/OBS DSCHRG MGMT 30/<: CPT | Performed by: STUDENT IN AN ORGANIZED HEALTH CARE EDUCATION/TRAINING PROGRAM

## 2025-06-10 PROCEDURE — 2500000005 HC RX 250 GENERAL PHARMACY W/O HCPCS: Performed by: STUDENT IN AN ORGANIZED HEALTH CARE EDUCATION/TRAINING PROGRAM

## 2025-06-10 PROCEDURE — 2500000001 HC RX 250 WO HCPCS SELF ADMINISTERED DRUGS (ALT 637 FOR MEDICARE OP): Performed by: STUDENT IN AN ORGANIZED HEALTH CARE EDUCATION/TRAINING PROGRAM

## 2025-06-10 PROCEDURE — 2500000002 HC RX 250 W HCPCS SELF ADMINISTERED DRUGS (ALT 637 FOR MEDICARE OP, ALT 636 FOR OP/ED): Performed by: STUDENT IN AN ORGANIZED HEALTH CARE EDUCATION/TRAINING PROGRAM

## 2025-06-10 RX ADMIN — DAPAGLIFLOZIN 5 MG: 10 TABLET, FILM COATED ORAL at 09:51

## 2025-06-10 RX ADMIN — FUROSEMIDE 40 MG: 40 TABLET ORAL at 09:51

## 2025-06-10 RX ADMIN — GABAPENTIN 300 MG: 300 CAPSULE ORAL at 09:51

## 2025-06-10 RX ADMIN — IPRATROPIUM BROMIDE AND ALBUTEROL SULFATE 3 ML: 2.5; .5 SOLUTION RESPIRATORY (INHALATION) at 12:07

## 2025-06-10 RX ADMIN — IPRATROPIUM BROMIDE AND ALBUTEROL SULFATE 3 ML: 2.5; .5 SOLUTION RESPIRATORY (INHALATION) at 07:27

## 2025-06-10 RX ADMIN — POTASSIUM CHLORIDE 20 MEQ: 1500 TABLET, EXTENDED RELEASE ORAL at 09:51

## 2025-06-10 RX ADMIN — GUAIFENESIN 600 MG: 600 TABLET ORAL at 09:51

## 2025-06-10 RX ADMIN — AMIODARONE HYDROCHLORIDE 100 MG: 200 TABLET ORAL at 09:51

## 2025-06-10 RX ADMIN — FLUTICASONE PROPIONATE 2 SPRAY: 50 SPRAY, METERED NASAL at 09:51

## 2025-06-10 RX ADMIN — GABAPENTIN 300 MG: 300 CAPSULE ORAL at 14:50

## 2025-06-10 RX ADMIN — Medication 8 L/MIN: at 00:46

## 2025-06-10 RX ADMIN — Medication 6 L/MIN: at 07:27

## 2025-06-10 RX ADMIN — APIXABAN 5 MG: 5 TABLET, FILM COATED ORAL at 09:51

## 2025-06-10 ASSESSMENT — COGNITIVE AND FUNCTIONAL STATUS - GENERAL
WALKING IN HOSPITAL ROOM: A LITTLE
DAILY ACTIVITIY SCORE: 24
CLIMB 3 TO 5 STEPS WITH RAILING: A LITTLE
MOBILITY SCORE: 22

## 2025-06-10 ASSESSMENT — PAIN - FUNCTIONAL ASSESSMENT: PAIN_FUNCTIONAL_ASSESSMENT: 0-10

## 2025-06-10 ASSESSMENT — PAIN SCALES - GENERAL
PAINLEVEL_OUTOF10: 0 - NO PAIN
PAINLEVEL_OUTOF10: 0 - NO PAIN

## 2025-06-10 NOTE — CARE PLAN
The patient's goals for the shift include less congestion    The clinical goals for the shift include reduce oxygen needs    Problem: Pain - Adult  Goal: Verbalizes/displays adequate comfort level or baseline comfort level  Outcome: Progressing     Problem: Safety - Adult  Goal: Free from fall injury  Outcome: Progressing     Problem: Discharge Planning  Goal: Discharge to home or other facility with appropriate resources  Outcome: Progressing     Problem: Chronic Conditions and Co-morbidities  Goal: Patient's chronic conditions and co-morbidity symptoms are monitored and maintained or improved  Outcome: Progressing     Problem: Nutrition  Goal: Nutrient intake appropriate for maintaining nutritional needs  Outcome: Progressing     Problem: Skin  Goal: Decreased wound size/increased tissue granulation at next dressing change  Outcome: Progressing  Goal: Participates in plan/prevention/treatment measures  Outcome: Progressing  Goal: Prevent/manage excess moisture  Outcome: Progressing  Goal: Prevent/minimize sheer/friction injuries  Outcome: Progressing  Goal: Promote/optimize nutrition  Outcome: Progressing  Goal: Promote skin healing  Outcome: Progressing     Problem: Fall/Injury  Goal: Not fall by end of shift  Outcome: Progressing  Goal: Be free from injury by end of the shift  Outcome: Progressing  Goal: Verbalize understanding of personal risk factors for fall in the hospital  Outcome: Progressing  Goal: Verbalize understanding of risk factor reduction measures to prevent injury from fall in the home  Outcome: Progressing  Goal: Use assistive devices by end of the shift  Outcome: Progressing  Goal: Pace activities to prevent fatigue by end of the shift  Outcome: Progressing

## 2025-06-10 NOTE — DISCHARGE INSTRUCTIONS
Ms. Rodríguez it was a pleasure taking care of you.   You were admitted due to your COPD and difficulty breathing. It is very important that during your family reunion on Saturday that you wear 2 mask the whole even especially around children. You can also increase your oxygen if you are continuously exerting yourself during the day.   Best,  Your Internal Medicine Team

## 2025-06-10 NOTE — CARE PLAN
The patient's goals for the shift include having a restful and uneventful night in preparation for discharge.    The clinical goals for the shift include adequate oxygenation.    Over the shift, the patient did not make progress toward the following goals. Barriers to progression include n/a. Recommendations to address these barriers include n/a.

## 2025-06-10 NOTE — PROGRESS NOTES
"Lissett Rodríguez is a 89 y.o. female who was referred to the Clinical Pharmacy Team to complete a Transitions of Care encounter for discharge medication optimization. The patient was referred for their T2DM, COPD, and HFpEF.    Attending: Estrella Briceno MD    PCP: Radha Anderson, DO    _______________________________________________________________________  PHARMACY ASSESSMENT    [facility]acy: [facility]   Meds to beds? no    Affordability/Accessibility: reports Eliquis is $25 per month which she states is expensive   Adherence/Organization: no concerns  Adverse Effects: none reported   _______________________________________________________________________  DIABETES ASSESSMENT    CURRENT PHARMACOTHERAPY  - Farxiga 5 mg    HISTORICAL PHARMACOTHERAPY  - none    BLOOD SUGAR TESTING AT HOME  - patient reports that she does not check sugars because she sees her provider every 3 months     Diet:   - unable to assess    Social History:  - unable to assess    Exercise Routine:   - none    Additional Contributory Factors [medications, comorbidities]   - HLD, CHF    SECONDARY PREVENTION  - Statin? Atorvastatin 40 mg  - ACE-I/ARB? none  - Aspirin? none    Lab Results   Component Value Date    HGBA1C 7.3 (H) 12/03/2024       Lab Results   Component Value Date    CHOL 174 12/03/2024    CHOL 168 09/23/2021    CHOL 127 01/11/2021     Lab Results   Component Value Date    HDL 36.3 12/03/2024    HDL 38.0 (A) 09/23/2021    HDL 33.9 (A) 01/11/2021     Lab Results   Component Value Date    LDLCALC 91 12/03/2024     Lab Results   Component Value Date    TRIG 233 (H) 12/03/2024    TRIG 187 (H) 09/23/2021    TRIG 141 01/11/2021     No components found for: \"CHOLHDL\"    _______________________________________________________________________  CHRONIC HEART FAILURE ASSESSMENT  -HTN present/diagnosed: no  -Home BP cuff: no; reports she does not check her blood pressure because she sees her provider every 3 months     LVEF: 60-65%  eGFR: " 54  Beta blocker: none  ACEi/ARB/ARNI: none  MRA: none  SGLT2i: Farxiga 5 mg  Diuretic: furosemide 40 mg  Advanced therapies: none  _______________________________________________________________________  ASTHMA/COPD ASSESSMENT    CURRENT PHARMACOTHERAPY  - Spiriva (last filled 3/20/25 for a 30 day supply)    HISTORICAL PHARMACOTHERAPY  - n/a    RESCUE INHALER USE  - albuterol (reports she uses once daily)     INHALER TECHNIQUE  - no concerns     EXACERBATION HISTORY  - When was your last hospitalization for an exacerbation? 7/6/2024  - When was the last time you were treated with antibiotics and/or steroids? N/a    SMOKING CESSATION  Patient is not currently using tobacco products  - Quit Date: 50 years ago per patient   ___________________________________________________________________  PATIENT EDUCATION/GOALS  - Recommend checking blood sugars and blood pressure at home   - Introduced post-discharge pharmacy team follow up and benefits of calls. She states she will like to participate in the program for copay assistance with Eliquis   - Answered all patient questions and concerns to best of my ability  _______________________________________________________________________  RECOMMENDATIONS/PLAN  Consider increasing Farxiga dose based on CKD, heart failure and DM diagnosis  Consider sending in diabetes testing supplies for patient to test sugars daily   Continue all medications per medical team  Continuity of care will be provided by PCP and clinical pharmacy team      Russell Jones, PharmD  PGY-1 Pharmacy Resident     Verbal consent to manage patient's drug therapy was obtained from the patient. They were informed they may decline to participate or withdraw from participation in pharmacy services at any time.

## 2025-06-10 NOTE — SIGNIFICANT EVENT
RT completed COPD education with patient.  Pt. Lives in assisted living and all meds and appointments are handled through facility.  COPD folder left for pt.

## 2025-06-10 NOTE — PROGRESS NOTES
06/10/25 0900   Discharge Planning   Expected Discharge Disposition Home Health  (From [facility], [facility])     I called and left a Vm for nursing at [facility] [phone] per notes PT/OT rec'd low, patient has [facility]HC [facility] possible dc today, I will continue to monitor for discharge planing.    09:45 I spoke with nursing at facility no barriers to return they are aware she is on home o2,

## 2025-06-10 NOTE — NURSING NOTE
Mrs. Rodríguez removed cpap from her face. Alarm sounded and this nurse immediately came to bedside. Oxygen saturation 86% on 6 liiters via nasal cannula. Mrs. Rodríguez educated on importance of wearing cpap while sleeping for oxygen support. She verbalized understanding but refused to put it back on because she stated the face mask is uncomfortable and its disturbing her sleep. She is maintained on 6 liters via nasal cannula and continuous pulse oximetry.    Samantha Torres RN

## 2025-06-11 ENCOUNTER — PATIENT OUTREACH (OUTPATIENT)
Dept: PRIMARY CARE | Facility: CLINIC | Age: OVER 89
End: 2025-06-11
Payer: MEDICARE

## 2025-06-11 NOTE — PROGRESS NOTES
Discharge Facility:[facility]  Discharge Diagnosis Chronic Bronchitis   Admission Date:6/8/25  Discharge Date: 6/10/25    PCP Appointment Date:Task to office staff  Specialist Appointment Date: N/A  Hospital Encounter and Summary Linked: Yes  See discharge assessment below for further details  ED to Hosp-Admission (Discharged) with Estrella Briceno MD; Renetta Koehler MD; Kolby Smith MD (06/08/2025)     Two attempts were made to reach patient within two business days after discharge. Left voicemail with contact information for patient to call back with any non-emergent questions or concerns.      Nuris Perry LPN

## 2025-06-13 NOTE — DISCHARGE SUMMARY
Discharge Diagnosis  COPD exacerbation (Multi)       Issues Requiring Follow-Up  PCP follow up     Discharge Meds     Medication List      CONTINUE taking these medications     albuterol 90 mcg/actuation inhaler   alendronate 70 mg tablet; Commonly known as: Fosamax; Take 1 tablet (70   mg) by mouth every 7 days. Take in the morning with a full glass of water,   on an empty stomach, and do not take anything else by mouth or lie down   for the next 30 min.   amiodarone 100 mg tablet; Commonly known as: Pacerone; Take 1 tablet   (100 mg) by mouth once daily.   apixaban 5 mg tablet; Commonly known as: Eliquis; Take 1 tablet (5 mg)   by mouth 2 times a day.   atorvastatin 40 mg tablet; Commonly known as: Lipitor; Take 1 tablet (40   mg) by mouth once daily at bedtime.   Blood Glucose Test; Generic drug: blood sugar diagnostic   cholecalciferol 50 mcg (2,000 units) capsule; Commonly known as: Vitamin   D-3   dapagliflozin propanediol 5 mg tablet; Commonly known as: Farxiga; Take   1 tablet (5 mg) by mouth once daily.   diclofenac sodium 1 % gel; Commonly known as: Voltaren; Apply 4.5 inches   (4 g) topically 4 times a day.   furosemide 40 mg tablet; Commonly known as: Lasix   gabapentin 100 mg capsule; Commonly known as: Neurontin; Take 3 capsules   (300 mg) by mouth 3 times a day.   guaiFENesin 600 mg 12 hr tablet; Commonly known as: Mucinex   mometasone 50 mcg/actuation nasal spray; Commonly known as: Nasonex;   Administer 1 spray into each nostril 2 times a day.   oxyBUTYnin XL 5 mg 24 hr tablet; Commonly known as: Ditropan-XL   oxygen gas therapy; Commonly known as: O2   potassium chloride CR 20 mEq ER tablet; Commonly known as: Klor-Con M20   Spiriva with HandiHaler 18 mcg inhalation capsule; Generic drug:   tiotropium       Test Results Pending At Discharge  Pending Labs       No current pending labs.            Hospital Course    Cough and congestion - suspect viral URI. COVID/Flu/RSV negative. CT PE study with no  PE/PTX/PNA/Pulmonary edema. BNP wnl.   Patient discharged at baseline state of health. Patient was able to return to her AL. She was discharged on her baseline of 6L nc      Chronic Problems     COPD/respiratory failure on 6L NC  A fib on Eliquis and Amio  HFpEf  CKD 3  HTN  DM2  KALPANA - CPAP at night  OA  Hx TIA    Pertinent Physical Exam At Time of Discharge  Physical Exam  Vitals reviewed.   Constitutional:       General: She is not in acute distress.  Cardiovascular:      Rate and Rhythm: Normal rate and regular rhythm.   Pulmonary:      Effort: Pulmonary effort is normal.      Breath sounds: CTAB   Abdominal:      General: There is no distension.      Palpations: Abdomen is soft.   Neurological:      Mental Status: She is alert. Mental status is at baseline.      Outpatient Follow-Up  Future Appointments   Date Time Provider Department Center   7/10/2025  2:00 PM Radha Anderson DO [id number]         Estrella Briceno MD

## 2025-06-17 NOTE — DOCUMENTATION CLARIFICATION NOTE
"    PATIENT:               CELIO العراقي  ACCT #:                  [id number]  MRN:                       [id number]  :                       1933  ADMIT DATE:       2025 2:37 PM  DISCH DATE:        6/10/2025 6:00 PM  RESPONDING PROVIDER #:        [id number]          PROVIDER RESPONSE TEXT:    Chronic hypoxemic respiratory failure    CDI QUERY TEXT:    Clarification        Instruction:    Based on your assessment of the patient and the clinical information, please provide the requested documentation by clicking on the appropriate radio button and enter any additional information if prompted.    Question: Please clarify the acuity of patient's Respiratory Failure    When answering this query, please exercise your independent professional judgment. The fact that a question is being asked, does not imply that any particular answer is desired or expected.    The patient's clinical indicators include:  Clinical Information: 89 yr old woman with SOB. Diagnoses include acute URI infection. PMH includes COPD on 6L O2 and KALPANA.    Clinical Indicators:   H/P: Dr. Smith  \"COPD/respiratory failure on 6L NC\"  Physical exam: \"... not in acute distress...Pulmonary effort is normal...Rales present...\"     RRT Care Plan: RRT Karalic  \"...desaturating into 70's while asleep with cpap on, Had to increase oxygen bleed in from 6 liters to 15 liters to get SpO2 back above 90(percent). Will wean oxygen down as pt tolerates...\"    -ER triage vitals(): 36.7-90-26    105/66     SaO2 6L: 88    Nursing Flowsheets  -: RR: 23, 23, 23, 22, 26     SaO2 6L: 83, 86, 87, 86, 85    -: SaO2 6L: 96, 90, 90    Treatment:  : O2 6L  : O2 8-15L with cpap  : O2 6L  Duoneb x 2: 8  Duoneb 3 times daily: -ongoing    Risk Factors: age, COPD, URI, CHF, KALPANA  Options provided:  -- Acute on chronic hypoxemic respiratory failure  -- Chronic hypoxemic respiratory failure  -- Other - I will add my own diagnosis  -- Refer to Clinical " Documentation Reviewer    Query created by: Esme Winston on 6/10/2025 11:35 AM      Electronically signed by:  BLANCO JEONG MD 6/17/2025 7:17 AM

## 2025-06-18 DIAGNOSIS — E11.9 DIABETES MELLITUS TYPE 2 WITHOUT RETINOPATHY (MULTI): ICD-10-CM

## 2025-06-18 DIAGNOSIS — I50.30 HEART FAILURE WITH PRESERVED EJECTION FRACTION, UNSPECIFIED HF CHRONICITY: Primary | ICD-10-CM

## 2025-06-18 DIAGNOSIS — J44.9 CHRONIC OBSTRUCTIVE PULMONARY DISEASE, UNSPECIFIED COPD TYPE (MULTI): Chronic | ICD-10-CM

## 2025-06-18 DIAGNOSIS — J45.909 ASTHMA, UNSPECIFIED ASTHMA SEVERITY, UNSPECIFIED WHETHER COMPLICATED, UNSPECIFIED WHETHER PERSISTENT (HHS-HCC): ICD-10-CM

## 2025-06-19 ENCOUNTER — APPOINTMENT (OUTPATIENT)
Dept: CARDIOLOGY | Facility: HOSPITAL | Age: OVER 89
DRG: 177 | End: 2025-06-19
Payer: MEDICARE

## 2025-06-19 ENCOUNTER — APPOINTMENT (OUTPATIENT)
Dept: RADIOLOGY | Facility: HOSPITAL | Age: OVER 89
DRG: 177 | End: 2025-06-19
Payer: MEDICARE

## 2025-06-19 ENCOUNTER — HOSPITAL ENCOUNTER (INPATIENT)
Facility: HOSPITAL | Age: OVER 89
DRG: 177 | End: 2025-06-19
Attending: INTERNAL MEDICINE | Admitting: INTERNAL MEDICINE
Payer: MEDICARE

## 2025-06-19 DIAGNOSIS — J18.9 PNEUMONIA DUE TO ORGANISM: Primary | ICD-10-CM

## 2025-06-19 LAB
ALBUMIN SERPL BCP-MCNC: 3.7 G/DL (ref 3.4–5)
ALP SERPL-CCNC: 74 U/L (ref 33–136)
ALT SERPL W P-5'-P-CCNC: 9 U/L (ref 7–45)
ANION GAP SERPL CALC-SCNC: 15 MMOL/L (ref 10–20)
AST SERPL W P-5'-P-CCNC: 8 U/L (ref 9–39)
ATRIAL RATE: 86 BPM
BASOPHILS # BLD AUTO: 0.07 X10*3/UL (ref 0–0.1)
BASOPHILS NFR BLD AUTO: 0.5 %
BILIRUB SERPL-MCNC: 0.9 MG/DL (ref 0–1.2)
BUN SERPL-MCNC: 18 MG/DL (ref 6–23)
CALCIUM SERPL-MCNC: 8.7 MG/DL (ref 8.6–10.3)
CARDIAC TROPONIN I PNL SERPL HS: 7 NG/L (ref 0–13)
CHLORIDE SERPL-SCNC: 102 MMOL/L (ref 98–107)
CO2 SERPL-SCNC: 23 MMOL/L (ref 21–32)
CREAT SERPL-MCNC: 1.03 MG/DL (ref 0.5–1.05)
EGFRCR SERPLBLD CKD-EPI 2021: 51 ML/MIN/1.73M*2
EOSINOPHIL # BLD AUTO: 0.06 X10*3/UL (ref 0–0.4)
EOSINOPHIL NFR BLD AUTO: 0.4 %
ERYTHROCYTE [DISTWIDTH] IN BLOOD BY AUTOMATED COUNT: 13.9 % (ref 11.5–14.5)
GLUCOSE SERPL-MCNC: 160 MG/DL (ref 74–99)
HCT VFR BLD AUTO: 46.8 % (ref 36–46)
HGB BLD-MCNC: 14.7 G/DL (ref 12–16)
IMM GRANULOCYTES # BLD AUTO: 0.1 X10*3/UL (ref 0–0.5)
IMM GRANULOCYTES NFR BLD AUTO: 0.7 % (ref 0–0.9)
INR PPP: 1.8 (ref 0.9–1.1)
LYMPHOCYTES # BLD AUTO: 1.59 X10*3/UL (ref 0.8–3)
LYMPHOCYTES NFR BLD AUTO: 11.4 %
MCH RBC QN AUTO: 29.1 PG (ref 26–34)
MCHC RBC AUTO-ENTMCNC: 31.4 G/DL (ref 32–36)
MCV RBC AUTO: 93 FL (ref 80–100)
MONOCYTES # BLD AUTO: 0.78 X10*3/UL (ref 0.05–0.8)
MONOCYTES NFR BLD AUTO: 5.6 %
MRSA DNA SPEC QL NAA+PROBE: NOT DETECTED
NEUTROPHILS # BLD AUTO: 11.39 X10*3/UL (ref 1.6–5.5)
NEUTROPHILS NFR BLD AUTO: 81.4 %
NRBC BLD-RTO: 0 /100 WBCS (ref 0–0)
P AXIS: 66 DEGREES
P OFFSET: 205 MS
P ONSET: 145 MS
PLATELET # BLD AUTO: 225 X10*3/UL (ref 150–450)
POTASSIUM SERPL-SCNC: 3.9 MMOL/L (ref 3.5–5.3)
PR INTERVAL: 160 MS
PROT SERPL-MCNC: 6.6 G/DL (ref 6.4–8.2)
PROTHROMBIN TIME: 20.4 SECONDS (ref 9.8–12.4)
Q ONSET: 225 MS
QRS COUNT: 14 BEATS
QRS DURATION: 80 MS
QT INTERVAL: 386 MS
QTC CALCULATION(BAZETT): 461 MS
QTC FREDERICIA: 435 MS
R AXIS: 61 DEGREES
RBC # BLD AUTO: 5.05 X10*6/UL (ref 4–5.2)
SODIUM SERPL-SCNC: 136 MMOL/L (ref 136–145)
T AXIS: 75 DEGREES
T OFFSET: 418 MS
VENTRICULAR RATE: 86 BPM
WBC # BLD AUTO: 14 X10*3/UL (ref 4.4–11.3)

## 2025-06-19 PROCEDURE — 2500000002 HC RX 250 W HCPCS SELF ADMINISTERED DRUGS (ALT 637 FOR MEDICARE OP, ALT 636 FOR OP/ED): Performed by: INTERNAL MEDICINE

## 2025-06-19 PROCEDURE — 2500000001 HC RX 250 WO HCPCS SELF ADMINISTERED DRUGS (ALT 637 FOR MEDICARE OP): Performed by: INTERNAL MEDICINE

## 2025-06-19 PROCEDURE — 71275 CT ANGIOGRAPHY CHEST: CPT | Performed by: RADIOLOGY

## 2025-06-19 PROCEDURE — 80053 COMPREHEN METABOLIC PANEL: CPT

## 2025-06-19 PROCEDURE — 1200000002 HC GENERAL ROOM WITH TELEMETRY DAILY

## 2025-06-19 PROCEDURE — 84145 PROCALCITONIN (PCT): CPT | Mod: AHULAB | Performed by: INTERNAL MEDICINE

## 2025-06-19 PROCEDURE — 87075 CULTR BACTERIA EXCEPT BLOOD: CPT | Mod: AHULAB | Performed by: INTERNAL MEDICINE

## 2025-06-19 PROCEDURE — 85025 COMPLETE CBC W/AUTO DIFF WBC: CPT

## 2025-06-19 PROCEDURE — 94660 CPAP INITIATION&MGMT: CPT

## 2025-06-19 PROCEDURE — 2500000005 HC RX 250 GENERAL PHARMACY W/O HCPCS: Performed by: INTERNAL MEDICINE

## 2025-06-19 PROCEDURE — 94640 AIRWAY INHALATION TREATMENT: CPT

## 2025-06-19 PROCEDURE — 36415 COLL VENOUS BLD VENIPUNCTURE: CPT | Performed by: INTERNAL MEDICINE

## 2025-06-19 PROCEDURE — 96365 THER/PROPH/DIAG IV INF INIT: CPT

## 2025-06-19 PROCEDURE — 93005 ELECTROCARDIOGRAM TRACING: CPT

## 2025-06-19 PROCEDURE — 71275 CT ANGIOGRAPHY CHEST: CPT

## 2025-06-19 PROCEDURE — 99223 1ST HOSP IP/OBS HIGH 75: CPT | Performed by: INTERNAL MEDICINE

## 2025-06-19 PROCEDURE — 87640 STAPH A DNA AMP PROBE: CPT | Performed by: INTERNAL MEDICINE

## 2025-06-19 PROCEDURE — 2550000001 HC RX 255 CONTRASTS

## 2025-06-19 PROCEDURE — 99285 EMERGENCY DEPT VISIT HI MDM: CPT | Mod: 25 | Performed by: INTERNAL MEDICINE

## 2025-06-19 PROCEDURE — 84484 ASSAY OF TROPONIN QUANT: CPT

## 2025-06-19 PROCEDURE — 85610 PROTHROMBIN TIME: CPT

## 2025-06-19 PROCEDURE — 2500000004 HC RX 250 GENERAL PHARMACY W/ HCPCS (ALT 636 FOR OP/ED): Performed by: INTERNAL MEDICINE

## 2025-06-19 PROCEDURE — 96367 TX/PROPH/DG ADDL SEQ IV INF: CPT

## 2025-06-19 PROCEDURE — 36415 COLL VENOUS BLD VENIPUNCTURE: CPT

## 2025-06-19 RX ORDER — CHOLECALCIFEROL (VITAMIN D3) 25 MCG
50 TABLET ORAL DAILY
Status: DISCONTINUED | OUTPATIENT
Start: 2025-06-19 | End: 2025-06-23 | Stop reason: HOSPADM

## 2025-06-19 RX ORDER — ACETAMINOPHEN 650 MG/1
650 SUPPOSITORY RECTAL EVERY 4 HOURS PRN
Status: DISCONTINUED | OUTPATIENT
Start: 2025-06-19 | End: 2025-06-23 | Stop reason: HOSPADM

## 2025-06-19 RX ORDER — ONDANSETRON 4 MG/1
4 TABLET, FILM COATED ORAL EVERY 8 HOURS PRN
Status: DISCONTINUED | OUTPATIENT
Start: 2025-06-19 | End: 2025-06-23 | Stop reason: HOSPADM

## 2025-06-19 RX ORDER — POTASSIUM CHLORIDE 20 MEQ/1
20 TABLET, EXTENDED RELEASE ORAL DAILY
Status: DISCONTINUED | OUTPATIENT
Start: 2025-06-19 | End: 2025-06-23 | Stop reason: HOSPADM

## 2025-06-19 RX ORDER — ACETAMINOPHEN 325 MG/1
325 TABLET ORAL EVERY 6 HOURS PRN
COMMUNITY

## 2025-06-19 RX ORDER — GUAIFENESIN 600 MG/1
600 TABLET, EXTENDED RELEASE ORAL EVERY 12 HOURS
Status: DISCONTINUED | OUTPATIENT
Start: 2025-06-19 | End: 2025-06-23 | Stop reason: HOSPADM

## 2025-06-19 RX ORDER — FUROSEMIDE 40 MG/1
40 TABLET ORAL DAILY
Status: DISCONTINUED | OUTPATIENT
Start: 2025-06-19 | End: 2025-06-23 | Stop reason: HOSPADM

## 2025-06-19 RX ORDER — IPRATROPIUM BROMIDE 0.5 MG/2.5ML
0.5 SOLUTION RESPIRATORY (INHALATION)
Status: DISCONTINUED | OUTPATIENT
Start: 2025-06-19 | End: 2025-06-19

## 2025-06-19 RX ORDER — PANTOPRAZOLE SODIUM 40 MG/1
40 TABLET, DELAYED RELEASE ORAL
Status: DISCONTINUED | OUTPATIENT
Start: 2025-06-20 | End: 2025-06-23 | Stop reason: HOSPADM

## 2025-06-19 RX ORDER — AMIODARONE HYDROCHLORIDE 200 MG/1
100 TABLET ORAL DAILY
Status: DISCONTINUED | OUTPATIENT
Start: 2025-06-19 | End: 2025-06-23 | Stop reason: HOSPADM

## 2025-06-19 RX ORDER — VANCOMYCIN HYDROCHLORIDE 1 G/20ML
INJECTION, POWDER, LYOPHILIZED, FOR SOLUTION INTRAVENOUS DAILY PRN
Status: DISCONTINUED | OUTPATIENT
Start: 2025-06-19 | End: 2025-06-19

## 2025-06-19 RX ORDER — DICLOFENAC SODIUM 10 MG/G
4 GEL TOPICAL 4 TIMES DAILY PRN
Status: DISCONTINUED | OUTPATIENT
Start: 2025-06-19 | End: 2025-06-23 | Stop reason: HOSPADM

## 2025-06-19 RX ORDER — ATORVASTATIN CALCIUM 40 MG/1
40 TABLET, FILM COATED ORAL NIGHTLY
Status: DISCONTINUED | OUTPATIENT
Start: 2025-06-19 | End: 2025-06-23 | Stop reason: HOSPADM

## 2025-06-19 RX ORDER — GABAPENTIN 300 MG/1
300 CAPSULE ORAL 3 TIMES DAILY
Status: DISCONTINUED | OUTPATIENT
Start: 2025-06-19 | End: 2025-06-23 | Stop reason: HOSPADM

## 2025-06-19 RX ORDER — OXYBUTYNIN CHLORIDE 5 MG/1
5 TABLET ORAL 2 TIMES DAILY
Status: DISCONTINUED | OUTPATIENT
Start: 2025-06-19 | End: 2025-06-23 | Stop reason: HOSPADM

## 2025-06-19 RX ORDER — INSULIN LISPRO 100 [IU]/ML
0-10 INJECTION, SOLUTION INTRAVENOUS; SUBCUTANEOUS
Status: DISCONTINUED | OUTPATIENT
Start: 2025-06-20 | End: 2025-06-23 | Stop reason: HOSPADM

## 2025-06-19 RX ORDER — ONDANSETRON HYDROCHLORIDE 2 MG/ML
4 INJECTION, SOLUTION INTRAVENOUS EVERY 8 HOURS PRN
Status: DISCONTINUED | OUTPATIENT
Start: 2025-06-19 | End: 2025-06-23 | Stop reason: HOSPADM

## 2025-06-19 RX ORDER — IPRATROPIUM BROMIDE AND ALBUTEROL SULFATE 2.5; .5 MG/3ML; MG/3ML
3 SOLUTION RESPIRATORY (INHALATION) EVERY 4 HOURS PRN
Status: DISCONTINUED | OUTPATIENT
Start: 2025-06-19 | End: 2025-06-19

## 2025-06-19 RX ORDER — VANCOMYCIN HYDROCHLORIDE 1 G/200ML
1000 INJECTION, SOLUTION INTRAVENOUS EVERY 24 HOURS
Status: DISCONTINUED | OUTPATIENT
Start: 2025-06-19 | End: 2025-06-19

## 2025-06-19 RX ORDER — PANTOPRAZOLE SODIUM 40 MG/10ML
40 INJECTION, POWDER, LYOPHILIZED, FOR SOLUTION INTRAVENOUS
Status: DISCONTINUED | OUTPATIENT
Start: 2025-06-20 | End: 2025-06-23 | Stop reason: HOSPADM

## 2025-06-19 RX ORDER — ACETAMINOPHEN 325 MG/1
650 TABLET ORAL EVERY 4 HOURS PRN
Status: DISCONTINUED | OUTPATIENT
Start: 2025-06-19 | End: 2025-06-23 | Stop reason: HOSPADM

## 2025-06-19 RX ORDER — IPRATROPIUM BROMIDE AND ALBUTEROL SULFATE 2.5; .5 MG/3ML; MG/3ML
3 SOLUTION RESPIRATORY (INHALATION) EVERY 2 HOUR PRN
Status: DISCONTINUED | OUTPATIENT
Start: 2025-06-19 | End: 2025-06-23 | Stop reason: HOSPADM

## 2025-06-19 RX ORDER — CEFTRIAXONE 1 G/50ML
1 INJECTION, SOLUTION INTRAVENOUS ONCE
Status: COMPLETED | OUTPATIENT
Start: 2025-06-19 | End: 2025-06-19

## 2025-06-19 RX ORDER — ACETAMINOPHEN 160 MG/5ML
650 SOLUTION ORAL EVERY 4 HOURS PRN
Status: DISCONTINUED | OUTPATIENT
Start: 2025-06-19 | End: 2025-06-23 | Stop reason: HOSPADM

## 2025-06-19 RX ORDER — IPRATROPIUM BROMIDE 0.5 MG/2.5ML
0.5 SOLUTION RESPIRATORY (INHALATION)
Status: DISCONTINUED | OUTPATIENT
Start: 2025-06-20 | End: 2025-06-23 | Stop reason: HOSPADM

## 2025-06-19 RX ORDER — DAPAGLIFLOZIN 10 MG/1
5 TABLET, FILM COATED ORAL DAILY
Status: DISCONTINUED | OUTPATIENT
Start: 2025-06-19 | End: 2025-06-23 | Stop reason: HOSPADM

## 2025-06-19 RX ORDER — AMOXICILLIN 250 MG
2 CAPSULE ORAL 2 TIMES DAILY
Status: DISCONTINUED | OUTPATIENT
Start: 2025-06-19 | End: 2025-06-23 | Stop reason: HOSPADM

## 2025-06-19 RX ADMIN — CEFTRIAXONE 1 G: 1 INJECTION, SOLUTION INTRAVENOUS at 14:39

## 2025-06-19 RX ADMIN — IOHEXOL 75 ML: 350 INJECTION, SOLUTION INTRAVENOUS at 13:07

## 2025-06-19 RX ADMIN — GABAPENTIN 300 MG: 300 CAPSULE ORAL at 21:20

## 2025-06-19 RX ADMIN — IPRATROPIUM BROMIDE 0.5 MG: 0.5 SOLUTION RESPIRATORY (INHALATION) at 20:04

## 2025-06-19 RX ADMIN — SENNOSIDES AND DOCUSATE SODIUM 2 TABLET: 50; 8.6 TABLET ORAL at 21:20

## 2025-06-19 RX ADMIN — AZITHROMYCIN MONOHYDRATE 500 MG: 500 INJECTION, POWDER, LYOPHILIZED, FOR SOLUTION INTRAVENOUS at 15:12

## 2025-06-19 RX ADMIN — Medication 8 L/MIN: at 15:26

## 2025-06-19 RX ADMIN — OXYBUTYNIN CHLORIDE 5 MG: 5 TABLET ORAL at 21:20

## 2025-06-19 RX ADMIN — Medication 9 L/MIN: at 20:04

## 2025-06-19 RX ADMIN — ATORVASTATIN CALCIUM 40 MG: 40 TABLET, FILM COATED ORAL at 21:20

## 2025-06-19 RX ADMIN — Medication 10 L/MIN: at 23:56

## 2025-06-19 RX ADMIN — APIXABAN 5 MG: 5 TABLET, FILM COATED ORAL at 21:20

## 2025-06-19 SDOH — SOCIAL STABILITY: SOCIAL INSECURITY: WERE YOU ABLE TO COMPLETE ALL THE BEHAVIORAL HEALTH SCREENINGS?: YES

## 2025-06-19 SDOH — ECONOMIC STABILITY: HOUSING INSECURITY: IN THE PAST 12 MONTHS, HOW MANY TIMES HAVE YOU MOVED WHERE YOU WERE LIVING?: 1

## 2025-06-19 SDOH — ECONOMIC STABILITY: FOOD INSECURITY: WITHIN THE PAST 12 MONTHS, YOU WORRIED THAT YOUR FOOD WOULD RUN OUT BEFORE YOU GOT THE MONEY TO BUY MORE.: NEVER TRUE

## 2025-06-19 SDOH — ECONOMIC STABILITY: HOUSING INSECURITY: IN THE LAST 12 MONTHS, WAS THERE A TIME WHEN YOU WERE NOT ABLE TO PAY THE MORTGAGE OR RENT ON TIME?: NO

## 2025-06-19 SDOH — SOCIAL STABILITY: SOCIAL INSECURITY: DO YOU FEEL ANYONE HAS EXPLOITED OR TAKEN ADVANTAGE OF YOU FINANCIALLY OR OF YOUR PERSONAL PROPERTY?: NO

## 2025-06-19 SDOH — ECONOMIC STABILITY: INCOME INSECURITY: IN THE PAST 12 MONTHS HAS THE ELECTRIC, GAS, OIL, OR WATER COMPANY THREATENED TO SHUT OFF SERVICES IN YOUR HOME?: NO

## 2025-06-19 SDOH — ECONOMIC STABILITY: FOOD INSECURITY: HOW HARD IS IT FOR YOU TO PAY FOR THE VERY BASICS LIKE FOOD, HOUSING, MEDICAL CARE, AND HEATING?: NOT HARD AT ALL

## 2025-06-19 SDOH — SOCIAL STABILITY: SOCIAL INSECURITY: HAVE YOU HAD THOUGHTS OF HARMING ANYONE ELSE?: NO

## 2025-06-19 SDOH — SOCIAL STABILITY: SOCIAL INSECURITY: WITHIN THE LAST YEAR, HAVE YOU BEEN HUMILIATED OR EMOTIONALLY ABUSED IN OTHER WAYS BY YOUR PARTNER OR EX-PARTNER?: NO

## 2025-06-19 SDOH — SOCIAL STABILITY: SOCIAL INSECURITY: WITHIN THE LAST YEAR, HAVE YOU BEEN AFRAID OF YOUR PARTNER OR EX-PARTNER?: NO

## 2025-06-19 SDOH — ECONOMIC STABILITY: FOOD INSECURITY: WITHIN THE PAST 12 MONTHS, THE FOOD YOU BOUGHT JUST DIDN'T LAST AND YOU DIDN'T HAVE MONEY TO GET MORE.: NEVER TRUE

## 2025-06-19 SDOH — ECONOMIC STABILITY: HOUSING INSECURITY: AT ANY TIME IN THE PAST 12 MONTHS, WERE YOU HOMELESS OR LIVING IN A SHELTER (INCLUDING NOW)?: NO

## 2025-06-19 SDOH — SOCIAL STABILITY: SOCIAL INSECURITY: DOES ANYONE TRY TO KEEP YOU FROM HAVING/CONTACTING OTHER FRIENDS OR DOING THINGS OUTSIDE YOUR HOME?: NO

## 2025-06-19 SDOH — SOCIAL STABILITY: SOCIAL INSECURITY: HAS ANYONE EVER THREATENED TO HURT YOUR FAMILY OR YOUR PETS?: NO

## 2025-06-19 SDOH — SOCIAL STABILITY: SOCIAL INSECURITY: DO YOU FEEL UNSAFE GOING BACK TO THE PLACE WHERE YOU ARE LIVING?: NO

## 2025-06-19 SDOH — SOCIAL STABILITY: SOCIAL INSECURITY: ARE YOU OR HAVE YOU BEEN THREATENED OR ABUSED PHYSICALLY, EMOTIONALLY, OR SEXUALLY BY ANYONE?: NO

## 2025-06-19 SDOH — ECONOMIC STABILITY: TRANSPORTATION INSECURITY: IN THE PAST 12 MONTHS, HAS LACK OF TRANSPORTATION KEPT YOU FROM MEDICAL APPOINTMENTS OR FROM GETTING MEDICATIONS?: NO

## 2025-06-19 SDOH — SOCIAL STABILITY: SOCIAL INSECURITY: ARE THERE ANY APPARENT SIGNS OF INJURIES/BEHAVIORS THAT COULD BE RELATED TO ABUSE/NEGLECT?: NO

## 2025-06-19 ASSESSMENT — COGNITIVE AND FUNCTIONAL STATUS - GENERAL
MOVING TO AND FROM BED TO CHAIR: A LITTLE
EATING MEALS: A LITTLE
MOVING FROM LYING ON BACK TO SITTING ON SIDE OF FLAT BED WITH BEDRAILS: A LITTLE
WALKING IN HOSPITAL ROOM: A LOT
HELP NEEDED FOR BATHING: A LITTLE
PATIENT BASELINE BEDBOUND: NO
TURNING FROM BACK TO SIDE WHILE IN FLAT BAD: A LITTLE
TOILETING: A LITTLE
DRESSING REGULAR UPPER BODY CLOTHING: A LITTLE
STANDING UP FROM CHAIR USING ARMS: A LITTLE
PERSONAL GROOMING: A LITTLE
DRESSING REGULAR LOWER BODY CLOTHING: A LITTLE
MOBILITY SCORE: 16
CLIMB 3 TO 5 STEPS WITH RAILING: A LOT
DAILY ACTIVITIY SCORE: 18

## 2025-06-19 ASSESSMENT — ACTIVITIES OF DAILY LIVING (ADL)
PATIENT'S MEMORY ADEQUATE TO SAFELY COMPLETE DAILY ACTIVITIES?: YES
HEARING - LEFT EAR: HEARING AID
LACK_OF_TRANSPORTATION: NO
DRESSING YOURSELF: NEEDS ASSISTANCE
HEARING - RIGHT EAR: HEARING AID
FEEDING YOURSELF: NEEDS ASSISTANCE
BATHING: NEEDS ASSISTANCE
ADEQUATE_TO_COMPLETE_ADL: YES
LACK_OF_TRANSPORTATION: NO
WALKS IN HOME: NEEDS ASSISTANCE
ASSISTIVE_DEVICE: WALKER
TOILETING: NEEDS ASSISTANCE
JUDGMENT_ADEQUATE_SAFELY_COMPLETE_DAILY_ACTIVITIES: YES
GROOMING: NEEDS ASSISTANCE

## 2025-06-19 ASSESSMENT — PAIN SCALES - GENERAL
PAINLEVEL_OUTOF10: 0 - NO PAIN
PAINLEVEL_OUTOF10: 0 - NO PAIN

## 2025-06-19 ASSESSMENT — PAIN - FUNCTIONAL ASSESSMENT
PAIN_FUNCTIONAL_ASSESSMENT: 0-10
PAIN_FUNCTIONAL_ASSESSMENT: 0-10

## 2025-06-19 ASSESSMENT — LIFESTYLE VARIABLES
HOW MANY STANDARD DRINKS CONTAINING ALCOHOL DO YOU HAVE ON A TYPICAL DAY: PATIENT DOES NOT DRINK
SKIP TO QUESTIONS 9-10: 1
HOW OFTEN DO YOU HAVE A DRINK CONTAINING ALCOHOL: NEVER
AUDIT-C TOTAL SCORE: 0
HOW OFTEN DO YOU HAVE 6 OR MORE DRINKS ON ONE OCCASION: NEVER
AUDIT-C TOTAL SCORE: 0

## 2025-06-19 NOTE — PROGRESS NOTES
Vancomycin Dosing by Pharmacy- INITIAL    Lissett Rodríguez is a 89 y.o. year old female who Pharmacy has been consulted for vancomycin dosing for pneumonia. Based on the patient's indication and renal status this patient will be dosed based on a goal AUC of 400-600.     Renal function is currently stable.    Visit Vitals  /66 (BP Location: Right arm, Patient Position: Lying)   Pulse 67   Temp 37.1 °C (98.7 °F) (Oral)   Resp 15        Lab Results   Component Value Date    CREATININE 1.03 2025    CREATININE 0.98 2025    CREATININE 1.13 (H) 2025    CREATININE 1.02 (H) 2025    CREATININE 1.16 (H) 2024        Patient weight is as follows:   Vitals:    25 1113   Weight: 68 kg (150 lb)       Cultures:  No results found for the encounter in last 14 days.        No intake/output data recorded.  I/O during current shift:  No intake/output data recorded.    Temp (24hrs), Av.9 °C (98.4 °F), Min:36.7 °C (98 °F), Max:37.1 °C (98.7 °F)         Assessment/Plan     Patient will not be given a loading dose.  Will initiate vancomycin maintenance, 1000 mg every 24 hours.    This dosing regimen is predicted by [employer] to result in the following pharmacokinetic parameters:  Regimen: 1000 mg IV every 24 hours.  Start time: 19:02 on 2025  Exposure target: AUC24 (range) 400-600 mg/L.hr   FJD25-16: 335 mg/L.hr  AUC24,ss: 526 mg/L.hr  Probability of AUC24 > 400: 80 %  Ctrough,ss: 16.9 mg/L  Probability of Ctrough,ss > 20: 32 %    Follow-up level will be ordered on  at 1400 unless clinically indicated sooner.  Will continue to monitor renal function daily while on vancomycin and order serum creatinine at least every 48 hours if not already ordered.  Follow for continued vancomycin needs, clinical response, and signs/symptoms of toxicity.       Zoraida Ray, PharmD

## 2025-06-19 NOTE — ED TRIAGE NOTES
Pt to ED from home for coughing up blood. Pt endorses having COPD and wears 6L at baseline. Pt denies SOB.

## 2025-06-19 NOTE — ED TRIAGE NOTES
TRIAGE NOTE   I saw the patient as the Clinician in Triage and performed a brief history and physical exam, established acuity, and ordered appropriate tests to develop basic plan of care. Patient will be seen by an JOE, resident and/or physician who will independently evaluate the patient. Please see subsequent provider notes for further details and disposition.     Brief HPI: In brief, Lissett Rodríguez is a 89 y.o. female that presents for hemoptysis.  History of COPD on 6 needed oxygen NC daily.  Has not had increase this at all.  She reports 2 episodes of hemoptysis this morning.  Dark red clots.  No fevers.  No nausea or vomiting.  Does feel fatigued and has rhinorrhea and nasal congestion.  she was here last week for COPD exacerbation.  No shortness of breath.  No chest pain.  No other symptoms or concerns this time.  Focused Physical exam:   Heart regular rate and rhythm.  No murmurs of the gallops.    Plan/MDM:   Initiating basic labs, INR, troponin, EKG, CT PE study.  Patient will be seen in the back in the ED for further evaluation and treatment.  I will not be following at this patient during her ED visit.  This is a preliminary evaluation during triage.    I evaluated this patient in triage with the RN. Due to the patients complaint labs and or imaging were ordered by myself in an attempt to expedite patient care however I am not participating in care after evaluation. This is a preliminary assessment. Pt does not appear in acute distress at this time. They will have a full evaluation as soon as possible. They will be cared for by another provider who will possibly order more labs, imaging or interventions. Pt did not have a full ROS or PE completed by myself however below is a summary with reasons for orders.  For the remainder of the patient's workup and ED course, please refer to the main ED provider note. We discussed need for diagnostic testing including laboratory studies and imaging.  We also  discussed that patient may be asked to wait in the waiting room while these tests are pending.  They understand that if they choose to leave without having the testing completed or resulted that we cannot rule out acute life threatening illnesses and the risks involved could lead to worsening condition, permanent disability or even death.     Please see subsequent provider note for further details and disposition

## 2025-06-19 NOTE — PROGRESS NOTES
Pharmacy Medication History     Source of Information: per med list from [facility]     Additional concerns with the patient's PTA list.   N/a  Notified Provider via Haiku : Yes    The following updates were made to the Prior to Admission medication list:     Medications ADDED:   Tylenol   Medications CHANGED:  Oxybutynin is IR not ER   Medications REMOVED:   Mucinex   Medications NOT TAKING:   N/a    Allergy reviewed : Yes    Meds 2 Beds : No    Outpatient pharmacy confirmed and updated in chart : No    Pharmacy [facility]: [facility]    The list below reflectives the updated PTA list. Please review each medication in order reconciliation for additional clarification and justification.    Prior to Admission Medications   Prescriptions Last Dose   acetaminophen (Tylenol) 325 mg tablet    Sig: Take 1 tablet (325 mg) by mouth every 6 hours if needed for mild pain (1 - 3).   albuterol 90 mcg/actuation inhaler    Sig: Inhale 1-2 puffs every 4 hours if needed for wheezing or shortness of breath.   alendronate (Fosamax) 70 mg tablet    Sig: Take 1 tablet (70 mg) by mouth every 7 days. Take in the morning with a full glass of water, on an empty stomach, and do not take anything else by mouth or lie down for the next 30 min.   Patient taking differently: Take 1 tablet (70 mg) by mouth every 7 days. Take in the morning with a full glass of water, on an empty stomach, and do not take anything else by mouth or lie down for the next 30 min. friday   amiodarone (Pacerone) 100 mg tablet    Sig: Take 1 tablet (100 mg) by mouth once daily.   apixaban (Eliquis) 5 mg tablet    Sig: Take 1 tablet (5 mg) by mouth 2 times a day.   atorvastatin (Lipitor) 40 mg tablet    Sig: Take 1 tablet (40 mg) by mouth once daily at bedtime.   blood sugar diagnostic (Blood Glucose Test) strip    Sig: TEST 3 TIMES DAILY.   cholecalciferol (Vitamin D-3) 50 mcg (2,000 unit) capsule    Sig: Take 1 tablet by mouth once daily.   dapagliflozin  propanediol (Farxiga) 5 mg    Sig: Take 1 tablet (5 mg) by mouth once daily.   diclofenac sodium (Voltaren) 1 % gel    Sig: Apply 4.5 inches (4 g) topically 4 times a day.   furosemide (Lasix) 40 mg tablet    Sig: Take 1 tablet (40 mg) by mouth once daily.   gabapentin (Neurontin) 100 mg capsule    Sig: Take 3 capsules (300 mg) by mouth 3 times a day.   mometasone (Nasonex) 50 mcg/actuation nasal spray    Sig: Administer 1 spray into each nostril 2 times a day.   oxyBUTYnin (Ditropan) 5 mg tablet    Sig: Take 1 tablet (5 mg) by mouth once daily.   oxygen (O2) gas therapy    Sig: Inhale 6 L/min continuously.   potassium chloride CR 20 mEq ER tablet    Sig: Take 1 tablet (20 mEq) by mouth once daily. Do not crush or chew.   tiotropium (Spiriva with HandiHaler) 18 mcg inhalation capsule    Sig: Place 1 capsule (18 mcg) into inhaler and inhale once daily.      Facility-Administered Medications: None       The list below reflectives the updated allergy list. Please review each documented allergy for additional clarification and justification.    Allergies   Allergen Reactions    Lisinopril Angioedema and Swelling    Tetracyclines Hives    Vancomycin Unknown    Penicillins Hives and Other     Tetramycin  Tolerated ceftriaxone and cefepime          06/19/25 at 6:16 PM - Sheryl Nguyen

## 2025-06-19 NOTE — H&P
"History Of Present Illness  Lissett Rodríguez is a 89 y.o. female with past medical history of COPD, chronic respiratory failure (6 L O2 via nasal cannula at home), A-fib on Eliquis and amiodarone, CKD 3, hypertension, type 2 diabetes, also, history of TIA, who is presenting to [facility] ED with complaint of coughing up \"puffy brown mucus\" x 2.  Patient was recently discharged for COPD exacerbation, stated that she was still short of breath, and started developing cough post discharge, and this morning coughed up brown sputum, with concern for hemoptysis, prompting her to come to the ED for further evaluation.  Patient stated that cough was ongoing at discharge, and not sure if she was prescribed antibiotic at discharge.  On review of systems, patient admitted to chills, productive cough as above, and denied fever, nausea, vomiting, chest pain, shortness of breath, headache, dizziness, abdominal pain or change in urinary habits.  At baseline she ambulates using a walker, chronically on 6 L O2 via nasal cannula, and denies any recent fall.  In the ED upon evaluation initial concern was with hemoptysis, but imaging with finding suggestive of pneumonia, decision made to admit for further management by hospitalist team     Past Medical History  Medical History[1]    Surgical History  Surgical History[2]     Social History  She reports that she has never smoked. She has never used smokeless tobacco. She reports that she does not drink alcohol and does not use drugs.    Family History  Family History[3]     Allergies  Lisinopril, Tetracyclines, Vancomycin, and Penicillins    Review of Systems  A 10 point review of systems was conducted, with patient admitted to symptom as described above and deny having any other symptom at this time.  Physical Exam   Constitutional: Pleasant elderly female, on 6 L O2 via nasal cannula alert active, cooperative not in acute distress  Eyes: PERRLA, clear sclera  ENMT: Moist " "mucosal membranes, no exudate  Head / Neck: Atraumatic, normocephalic, supple neck, JVP not visualized  Lungs: Patent airways, CTABL  Heart: RRR, S1S2, no murmurs appreciated, palpable pulses in all extremities  GI: Soft, NT, ND, bowel sounds present in all quadrants  MSK: Moves all extremities freely, no restriction  of ROM, no joint edema  Extremities: Intact x 4, no peripheral edema  : No Arceo catheter inserted  Breast: Deferred  Neurological: AAO x 3 to person, place and date, facial muscles symmetrical, sensation intact, strength 4/4, no acute focal neurological deficits appreciated  Psychological: Appropriate mood and behavior    Last Recorded Vitals  Blood pressure 141/67, pulse 71, temperature 36.7 °C (98 °F), temperature source Temporal, resp. rate (!) 27, height 1.499 m (4' 11\"), weight 68 kg (150 lb), SpO2 (!) 93%.    Relevant Results        Scheduled medications  Scheduled Medications[4]  Continuous medications  Continuous Medications[5]  PRN medications  PRN Medications[6]       Assessment & Plan  Pneumonia due to organism      89 y.o. female with past medical history of COPD, chronic respiratory failure (6 L O2 via nasal cannula at home), A-fib on Eliquis and amiodarone, CKD 3, hypertension, type 2 diabetes, also, history of TIA, who is presenting to [facility] ED with complaint of coughing up \"puffy brown mucus\" x 2    Pneumonia due to unknown organism  - Presented with productive cough with brown sputum concerning with hemoptysis  - Imaging suggestive of pneumonia, Versus hospital-acquired given recent admission  - Received ceftriaxone and azithromycin in the ED  - Start Zosyn 3.375 g IV piggyback every 6 hours  - MRSA negative negative, no further vancomycin necessary  - Procalcitonin  - Legionella and pneumococcus urine antigen ordered  - Pulmonary consult: Evaluation.  - Bronchodilators  - Incentive spirometer    COPD, on chronic respiratory failure, with 6 L O2 via nasal " cannula  - No shortness of breath on presentation  - Bronchodilators as needed shortness of breath or wheezing    Atrial fibrillation: Stable, no RVR  - Hold Eliquis given concern with hemoptysis, Restarted in the morning if no recurrent productive cough concerning with hemoptysis  - Amiodarone 100 mg daily    Type 2 diabetes  -Not on any home medications  -Lispro insulin sliding scale    Hypertension  - Stable, no home meds    Diet  - Regular    DVT prophylaxis  - On Eliquis 5 mg twice daily, currently on hold due to concern with hemoptysis, okay to restart if no further productive cough with brown sputum    Disposition: Recently discharged, returned with complaint of cough concern with hemoptysis, now found with pneumonia, need further management, discharge pending clinical improvement    Anticipated length of stay greater than 2 midnights    I spent 60 minutes in the professional and overall care of this patient.      Mikal Doty DO         [1]   Past Medical History:  Diagnosis Date    Angioneurotic edema, initial encounter 08/24/2018    Angioedema of lips    Calculus of gallbladder without cholecystitis without obstruction 02/22/2017    Calculus of gallbladder without cholecystitis without obstruction    Chronic obstructive pulmonary disease, unspecified 02/20/2017    Chronic obstructive pulmonary disease with hypoxia    Eructation 06/16/2017    Burping    Personal history of transient ischemic attack (TIA), and cerebral infarction 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 10/16/2018    History of transient cerebral ischemia    Personal history of transient ischemic attack (TIA), and cerebral infarction 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 03/08/2016    History of transient cerebral ischemia    Pneumonia, unspecified organism 02/09/2018    CAP (community acquired pneumonia)    Trochanteric bursitis, unspecified hip 04/23/2015    Greater trochanteric bursitis    Type 2 diabetes mellitus without complications (Multi) 11/06/2017     Controlled type 2 diabetes mellitus without complication, without long-term current use of insulin   [2]   Past Surgical History:  Procedure Laterality Date    APPENDECTOMY  05/15/2013    Appendectomy    CATARACT EXTRACTION  05/15/2013    Cataract Surgery    COLONOSCOPY  04/11/2014    Complete Colonoscopy    HYSTERECTOMY  05/15/2013    Hysterectomy    MR HEAD ANGIO WO IV CONTRAST  1/8/2016    MR HEAD ANGIO WO IV CONTRAST 1/8/2016 [facility]    MR HEAD ANGIO WO IV CONTRAST  10/1/2012    MR HEAD ANGIO WO IV CONTRAST 10/1/2012 [facility]    MR NECK ANGIO W IV CONTRAST  10/1/2012    MR NECK ANGIO W IV CONTRAST 10/1/2012 [facility]    MR NECK ANGIO WO IV CONTRAST  1/8/2016    MR NECK ANGIO WO IV CONTRAST 1/8/2016 [facility]    OTHER SURGICAL HISTORY  05/15/2013    Cardiac Cath Procedure Outcome: Successful    OTHER SURGICAL HISTORY  02/28/2019    Cholecystectomy    TOTAL KNEE ARTHROPLASTY  05/29/2013    Knee Replacement   [3]   Family History  Problem Relation Name Age of Onset    Alzheimer's disease Mother      Heart failure Mother      Stroke Mother          ischemic stroke    Heart attack Father      Stroke Father          ischemic stroke    COPD Sister      Diabetes Sister          mellitus    Cancer Sister      Parkinsonism Sister      Other (previous cardiac prolems) Sister     [4] amiodarone, 100 mg, oral, Daily  [Held by provider] apixaban, 5 mg, oral, BID  atorvastatin, 40 mg, oral, Nightly  cholecalciferol, 50 mcg, oral, Daily  dapagliflozin propanediol, 5 mg, oral, Daily  furosemide, 40 mg, oral, Daily  gabapentin, 300 mg, oral, TID  guaiFENesin, 600 mg, oral, q12h  [START ON 6/20/2025] ipratropium, 0.5 mg, nebulization, 4x daily  oxyBUTYnin, 5 mg, oral, BID  oxygen, , inhalation, Continuous - Inhalation  [START ON 6/20/2025] pantoprazole, 40 mg, oral, Daily before breakfast   Or  [START ON 6/20/2025] pantoprazole, 40 mg, intravenous, Daily before  breakfast  piperacillin-tazobactam, 3.375 g, intravenous, q6h  potassium chloride CR, 20 mEq, oral, Daily  sennosides-docusate sodium, 2 tablet, oral, BID  [5]    [6] PRN medications: acetaminophen **OR** acetaminophen **OR** acetaminophen, diclofenac sodium, ipratropium-albuteroL, ondansetron **OR** ondansetron, oxygen, oxygen

## 2025-06-19 NOTE — ED PROVIDER NOTES
"HPI     CC: Coughing Up Blood     HPI: Lissett Rodríguez is a 89 y.o. female with a history of AF and PE on Eliquis, T2DM, HFpEF, COPD on 6L home O2, HTN, HLD, KALPANA, TIA, CKD, who presents with productive cough.  Patient states that this morning she coughed up to 2 \"puffy things\" that were hard, dark brown in color, concerning for blood.  She was recently admitted last week for COPD exacerbation, states she was told she did not have pneumonia.  She has continued to cough but cough is gone unchanged since hospital discharge.  She denies any fevers or chills.  She wears 6L home O2 and has not had to increase this lately, has not been especially short of breath and having no chest pain.  She denies any significant leg pain or swelling.  She has not been on any recent antibiotics.    ROS: 10-point review of systems was performed and is otherwise negative except as noted in HPI.    Limitations to history: N/A    Independent Historians: N/A    External Records Reviewed: Outpatient notes in EMR, DC summary from last week    Past Medical History: Noncontributory except per HPI     Past Surgical History: Noncontributory except per HPI     Family History: Reviewed and noncontributory     Social History:  Denies tobacco. Denies ETOH. Denies illicit drugs.    Social Determinants Affecting Care: N/A    RX Allergies[1]    Home Meds:   Current Outpatient Medications   Medication Instructions    albuterol 90 mcg/actuation inhaler INHALE 1 TO 2 PUFFS EVERY 4 TO 6 HOURS AS NEEDED.    alendronate (FOSAMAX) 70 mg, oral, Every 7 days, Take in the morning with a full glass of water, on an empty stomach, and do not take anything else by mouth or lie down for the next 30 min.    amiodarone (PACERONE) 100 mg, oral, Daily    apixaban (ELIQUIS) 5 mg, oral, 2 times daily    atorvastatin (LIPITOR) 40 mg, oral, Nightly    blood sugar diagnostic (Blood Glucose Test) strip TEST 3 TIMES DAILY.    cholecalciferol (Vitamin D-3) 50 mcg (2,000 unit) " "capsule 1 tablet, Daily    dapagliflozin propanediol (FARXIGA) 5 mg, oral, Daily    diclofenac sodium (VOLTAREN) 4 g, Topical, 4 times daily    furosemide (LASIX) 40 mg, Daily    gabapentin (NEURONTIN) 300 mg, oral, 3 times daily    guaiFENesin (Mucinex) 600 mg 12 hr tablet Every 12 hours    mometasone (Nasonex) 50 mcg/actuation nasal spray 1 spray, Each Nostril, 2 times daily    oxybutynin XL (Ditropan-XL) 5 mg 24 hr tablet Take 1 tablet (5 mg) by mouth once daily.    oxygen (O2) 6 L/min, Continuous    potassium chloride CR 20 mEq ER tablet 20 mEq, Daily    tiotropium (Spiriva with HandiHaler) 18 mcg inhalation capsule INHALE CONTENTS OF 1 CAPSULE ONCE DAILY.        Physical Exam     ED Triage Vitals [06/19/25 1113]   Temperature Heart Rate Respirations BP   36.7 °C (98 °F) 77 20 111/70      Pulse Ox Temp Source Heart Rate Source Patient Position   (!) 92 % Temporal -- --      BP Location FiO2 (%)     -- --         Heart Rate:  [74-89]   Temperature:  [36.7 °C (98 °F)]   Respirations:  [17-27]   BP: (111-144)/()   Height:  [149.9 cm (4' 11\")]   Weight:  [68 kg (150 lb)]   Pulse Ox:  [88 %-95 %]      Physical Exam  Vitals and nursing note reviewed.     CONSTITUTIONAL: Well appearing, well nourished, in no acute distress.   HENMT: Head atraumatic. Airway patent. Nasal mucosa clear. Mouth with normal mucosa, clear oropharynx. Uvula midline. Neck supple.    EYES: Clear bilaterally, pupils equally round and reactive to light.   CARDIOVASCULAR: Normal rate, regular rhythm.  Heart sounds S1, S2.  No murmurs, rubs or gallops. Normal pulses. Capillary refill < 2 sec.   RESPIRATORY: No increased work of breathing. Breath sounds clear and equal bilaterally except for scattered end expiratory wheeze and rales at left midlung.  GASTROINTESTINAL: Abdomen soft, non-distended, non-tender. No rebound, no guarding. Normal bowel sounds. No palpable masses.  GENITOURINARY:  No CVA tenderness.  MUSCULOSKELETAL: Spine appears " normal, range of motion is not limited, no muscle or joint tenderness. No edema.   NEUROLOGICAL: Alert and oriented, no asymmetry, moving all extremities equally.  SKIN: Warm, dry and intact. No rash or notable lesions.  PSYCHIATRIC: Normal mood and affect.  HEME/LYMPH: No adenopathy or splenomegaly.    Diagnostic Results      ECG: ECGs read and interpreted by me. See ED Course, below, for interpretation.    Labs Reviewed   CBC WITH AUTO DIFFERENTIAL - Abnormal       Result Value    WBC 14.0 (*)     nRBC 0.0      RBC 5.05      Hemoglobin 14.7      Hematocrit 46.8 (*)     MCV 93      MCH 29.1      MCHC 31.4 (*)     RDW 13.9      Platelets 225      Neutrophils % 81.4      Immature Granulocytes %, Automated 0.7      Lymphocytes % 11.4      Monocytes % 5.6      Eosinophils % 0.4      Basophils % 0.5      Neutrophils Absolute 11.39 (*)     Immature Granulocytes Absolute, Automated 0.10      Lymphocytes Absolute 1.59      Monocytes Absolute 0.78      Eosinophils Absolute 0.06      Basophils Absolute 0.07     COMPREHENSIVE METABOLIC PANEL - Abnormal    Glucose 160 (*)     Sodium 136      Potassium 3.9      Chloride 102      Bicarbonate 23      Anion Gap 15      Urea Nitrogen 18      Creatinine 1.03      eGFR 51 (*)     Calcium 8.7      Albumin 3.7      Alkaline Phosphatase 74      Total Protein 6.6      AST 8 (*)     Bilirubin, Total 0.9      ALT 9     PROTIME-INR - Abnormal    Protime 20.4 (*)     INR 1.8 (*)    TROPONIN I, HIGH SENSITIVITY - Normal    Troponin I, High Sensitivity 7      Narrative:     Less than 99th percentile of normal range cutoff-  Female and children under 18 years old <14 ng/L; Male <21 ng/L: Negative  Repeat testing should be performed if clinically indicated.     Female and children under 18 years old 14-50 ng/L; Male 21-50 ng/L:  Consistent with possible cardiac damage and possible increased clinical   risk. Serial measurements may help to assess extent of myocardial damage.     >50 ng/L:  Consistent with cardiac damage, increased clinical risk and  myocardial infarction. Serial measurements may help assess extent of   myocardial damage.      NOTE: Children less than 1 year old may have higher baseline troponin   levels and results should be interpreted in conjunction with the overall   clinical context.     NOTE: Troponin I testing is performed using a different   testing methodology at [facility] than at other   [facility] hospitals. Direct result comparisons should only   be made within the same method.   BLOOD CULTURE   BLOOD CULTURE         CT angio chest for pulmonary embolism   Final Result   1. No pulmonary embolism, aortic aneurysm or dissection.   2. Interval development of infiltrates, mostly involving the lingula,   possibly due to pneumonia, on a background of chronic interstitial   lung disease. Follow-up as needed.        MACRO:   None.             Signed by: Robert Pedro 6/19/2025 2:16 PM   Dictation workstation:   [id number]                    [place] Coma Scale Score: 15                  Procedure  Procedures    ED Course & MDM   Assessment/Plan:   Lissett Rodríguez is a 89 y.o. female with a history of AF and PE on Eliquis, T2DM, HFpEF, COPD on 6L home O2, HTN, HLD, KALPANA, TIA, CKD, who presents with productive cough and possible hemoptysis earlier today.  She is saturating between 86 and 90% on her home 6L home O2 while I am in the room.  She has crackles at the left midlung and scattered expiratory wheeze.  She is not complaining of shortness of breath, chest pain, or other concerning symptoms.  Presentation is concerning for possible pneumonia, COPD exacerbation, PE (given possibility of hemoptysis and patient being on Eliquis).  Workup was initiated by triage provider with ECG, labs, CT PE. See below for details of ED course and ultimate disposition.    Medications   oxygen (O2) therapy (8 L/min inhalation Start 6/19/25 1526)   iohexol (OMNIPaque) 350 mg iodine/mL  solution 75 mL (75 mL intravenous Given 6/19/25 1307)   cefTRIAXone (Rocephin) 1 g in dextrose (iso) IV 50 mL (0 g intravenous Stopped 6/19/25 1513)   azithromycin (Zithromax) 500 mg in dextrose 5%  mL (500 mg intravenous New Bag 6/19/25 1512)        ED Course as of 06/19/25 2118   Thu Jun 19, 2025   1435 ECG read interpreted by me.  Normal sinus rhythm, rate 73.  Normal axis.  Normal intervals.  No significant ST or T wave derangements. [CG]   1435 Labs are notable for CBC with mild leukocytosis 14.0, normal H&H, normal platelets, normal CMP, troponin, mildly prolonged coags [CG]   1436 CTPE negative for PE but does show interval pneumonia in the lingula possibly due to pneumonia in background of chronic interstitial lung disease [CG]   1600 Ceftriaxone and azithromycin ordered. Patient admitted to hospitalist for pneumonia given her age and pulmonary risk factors. [CG]      ED Course User Index  [CG] Makayla Borjas MD         Diagnoses as of 06/19/25 2118   Pneumonia due to organism       Disposition:   Admitted to Hospitalist, discussed differential and findings with patient as well as any family members at bedside.      ED Prescriptions    None         Makayla Borjas MD  EM/IM/Peds    This note was dictated by speech recognition. Minor errors in transcription may be present.       [1]   Allergies  Allergen Reactions    Lisinopril Angioedema and Swelling    Tetracyclines Hives    Vancomycin Unknown    Penicillins Hives and Other     Tetramycin  Tolerated ceftriaxone and cefepime        Makayla Borjas MD  06/19/25 2118

## 2025-06-20 LAB
ALBUMIN SERPL BCP-MCNC: 3.2 G/DL (ref 3.4–5)
ANION GAP SERPL CALC-SCNC: 15 MMOL/L (ref 10–20)
ATRIAL RATE: 73 BPM
BUN SERPL-MCNC: 17 MG/DL (ref 6–23)
CALCIUM SERPL-MCNC: 8.3 MG/DL (ref 8.6–10.3)
CHLORIDE SERPL-SCNC: 106 MMOL/L (ref 98–107)
CO2 SERPL-SCNC: 23 MMOL/L (ref 21–32)
CREAT SERPL-MCNC: 0.95 MG/DL (ref 0.5–1.05)
EGFRCR SERPLBLD CKD-EPI 2021: 56 ML/MIN/1.73M*2
ERYTHROCYTE [DISTWIDTH] IN BLOOD BY AUTOMATED COUNT: 13.8 % (ref 11.5–14.5)
GLUCOSE BLD MANUAL STRIP-MCNC: 115 MG/DL (ref 74–99)
GLUCOSE BLD MANUAL STRIP-MCNC: 152 MG/DL (ref 74–99)
GLUCOSE BLD MANUAL STRIP-MCNC: 166 MG/DL (ref 74–99)
GLUCOSE SERPL-MCNC: 112 MG/DL (ref 74–99)
HCT VFR BLD AUTO: 39.9 % (ref 36–46)
HGB BLD-MCNC: 13.1 G/DL (ref 12–16)
MCH RBC QN AUTO: 28.8 PG (ref 26–34)
MCHC RBC AUTO-ENTMCNC: 32.8 G/DL (ref 32–36)
MCV RBC AUTO: 88 FL (ref 80–100)
NRBC BLD-RTO: 0 /100 WBCS (ref 0–0)
P AXIS: 60 DEGREES
P OFFSET: 201 MS
P ONSET: 142 MS
PHOSPHATE SERPL-MCNC: 3.6 MG/DL (ref 2.5–4.9)
PLATELET # BLD AUTO: 203 X10*3/UL (ref 150–450)
POTASSIUM SERPL-SCNC: 4 MMOL/L (ref 3.5–5.3)
PR INTERVAL: 164 MS
PROCALCITONIN SERPL-MCNC: 0.04 NG/ML
Q ONSET: 224 MS
QRS COUNT: 12 BEATS
QRS DURATION: 86 MS
QT INTERVAL: 410 MS
QTC CALCULATION(BAZETT): 451 MS
QTC FREDERICIA: 438 MS
R AXIS: 82 DEGREES
RBC # BLD AUTO: 4.55 X10*6/UL (ref 4–5.2)
SODIUM SERPL-SCNC: 140 MMOL/L (ref 136–145)
T AXIS: 51 DEGREES
T OFFSET: 429 MS
VENTRICULAR RATE: 73 BPM
WBC # BLD AUTO: 11.7 X10*3/UL (ref 4.4–11.3)

## 2025-06-20 PROCEDURE — 2500000002 HC RX 250 W HCPCS SELF ADMINISTERED DRUGS (ALT 637 FOR MEDICARE OP, ALT 636 FOR OP/ED): Performed by: INTERNAL MEDICINE

## 2025-06-20 PROCEDURE — 2500000001 HC RX 250 WO HCPCS SELF ADMINISTERED DRUGS (ALT 637 FOR MEDICARE OP): Performed by: INTERNAL MEDICINE

## 2025-06-20 PROCEDURE — 82947 ASSAY GLUCOSE BLOOD QUANT: CPT

## 2025-06-20 PROCEDURE — 85027 COMPLETE CBC AUTOMATED: CPT | Performed by: INTERNAL MEDICINE

## 2025-06-20 PROCEDURE — 97116 GAIT TRAINING THERAPY: CPT | Mod: GP

## 2025-06-20 PROCEDURE — 36415 COLL VENOUS BLD VENIPUNCTURE: CPT | Performed by: INTERNAL MEDICINE

## 2025-06-20 PROCEDURE — 80069 RENAL FUNCTION PANEL: CPT | Performed by: INTERNAL MEDICINE

## 2025-06-20 PROCEDURE — 97530 THERAPEUTIC ACTIVITIES: CPT | Mod: GP

## 2025-06-20 PROCEDURE — 99222 1ST HOSP IP/OBS MODERATE 55: CPT | Performed by: STUDENT IN AN ORGANIZED HEALTH CARE EDUCATION/TRAINING PROGRAM

## 2025-06-20 PROCEDURE — 99233 SBSQ HOSP IP/OBS HIGH 50: CPT | Performed by: INTERNAL MEDICINE

## 2025-06-20 PROCEDURE — 2500000001 HC RX 250 WO HCPCS SELF ADMINISTERED DRUGS (ALT 637 FOR MEDICARE OP)

## 2025-06-20 PROCEDURE — 87899 AGENT NOS ASSAY W/OPTIC: CPT | Mod: AHULAB | Performed by: INTERNAL MEDICINE

## 2025-06-20 PROCEDURE — 2500000004 HC RX 250 GENERAL PHARMACY W/ HCPCS (ALT 636 FOR OP/ED): Performed by: INTERNAL MEDICINE

## 2025-06-20 PROCEDURE — 87449 NOS EACH ORGANISM AG IA: CPT | Mod: AHULAB | Performed by: INTERNAL MEDICINE

## 2025-06-20 PROCEDURE — 2500000005 HC RX 250 GENERAL PHARMACY W/O HCPCS: Performed by: INTERNAL MEDICINE

## 2025-06-20 PROCEDURE — 9420000001 HC RT PATIENT EDUCATION 5 MIN

## 2025-06-20 PROCEDURE — 1200000002 HC GENERAL ROOM WITH TELEMETRY DAILY

## 2025-06-20 PROCEDURE — 97165 OT EVAL LOW COMPLEX 30 MIN: CPT | Mod: GO

## 2025-06-20 PROCEDURE — 97161 PT EVAL LOW COMPLEX 20 MIN: CPT | Mod: GP

## 2025-06-20 PROCEDURE — 94640 AIRWAY INHALATION TREATMENT: CPT

## 2025-06-20 PROCEDURE — 87070 CULTURE OTHR SPECIMN AEROBIC: CPT | Mod: AHULAB | Performed by: INTERNAL MEDICINE

## 2025-06-20 PROCEDURE — 94669 MECHANICAL CHEST WALL OSCILL: CPT

## 2025-06-20 RX ADMIN — Medication 50 MCG: at 08:54

## 2025-06-20 RX ADMIN — PANTOPRAZOLE SODIUM 40 MG: 40 INJECTION, POWDER, FOR SOLUTION INTRAVENOUS at 05:55

## 2025-06-20 RX ADMIN — GUAIFENESIN 600 MG: 600 TABLET, EXTENDED RELEASE ORAL at 18:03

## 2025-06-20 RX ADMIN — PIPERACILLIN SODIUM AND TAZOBACTAM SODIUM 3.38 G: 3; .375 INJECTION, SOLUTION INTRAVENOUS at 00:17

## 2025-06-20 RX ADMIN — GABAPENTIN 300 MG: 300 CAPSULE ORAL at 20:36

## 2025-06-20 RX ADMIN — IPRATROPIUM BROMIDE 0.5 MG: 0.5 SOLUTION RESPIRATORY (INHALATION) at 11:23

## 2025-06-20 RX ADMIN — GABAPENTIN 300 MG: 300 CAPSULE ORAL at 08:55

## 2025-06-20 RX ADMIN — SENNOSIDES AND DOCUSATE SODIUM 2 TABLET: 50; 8.6 TABLET ORAL at 08:54

## 2025-06-20 RX ADMIN — ATORVASTATIN CALCIUM 40 MG: 40 TABLET, FILM COATED ORAL at 20:36

## 2025-06-20 RX ADMIN — OXYBUTYNIN CHLORIDE 5 MG: 5 TABLET ORAL at 08:54

## 2025-06-20 RX ADMIN — PIPERACILLIN SODIUM AND TAZOBACTAM SODIUM 3.38 G: 3; .375 INJECTION, SOLUTION INTRAVENOUS at 12:27

## 2025-06-20 RX ADMIN — AMIODARONE HYDROCHLORIDE 100 MG: 200 TABLET ORAL at 08:55

## 2025-06-20 RX ADMIN — INSULIN LISPRO 2 UNITS: 100 INJECTION, SOLUTION INTRAVENOUS; SUBCUTANEOUS at 16:37

## 2025-06-20 RX ADMIN — IPRATROPIUM BROMIDE 0.5 MG: 0.5 SOLUTION RESPIRATORY (INHALATION) at 19:59

## 2025-06-20 RX ADMIN — PIPERACILLIN SODIUM AND TAZOBACTAM SODIUM 3.38 G: 3; .375 INJECTION, SOLUTION INTRAVENOUS at 05:55

## 2025-06-20 RX ADMIN — IPRATROPIUM BROMIDE 0.5 MG: 0.5 SOLUTION RESPIRATORY (INHALATION) at 14:33

## 2025-06-20 RX ADMIN — PIPERACILLIN SODIUM AND TAZOBACTAM SODIUM 3.38 G: 3; .375 INJECTION, SOLUTION INTRAVENOUS at 18:03

## 2025-06-20 RX ADMIN — Medication 10 L/MIN: at 08:00

## 2025-06-20 RX ADMIN — FUROSEMIDE 40 MG: 40 TABLET ORAL at 08:54

## 2025-06-20 RX ADMIN — OXYBUTYNIN CHLORIDE 5 MG: 5 TABLET ORAL at 20:36

## 2025-06-20 RX ADMIN — Medication 10 L/MIN: at 22:42

## 2025-06-20 RX ADMIN — Medication 10 L/MIN: at 03:15

## 2025-06-20 RX ADMIN — POTASSIUM CHLORIDE 20 MEQ: 1500 TABLET, EXTENDED RELEASE ORAL at 08:55

## 2025-06-20 RX ADMIN — SENNOSIDES AND DOCUSATE SODIUM 2 TABLET: 50; 8.6 TABLET ORAL at 20:36

## 2025-06-20 RX ADMIN — INSULIN LISPRO 2 UNITS: 100 INJECTION, SOLUTION INTRAVENOUS; SUBCUTANEOUS at 12:27

## 2025-06-20 RX ADMIN — DAPAGLIFLOZIN 5 MG: 10 TABLET, FILM COATED ORAL at 08:55

## 2025-06-20 RX ADMIN — APIXABAN 2.5 MG: 2.5 TABLET, FILM COATED ORAL at 20:36

## 2025-06-20 RX ADMIN — GABAPENTIN 300 MG: 300 CAPSULE ORAL at 16:37

## 2025-06-20 RX ADMIN — Medication 10 L/MIN: at 19:59

## 2025-06-20 RX ADMIN — IPRATROPIUM BROMIDE 0.5 MG: 0.5 SOLUTION RESPIRATORY (INHALATION) at 07:54

## 2025-06-20 ASSESSMENT — ENCOUNTER SYMPTOMS
SHORTNESS OF BREATH: 0
COUGH: 1
RHINORRHEA: 0
TROUBLE SWALLOWING: 1
MYALGIAS: 0
LIGHT-HEADEDNESS: 0
CHILLS: 1
FEVER: 0
WHEEZING: 0
SLEEP DISTURBANCE: 0
DIARRHEA: 0
FATIGUE: 1
DIZZINESS: 0
ARTHRALGIAS: 1

## 2025-06-20 ASSESSMENT — COGNITIVE AND FUNCTIONAL STATUS - GENERAL
DAILY ACTIVITIY SCORE: 19
TURNING FROM BACK TO SIDE WHILE IN FLAT BAD: A LITTLE
STANDING UP FROM CHAIR USING ARMS: A LITTLE
MOBILITY SCORE: 22
MOVING FROM LYING ON BACK TO SITTING ON SIDE OF FLAT BED WITH BEDRAILS: A LITTLE
DRESSING REGULAR UPPER BODY CLOTHING: A LITTLE
CLIMB 3 TO 5 STEPS WITH RAILING: A LITTLE
CLIMB 3 TO 5 STEPS WITH RAILING: A LITTLE
MOVING FROM LYING ON BACK TO SITTING ON SIDE OF FLAT BED WITH BEDRAILS: A LITTLE
CLIMB 3 TO 5 STEPS WITH RAILING: A LOT
MOVING TO AND FROM BED TO CHAIR: A LITTLE
TURNING FROM BACK TO SIDE WHILE IN FLAT BAD: A LITTLE
DRESSING REGULAR UPPER BODY CLOTHING: A LITTLE
STANDING UP FROM CHAIR USING ARMS: A LITTLE
WALKING IN HOSPITAL ROOM: A LITTLE
TOILETING: A LITTLE
MOVING TO AND FROM BED TO CHAIR: A LITTLE
HELP NEEDED FOR BATHING: A LITTLE
EATING MEALS: A LITTLE
HELP NEEDED FOR BATHING: A LITTLE
WALKING IN HOSPITAL ROOM: A LOT
DRESSING REGULAR LOWER BODY CLOTHING: A LITTLE
PERSONAL GROOMING: A LITTLE
DAILY ACTIVITIY SCORE: 18
DAILY ACTIVITIY SCORE: 24
PERSONAL GROOMING: A LITTLE
WALKING IN HOSPITAL ROOM: A LITTLE
TOILETING: A LITTLE
MOBILITY SCORE: 18
MOBILITY SCORE: 16
DRESSING REGULAR LOWER BODY CLOTHING: A LITTLE

## 2025-06-20 ASSESSMENT — PAIN - FUNCTIONAL ASSESSMENT
PAIN_FUNCTIONAL_ASSESSMENT: 0-10

## 2025-06-20 ASSESSMENT — PAIN SCALES - GENERAL
PAINLEVEL_OUTOF10: 0 - NO PAIN

## 2025-06-20 ASSESSMENT — ACTIVITIES OF DAILY LIVING (ADL)
LACK_OF_TRANSPORTATION: NO
ADL_ASSISTANCE: INDEPENDENT
ADL_ASSISTANCE: INDEPENDENT
BATHING_ASSISTANCE: STAND BY

## 2025-06-20 NOTE — PROGRESS NOTES
Physical Therapy    Physical Therapy Evaluation & Treatment    Patient Name: Lissett Rodríguez  MRN: [id number]  Department: [facility] [id number]  Room: [address] [place] [address]  Today's Date: 6/20/2025   Time Calculation  Start Time: 0940  Stop Time: 1024  Time Calculation (min): 44 min    Completion of this session, clinical decision making, and documentation performed under the supervision/direction of Kelsea Liriano DPT.    Assessment/Plan   PT Assessment  PT Assessment Results: Decreased strength, Decreased endurance, Impaired balance, Decreased mobility, Impaired vision, Impaired hearing, Impaired sensation  Rehab Prognosis: Good  Barriers to Discharge Home: No anticipated barriers  Evaluation/Treatment Tolerance: Patient tolerated treatment well, Patient limited by fatigue  Medical Staff Made Aware: Yes  Strengths: Ability to acquire knowledge, Attitude of self, Premorbid level of function, Rehab experience, Support of Caregivers  Barriers to Participation: Comorbidities  End of Session Communication: Bedside nurse  Assessment Comment: Patient remains with deconditioning, weakness, and functional mobility deficits. Pt also demonstrates decreased endurance and activity tolerance. Pt will benefit from ongoing PT to address impairments in mobility, balance, strength, activity tolerance, pain, and safety.  End of Session Patient Position: Up in chair, Alarm on   IP OR SWING BED PT PLAN  Inpatient or Swing Bed: Inpatient  PT Plan  Treatment/Interventions: Bed mobility, Transfer training, Gait training, Stair training, Balance training, Neuromuscular re-education, Strengthening, Endurance training, Therapeutic exercise, Therapeutic activity, Home exercise program, Postural re-education  PT Plan: Ongoing PT  PT Frequency: 3 times per week  PT Discharge Recommendations: Low intensity level of continued care  Equipment Recommended upon Discharge: Other (comment) (Pt has rollator and cane at home)  PT Recommended Transfer Status: Assist x1,  Assistive device (2WW)  PT - OK to Discharge: Yes (PT POC established)      Subjective     PT Visit Info:  PT Received On: 06/20/25  General Visit Information:  General  Reason for Referral: Pt is a 89 y.o. female on day 1 of admission presenting with pneumonia  Referred By: Noel Babin MD  Past Medical History Relevant to Rehab: Medical History[1] Surgical History[2]   Family/Caregiver Present: No  Prior to Session Communication: Bedside nurse  Patient Position Received: Bed, 3 rail up, Alarm on  Preferred Learning Style: auditory, kinesthetic, verbal, visual  General Comment: Pt pleasant and agreeable to PT this date.  Home Living:  Home Living  Type of Home: Assisted living  Lives With: Alone  Home Adaptive Equipment: Cane (rollator)  Home Layout: One level  Home Access: No concerns (no TESSY)  Bathroom Shower/Tub: Walk-in shower (with seat)  Prior Level of Function:  Prior Function Per Pt/Caregiver Report  Level of [place]: Independent with ADLs and functional transfers, Needs assistance with homemaking  Receives Help From: Other (Comment) (staff at assisted living)  ADL Assistance: Independent  Homemaking Assistance: Needs assistance (jail provides and completes iADLs)  Meal Prep: Total (MARY ANN prepares meals)  Ambulatory Assistance: Independent (reports furniture surfing in apartment, uses cane or rollator outside of apartment)  Prior Function Comments: Pt reports no falls in the past 2 months  Precautions:  Precautions  Hearing/Visual Limitations: [place] with hearing aids, glasses  Medical Precautions: Fall precautions, Oxygen therapy device and L/min (10 L/min)     06/20/25 0940   Vital Signs   Vitals Session Pre PT   Heart Rate 65   /59   MAP (mmHg) 82   BP Location Right arm   BP Method Automatic   Patient Position Lying     Vital Signs Comment: Pt demonstrated mild SOB throughout functional ambulation demonstrating O2 levels between 87-94%. Pt demonstrated ability to normalize O2 levels when cued  in pursed lip breathing.    Objective   Pain:  Pain Assessment  Pain Assessment: 0-10  0-10 (Numeric) Pain Score: 0 - No pain  Cognition:  Cognition  Overall Cognitive Status: Within Functional Limits  Impulsive: Mildly    General Assessments:  General Observation  General Observation: Pt pleasant and agreeable to participate in PT session. Pt [place] and demonstrated increased SOB, requiring O2 throughout session.     Activity Tolerance  Endurance: Tolerates 30 min exercise with multiple rests    Sensation  Light Touch: Partial deficits in the RLE, Partial deficits in the LLE  Sensation Comment: Pt reports chronic numbness in B feet    Strength  Strength Comments: Pt able to perform functional mobility with CGA-SBA  Perception  Inattention/Neglect: Appears intact    Coordination  Movements are Fluid and Coordinated: Yes    Postural Control  Postural Control: Within Functional Limits  Trunk Control: Pt demonstrates slightly forward flexed posture when ambulating with 2WW    Static Sitting Balance  Static Sitting-Balance Support: Bilateral upper extremity supported, Feet supported  Static Sitting-Level of Assistance: Close supervision  Dynamic Sitting Balance  Dynamic Sitting-Balance Support: Right upper extremity supported, Left upper extremity supported, Feet supported (unilateral UE assist)  Dynamic Sitting-Level of Assistance: Close supervision  Dynamic Sitting-Balance: Forward lean, Lateral lean, Trunk control activities (scooting)    Static Standing Balance  Static Standing-Balance Support: Right upper extremity supported, Left upper extremity supported  Static Standing-Level of Assistance: Close supervision  Dynamic Standing Balance  Dynamic Standing-Balance Support: No upper extremity supported  Dynamic Standing-Level of Assistance: Contact guard  Dynamic Standing-Balance: Forward lean, Lateral lean, Turning (ambulation)  Dynamic Standing-Comments: Pt demonstrated ability to maintain standing balance with CGA  while pulling up depends  Functional Assessments:  Bed Mobility  Bed Mobility: Yes  Bed Mobility 1  Bed Mobility 1: Supine to sitting  Level of Assistance 1: Close supervision  Bed Mobility Comments 1: HOB elevated due to pt reporting sleeping in recliner at home. Pt required cueing to scoot forward at end of transfer so that her feet were touching the floor.    Transfers  Transfer: Yes  Transfer 1  Technique 1: Sit to stand, Stand to sit  Transfer Device 1: Walker, Gait belt  Transfer Level of Assistance 1: Contact guard  Trials/Comments 1: Pt performed x1 STS this session.    Ambulation/Gait Training  Ambulation/Gait Training Performed: Yes  Ambulation/Gait Training 1  Surface 1: Level tile  Device 1: Rolling walker  Gait Support Devices: Gait belt  Assistance 1: Contact guard, Minimal verbal cues  Quality of Gait 1: Forward flexed posture, Decreased step length  Comments/Distance (ft) 1: Pt ambulated x320' this session with 2WW and CGA. Pt ambulated with increased effort, requiring x3 standing rest breaks.     Stairs  Stairs: No (Pt does not have stairs at home)  Extremity/Trunk Assessments:  Cervical Spine   Cervical Spine: Within Functional Limits  Lumbar Spine   Lumbar Spine : Within Functional Limits    RUE   RUE : Within Functional Limits  LUE   LUE: Within Functional Limits  RLE   RLE : Within Functional Limits  Strength RLE  RLE Overall Strength: Greater than or equal to 3/5 as evidenced by functional mobility  LLE   LLE : Within Functional Limits  Strength LLE  LLE Overall Strength: Greater than or equal to 3/5 as evidenced by functional mobility  Treatments:  Therapeutic Activity  Therapeutic Activity Performed: Yes  Therapeutic Activity 1: Pt educated on pursed lip breathing in order to improve O2 levels during functional mobility and ambulation. Pt also educated about the importance of being up in the chair, as well as use of incentive spirometer, in order to improve lung function and capacity. Pt  educated on activity pacing as O2 is weaned to prior level (6L prior to admission) in order avoid over-exertion.    Ambulation/Gait Training  Ambulation/Gait Training Performed: Yes  Ambulation/Gait Training 1  Surface 1: Level tile  Device 1: Rolling walker  Gait Support Devices: Gait belt  Assistance 1: Contact guard, Minimal verbal cues  Quality of Gait 1: Forward flexed posture, Decreased step length  Comments/Distance (ft) 1: Pt ambulated x320' this session with 2WW and CGA. Pt ambulated with increased effort, requiring x3 standing rest breaks. Pt required cueing for upright posture and maintaining 2WW contact with ground during ambulation. During ambulation, pt demonstrated some path deviation and decreased stability, but no overt LOB. Pt demonstrated some decrease in O2 levels ranging from 87-94% SpO2 during ambulation, but demonstrated ability to normalize levels with utlization of pursed lip breathing during standing rest breaks.  Transfers  Transfer: Yes  Transfer 1  Technique 1: Sit to stand, Stand to sit  Transfer Device 1: Walker, Gait belt  Transfer Level of Assistance 1: Contact guard  Trials/Comments 1: Pt performed x1 STS this session. Pt required cueing for hand placement on bed before standing as well as backing up to the chair and reaching back before sitting.  Outcome Measures:  [facility] Basic Mobility  Turning from your back to your side while in a flat bed without using bedrails: A little  Moving from lying on your back to sitting on the side of a flat bed without using bedrails: A little  Moving to and from bed to chair (including a wheelchair): A little  Standing up from a chair using your arms (e.g. wheelchair or bedside chair): A little  To walk in hospital room: A little  Climbing 3-5 steps with railing: A little  Basic Mobility - Total Score: 18    Encounter Problems       Encounter Problems (Active)       Balance       STG - Maintains dynamic standing balance with Mod I and unilateral  upper extremity support x10 minutes       Start:  06/20/25    Expected End:  07/04/25       INTERVENTIONS:1. Practice standing with minimal support.2. Educate patient about standing tolerance.3. Educate patient about independence with gait, transfers, and ADL's.4. Educate patient about use of assistive device.5. Educate patient about self-directed care.            Mobility       STG - Patient will ambulate x300' without standing rest breaks, demonstrating SpO2 values above 90%, with Mod I and WW       Start:  06/20/25    Expected End:  07/04/25               PT Transfers       STG - Transfer from bed to chair with Mod I and WW       Start:  06/20/25    Expected End:  07/04/25            STG - Patient to transfer to and from sit to supine with Mod I       Start:  06/20/25    Expected End:  07/04/25            STG - Patient will transfer sit to and from stand with Mod I and WW       Start:  06/20/25    Expected End:  07/04/25                   Education Documentation  Body Mechanics, taught by JM Ashley at 6/20/2025 11:47 AM.  Learner: Patient  Readiness: Acceptance  Method: Explanation  Response: Verbalizes Understanding  Comment: Pt educated on sequencing of functional mobility tasks, proper breathing techniques, activity pacing to maintain endurance, the benefits of functional mobility for lung function, and general safety awareness.    Mobility Training, taught by JM Ashley at 6/20/2025 11:47 AM.  Learner: Patient  Readiness: Acceptance  Method: Explanation  Response: Verbalizes Understanding  Comment: Pt educated on sequencing of functional mobility tasks, proper breathing techniques, activity pacing to maintain endurance, the benefits of functional mobility for lung function, and general safety awareness.    Education Comments  No comments found.           [1]   Past Medical History:  Diagnosis Date    Angioneurotic edema, initial encounter 08/24/2018    Angioedema of lips    Calculus of  gallbladder without cholecystitis without obstruction 02/22/2017    Calculus of gallbladder without cholecystitis without obstruction    Chronic obstructive pulmonary disease, unspecified 02/20/2017    Chronic obstructive pulmonary disease with hypoxia    Eructation 06/16/2017    Burping    Personal history of transient ischemic attack (TIA), and cerebral infarction 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 10/16/2018    History of transient cerebral ischemia    Personal history of transient ischemic attack (TIA), and cerebral infarction 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 03/08/2016    History of transient cerebral ischemia    Pneumonia, unspecified organism 02/09/2018    CAP (community acquired pneumonia)    Trochanteric bursitis, unspecified hip 04/23/2015    Greater trochanteric bursitis    Type 2 diabetes mellitus without complications (Multi) 11/06/2017    Controlled type 2 diabetes mellitus without complication, without long-term current use of insulin   [2]   Past Surgical History:  Procedure Laterality Date    APPENDECTOMY  05/15/2013    Appendectomy    CATARACT EXTRACTION  05/15/2013    Cataract Surgery    COLONOSCOPY  04/11/2014    Complete Colonoscopy    HYSTERECTOMY  05/15/2013    Hysterectomy    MR HEAD ANGIO WO IV CONTRAST  1/8/2016    MR HEAD ANGIO WO IV CONTRAST 1/8/2016 [facility]    MR HEAD ANGIO WO IV CONTRAST  10/1/2012    MR HEAD ANGIO WO IV CONTRAST 10/1/2012 [facility]    MR NECK ANGIO W IV CONTRAST  10/1/2012    MR NECK ANGIO W IV CONTRAST 10/1/2012 [facility]ICAL [facility]GAC[facility]    MR NECK ANGIO WO IV CONTRAST  1/8/2016    MR NECK ANGIO WO IV CONTRAST 1/8/2016 [facility]    OTHER SURGICAL HISTORY  05/15/2013    Cardiac Cath Procedure Outcome: Successful    OTHER SURGICAL HISTORY  02/28/2019    Cholecystectomy    TOTAL KNEE ARTHROPLASTY  05/29/2013    Knee Replacement

## 2025-06-20 NOTE — CONSULTS
Reason For Consult  Pneumonia, possible COPD exacerbation    History Of Present Illness  Lissett Rodríguez is a 89 y.o. female former smoker (35 pk/yr) with history of DM, KALPANA on CPAP, COPD with hyperinflation on 6L O2 at baseline, HFpEF, afib and prior PE on Eliquis (failed Xarelto), and recent admission for COPD exacerbation presenting with fatigue and increased sputum production. Pulmonary is consulted for further management.     Ms. Rodríguez reports that after her hospital discharge on 6/10, she felt well overall and attended a family reunion (no known sick contacts), however over the past 2 days she has had an increased cough productive of brown sputum and severe fatigue. She had one episode of chills while in the ED. She denies dyspnea or wheezing, URI symptoms, hemoptysis, or needing her rescue inhaler. She does report that she aspirates if she drinks or eats too quickly but is very conscientious about small bites and slow eating/drinking so has not had recent episodes. She presented to the ED where she was saturating between 86 and 90% on 6L. CTPE was negative for PE but showed a new RML pneumonia which is new since her last CT on 6/8 superimposed on her baseline mosaic attenuation from her small airway disease. Labs showed a new leukocytosis at 14. She was admitted, started on CTX/azithro, and required increased O2 to 10L NC.      PFT 9/2024: FEV1/FVC reduced, FEV1 1.33L (161% predicted), FVC 1.94L (191% predicted), %, DLCO 51% predicted    Soc/exposures: former smoker off and on, 35 pk/yr quit in 1970s, lies in [facility]. Retired realtor. No other clear exposures.   FH: sister- COPD     Past Medical History  She has a past medical history of Angioneurotic edema, initial encounter (08/24/2018), Calculus of gallbladder without cholecystitis without obstruction (02/22/2017), Chronic obstructive pulmonary disease, unspecified (02/20/2017), Eructation (06/16/2017), Personal history of transient ischemic attack  (TIA), and cerebral infarction 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 (10/16/2018), Personal history of transient ischemic attack (TIA), and cerebral infarction 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 (03/08/2016), Pneumonia, unspecified organism (02/09/2018), Trochanteric bursitis, unspecified hip (04/23/2015), and Type 2 diabetes mellitus without complications (Multi) (11/06/2017).    Surgical History  She has a past surgical history that includes Appendectomy (05/15/2013); Cataract extraction (05/15/2013); Hysterectomy (05/15/2013); Other surgical history (05/15/2013); Other surgical history (02/28/2019); Colonoscopy (04/11/2014); Total knee arthroplasty (05/29/2013); MR angio head wo IV contrast (1/8/2016); MR angio neck wo IV contrast (1/8/2016); MR angio head wo IV contrast (10/1/2012); and MR angio neck w IV contrast (10/1/2012).     Social History  She reports that she has never smoked. She has never used smokeless tobacco. She reports that she does not drink alcohol and does not use drugs.    Family History  Family History[1]     Allergies  Lisinopril, Tetracyclines, Vancomycin, and Penicillins    Review of Systems  Review of Systems   Constitutional:  Positive for chills and fatigue. Negative for fever.   HENT:  Positive for trouble swallowing. Negative for congestion, postnasal drip and rhinorrhea.    Eyes:  Negative for visual disturbance.   Respiratory:  Positive for cough. Negative for shortness of breath and wheezing.    Cardiovascular:  Negative for chest pain and leg swelling.   Gastrointestinal:  Negative for diarrhea.   Musculoskeletal:  Positive for arthralgias (chronic). Negative for myalgias.   Skin:  Negative for rash.   Neurological:  Negative for dizziness, syncope and light-headedness.   Psychiatric/Behavioral:  Negative for sleep disturbance.          Physical Exam  Physical Exam  Constitutional:       General: She is not in acute distress.     Appearance: She is normal weight. She is not toxic-appearing.   HENT:     "  Head: Normocephalic and atraumatic.      Nose: No rhinorrhea.      Mouth/Throat:      Mouth: Mucous membranes are moist.   Eyes:      General: No scleral icterus.  Cardiovascular:      Rate and Rhythm: Normal rate. Rhythm irregular.      Heart sounds: No murmur heard.     No friction rub. No gallop.   Pulmonary:      Effort: Pulmonary effort is normal.      Breath sounds: Normal breath sounds. No wheezing, rhonchi or rales.   Abdominal:      General: There is no distension.   Musculoskeletal:      Right lower leg: No edema.      Left lower leg: No edema.   Skin:     General: Skin is warm and dry.   Neurological:      Mental Status: She is alert and oriented to person, place, and time.   Psychiatric:         Mood and Affect: Mood normal.         Behavior: Behavior normal.         Thought Content: Thought content normal.         Judgment: Judgment normal.           Last Recorded Vitals  Blood pressure 119/54, pulse 72, temperature 36.6 °C (97.9 °F), temperature source Temporal, resp. rate 18, height 1.499 m (4' 11\"), weight 58.5 kg (129 lb), SpO2 92%.    Relevant Results  Results for orders placed or performed during the hospital encounter of 06/19/25 (from the past 24 hours)   MRSA Surveillance for Vancomycin De-escalation, PCR    Specimen: Anterior Nares; Swab   Result Value Ref Range    MRSA PCR Not Detected Not Detected   Procalcitonin   Result Value Ref Range    Procalcitonin 0.04 <=0.07 ng/mL   CBC   Result Value Ref Range    WBC 11.7 (H) 4.4 - 11.3 x10*3/uL    nRBC 0.0 0.0 - 0.0 /100 WBCs    RBC 4.55 4.00 - 5.20 x10*6/uL    Hemoglobin 13.1 12.0 - 16.0 g/dL    Hematocrit 39.9 36.0 - 46.0 %    MCV 88 80 - 100 fL    MCH 28.8 26.0 - 34.0 pg    MCHC 32.8 32.0 - 36.0 g/dL    RDW 13.8 11.5 - 14.5 %    Platelets 203 150 - 450 x10*3/uL   Renal Function Panel   Result Value Ref Range    Glucose 112 (H) 74 - 99 mg/dL    Sodium 140 136 - 145 mmol/L    Potassium 4.0 3.5 - 5.3 mmol/L    Chloride 106 98 - 107 mmol/L    " Bicarbonate 23 21 - 32 mmol/L    Anion Gap 15 10 - 20 mmol/L    Urea Nitrogen 17 6 - 23 mg/dL    Creatinine 0.95 0.50 - 1.05 mg/dL    eGFR 56 (L) >60 mL/min/1.73m*2    Calcium 8.3 (L) 8.6 - 10.3 mg/dL    Phosphorus 3.6 2.5 - 4.9 mg/dL    Albumin 3.2 (L) 3.4 - 5.0 g/dL   POCT GLUCOSE   Result Value Ref Range    POCT Glucose 115 (H) 74 - 99 mg/dL   POCT GLUCOSE   Result Value Ref Range    POCT Glucose 166 (H) 74 - 99 mg/dL     *Note: Due to a large number of results and/or encounters for the requested time period, some results have not been displayed. A complete set of results can be found in Results Review.     Electrocardiogram, 12-lead PRN ACS symptoms  Result Date: 6/20/2025  Normal sinus rhythm Normal ECG When compared with ECG of 08-JUN-2025 14:31, No significant change was found    CT angio chest for pulmonary embolism  Result Date: 6/19/2025  Interpreted By:  Robert Pedro, STUDY: CT ANGIO CHEST FOR PULMONARY EMBOLISM;  6/19/2025 1:18 pm   INDICATION: Signs/Symptoms:hemoptysis.   COMPARISON: 06/08/2025.   ACCESSION NUMBER(S): [id number]   ORDERING CLINICIAN: RYAN MCNALLY   TECHNIQUE: CT angiography (non-coronary) of the chest was performed with Intravenous contrast material along with 3D (maximum intensity projection - MIP) image post processing and coronal reformatted images. 75 ml Omnipaque 350 was injected intravenously.   All CT examinations are performed with one or more of the following dose reduction techniques: Automated Exposure Control, adjustment of mA and/or kV according to patient size, or use of iterative reconstruction techniques.   FINDINGS: Findings of vascular structures: The heart is mildly prominent. Atherosclerotic calcifications are again present involving coronary arteries and the aorta. There is no aortic aneurysm or dissection. The pulmonary arteries are normal in course and caliber, without filling defects to suggest pulmonary embolism.   Other findings of chest: There is no  mediastinal, hilar or axillary lymphadenopathy. There is again prominence of the interstitial markings bilaterally with reticulonodular pattern. Additionally there is interval development of patchy infiltrates, mostly involving the right middle lobe. Upper abdomen: Within normal limits. Osseous structures: Degenerative changes and marked multiple level disc disease involve the spine. No acute fracture is seen.       1. No pulmonary embolism, aortic aneurysm or dissection. 2. Interval development of infiltrates, mostly involving the lingula, possibly due to pneumonia, on a background of chronic interstitial lung disease. Follow-up as needed.   MACRO: None.     Signed by: Robert Pedro 6/19/2025 2:16 PM Dictation workstation:   [id number]        Assessment/Plan     Lissett Rodríguez is a 89 y.o. female former smoker (35 pk/yr) with history of DM, KALPANA on CPAP, COPD with hyperinflation on 6L O2 at baseline, HFpEF, afib and prior PE on Eliquis (failed Xarelto), and recent admission for COPD exacerbation presenting with fatigue and increased sputum production. Pulmonary is consulted for further management. Clinical picture is consistent with bacterial PNA.    #Acute on chronic respiratory failure with hypoxemia due to PNA/COPD  #HAP  #COPD with hyperinflation - not in exacerbation  #History of PE on Eliquis    Recommendations:  -Agree with Zosyn for HAP coverage  -On Spiriva at baseline, ok for atrovent while inpatient with PRN albuterol  -Sputum culture, specimen container at bedside  -Flutter valve for secretion clearance  -IS/out of bed as tolerated  -Wean O2 as tolerated, goal 88-92%  -Continue Eliquis for AC  -Follow up with Dr. Brown after dc    Will continue to follow    I spent 35 minutes in the professional and overall care of this patient.      Corinne Fontenot DO         [1]   Family History  Problem Relation Name Age of Onset    Alzheimer's disease Mother      Heart failure Mother      Stroke Mother           ischemic stroke    Heart attack Father      Stroke Father          ischemic stroke    COPD Sister      Diabetes Sister          mellitus    Cancer Sister      Parkinsonism Sister      Other (previous cardiac prolems) Sister

## 2025-06-20 NOTE — PROGRESS NOTES
06/20/25 1234   Discharge Planning   Living Arrangements Alone   Support Systems Family members   Type of Residence Assisted living  ([facility] [place])   Care Facility Name [facility] [phone]   Home or Post Acute Services Other (Comment)  (outpatient PT OT)   Expected Discharge Disposition Home   Does the patient need discharge transport arranged? Yes   RoundTrip coordination needed? Yes   Has discharge transport been arranged? No   Financial Resource Strain   How hard is it for you to pay for the very basics like food, housing, medical care, and heating? Not hard   Housing Stability   In the last 12 months, was there a time when you were not able to pay the mortgage or rent on time? N   At any time in the past 12 months, were you homeless or living in a shelter (including now)? N   Transportation Needs   In the past 12 months, has lack of transportation kept you from medical appointments or from getting medications? no   In the past 12 months, has lack of transportation kept you from meetings, work, or from getting things needed for daily living? No   Intensity of Service   Intensity of Service 0-30 min     TCC met with pt at bedside. Explained my role as discharge planner. Pt [facility] at [facility]. TCC unable to reach [facility] a this time. Pt base 6 L o2 on 10 L now. Therapy rec low. Pt active with outpatient PT OT at [facility] and will cont when med ready for dc.       Diagnosis: PN    Plan:     Gram negative PNA... 10L NC (home o2 6L).... cont iv abx, f/up pulm.   She has brown sputum from infection, not hemoptysis... restart home eliquis.   Hx COPD... no bronchospasm currently.   Hx afib... eliquis, amio.   CKD stable.   Hx TIA... eliquis, statin.   Hx HFpEF... appears euvolemic... cont home lasix, farxiga, potassium.   Hx DM, A1c 7.3 12/2024... farxiga, ss.   Hx KALPANA... nocturnal bipap.   Pt recently discharged 6/10.   Home regimen for chronic conditions  Pt/ot  Dvt px covered with  eliquis.   Pt lives in [place], uses cane/walker baseline.     Disposition: [facility]    ADOD: 3-4 days

## 2025-06-20 NOTE — CARE PLAN
The patient's goals for the shift include      The clinical goals for the shift include remain safe.    Plan of care progressing. Will continue to monitor.

## 2025-06-20 NOTE — PROGRESS NOTES
Occupational Therapy    Evaluation    Patient Name: Lissett Rodríguez  MRN: [id number]  Department: [place] [address]  Room: [address] [place] [address]  Today's Date: 6/20/2025  Time Calculation  Start Time: 1112  Stop Time: 1122  Time Calculation (min): 10 min    Assessment  IP OT Assessment  OT Assessment:  (OT Eval complete, patient is overall CGA to close supervision with most tasks. Patient with decreased ADLs, transfers and activity tolerance. LOW intensity therapy recommended to maximize functional independence.)  Prognosis: Excellent  Barriers to Discharge Home: No anticipated barriers  Evaluation/Treatment Tolerance: Patient tolerated treatment well  Medical Staff Made Aware: Yes  End of Session Communication: Bedside nurse  End of Session Patient Position: Up in chair, Alarm on  Plan:  Treatment Interventions: ADL retraining, Functional transfer training, Endurance training, Patient/family training, Neuromuscular reeducation  OT Frequency: 3 times per week  OT Discharge Recommendations: Low intensity level of continued care  Equipment Recommended upon Discharge: Wheeled walker  OT Recommended Transfer Status: Stand by assist  OT - OK to Discharge: Yes (Per POC)    Subjective   Current Problem:  1. Pneumonia due to organism          OT Visit Info:  OT Received On: 06/20/25  General Visit Info:  General  Reason for Referral: Pt is a 89 y.o. female on day 1 of admission presenting with pneumonia  Referred By: Noel Babin MD  Past Medical History Relevant to Rehab: Medical History[1]    Family/Caregiver Present: No  Prior to Session Communication: Bedside nurse  Patient Position Received: Alarm on, Up in chair  General Comment:  (Patient is pleasant and cooperative and motivated to regain prior level of independence.)  Precautions:  Hearing/Visual Limitations: [place] with hearing aids, glasses  Medical Precautions: Fall precautions, Oxygen therapy device and L/min (9L O2)     Date/Time Vitals Session Patient Position Pulse Resp SpO2 BP  MAP (mmHg)    06/20/25 1100 --  --  72  18  93 %  119/54  69     06/20/25 1123 --  --  --  --  92 %  --  --      Pain:  Pain Assessment  Pain Assessment: 0-10  0-10 (Numeric) Pain Score: 0 - No pain    Objective   Cognition:  Overall Cognitive Status: Within Functional Limits  Orientation Level: Oriented X4     Home Living:  Type of Home: Assisted living (walk in shower, std ht toilet.)  Lives With: Alone  Home Adaptive Equipment: Cane (Rollator)  Home Layout: One level  Home Access: No concerns  Bathroom Shower/Tub: Walk-in shower   Prior Function:  Level of [place]: Independent with ADLs and functional transfers  Receives Help From:  (AL Staff)  ADL Assistance: Independent  Homemaking Assistance:  ([facility] completes IADLs)  Meal Prep:  (AL provides meals.)  Ambulatory Assistance: Independent  Hand Dominance: Right  IADL History:  Homemaking Responsibilities:  ([facility] Staff completes)  ADL:  Eating Assistance: Independent  Grooming Assistance: Stand by  Bathing Assistance: Stand by  UE Dressing Assistance: Stand by  LE Dressing Assistance: Stand by  Toileting Assistance with Device: Stand by  Functional Assistance: Stand by  Activity Tolerance:  Endurance: Tolerates 30 min exercise with multiple rests  Activity Tolerance Comments:  (Good)  Bed Mobility/Transfers: Bed Mobility  Bed Mobility: Yes  Bed Mobility 1  Bed Mobility 1: Supine to sitting  Level of Assistance 1: Close supervision  Bed Mobility 2  Bed Mobility  2: Sitting to supine  Level of Assistance 2: Close supervision    Transfers  Transfer: Yes  Transfer 1  Transfer From 1: Chair with arms to  Transfer to 1: Bed  Technique 1: Sit to stand  Transfer Device 1: Walker  Transfer Level of Assistance 1: Close supervision  Trials/Comments 1:  (Cues for hand placement)    Sitting Balance:  Static Sitting Balance  Static Sitting-Balance Support: Feet supported  Static Sitting-Level of Assistance: Distant supervision  Dynamic Sitting Balance  Dynamic Sitting-Balance  Support: Feet supported  Dynamic Sitting-Level of Assistance: Distant supervision  Dynamic Sitting-Balance: Reaching for objects  Standing Balance:  Static Standing Balance  Static Standing-Balance Support: Bilateral upper extremity supported  Static Standing-Level of Assistance: Close supervision  Dynamic Standing Balance  Dynamic Standing-Balance Support: Bilateral upper extremity supported  Dynamic Standing-Level of Assistance: Close supervision  Dynamic Standing-Balance: Reaching for objects    IADL's:   Homemaking Responsibilities:  ([facility] Staff completes)  Vision: Vision - Basic Assessment  Current Vision: Wears glasses all the time    Strength:  Strength Comments:  (B UEs- 4/5 throughout)    Coordination:  Movements are Fluid and Coordinated: Yes   Hand Function:  Hand Function  Gross Grasp: Functional  Coordination: Functional    Outcome Measures: [facility] Daily Activity  Putting on and taking off regular lower body clothing: A little  Bathing (including washing, rinsing, drying): A little  Putting on and taking off regular upper body clothing: A little  Toileting, which includes using toilet, bedpan or urinal: A little  Taking care of personal grooming such as brushing teeth: A little  Eating Meals: None  Daily Activity - Total Score: 19    Goals:   Encounter Problems       Encounter Problems (Active)       ADLs       Patient will perform UB and LB bathing with independent level of assistance and raised toilet seat.       Start:  06/20/25    Expected End:  07/04/25            Patient with complete upper body dressing with independent level of assistance donning and doffing all UE clothes with no adaptive equipment while edge of bed        Start:  06/20/25    Expected End:  07/04/25            Patient with complete lower body dressing with independent level of assistance donning and doffing all LE clothes  with no adaptive equipment while edge of bed        Start:  06/20/25    Expected End:  07/04/25             Patient will complete daily grooming tasks brushing teeth and washing face/hair with independent level of assistance and PRN adaptive equipment while standing.       Start:  06/20/25    Expected End:  07/04/25            Patient will complete toileting including hygiene clothing management/hygiene with independent level of assistance and raised toilet seat.       Start:  06/20/25    Expected End:  07/04/25               BALANCE       Pt will maintain dynamic standing balance during ADL task with independent level of assistance in order to demonstrate decreased risk of falling and improved postural control.       Start:  06/20/25    Expected End:  07/04/25               TRANSFERS       Patient will perform bed mobility independent level of assistance and bed rails in order to improve safety and independence with mobility       Start:  06/20/25    Expected End:  07/04/25            Patient will complete functional transfer to all surfaces with front wheeled walker with modified independent level of assistance.       Start:  06/20/25    Expected End:  07/04/25                      [1]   Past Medical History:  Diagnosis Date    Angioneurotic edema, initial encounter 08/24/2018    Angioedema of lips    Calculus of gallbladder without cholecystitis without obstruction 02/22/2017    Calculus of gallbladder without cholecystitis without obstruction    Chronic obstructive pulmonary disease, unspecified 02/20/2017    Chronic obstructive pulmonary disease with hypoxia    Eructation 06/16/2017    Burping    Personal history of transient ischemic attack (TIA), and cerebral infarction 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 10/16/2018    History of transient cerebral ischemia    Personal history of transient ischemic attack (TIA), and cerebral infarction 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 03/08/2016    History of transient cerebral ischemia    Pneumonia, unspecified organism 02/09/2018    CAP (community acquired pneumonia)    Trochanteric bursitis,  unspecified hip 04/23/2015    Greater trochanteric bursitis    Type 2 diabetes mellitus without complications (Multi) 11/06/2017    Controlled type 2 diabetes mellitus without complication, without long-term current use of insulin

## 2025-06-20 NOTE — PROGRESS NOTES
Vancomycin Dosing by Pharmacy- Cessation of Therapy    Consult to pharmacy for vancomycin dosing has been discontinued by the prescriber, pharmacy will sign off at this time.    Please call pharmacy if there are further questions or re-enter a consult if vancomycin is resumed.     Chelita Schaeffer, PharmD

## 2025-06-20 NOTE — CARE PLAN
The patient's goals for the shift include      The clinical goals for the shift include Pt will not have increased oxygen needs by end of shift.      Problem: Safety - Adult  Goal: Free from fall injury  Outcome: Progressing     Problem: Discharge Planning  Goal: Discharge to home or other facility with appropriate resources  Outcome: Progressing     Problem: Chronic Conditions and Co-morbidities  Goal: Patient's chronic conditions and co-morbidity symptoms are monitored and maintained or improved  Outcome: Progressing     Problem: Nutrition  Goal: Nutrient intake appropriate for maintaining nutritional needs  Outcome: Progressing     Problem: Skin  Goal: Decreased wound size/increased tissue granulation at next dressing change  Outcome: Progressing  Goal: Participates in plan/prevention/treatment measures  Outcome: Progressing  Goal: Prevent/manage excess moisture  Outcome: Progressing  Goal: Prevent/minimize sheer/friction injuries  Outcome: Progressing  Goal: Promote/optimize nutrition  Outcome: Progressing  Goal: Promote skin healing  Outcome: Progressing

## 2025-06-20 NOTE — PROGRESS NOTES
Lissett Rodríguez is a 89 y.o. female on day 1 of admission presenting with Pneumonia due to organism.      Subjective   On 10L NC (home o2 6L). Reports brown tinged sputum, no hemoptysis. Afebrile. No overnight events reported. She feels a bit better than last night.        Objective     Last Recorded Vitals  /68 (BP Location: Right arm)   Pulse 65   Temp 36.4 °C (97.5 °F) (Temporal)   Resp 17   Wt 58.5 kg (129 lb)   SpO2 93%   Intake/Output last 3 Shifts:    Intake/Output Summary (Last 24 hours) at 6/20/2025 1019  Last data filed at 6/20/2025 0907  Gross per 24 hour   Intake 240 ml   Output 350 ml   Net -110 ml       Admission Weight  Weight: 68 kg (150 lb) (06/19/25 1113)    Daily Weight  06/20/25 : 58.5 kg (129 lb)    Image Results  Electrocardiogram, 12-lead PRN ACS symptoms  Normal sinus rhythm  Normal ECG  When compared with ECG of 08-JUN-2025 14:31,  No significant change was found      Physical Exam  Constitutional:       Appearance: Normal appearance.   Cardiovascular:      Rate and Rhythm: Normal rate and regular rhythm.   Pulmonary:      Effort: Pulmonary effort is normal.      Breath sounds: Rhonchi present.   Abdominal:      General: Abdomen is flat. Bowel sounds are normal.      Palpations: Abdomen is soft.   Musculoskeletal:      Cervical back: Normal range of motion.   Skin:     General: Skin is warm.   Neurological:      General: No focal deficit present.      Mental Status: She is alert and oriented to person, place, and time. Mental status is at baseline.   Psychiatric:         Mood and Affect: Mood normal.         Behavior: Behavior normal.         Thought Content: Thought content normal.         Judgment: Judgment normal.         Relevant Results    Assessment & Plan  Pneumonia due to organism    6/20:  Gram negative PNA... 10L NC (home o2 6L).... cont iv abx, f/up pulm.   She has brown sputum from infection, not hemoptysis... restart home eliquis.   Hx COPD... no bronchospasm  currently.   Hx afib... eliquis, amio.   CKD stable.   Hx TIA... eliquis, statin.   Hx HFpEF... appears euvolemic... cont home lasix, farxiga, potassium.   Hx DM, A1c 7.3 12/2024... farxiga, ss.   Hx KALPANA... nocturnal bipap.   Pt recently discharged 6/10.   Home regimen for chronic conditions  Pt/ot  Dvt px covered with eliquis.   Pt lives in shelter, uses cane/walker baseline.                Noel Babin MD

## 2025-06-21 LAB
ALBUMIN SERPL BCP-MCNC: 3.3 G/DL (ref 3.4–5)
ANION GAP SERPL CALC-SCNC: 15 MMOL/L (ref 10–20)
BUN SERPL-MCNC: 21 MG/DL (ref 6–23)
CALCIUM SERPL-MCNC: 8.4 MG/DL (ref 8.6–10.3)
CHLORIDE SERPL-SCNC: 104 MMOL/L (ref 98–107)
CO2 SERPL-SCNC: 22 MMOL/L (ref 21–32)
CREAT SERPL-MCNC: 1.02 MG/DL (ref 0.5–1.05)
EGFRCR SERPLBLD CKD-EPI 2021: 52 ML/MIN/1.73M*2
ERYTHROCYTE [DISTWIDTH] IN BLOOD BY AUTOMATED COUNT: 13.6 % (ref 11.5–14.5)
GLUCOSE BLD MANUAL STRIP-MCNC: 136 MG/DL (ref 74–99)
GLUCOSE BLD MANUAL STRIP-MCNC: 136 MG/DL (ref 74–99)
GLUCOSE BLD MANUAL STRIP-MCNC: 155 MG/DL (ref 74–99)
GLUCOSE SERPL-MCNC: 129 MG/DL (ref 74–99)
HCT VFR BLD AUTO: 42.9 % (ref 36–46)
HGB BLD-MCNC: 13.8 G/DL (ref 12–16)
LEGIONELLA AG UR QL: NEGATIVE
MCH RBC QN AUTO: 28.6 PG (ref 26–34)
MCHC RBC AUTO-ENTMCNC: 32.2 G/DL (ref 32–36)
MCV RBC AUTO: 89 FL (ref 80–100)
NRBC BLD-RTO: 0 /100 WBCS (ref 0–0)
PHOSPHATE SERPL-MCNC: 3.3 MG/DL (ref 2.5–4.9)
PLATELET # BLD AUTO: 198 X10*3/UL (ref 150–450)
POTASSIUM SERPL-SCNC: 3.8 MMOL/L (ref 3.5–5.3)
RBC # BLD AUTO: 4.83 X10*6/UL (ref 4–5.2)
S PNEUM AG UR QL: NEGATIVE
SODIUM SERPL-SCNC: 137 MMOL/L (ref 136–145)
WBC # BLD AUTO: 11.8 X10*3/UL (ref 4.4–11.3)

## 2025-06-21 PROCEDURE — 2500000005 HC RX 250 GENERAL PHARMACY W/O HCPCS: Performed by: INTERNAL MEDICINE

## 2025-06-21 PROCEDURE — 2500000001 HC RX 250 WO HCPCS SELF ADMINISTERED DRUGS (ALT 637 FOR MEDICARE OP)

## 2025-06-21 PROCEDURE — 94640 AIRWAY INHALATION TREATMENT: CPT

## 2025-06-21 PROCEDURE — 99232 SBSQ HOSP IP/OBS MODERATE 35: CPT | Performed by: STUDENT IN AN ORGANIZED HEALTH CARE EDUCATION/TRAINING PROGRAM

## 2025-06-21 PROCEDURE — 36415 COLL VENOUS BLD VENIPUNCTURE: CPT | Performed by: INTERNAL MEDICINE

## 2025-06-21 PROCEDURE — 94668 MNPJ CHEST WALL SBSQ: CPT

## 2025-06-21 PROCEDURE — 2500000002 HC RX 250 W HCPCS SELF ADMINISTERED DRUGS (ALT 637 FOR MEDICARE OP, ALT 636 FOR OP/ED): Performed by: INTERNAL MEDICINE

## 2025-06-21 PROCEDURE — 1200000002 HC GENERAL ROOM WITH TELEMETRY DAILY

## 2025-06-21 PROCEDURE — 82947 ASSAY GLUCOSE BLOOD QUANT: CPT

## 2025-06-21 PROCEDURE — 80069 RENAL FUNCTION PANEL: CPT | Performed by: INTERNAL MEDICINE

## 2025-06-21 PROCEDURE — 99233 SBSQ HOSP IP/OBS HIGH 50: CPT | Performed by: INTERNAL MEDICINE

## 2025-06-21 PROCEDURE — 2500000004 HC RX 250 GENERAL PHARMACY W/ HCPCS (ALT 636 FOR OP/ED): Performed by: INTERNAL MEDICINE

## 2025-06-21 PROCEDURE — 2500000001 HC RX 250 WO HCPCS SELF ADMINISTERED DRUGS (ALT 637 FOR MEDICARE OP): Performed by: INTERNAL MEDICINE

## 2025-06-21 PROCEDURE — 85027 COMPLETE CBC AUTOMATED: CPT | Performed by: INTERNAL MEDICINE

## 2025-06-21 RX ADMIN — PIPERACILLIN SODIUM AND TAZOBACTAM SODIUM 3.38 G: 3; .375 INJECTION, SOLUTION INTRAVENOUS at 00:12

## 2025-06-21 RX ADMIN — POTASSIUM CHLORIDE 20 MEQ: 1500 TABLET, EXTENDED RELEASE ORAL at 09:43

## 2025-06-21 RX ADMIN — PIPERACILLIN SODIUM AND TAZOBACTAM SODIUM 3.38 G: 3; .375 INJECTION, SOLUTION INTRAVENOUS at 18:22

## 2025-06-21 RX ADMIN — PIPERACILLIN SODIUM AND TAZOBACTAM SODIUM 3.38 G: 3; .375 INJECTION, SOLUTION INTRAVENOUS at 06:19

## 2025-06-21 RX ADMIN — FUROSEMIDE 40 MG: 40 TABLET ORAL at 09:43

## 2025-06-21 RX ADMIN — Medication 10 L/MIN: at 03:25

## 2025-06-21 RX ADMIN — APIXABAN 2.5 MG: 2.5 TABLET, FILM COATED ORAL at 20:51

## 2025-06-21 RX ADMIN — GABAPENTIN 300 MG: 300 CAPSULE ORAL at 20:51

## 2025-06-21 RX ADMIN — DAPAGLIFLOZIN 5 MG: 10 TABLET, FILM COATED ORAL at 09:43

## 2025-06-21 RX ADMIN — IPRATROPIUM BROMIDE 0.5 MG: 0.5 SOLUTION RESPIRATORY (INHALATION) at 20:11

## 2025-06-21 RX ADMIN — Medication 50 MCG: at 09:43

## 2025-06-21 RX ADMIN — SENNOSIDES AND DOCUSATE SODIUM 2 TABLET: 50; 8.6 TABLET ORAL at 09:43

## 2025-06-21 RX ADMIN — PANTOPRAZOLE SODIUM 40 MG: 40 TABLET, DELAYED RELEASE ORAL at 06:19

## 2025-06-21 RX ADMIN — ATORVASTATIN CALCIUM 40 MG: 40 TABLET, FILM COATED ORAL at 20:51

## 2025-06-21 RX ADMIN — GABAPENTIN 300 MG: 300 CAPSULE ORAL at 09:43

## 2025-06-21 RX ADMIN — IPRATROPIUM BROMIDE 0.5 MG: 0.5 SOLUTION RESPIRATORY (INHALATION) at 12:14

## 2025-06-21 RX ADMIN — Medication 7 L/MIN: at 20:11

## 2025-06-21 RX ADMIN — SENNOSIDES AND DOCUSATE SODIUM 2 TABLET: 50; 8.6 TABLET ORAL at 20:51

## 2025-06-21 RX ADMIN — OXYBUTYNIN CHLORIDE 5 MG: 5 TABLET ORAL at 20:51

## 2025-06-21 RX ADMIN — GUAIFENESIN 600 MG: 600 TABLET, EXTENDED RELEASE ORAL at 06:19

## 2025-06-21 RX ADMIN — APIXABAN 2.5 MG: 2.5 TABLET, FILM COATED ORAL at 09:43

## 2025-06-21 RX ADMIN — IPRATROPIUM BROMIDE 0.5 MG: 0.5 SOLUTION RESPIRATORY (INHALATION) at 14:54

## 2025-06-21 RX ADMIN — INSULIN LISPRO 2 UNITS: 100 INJECTION, SOLUTION INTRAVENOUS; SUBCUTANEOUS at 16:19

## 2025-06-21 RX ADMIN — GUAIFENESIN 600 MG: 600 TABLET, EXTENDED RELEASE ORAL at 18:22

## 2025-06-21 RX ADMIN — AMIODARONE HYDROCHLORIDE 100 MG: 200 TABLET ORAL at 09:43

## 2025-06-21 RX ADMIN — GABAPENTIN 300 MG: 300 CAPSULE ORAL at 14:52

## 2025-06-21 RX ADMIN — Medication 7 L/MIN: at 14:54

## 2025-06-21 RX ADMIN — OXYBUTYNIN CHLORIDE 5 MG: 5 TABLET ORAL at 09:43

## 2025-06-21 RX ADMIN — IPRATROPIUM BROMIDE 0.5 MG: 0.5 SOLUTION RESPIRATORY (INHALATION) at 07:48

## 2025-06-21 RX ADMIN — PIPERACILLIN SODIUM AND TAZOBACTAM SODIUM 3.38 G: 3; .375 INJECTION, SOLUTION INTRAVENOUS at 11:55

## 2025-06-21 RX ADMIN — Medication 7 L/MIN: at 07:48

## 2025-06-21 ASSESSMENT — COGNITIVE AND FUNCTIONAL STATUS - GENERAL
MOBILITY SCORE: 21
WALKING IN HOSPITAL ROOM: A LITTLE
DAILY ACTIVITIY SCORE: 24
CLIMB 3 TO 5 STEPS WITH RAILING: A LOT

## 2025-06-21 ASSESSMENT — PAIN SCALES - GENERAL
PAINLEVEL_OUTOF10: 0 - NO PAIN
PAINLEVEL_OUTOF10: 0 - NO PAIN

## 2025-06-21 ASSESSMENT — PAIN - FUNCTIONAL ASSESSMENT: PAIN_FUNCTIONAL_ASSESSMENT: 0-10

## 2025-06-21 NOTE — PROGRESS NOTES
"Lissett Rodríguez is a 89 y.o. female on day 2 of admission presenting with Pneumonia due to organism.    Subjective   Patient seen at bedside, weaned to 7L NC. She reports having a few episodes of cough productive of brown sputum with one episode that had some light blood streaks. No fever, chills, sweats, SOB or wheezing. ROS otherwise negative.       Objective     Physical Exam  Constitutional:       General: She is not in acute distress.     Appearance: Normal appearance. She is not toxic-appearing.   HENT:      Head: Normocephalic and atraumatic.      Ears:      Comments: Hard of hearing     Nose: No rhinorrhea.      Mouth/Throat:      Mouth: Mucous membranes are moist.   Eyes:      General: No scleral icterus.  Cardiovascular:      Rate and Rhythm: Normal rate and regular rhythm.      Heart sounds: No murmur heard.     No friction rub. No gallop.   Pulmonary:      Comments: Good air entry bilaterally, fine bilateral crackles bilateral upper lobes, no wheezes or ronchi  Abdominal:      General: There is no distension.   Musculoskeletal:      Right lower leg: No edema.      Left lower leg: No edema.   Skin:     General: Skin is warm and dry.   Neurological:      General: No focal deficit present.      Mental Status: She is alert and oriented to person, place, and time.   Psychiatric:         Mood and Affect: Mood normal.         Behavior: Behavior normal.         Thought Content: Thought content normal.         Judgment: Judgment normal.         Last Recorded Vitals  Blood pressure 123/69, pulse 79, temperature 36.9 °C (98.4 °F), temperature source Temporal, resp. rate 23, height 1.499 m (4' 11\"), weight 58.5 kg (129 lb), SpO2 96%.  Intake/Output last 3 Shifts:  I/O last 3 completed shifts:  In: 360 (6.2 mL/kg) [P.O.:360]  Out: 350 (6 mL/kg) [Urine:350 (0.2 mL/kg/hr)]  Weight: 58.5 kg     Relevant Results               Results for orders placed or performed during the hospital encounter of 06/19/25 (from the past " 24 hours)   POCT GLUCOSE   Result Value Ref Range    POCT Glucose 152 (H) 74 - 99 mg/dL   Legionella Antigen, Urine    Specimen: Clean Catch/Voided; Urine   Result Value Ref Range    L. pneumophila Urine Ag Negative Negative   Streptococcus pneumoniae Antigen, Urine    Specimen: Clean Catch/Voided; Urine   Result Value Ref Range    Streptococcus pneumoniae Ag, Urine Negative Negative   POCT GLUCOSE   Result Value Ref Range    POCT Glucose 136 (H) 74 - 99 mg/dL   CBC   Result Value Ref Range    WBC 11.8 (H) 4.4 - 11.3 x10*3/uL    nRBC 0.0 0.0 - 0.0 /100 WBCs    RBC 4.83 4.00 - 5.20 x10*6/uL    Hemoglobin 13.8 12.0 - 16.0 g/dL    Hematocrit 42.9 36.0 - 46.0 %    MCV 89 80 - 100 fL    MCH 28.6 26.0 - 34.0 pg    MCHC 32.2 32.0 - 36.0 g/dL    RDW 13.6 11.5 - 14.5 %    Platelets 198 150 - 450 x10*3/uL   Renal Function Panel   Result Value Ref Range    Glucose 129 (H) 74 - 99 mg/dL    Sodium 137 136 - 145 mmol/L    Potassium 3.8 3.5 - 5.3 mmol/L    Chloride 104 98 - 107 mmol/L    Bicarbonate 22 21 - 32 mmol/L    Anion Gap 15 10 - 20 mmol/L    Urea Nitrogen 21 6 - 23 mg/dL    Creatinine 1.02 0.50 - 1.05 mg/dL    eGFR 52 (L) >60 mL/min/1.73m*2    Calcium 8.4 (L) 8.6 - 10.3 mg/dL    Phosphorus 3.3 2.5 - 4.9 mg/dL    Albumin 3.3 (L) 3.4 - 5.0 g/dL   POCT GLUCOSE   Result Value Ref Range    POCT Glucose 136 (H) 74 - 99 mg/dL   POCT GLUCOSE   Result Value Ref Range    POCT Glucose 155 (H) 74 - 99 mg/dL     *Note: Due to a large number of results and/or encounters for the requested time period, some results have not been displayed. A complete set of results can be found in Results Review.                   Assessment & Plan  Pneumonia due to organism    Lissett DOUGLAS Marcos is a 89 y.o. female former smoker (35 pk/yr) with history of DM, KALPANA on CPAP, COPD with hyperinflation on 6L O2 at baseline, HFpEF, afib and prior PE on Eliquis (failed Xarelto), and recent admission for COPD exacerbation presenting with fatigue and increased  sputum production. Pulmonary is consulted for further management. Clinical picture is consistent with bacterial PNA.     #Acute on chronic respiratory failure with hypoxemia due to PNA/COPD  #HAP  #COPD with hyperinflation - not in exacerbation  #History of PE on Eliquis  #Mild hemoptysis - streaks of blood. Likely due to airway irritation from PNA     Recommendations:  -Agree with Zosyn for HAP coverage  -On Spiriva at baseline, ok for atrovent while inpatient with PRN albuterol  -Follow up sputum culture  -Flutter valve for secretion clearance  -IS/out of bed as tolerated  -Wean O2 as tolerated, goal 88-92%  -Continue Eliquis for AC, hold if hemoptysis increases  -Follow up with Dr. Brown after dc     Will continue to follow      I spent 35 minutes in the professional and overall care of this patient.      Corinne Fontenot, DO

## 2025-06-21 NOTE — CARE PLAN
The patient's goals for the shift include      The clinical goals for the shift include Patient will remain safe in hospital.

## 2025-06-21 NOTE — CARE PLAN
The patient's goals for the shift include      The clinical goals for the shift include  patient will remain safe in hospital.    Care plan progressing. Will continue to monitor.

## 2025-06-21 NOTE — PROGRESS NOTES
Lissett Rodríguez is a 89 y.o. female on day 2 of admission presenting with Pneumonia due to organism.      Subjective   On 8L this morning and reduced to 6L. Sh efeels better. Tolerating PO intake. No overnight events reported.        Objective     Last Recorded Vitals  /69 (BP Location: Left arm, Patient Position: Sitting)   Pulse 79   Temp 36.9 °C (98.4 °F) (Temporal)   Resp 23   Wt 58.5 kg (129 lb)   SpO2 96%   Intake/Output last 3 Shifts:    Intake/Output Summary (Last 24 hours) at 6/21/2025 1641  Last data filed at 6/21/2025 1528  Gross per 24 hour   Intake 120 ml   Output 1300 ml   Net -1180 ml       Admission Weight  Weight: 68 kg (150 lb) (06/19/25 1113)    Daily Weight  06/20/25 : 58.5 kg (129 lb)    Image Results  Electrocardiogram, 12-lead PRN ACS symptoms  Normal sinus rhythm  Normal ECG  When compared with ECG of 08-JUN-2025 14:31,  No significant change was found      Physical Exam  Constitutional:       Appearance: Normal appearance.   Cardiovascular:      Rate and Rhythm: Normal rate and regular rhythm.   Pulmonary:      Effort: Pulmonary effort is normal.      Breath sounds: Rhonchi present.   Abdominal:      General: Abdomen is flat. Bowel sounds are normal.      Palpations: Abdomen is soft.   Musculoskeletal:      Cervical back: Normal range of motion.   Skin:     General: Skin is warm.   Neurological:      General: No focal deficit present.      Mental Status: She is alert and oriented to person, place, and time. Mental status is at baseline.   Psychiatric:         Mood and Affect: Mood normal.         Behavior: Behavior normal.         Thought Content: Thought content normal.         Judgment: Judgment normal.         Relevant Results    Assessment & Plan  Pneumonia due to organism      6/21:  Cont iv abx, wean o2 to home 6L, f/up pulm recs.   Pt/ot  [facility] if [facility] by [facility]m and stable on 6L.       6/20:  Gram negative PNA... 10L NC (home o2 6L).... cont iv  abx, f/up pulm.   She has brown sputum from infection, not hemoptysis... restart home eliquis.   Hx COPD... no bronchospasm currently.   Hx afib... eliquis, amio.   CKD stable.   Hx TIA... eliquis, statin.   Hx HFpEF... appears euvolemic... cont home lasix, farxiga, potassium.   Hx DM, A1c 7.3 12/2024... farxiga, ss.   Hx KALPANA... nocturnal bipap.   Pt recently discharged 6/10.   Home regimen for chronic conditions  Pt/ot  Dvt px covered with eliquis.   Pt lives in [facility], uses cane/walker baseline.                Noel Babin MD

## 2025-06-21 NOTE — CARE PLAN
The patient's goals for the shift include  no sob    The clinical goals for the shift include Patient will remain stable throughout shift.      Problem: Safety - Adult  Goal: Free from fall injury  Outcome: Progressing     Problem: Discharge Planning  Goal: Discharge to home or other facility with appropriate resources  Outcome: Progressing     Problem: Chronic Conditions and Co-morbidities  Goal: Patient's chronic conditions and co-morbidity symptoms are monitored and maintained or improved  Outcome: Progressing     Problem: Nutrition  Goal: Nutrient intake appropriate for maintaining nutritional needs  Outcome: Progressing     Problem: Skin  Goal: Decreased wound size/increased tissue granulation at next dressing change  Outcome: Progressing     Problem: Respiratory  Goal: No signs of respiratory distress (eg. Use of accessory muscles. Peds grunting)  Outcome: Progressing

## 2025-06-22 VITALS
HEART RATE: 70 BPM | OXYGEN SATURATION: 96 % | WEIGHT: 129 LBS | RESPIRATION RATE: 16 BRPM | SYSTOLIC BLOOD PRESSURE: 108 MMHG | TEMPERATURE: 97.4 F | DIASTOLIC BLOOD PRESSURE: 55 MMHG | HEIGHT: 59 IN | BODY MASS INDEX: 26 KG/M2

## 2025-06-22 LAB
ALBUMIN SERPL BCP-MCNC: 3.4 G/DL (ref 3.4–5)
ANION GAP SERPL CALC-SCNC: 16 MMOL/L (ref 10–20)
BACTERIA BLD CULT: NORMAL
BACTERIA BLD CULT: NORMAL
BACTERIA SPEC RESP CULT: NORMAL
BUN SERPL-MCNC: 20 MG/DL (ref 6–23)
CALCIUM SERPL-MCNC: 8.4 MG/DL (ref 8.6–10.3)
CHLORIDE SERPL-SCNC: 103 MMOL/L (ref 98–107)
CO2 SERPL-SCNC: 22 MMOL/L (ref 21–32)
CREAT SERPL-MCNC: 0.97 MG/DL (ref 0.5–1.05)
EGFRCR SERPLBLD CKD-EPI 2021: 55 ML/MIN/1.73M*2
ERYTHROCYTE [DISTWIDTH] IN BLOOD BY AUTOMATED COUNT: 13.5 % (ref 11.5–14.5)
GLUCOSE BLD MANUAL STRIP-MCNC: 132 MG/DL (ref 74–99)
GLUCOSE BLD MANUAL STRIP-MCNC: 171 MG/DL (ref 74–99)
GLUCOSE BLD MANUAL STRIP-MCNC: 197 MG/DL (ref 74–99)
GLUCOSE SERPL-MCNC: 129 MG/DL (ref 74–99)
GRAM STN SPEC: NORMAL
GRAM STN SPEC: NORMAL
HCT VFR BLD AUTO: 40.8 % (ref 36–46)
HGB BLD-MCNC: 13.7 G/DL (ref 12–16)
MCH RBC QN AUTO: 28.9 PG (ref 26–34)
MCHC RBC AUTO-ENTMCNC: 33.6 G/DL (ref 32–36)
MCV RBC AUTO: 86 FL (ref 80–100)
NRBC BLD-RTO: 0 /100 WBCS (ref 0–0)
PHOSPHATE SERPL-MCNC: 3.4 MG/DL (ref 2.5–4.9)
PLATELET # BLD AUTO: 223 X10*3/UL (ref 150–450)
POTASSIUM SERPL-SCNC: 3.7 MMOL/L (ref 3.5–5.3)
RBC # BLD AUTO: 4.74 X10*6/UL (ref 4–5.2)
SODIUM SERPL-SCNC: 137 MMOL/L (ref 136–145)
WBC # BLD AUTO: 11.9 X10*3/UL (ref 4.4–11.3)

## 2025-06-22 PROCEDURE — 2500000002 HC RX 250 W HCPCS SELF ADMINISTERED DRUGS (ALT 637 FOR MEDICARE OP, ALT 636 FOR OP/ED): Performed by: INTERNAL MEDICINE

## 2025-06-22 PROCEDURE — 82947 ASSAY GLUCOSE BLOOD QUANT: CPT

## 2025-06-22 PROCEDURE — 2500000004 HC RX 250 GENERAL PHARMACY W/ HCPCS (ALT 636 FOR OP/ED): Performed by: INTERNAL MEDICINE

## 2025-06-22 PROCEDURE — 94640 AIRWAY INHALATION TREATMENT: CPT

## 2025-06-22 PROCEDURE — 2500000001 HC RX 250 WO HCPCS SELF ADMINISTERED DRUGS (ALT 637 FOR MEDICARE OP): Performed by: INTERNAL MEDICINE

## 2025-06-22 PROCEDURE — 85027 COMPLETE CBC AUTOMATED: CPT | Performed by: INTERNAL MEDICINE

## 2025-06-22 PROCEDURE — 94668 MNPJ CHEST WALL SBSQ: CPT

## 2025-06-22 PROCEDURE — 36415 COLL VENOUS BLD VENIPUNCTURE: CPT | Performed by: INTERNAL MEDICINE

## 2025-06-22 PROCEDURE — 2500000001 HC RX 250 WO HCPCS SELF ADMINISTERED DRUGS (ALT 637 FOR MEDICARE OP)

## 2025-06-22 PROCEDURE — 99232 SBSQ HOSP IP/OBS MODERATE 35: CPT | Performed by: INTERNAL MEDICINE

## 2025-06-22 PROCEDURE — 2500000005 HC RX 250 GENERAL PHARMACY W/O HCPCS: Performed by: INTERNAL MEDICINE

## 2025-06-22 PROCEDURE — 80069 RENAL FUNCTION PANEL: CPT | Performed by: INTERNAL MEDICINE

## 2025-06-22 PROCEDURE — 94660 CPAP INITIATION&MGMT: CPT

## 2025-06-22 PROCEDURE — 9420000001 HC RT PATIENT EDUCATION 5 MIN

## 2025-06-22 PROCEDURE — 1200000002 HC GENERAL ROOM WITH TELEMETRY DAILY

## 2025-06-22 RX ADMIN — Medication 10 L/MIN: at 02:59

## 2025-06-22 RX ADMIN — PIPERACILLIN SODIUM AND TAZOBACTAM SODIUM 3.38 G: 3; .375 INJECTION, SOLUTION INTRAVENOUS at 00:00

## 2025-06-22 RX ADMIN — PIPERACILLIN SODIUM AND TAZOBACTAM SODIUM 3.38 G: 3; .375 INJECTION, SOLUTION INTRAVENOUS at 17:52

## 2025-06-22 RX ADMIN — SENNOSIDES AND DOCUSATE SODIUM 2 TABLET: 50; 8.6 TABLET ORAL at 21:00

## 2025-06-22 RX ADMIN — PIPERACILLIN SODIUM AND TAZOBACTAM SODIUM 3.38 G: 3; .375 INJECTION, SOLUTION INTRAVENOUS at 12:44

## 2025-06-22 RX ADMIN — IPRATROPIUM BROMIDE 0.5 MG: 0.5 SOLUTION RESPIRATORY (INHALATION) at 10:56

## 2025-06-22 RX ADMIN — FUROSEMIDE 40 MG: 40 TABLET ORAL at 09:57

## 2025-06-22 RX ADMIN — Medication 10 L/MIN: at 23:14

## 2025-06-22 RX ADMIN — PANTOPRAZOLE SODIUM 40 MG: 40 INJECTION, POWDER, FOR SOLUTION INTRAVENOUS at 06:52

## 2025-06-22 RX ADMIN — OXYBUTYNIN CHLORIDE 5 MG: 5 TABLET ORAL at 09:56

## 2025-06-22 RX ADMIN — APIXABAN 2.5 MG: 2.5 TABLET, FILM COATED ORAL at 09:56

## 2025-06-22 RX ADMIN — APIXABAN 2.5 MG: 2.5 TABLET, FILM COATED ORAL at 21:00

## 2025-06-22 RX ADMIN — GUAIFENESIN 600 MG: 600 TABLET, EXTENDED RELEASE ORAL at 17:52

## 2025-06-22 RX ADMIN — INSULIN LISPRO 2 UNITS: 100 INJECTION, SOLUTION INTRAVENOUS; SUBCUTANEOUS at 17:52

## 2025-06-22 RX ADMIN — PIPERACILLIN SODIUM AND TAZOBACTAM SODIUM 3.38 G: 3; .375 INJECTION, SOLUTION INTRAVENOUS at 06:52

## 2025-06-22 RX ADMIN — OXYBUTYNIN CHLORIDE 5 MG: 5 TABLET ORAL at 21:00

## 2025-06-22 RX ADMIN — Medication 50 MCG: at 09:56

## 2025-06-22 RX ADMIN — Medication 6 L/MIN: at 08:00

## 2025-06-22 RX ADMIN — GUAIFENESIN 600 MG: 600 TABLET, EXTENDED RELEASE ORAL at 06:52

## 2025-06-22 RX ADMIN — AMIODARONE HYDROCHLORIDE 100 MG: 200 TABLET ORAL at 09:56

## 2025-06-22 RX ADMIN — Medication 8 L/MIN: at 20:16

## 2025-06-22 RX ADMIN — GABAPENTIN 300 MG: 300 CAPSULE ORAL at 14:22

## 2025-06-22 RX ADMIN — IPRATROPIUM BROMIDE 0.5 MG: 0.5 SOLUTION RESPIRATORY (INHALATION) at 13:32

## 2025-06-22 RX ADMIN — DAPAGLIFLOZIN 5 MG: 10 TABLET, FILM COATED ORAL at 09:56

## 2025-06-22 RX ADMIN — IPRATROPIUM BROMIDE 0.5 MG: 0.5 SOLUTION RESPIRATORY (INHALATION) at 20:16

## 2025-06-22 RX ADMIN — GABAPENTIN 300 MG: 300 CAPSULE ORAL at 21:00

## 2025-06-22 RX ADMIN — IPRATROPIUM BROMIDE 0.5 MG: 0.5 SOLUTION RESPIRATORY (INHALATION) at 08:00

## 2025-06-22 RX ADMIN — GABAPENTIN 300 MG: 300 CAPSULE ORAL at 09:56

## 2025-06-22 RX ADMIN — ATORVASTATIN CALCIUM 40 MG: 40 TABLET, FILM COATED ORAL at 21:00

## 2025-06-22 RX ADMIN — POTASSIUM CHLORIDE 20 MEQ: 1500 TABLET, EXTENDED RELEASE ORAL at 09:56

## 2025-06-22 ASSESSMENT — PAIN SCALES - GENERAL
PAINLEVEL_OUTOF10: 0 - NO PAIN
PAINLEVEL_OUTOF10: 0 - NO PAIN

## 2025-06-22 NOTE — PROGRESS NOTES
06/22/25 1316   Discharge Planning   Assistance Needed discharge     Pt on 6 L and has one more day of ATB. Plan is to discharge to [facility] tomorrow.

## 2025-06-22 NOTE — PROGRESS NOTES
Lissett Rodríguez is a 89 y.o. female on day 3 of admission presenting with Pneumonia due to organism.      Subjective   Tolerating home o2 6L. Sitting in chair writing in her journal. Feels better, Afebrile, no overnight events reported. Hesitant to go home today, prefers more iv abx and dc tomorrow.        Objective     Last Recorded Vitals  /58 (BP Location: Right arm, Patient Position: Lying)   Pulse 68   Temp 36.4 °C (97.5 °F) (Temporal)   Resp 17   Wt 58.5 kg (129 lb)   SpO2 94%   Intake/Output last 3 Shifts:    Intake/Output Summary (Last 24 hours) at 6/22/2025 0965  Last data filed at 6/22/2025 0754  Gross per 24 hour   Intake 980 ml   Output 1300 ml   Net -320 ml       Admission Weight  Weight: 68 kg (150 lb) (06/19/25 1113)    Daily Weight  06/20/25 : 58.5 kg (129 lb)    Image Results  Electrocardiogram, 12-lead PRN ACS symptoms  Normal sinus rhythm  Normal ECG  When compared with ECG of 08-JUN-2025 14:31,  No significant change was found      Physical Exam  Constitutional:       Appearance: Normal appearance.   Cardiovascular:      Rate and Rhythm: Normal rate and regular rhythm.   Pulmonary:      Effort: Pulmonary effort is normal.      Breath sounds: Rhonchi present.   Abdominal:      General: Abdomen is flat. Bowel sounds are normal.      Palpations: Abdomen is soft.   Musculoskeletal:      Cervical back: Normal range of motion.   Skin:     General: Skin is warm.   Neurological:      General: No focal deficit present.      Mental Status: She is alert and oriented to person, place, and time. Mental status is at baseline.   Psychiatric:         Mood and Affect: Mood normal.         Behavior: Behavior normal.         Thought Content: Thought content normal.         Judgment: Judgment normal.         Relevant Results    Assessment & Plan  Pneumonia due to organism        6/22:  PNA... tolerating home o2 6L.   Cont iv abx for 1 more day and plan to dc to [facility] tomorrow.       6/21:  Cont iv abx,  wean o2 to home 6L, f/up pulm recs.   Pt/ot  Dispo to [facility] hopefully tomorrow if cleared by pulm and stable on 6L.       6/20:  Gram negative PNA... 10L NC (home o2 6L).... cont iv abx, f/up pulm.   She has brown sputum from infection, not hemoptysis... restart home eliquis.   Hx COPD... no bronchospasm currently.   Hx afib... eliquis, amio.   CKD stable.   Hx TIA... eliquis, statin.   Hx HFpEF... appears euvolemic... cont home lasix, farxiga, potassium.   Hx DM, A1c 7.3 12/2024... farxiga, ss.   Hx KALPANA... nocturnal bipap.   Pt recently discharged 6/10.   Home regimen for chronic conditions  Pt/ot  Dvt px covered with eliquis.   Pt lives in [facility], uses cane/walker baseline.                Noel Babin MD

## 2025-06-23 VITALS
HEART RATE: 87 BPM | HEIGHT: 59 IN | RESPIRATION RATE: 17 BRPM | SYSTOLIC BLOOD PRESSURE: 133 MMHG | TEMPERATURE: 97.9 F | DIASTOLIC BLOOD PRESSURE: 74 MMHG | BODY MASS INDEX: 26 KG/M2 | OXYGEN SATURATION: 93 % | WEIGHT: 129 LBS

## 2025-06-23 LAB
BACTERIA BLD CULT: NORMAL
BACTERIA BLD CULT: NORMAL
BACTERIA SPEC RESP CULT: NORMAL
GLUCOSE BLD MANUAL STRIP-MCNC: 150 MG/DL (ref 74–99)
GLUCOSE BLD MANUAL STRIP-MCNC: 165 MG/DL (ref 74–99)
GRAM STN SPEC: NORMAL
GRAM STN SPEC: NORMAL

## 2025-06-23 PROCEDURE — 94660 CPAP INITIATION&MGMT: CPT

## 2025-06-23 PROCEDURE — 2500000004 HC RX 250 GENERAL PHARMACY W/ HCPCS (ALT 636 FOR OP/ED): Performed by: INTERNAL MEDICINE

## 2025-06-23 PROCEDURE — 2500000001 HC RX 250 WO HCPCS SELF ADMINISTERED DRUGS (ALT 637 FOR MEDICARE OP): Performed by: INTERNAL MEDICINE

## 2025-06-23 PROCEDURE — 2500000005 HC RX 250 GENERAL PHARMACY W/O HCPCS: Performed by: INTERNAL MEDICINE

## 2025-06-23 PROCEDURE — 99232 SBSQ HOSP IP/OBS MODERATE 35: CPT | Performed by: CLINICAL NURSE SPECIALIST

## 2025-06-23 PROCEDURE — 82947 ASSAY GLUCOSE BLOOD QUANT: CPT

## 2025-06-23 PROCEDURE — 2500000002 HC RX 250 W HCPCS SELF ADMINISTERED DRUGS (ALT 637 FOR MEDICARE OP, ALT 636 FOR OP/ED): Performed by: INTERNAL MEDICINE

## 2025-06-23 PROCEDURE — 2500000001 HC RX 250 WO HCPCS SELF ADMINISTERED DRUGS (ALT 637 FOR MEDICARE OP)

## 2025-06-23 PROCEDURE — 99239 HOSP IP/OBS DSCHRG MGMT >30: CPT | Performed by: STUDENT IN AN ORGANIZED HEALTH CARE EDUCATION/TRAINING PROGRAM

## 2025-06-23 PROCEDURE — 94640 AIRWAY INHALATION TREATMENT: CPT

## 2025-06-23 RX ADMIN — INSULIN LISPRO 2 UNITS: 100 INJECTION, SOLUTION INTRAVENOUS; SUBCUTANEOUS at 12:30

## 2025-06-23 RX ADMIN — APIXABAN 2.5 MG: 2.5 TABLET, FILM COATED ORAL at 09:33

## 2025-06-23 RX ADMIN — GABAPENTIN 300 MG: 300 CAPSULE ORAL at 09:33

## 2025-06-23 RX ADMIN — PIPERACILLIN SODIUM AND TAZOBACTAM SODIUM 3.38 G: 3; .375 INJECTION, SOLUTION INTRAVENOUS at 00:59

## 2025-06-23 RX ADMIN — Medication 50 MCG: at 09:33

## 2025-06-23 RX ADMIN — GUAIFENESIN 600 MG: 600 TABLET, EXTENDED RELEASE ORAL at 06:18

## 2025-06-23 RX ADMIN — DAPAGLIFLOZIN 5 MG: 10 TABLET, FILM COATED ORAL at 09:33

## 2025-06-23 RX ADMIN — Medication 6 L/MIN: at 11:36

## 2025-06-23 RX ADMIN — IPRATROPIUM BROMIDE 0.5 MG: 0.5 SOLUTION RESPIRATORY (INHALATION) at 08:00

## 2025-06-23 RX ADMIN — FUROSEMIDE 40 MG: 40 TABLET ORAL at 09:32

## 2025-06-23 RX ADMIN — SENNOSIDES AND DOCUSATE SODIUM 2 TABLET: 50; 8.6 TABLET ORAL at 09:33

## 2025-06-23 RX ADMIN — POTASSIUM CHLORIDE 20 MEQ: 1500 TABLET, EXTENDED RELEASE ORAL at 09:32

## 2025-06-23 RX ADMIN — AMIODARONE HYDROCHLORIDE 100 MG: 200 TABLET ORAL at 09:32

## 2025-06-23 RX ADMIN — PIPERACILLIN SODIUM AND TAZOBACTAM SODIUM 3.38 G: 3; .375 INJECTION, SOLUTION INTRAVENOUS at 06:19

## 2025-06-23 RX ADMIN — OXYBUTYNIN CHLORIDE 5 MG: 5 TABLET ORAL at 09:33

## 2025-06-23 RX ADMIN — IPRATROPIUM BROMIDE 0.5 MG: 0.5 SOLUTION RESPIRATORY (INHALATION) at 11:17

## 2025-06-23 RX ADMIN — PANTOPRAZOLE SODIUM 40 MG: 40 INJECTION, POWDER, FOR SOLUTION INTRAVENOUS at 06:18

## 2025-06-23 RX ADMIN — Medication 10 L/MIN: at 03:18

## 2025-06-23 ASSESSMENT — COGNITIVE AND FUNCTIONAL STATUS - GENERAL
DAILY ACTIVITIY SCORE: 23
WALKING IN HOSPITAL ROOM: A LITTLE
DRESSING REGULAR LOWER BODY CLOTHING: A LITTLE
CLIMB 3 TO 5 STEPS WITH RAILING: A LOT
MOBILITY SCORE: 21

## 2025-06-23 ASSESSMENT — PAIN SCALES - GENERAL
PAINLEVEL_OUTOF10: 0 - NO PAIN
PAINLEVEL_OUTOF10: 0 - NO PAIN

## 2025-06-23 ASSESSMENT — PAIN - FUNCTIONAL ASSESSMENT: PAIN_FUNCTIONAL_ASSESSMENT: 0-10

## 2025-06-23 NOTE — DISCHARGE INSTRUCTIONS
Mr. Rodríguez it was a pleasure taking care of you.   You were admitted for pneumonia and treated with a full course of IV antibiotics. Please follow up with your PCP.   Best,  Your Internal Medicine Team

## 2025-06-23 NOTE — CARE PLAN
The patient's goals for the shift include      The clinical goals for the shift include safety with transfers to bathroom      Problem: Chronic Conditions and Co-morbidities  Goal: Patient's chronic conditions and co-morbidity symptoms are monitored and maintained or improved  Outcome: Progressing     Problem: Safety - Adult  Goal: Free from fall injury  Outcome: Progressing

## 2025-06-23 NOTE — PROGRESS NOTES
06/23/25 1227   Discharge Planning   Expected Discharge Disposition Home  ([facility])   Does the patient need discharge transport arranged? No     [facility] spoke with nurse Vazquez at [facility]. Pt is at her baseline 6 L oxygen. No barriers to return.

## 2025-06-23 NOTE — PROGRESS NOTES
"Lissett Rodríguez is a 89 y.o. female on day 4 of admission presenting with Pneumonia due to organism.    Subjective   No acute events documented overnight.  Spoke with bedside RN who reported patient desaturated to 65 today with recovery within a minute during exertion/dressing.  Patient was on 8 L and placed back to baseline 6 L.  RT rechecked after 20 minutes patient satting 90%.  Discussed the patient's goal O2 saturation is 88-92.  Patient reports that she is feeling better without any shortness of breath at rest and + MANUEL that she feels is close to baseline.  Denies fever, chills, nausea and emesis.  Continues to have a cough where she reports green/yellow sputum.  Denies hemoptysis.  Patient feels that she is ready to go home.  She reports following with Dr. Brown and we discussed continued follow-up with Dr. Brown after discharge.    Objective   Physical Exam  Constitutional: Chronically ill-appearing, sitting up in the chair, mild increased work of breathing without acute distress, cooperative  HENT: Atraumatic, moist mucous membranes, hard of hearing  Eyes: Nonicteric  Neck: Supple, no JVD  Cardiovascular: Regular rate and rhythm, no murmur appreciated  Pulmonary: Fair air entry with fine bilateral crackles bilateral upper lobes; no wheezing noted  Abdominal: Soft, nontender, nondistended, + BS  Musculoskeletal: Fair active range of motion with fair strength  Extremities:   No BLE edema  Lymphadenopathy: No nuchal LAP  Skin: Warm and dry  Neurological: Alert and oriented with clear and appropriate speech  Psychiatric:     Appropriate mood and behavior    Medications:  Scheduled Medications[1]   PRN Medications[2]     Last Recorded Vitals  Blood pressure 115/67, pulse 70, temperature 36.6 °C (97.9 °F), temperature source Temporal, resp. rate 17, height 1.499 m (4' 11\"), weight 58.5 kg (129 lb), SpO2 98%.  Intake/Output last 3 Shifts:  I/O last 3 completed shifts:  In: 1735 (29.7 mL/kg) [P.O.:1535; IV " Piggyback:200]  Out: 450 (7.7 mL/kg) [Urine:450 (0.2 mL/kg/hr)]  Weight: 58.5 kg     Relevant Results  Results from last 7 days   Lab Units 06/22/25  0619 06/21/25  0800 06/20/25  0552   WBC AUTO x10*3/uL 11.9* 11.8* 11.7*   HEMOGLOBIN g/dL 13.7 13.8 13.1   HEMATOCRIT % 40.8 42.9 39.9   PLATELETS AUTO x10*3/uL 223 198 203      Results from last 7 days   Lab Units 06/22/25  0619 06/21/25  0800 06/20/25  0552 06/19/25  1152   SODIUM mmol/L 137 137 140 136   POTASSIUM mmol/L 3.7 3.8 4.0 3.9   CHLORIDE mmol/L 103 104 106 102   CO2 mmol/L 22 22 23 23   BUN mg/dL 20 21 17 18   CREATININE mg/dL 0.97 1.02 0.95 1.03   CALCIUM mg/dL 8.4* 8.4* 8.3* 8.7   PROTEIN TOTAL g/dL  --   --   --  6.6   BILIRUBIN TOTAL mg/dL  --   --   --  0.9   ALK PHOS U/L  --   --   --  74   ALT U/L  --   --   --  9   AST U/L  --   --   --  8*   GLUCOSE mg/dL 129* 129* 112* 160*       CT angio chest for pulmonary embolism  Result Date: 6/19/2025  Interpreted By:  Robert Pedro, STUDY: CT ANGIO CHEST FOR PULMONARY EMBOLISM;  6/19/2025 1:18 pm   INDICATION: Signs/Symptoms:hemoptysis.   COMPARISON: 06/08/2025.   ACCESSION NUMBER(S): [id number]   ORDERING CLINICIAN: RYAN MCNALLY   TECHNIQUE: CT angiography (non-coronary) of the chest was performed with Intravenous contrast material along with 3D (maximum intensity projection - MIP) image post processing and coronal reformatted images. 75 ml Omnipaque 350 was injected intravenously.   All CT examinations are performed with one or more of the following dose reduction techniques: Automated Exposure Control, adjustment of mA and/or kV according to patient size, or use of iterative reconstruction techniques.   FINDINGS: Findings of vascular structures: The heart is mildly prominent. Atherosclerotic calcifications are again present involving coronary arteries and the aorta. There is no aortic aneurysm or dissection. The pulmonary arteries are normal in course and caliber, without filling defects to  suggest pulmonary embolism.   Other findings of chest: There is no mediastinal, hilar or axillary lymphadenopathy. There is again prominence of the interstitial markings bilaterally with reticulonodular pattern. Additionally there is interval development of patchy infiltrates, mostly involving the right middle lobe. Upper abdomen: Within normal limits. Osseous structures: Degenerative changes and marked multiple level disc disease involve the spine. No acute fracture is seen.       1. No pulmonary embolism, aortic aneurysm or dissection. 2. Interval development of infiltrates, mostly involving the lingula, possibly due to pneumonia, on a background of chronic interstitial lung disease. Follow-up as needed.   MACRO: None.     Signed by: Robert Pedro 6/19/2025 2:16 PM Dictation workstation:   [id number]    Assessment & Plan  Pneumonia due to organism    Lissett Rodríguez is a 89 y.o. female former smoker (35 pk/yr) with history of DM, KALPANA on CPAP, COPD with hyperinflation on 6L O2 at baseline, HFpEF, afib and prior PE on Eliquis (failed Xarelto), and recent admission for COPD exacerbation presenting with fatigue and increased sputum production. Pulmonary is consulted for further management. Clinical picture is consistent with bacterial PNA.     #Acute on chronic respiratory failure with hypoxemia due to PNA/COPD  #HAP  #COPD with hyperinflation - not in exacerbation  #History of PE on Eliquis  #Mild hemoptysis - streaks of blood. Likely due to airway irritation from PNA     Recommendations:  -Agree with Zosyn for HAP coverage  -On Spiriva at baseline, ok for atrovent while inpatient with PRN albuterol  -Follow up sputum culture -no predominant organism  -Flutter valve for secretion clearance  -IS/out of bed as tolerated  -Wean O2 as tolerated, goal 88-92%  -Continue Eliquis for AC, hold if hemoptysis increases  -Follow up with Dr. Brown after dc (follow-up reminder placed in discharge instructions)      Discussed with Dr. Briceno.  No barriers to discharge from pulmonary perspective.    I spent 35 minutes in the professional and overall care of this patient.  DANIELLE Flores-CNS           [1] amiodarone, 100 mg, oral, Daily  apixaban, 2.5 mg, oral, BID  atorvastatin, 40 mg, oral, Nightly  cholecalciferol, 50 mcg, oral, Daily  dapagliflozin propanediol, 5 mg, oral, Daily  furosemide, 40 mg, oral, Daily  gabapentin, 300 mg, oral, TID  guaiFENesin, 600 mg, oral, q12h  insulin lispro, 0-10 Units, subcutaneous, TID AC  ipratropium, 0.5 mg, nebulization, 4x daily  oxyBUTYnin, 5 mg, oral, BID  pantoprazole, 40 mg, oral, Daily before breakfast   Or  pantoprazole, 40 mg, intravenous, Daily before breakfast  piperacillin-tazobactam, 3.375 g, intravenous, q6h  potassium chloride CR, 20 mEq, oral, Daily  sennosides-docusate sodium, 2 tablet, oral, BID     [2] PRN medications: acetaminophen **OR** acetaminophen **OR** acetaminophen, diclofenac sodium, ipratropium-albuteroL, ondansetron **OR** ondansetron, oxygen, oxygen

## 2025-06-23 NOTE — CARE PLAN
The clinical goals for the shift include free from falls & hds    Over the shift, the patient is making progress toward the following goals.       Problem: Safety - Adult  Goal: Free from fall injury  Outcome: Progressing      Problem: Discharge Planning  Goal: Discharge to home or other facility with appropriate resources  Outcome: Progressing     Problem: Chronic Conditions and Co-morbidities  Goal: Patient's chronic conditions and co-morbidity symptoms are monitored and maintained or improved  Outcome: Progressing     Problem: Respiratory  Goal: No signs of respiratory distress (eg. Use of accessory muscles. Peds grunting)  Outcome: Progressing     Problem: Fall/Injury  Goal: Not fall by end of shift  Outcome: Progressing

## 2025-06-24 ENCOUNTER — PATIENT OUTREACH (OUTPATIENT)
Dept: PRIMARY CARE | Facility: CLINIC | Age: OVER 89
End: 2025-06-24
Payer: MEDICARE

## 2025-06-24 NOTE — DISCHARGE SUMMARY
Discharge Diagnosis  Pneumonia due to organism     Issues Requiring Follow-Up  PCP and pulm f/up     Discharge Meds     Medication List      CONTINUE taking these medications     acetaminophen 325 mg tablet; Commonly known as: Tylenol   albuterol 90 mcg/actuation inhaler   alendronate 70 mg tablet; Commonly known as: Fosamax; Take 1 tablet (70   mg) by mouth every 7 days. Take in the morning with a full glass of water,   on an empty stomach, and do not take anything else by mouth or lie down   for the next 30 min.   amiodarone 100 mg tablet; Commonly known as: Pacerone; Take 1 tablet   (100 mg) by mouth once daily.   apixaban 5 mg tablet; Commonly known as: Eliquis; Take 1 tablet (5 mg)   by mouth 2 times a day.   atorvastatin 40 mg tablet; Commonly known as: Lipitor; Take 1 tablet (40   mg) by mouth once daily at bedtime.   Blood Glucose Test; Generic drug: blood sugar diagnostic   cholecalciferol 50 mcg (2,000 units) capsule; Commonly known as: Vitamin   D-3   dapagliflozin propanediol 5 mg tablet; Commonly known as: Farxiga; Take   1 tablet (5 mg) by mouth once daily.   diclofenac sodium 1 % gel; Commonly known as: Voltaren; Apply 4.5 inches   (4 g) topically 4 times a day.   furosemide 40 mg tablet; Commonly known as: Lasix   gabapentin 100 mg capsule; Commonly known as: Neurontin; Take 3 capsules   (300 mg) by mouth 3 times a day.   mometasone 50 mcg/actuation nasal spray; Commonly known as: Nasonex;   Administer 1 spray into each nostril 2 times a day.   oxyBUTYnin 5 mg tablet; Commonly known as: Ditropan   oxygen gas therapy; Commonly known as: O2   potassium chloride CR 20 mEq ER tablet; Commonly known as: Klor-Con M20   Spiriva with HandiHaler 18 mcg inhalation capsule; Generic drug:   tiotropium       Test Results Pending At Discharge  Pending Labs       No current pending labs.            Hospital Course     Lissett Hendricksrubio is a 89 y.o. female former smoker (35 pk/yr) with history of DM, KALPANA on CPAP,  COPD with hyperinflation on 6L O2 at baseline, HFpEF, afib and prior PE on Eliquis (failed Xarelto), and recent admission for COPD exacerbation presenting with fatigue and increased sputum production. Pulmonary is consulted for further management. Clinical picture is consistent with bacterial PNA. Pulmonary was consulted and patient was on zoysn for 5 days inpatient. Patient was satting 88-92% at discharge. Plan to follow up with Dr. Brown at discharge      #Acute on chronic respiratory failure with hypoxemia due to PNA/COPD  #HAP  #COPD with hyperinflation - not in exacerbation  #History of PE on Eliquis  #Mild hemoptysis - streaks of blood. Likely due to airway irritation from PNA       Pertinent Physical Exam At Time of Discharge  Physical Exam  Constitutional:       Appearance: Normal appearance.   Cardiovascular:      Rate and Rhythm: Normal rate and regular rhythm.   Pulmonary:      Effort: Pulmonary effort is normal.      Breath sounds: Rhonchi present.   Abdominal:      General: Abdomen is flat. Bowel sounds are normal.      Palpations: Abdomen is soft.   Musculoskeletal:      Cervical back: Normal range of motion.   Skin:     General: Skin is warm.   Neurological:      General: No focal deficit present.      Mental Status: She is alert and oriented to person, place, and time. Mental status is at baseline.   Psychiatric:         Mood and Affect: Mood normal.         Behavior: Behavior normal.         Thought Content: Thought content normal.         Judgment: Judgment normal.   Outpatient Follow-Up  Future Appointments   Date Time Provider Department Center   7/10/2025  2:00 PM Radha Anderson DO [id number] [facility]         Estrella Briceno MD

## 2025-06-24 NOTE — PROGRESS NOTES
Discharge Facility: [facility]   Discharge Diagnosis: Lung Infection  Admission Date: 6/19/25  Discharge Date: 6/23/25    PCP Appointment Date: Task to office staff  Specialist Appointment Date: N/A  Hospital Encounter and Summary Linked: Yes  See discharge assessment below for further details    ED to Hosp-Admission (Discharged) with Estrella Briceno MD; Makayla Borjas MD (06/19/2025)   Two attempts were made to reach patient within two business days after discharge. Left voicemail with contact information for patient to call back with any non-emergent questions or concerns.      Nuris Perry LPN

## 2025-06-27 ENCOUNTER — OFFICE VISIT (OUTPATIENT)
Dept: PRIMARY CARE | Facility: CLINIC | Age: OVER 89
End: 2025-06-27
Payer: MEDICARE

## 2025-06-27 VITALS
BODY MASS INDEX: 29.49 KG/M2 | HEART RATE: 71 BPM | DIASTOLIC BLOOD PRESSURE: 74 MMHG | OXYGEN SATURATION: 90 % | SYSTOLIC BLOOD PRESSURE: 127 MMHG | WEIGHT: 146 LBS

## 2025-06-27 DIAGNOSIS — B35.6 TINEA CRURIS: Primary | ICD-10-CM

## 2025-06-27 DIAGNOSIS — I26.99 OTHER ACUTE PULMONARY EMBOLISM, UNSPECIFIED WHETHER ACUTE COR PULMONALE PRESENT (MULTI): ICD-10-CM

## 2025-06-27 PROCEDURE — 1126F AMNT PAIN NOTED NONE PRSNT: CPT | Performed by: INTERNAL MEDICINE

## 2025-06-27 PROCEDURE — 1036F TOBACCO NON-USER: CPT | Performed by: INTERNAL MEDICINE

## 2025-06-27 PROCEDURE — 1111F DSCHRG MED/CURRENT MED MERGE: CPT | Performed by: INTERNAL MEDICINE

## 2025-06-27 PROCEDURE — 3078F DIAST BP <80 MM HG: CPT | Performed by: INTERNAL MEDICINE

## 2025-06-27 PROCEDURE — 3074F SYST BP LT 130 MM HG: CPT | Performed by: INTERNAL MEDICINE

## 2025-06-27 PROCEDURE — 1160F RVW MEDS BY RX/DR IN RCRD: CPT | Performed by: INTERNAL MEDICINE

## 2025-06-27 PROCEDURE — 1159F MED LIST DOCD IN RCRD: CPT | Performed by: INTERNAL MEDICINE

## 2025-06-27 PROCEDURE — 99495 TRANSJ CARE MGMT MOD F2F 14D: CPT | Performed by: INTERNAL MEDICINE

## 2025-06-27 RX ORDER — KETOCONAZOLE 20 MG/G
CREAM TOPICAL 2 TIMES DAILY
Qty: 60 G | Refills: 2 | Status: SHIPPED | OUTPATIENT
Start: 2025-06-27 | End: 2026-06-27

## 2025-06-27 RX ORDER — NYSTATIN 100000 [USP'U]/G
1 POWDER TOPICAL 2 TIMES DAILY
Qty: 60 G | Refills: 1 | Status: SHIPPED | OUTPATIENT
Start: 2025-06-27 | End: 2026-06-27

## 2025-06-27 ASSESSMENT — PAIN SCALES - GENERAL: PAINLEVEL_OUTOF10: 0-NO PAIN

## 2025-06-27 NOTE — PATIENT INSTRUCTIONS
Lissett,   Schedule appointment with Dr. Brown   Take cream daily for the fungal infection in the groin   Powder is meant for prevention only.  See if you can switch to cotton underwear - some brands are Wearever and Nathen Sanipant  Followup with audiology     Followup 3 months for annual wellness visit

## 2025-06-27 NOTE — PROGRESS NOTES
"Subjective   Patient ID: Lissett Rodríguez is a 89 y.o. female who presents for Hospital Follow-up.  Patient: Lissett Rodríguez  : 1933  PCP: Radha Anderson DO  MRN: [id number]  Program: Transitional Care Management  Status: Enrolled  Effective Dates: 2025 - present  Responsible Staff: Nuris Perry LPN  Social Drivers to be Addressed: Physical Activity, Social Connections, Stress         Lissett Rodríguez is a 89 y.o. female presenting today for follow-up after being discharged from the hospital 4 days ago. The main problem requiring admission was hypoxemic respiratory failure in the setting of community-acquired pneumonia.  The discharge summary and/or Transitional Care Management documentation was reviewed. Medication reconciliation was performed as indicated via the \"Mic as Reviewed\" timestamp.     Lissett Rodríguez was contacted by Transitional Care Management services two days after her discharge. This encounter and supporting documentation was reviewed.    History of Present Illness    She initially presented to the ED on 6/10 out of concern for cough and congestion treated for COPD exacerbation subsequently discharged represented again to the ED on  for fatigue and increased sputum production diagnosed with bacterial pneumonia treated with Zosyn 5 days inpatient and subsequently discharged to her baseline home oxygen status recommended follow-up with Dr. Brown at discharge.  Medication were not adjusted at the time.    She initially experienced coughing and noticed two small brown dots in her sputum, which led to a hospital visit. She was treated with antibiotics but discharged without medications. CT scans and cultures did not show bacterial pneumonia. She feels 'pretty good' today but is tired.    She is on Spiriva, potassium, oxygen at six liters, amiodarone, and Eliquis. She is unsure about her use of oxybutynin and mentions receiving medications in a cup. She uses a nasal " spray and possibly montelukast but is uncertain about specific inhalers.    She experiences swelling and soreness in her urinary area, attributed to an allergy to incontinence underwear. Using dish towels has alleviated some discomfort. She requests a refill of nystatin powder for fungal infection prevention. Her bowel movements have improved and are now formed. She holds her bladder most of the time but experiences mild to moderate leakage.    CT angio 6/19: No pulmonary embolism, aortic aneurysm or dissection.  Interval development of infiltrates, mostly Velde in the lingula, possibly due to pneumonia on a background of chronic interstitial lung disease.  Follow-up as needed  CT PE 6/8 (no pulmonary embolism or acute cardiopulmonary process.  Stable chronic findings as above.  No aortic aneurysm.  Moderate thoracic  aortic calcifications.  Stable enlarged main pulmonary artery, 3.3 cm may be seen with pulmonary hypertension.  Moderate coronary artery calcifications     Strep pneumo urine negative Legionella urine negative, respiratory culture showing lower respiratory tract secretions, no predominant organism.  Blood cultures no growth.  COVID-negative BNP 88      PMHx:  - AFib - on eliquis and amiodarone UTD with eye exam  no significant concerns.  Referred to pharmacy team for cost concerns. Optho UTD   - DM2 - with neuropathy - on Farxiga - last A1C 7.3% 12/24, referred to neurology and OT for management of neuropathy.  Ophtho 12/24, continuing gabapentin this helps symptoms of sciatica and neuropathy   - HfPEF  - On lasix and Farxiga, previously in cardiac rehab continues home exercises as well as group exercises Followed by Dr. Hinkle last seen in December recommended as needed follow-up  - COPD/ hypoxic respiratory failure on 6L home O2- MANUEL multifactorial due HFpEF, interstitial lung disease possibly related to amiodarone -  seen by Dr. Brown, high-resolution CT notable for a pleural-based nodular  area, repeat CT recommended, O2 sat goal of 88 to 92%  PFTs were also performed.  - PE -  1/24 on Eliquis  - Postprandial hypotension - with hospitalization earlier this year  - HTN -no longer treated  - KALPANA - compliant on nocturnal BiPAP no issues with it present.  - Hearing loss - with acquired deafness - has hearing aids notes persistent trouble hearing.  - HLD -  on atorvastatin 40mg   - TIA - history in 2016   - Obesity   - CKD3a   - Ascending aortic enlargement - most recent TTE noted at 3.9cm 1/24   - Osteoporosis -started alendronate 8/24 DEXA 7/24 though does not recall taking this medication.   - B12 - supplement improvement in levels  - Sciatica R>L        Social:   - Lives in [facility] alone, son lives nearby (retired ) who checks in regularly, sister lives in [place].   - 5 children, one passed away. Has a daughter who she gave up for adoption recently reunited lives in [place], 14 grandchildren and 7 great-grandchildren      Objective     /74   Pulse 71   Wt 66.2 kg (146 lb)   SpO2 90%   BMI 29.49 kg/m²  O2sat on 6L O2   General: Appears comfortable, NAD, appropriate affect, speaking full sentences, hard of hearing despite hearing aids, NC in place   HEENT: NCAT, EOMI, pupils symmetric, no conjunctival erythema   Neck: Supple, no LAD   Heart: RRR S1 S2 no murmurs appreciated   Lungs: CTA bilaterally, no rhonchi, rales, or wheezes   Abdomen: Soft,+distended  NT/ND, no rebound or guarding,   Extremities: no cyanosis or edema appreciated,   Neuro: AAO x 3, answers questions appropriately, no FND, gait antalgic    Skin: Intertrigo at groin, no e/o skin breakdown       Current Outpatient Medications   Medication Instructions    acetaminophen (TYLENOL) 325 mg, oral, Every 6 hours PRN    albuterol 90 mcg/actuation inhaler Inhale 1-2 puffs every 4 hours if needed for wheezing or shortness of breath.    alendronate (FOSAMAX) 70 mg, oral, Every 7 days, Take in the morning  with a full glass of water, on an empty stomach, and do not take anything else by mouth or lie down for the next 30 min.    amiodarone (PACERONE) 100 mg, oral, Daily    apixaban (ELIQUIS) 5 mg, oral, 2 times daily    atorvastatin (LIPITOR) 40 mg, oral, Nightly    blood sugar diagnostic (Blood Glucose Test) strip TEST 3 TIMES DAILY.    cholecalciferol (Vitamin D-3) 50 mcg (2,000 unit) capsule 1 tablet, Daily    dapagliflozin propanediol (FARXIGA) 5 mg, oral, Daily    diclofenac sodium (VOLTAREN) 4 g, Topical, 4 times daily    furosemide (LASIX) 40 mg, Daily    gabapentin (NEURONTIN) 300 mg, oral, 3 times daily    mometasone (Nasonex) 50 mcg/actuation nasal spray 1 spray, Each Nostril, 2 times daily    oxyBUTYnin (Ditropan) 5 mg tablet Take 1 tablet (5 mg) by mouth once daily.    oxygen (O2) 6 L/min, Continuous    potassium chloride CR 20 mEq ER tablet 20 mEq, Daily    tiotropium (Spiriva with HandiHaler) 18 mcg inhalation capsule Place 1 capsule (18 mcg) into inhaler and inhale once daily.       Results  LABS  Blood cultures: No growth  COVID-19 PCR: Negative  Streptococcus pneumoniae culture: Negative  Legionella culture: Negative  Calcium: 8.4 mg/dL    RADIOLOGY  CT scan: No thrombus, normal aortic size    The complexity of medical decision making for this patient's transitional care is moderate.    Assessment & Plan  Pneumonia   Improving on exam, clinically nontoxic in appearance, completed course of antibiotics, continue regular management.   - Follow-up with pulmonologist Dr. Brown.    Fungal infection  Fungal infection in urinary area likely due to moisture from incontinence underwear. Nystatin powder preventive but not curative.  - Prescribe ketoconazole cream for treatment.  - Prescribe nystatin powder for prevention post-treatment.  - Recommend cotton underwear to reduce moisture retention.    Urinary incontinence  Urinary incontinence managed with oxybutynin. Possible urge incontinence indicated by  holding bladder full before leakage.  - Consider Prevail or Wherever incontinence panties for better moisture management.    Hearing impairment  Persistent hearing challenges despite recent hearing aid adjustments. Relies on lip reading.    Muscle ache  Muscle ache in arm improved with therapy but still causes slight pain. Likely muscular in origin.  - Continue physical therapy.  - Consider magnesium spray for muscle relaxation.  - Encourage hydration, stretching, and use of heating pads.    3 months AWV     Radha Anderson, DO     This medical note was created with the assistance of artificial intelligence (AI) for documentation purposes. The content has been reviewed and confirmed by the healthcare provider for accuracy and completeness. Patient consented to the use of audio recording and use of AI during their visit.

## 2025-06-28 LAB
ATRIAL RATE: 73 BPM
P AXIS: 60 DEGREES
P OFFSET: 201 MS
P ONSET: 142 MS
PR INTERVAL: 164 MS
Q ONSET: 224 MS
QRS COUNT: 12 BEATS
QRS DURATION: 86 MS
QT INTERVAL: 410 MS
QTC CALCULATION(BAZETT): 451 MS
QTC FREDERICIA: 438 MS
R AXIS: 82 DEGREES
T AXIS: 51 DEGREES
T OFFSET: 429 MS
VENTRICULAR RATE: 73 BPM

## 2025-07-10 ENCOUNTER — APPOINTMENT (OUTPATIENT)
Dept: PRIMARY CARE | Facility: CLINIC | Age: OVER 89
End: 2025-07-10
Payer: MEDICARE

## 2025-07-11 ENCOUNTER — PATIENT OUTREACH (OUTPATIENT)
Dept: PRIMARY CARE | Facility: CLINIC | Age: OVER 89
End: 2025-07-11
Payer: MEDICARE

## 2025-07-16 ENCOUNTER — HOSPITAL ENCOUNTER (OUTPATIENT)
Dept: RADIOLOGY | Facility: CLINIC | Age: OVER 89
Discharge: HOME | End: 2025-07-16
Payer: MEDICARE

## 2025-07-16 ENCOUNTER — OFFICE VISIT (OUTPATIENT)
Dept: PULMONOLOGY | Facility: CLINIC | Age: OVER 89
End: 2025-07-16
Payer: MEDICARE

## 2025-07-16 VITALS
WEIGHT: 142 LBS | RESPIRATION RATE: 18 BRPM | SYSTOLIC BLOOD PRESSURE: 135 MMHG | TEMPERATURE: 97.2 F | OXYGEN SATURATION: 92 % | BODY MASS INDEX: 28.68 KG/M2 | DIASTOLIC BLOOD PRESSURE: 67 MMHG | HEART RATE: 71 BPM

## 2025-07-16 DIAGNOSIS — J44.9 CHRONIC OBSTRUCTIVE PULMONARY DISEASE, UNSPECIFIED COPD TYPE (MULTI): ICD-10-CM

## 2025-07-16 DIAGNOSIS — J44.9 CHRONIC OBSTRUCTIVE PULMONARY DISEASE, UNSPECIFIED COPD TYPE (MULTI): Primary | ICD-10-CM

## 2025-07-16 PROCEDURE — 3078F DIAST BP <80 MM HG: CPT | Performed by: INTERNAL MEDICINE

## 2025-07-16 PROCEDURE — 1111F DSCHRG MED/CURRENT MED MERGE: CPT | Performed by: INTERNAL MEDICINE

## 2025-07-16 PROCEDURE — 99212 OFFICE O/P EST SF 10 MIN: CPT | Mod: 25 | Performed by: INTERNAL MEDICINE

## 2025-07-16 PROCEDURE — 71046 X-RAY EXAM CHEST 2 VIEWS: CPT

## 2025-07-16 PROCEDURE — 99214 OFFICE O/P EST MOD 30 MIN: CPT | Performed by: INTERNAL MEDICINE

## 2025-07-16 PROCEDURE — 1036F TOBACCO NON-USER: CPT | Performed by: INTERNAL MEDICINE

## 2025-07-16 PROCEDURE — 1160F RVW MEDS BY RX/DR IN RCRD: CPT | Performed by: INTERNAL MEDICINE

## 2025-07-16 PROCEDURE — 1159F MED LIST DOCD IN RCRD: CPT | Performed by: INTERNAL MEDICINE

## 2025-07-16 PROCEDURE — 3075F SYST BP GE 130 - 139MM HG: CPT | Performed by: INTERNAL MEDICINE

## 2025-07-16 ASSESSMENT — ENCOUNTER SYMPTOMS
EYE DISCHARGE: 0
SINUS PAIN: 0
WEAKNESS: 0
NERVOUS/ANXIOUS: 0
EYE REDNESS: 0
JOINT SWELLING: 0
UNEXPECTED WEIGHT CHANGE: 0
CHOKING: 0
HEADACHES: 0
ABDOMINAL DISTENTION: 1
NAUSEA: 0
FREQUENCY: 0
STRIDOR: 0
SLEEP DISTURBANCE: 0
DIZZINESS: 0
COUGH: 0
WHEEZING: 0
DIFFICULTY URINATING: 0
SHORTNESS OF BREATH: 1
CONSTIPATION: 0
APNEA: 0
LIGHT-HEADEDNESS: 0
ABDOMINAL PAIN: 0
ARTHRALGIAS: 0
RHINORRHEA: 0
PALPITATIONS: 0
FACIAL SWELLING: 0
FEVER: 0
NUMBNESS: 0
SINUS PRESSURE: 0
ADENOPATHY: 0
DYSURIA: 0
AGITATION: 0
HEMATURIA: 0
BRUISES/BLEEDS EASILY: 0
FATIGUE: 0
TREMORS: 0
SPEECH DIFFICULTY: 0

## 2025-07-16 NOTE — PROGRESS NOTES
"@PULMONARY FOLLOW-UP@       PROBLEM: Pneumonia and COPD     ASSESSMENT:  The patient is a 89-year-old with atrial fibrillation and COPD with diabetes hyperlipidemia hypertension and chronic respiratory failure.  She recent was hospitalized with pneumonia.  While it is reported that she has COPD her PFTs from 2024 outlined in normal FEV1. She was treated in the hospital with intravenous antibiotics a flutter valve etc.  Currently her lungs are clear on auscultation and her saturation is adequate on her baseline 6 L/min.  I see no active infiltrate on the chest x-ray just some chronic changes.    PLAN:  The patient will continue present therapies and return in 1 month.  She will need a follow-up CT scan of the chest.      HISTORY OF PRESENT ILLNESS:  The patient is a 89-year-old with a history of atrial fibrillation who was admitted to the hospital in January 2024 with pneumonia and found to have a small pulm embolization.  She also was admitted on February 26, 2024 until February 27, 2024 with dizziness and presyncope.  She has a history of preserved ejection fraction heart failure and she had constipation.  She also said that of COPD diabetes hyperlipidemia hypertension sleep apnea with chronic respiratory failure and hypoxemia.  Her blood pressure medications were adjusted and a question of reactive hypoglycemia was raised.  It is noted that she has COPD with hypoxemia and has been on home oxygen at 5 L/min.  She also is on CPAP for sleep apnea and has been compliant.     Previously, she was followed by Dr. Glynn last saw her on March 2, 2023.  He summarized the following     ex smoker (35 pack year smoker) h/o DM, heart failure with PEF, TIA, Atrial fibrillation on Xarelto,KALPANA on CPAP, COPD on 5lpm of nasal oxygen, here for evaluation of respiratory symptoms.  Patient reports being diagnosed with COPD for about 5 years and has been on oxygen for about the same time. Patient reports exertional SOB \"when she does not " "get enough oxygen\"  CT scan of the chest shows septal thickening, mosaic attenuation and ground glass opacities on both lung fields with areas of bronchial wall thickening. LA dilation also noted on CT chest.  BNP - 67  Echo - Feb 2022 EF 65% - Impaired Diastolic dysfunction, mild LA dilation, RVSP 31 normal IVC  +BD change 14%, air trapping, but no obstruction,.     Assessment:  1) Dyspnea on exertion - multifactorial, likely contributed by combination of pulmonary edema in the setting of HFREF and small airway disease/COPD.  2) HFPEF  3) COPD  4) Mediastinal adenopathy - with diffuse bilateral GGOs  5) Hypoxic respiratory failure - on 6lpm     The CT scan of the chest from January 18, 2024 revealed the following  Acute pulmonary embolus within a segmental branch of the lingula.      Ground-glass opacities scattered diffusely throughout the lungs with  more consolidative opacities in the left upper lobe and left lower  lobe concerning for pneumonia. 1 of the opacities in the lingula is  somewhat wedge-shaped and may also represent a component of pulmonary  infarction.      Left lower lobe nodular density measuring up to 8 mm. While findings  may be related to the underlying likely infectious process, recommend  repeat imaging after treatment to assess for resolution and/or  stability.      Nonspecific enlarged mediastinal and hilar lymph nodes, which may be  reactive.     The chest x-ray from February 26 2024 revealed the following  Interval improvement in aeration of the lungs. There are however  persistent bilateral opacities. Focal appearing opacity at the right  lung base may in part relate to rib end or nipple shadow, however  focal infiltrate or nodule is not excluded and attention on  short-term progress imaging is recommended.     PFTs in the past in November 10, 2022 The FVC was 1.64 L 131% predicted with an FEV1 of 1.16 L 120% predicted with an FEV1/FVC ratio of 71%.  The FEV1 improved 14% after " bronchodilators.  Her TLC was 197% predicted and the RV/TLC was 148% predicted.  Her DLCO was 62% predicted.     The CT scan from January 19, 2024 revealed the following   1. Left ventricular systolic function is normal with a 60-65% estimated ejection fraction.   2. There is moderate mitral annular calcification.   3. Ascending aorta mildly enlarged at 3.9 cm.        On May 8, 2024 it was reported that she probably smoked around 20 pack years off and on over the years considering pregnancies.  She has been on oxygen at least 6 years and she has chronic congestive heart failure with atrial fibrillation on amiodarone.  She has no chest pains or pressures.  She has no swelling of her legs.  She has no wheezing.  She does note shortness of breath although she is is quite active and she walks and exercises for 30 minutes every day utilizing her oxygen.  She lives in assisted living and she also has sleep apnea utilizing CPAP at night.  She has no occupational environmental exposures.  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           The HRCT scan from May 24 2024 revealed the following  1. Interval resolution of previously noted multifocal pneumonia. New  airspace consolidation.  2. Similar severe diffuse mosaic attenuation pattern consistent with  air trapping from patient's known COPD.  3. Diffuse multifocal micronodularity suspicious for smoking-related  respiratory bronchiolitis.  4. Interval development of a pleural-based solid right middle lobe  nodule measuring 0.6 cm. Recommend follow-up in 6-12 months.  5. Redemonstration of diffusely and severely patulous esophagus  raising concern for motility disorder. Consider esophagram or  endoscopy for further evaluation.  6. Main pulmonary artery dilatation which can be seen in the setting  of pulmonary hypertension.    Pulmonary function test from September 27, 2024   outlined an FVC of 1.94 L 191% predicted with an FEV1 of 1.33 L 161% predicted and  the TLC is 156% predicted the DLCO was 55% predicted. The test were performed with variable efforts. The DLCO seems somewhat down compared to 2022. Will obtain a follow-up CT scan.      When seen on August 27, 2024, the patient reported that she, his on 6 L/min and her respiratory status per her description is generally stable.  She has no swelling of her legs wheezing or coughing.  She is more concerned about her osteoporosis cardiac evaluation etc.  She has not had pulmonary function test repeated yet.  She is aware that her CT scan needs to be repeated in 2 or 3 months.    On August 27, 2024 I summarized he patient is a 89-year-old with atrial fibrillation, obstructive sleep apnea, preserved ejection fraction heart failure history of embolization in January 2024.  She also has CT scan findings of groundglass changes which have been persistent.  She has been on amiodarone and in the past, PFTs were normal with an isolated decrease in DLCO.  In addition her recent CT scan demonstrates a pleural-based nodular area that needs to be followed.  Correlation of pulmonary function tests with her interstitial  CT scan findings is important.  She has an ex-smoker.  Pulmonary function test will be obtained.  She also will obtain a follow-up CT scan of the chest in several months.    She was admitted on June 8, 2025 with a COPD exacerbation and she was admitted on June 19, 2025 with pneumonia.  She was discharged on June 23, 2025 and during the hospital course she required up to 8 L/min of nasal oxygen.  She also was treated for a COPD exacerbation and was felt that she had bacterial pneumonia.  She was treated with Zosyn flutter valve etc.    A CT scan from June 8, 2025 revealed no pulm embolization.  No evidence of pulmonary consolidation was outlined or nodularity.  She has borderline bilateral hilar adenopathy likely reactive    The CT scan from June 19, 2025 revealed the following   1. No pulmonary embolism, aortic  aneurysm or dissection.  2. Interval development of infiltrates, mostly involving the lingula,  possibly due to pneumonia, on a background of chronic interstitial  lung disease. Follow-up as needed.      Currently, it is reported the patient is living in assisted living.  Apparently over the Fourth of July weekend or so she did not get any medication for some reason.  She is not have any coughing or congestion.  She is not bringing up any phlegm.  She is on her oxygen at 6 L/min.  She is having no swelling.  She is trying to lose weight.  She has intermittent abdominal discomfort and she has a protuberant abdomen and she is trying to lose weight.     RX Allergies[1]       Current Medications[2]          Review of Systems   Constitutional:  Negative for fatigue, fever and unexpected weight change.   HENT:  Negative for congestion, facial swelling, nosebleeds, postnasal drip, rhinorrhea, sinus pressure and sinus pain.    Eyes:  Negative for discharge, redness and visual disturbance.   Respiratory:  Positive for shortness of breath. Negative for apnea, cough, choking, wheezing and stridor.    Cardiovascular:  Negative for chest pain, palpitations and leg swelling.   Gastrointestinal:  Positive for abdominal distention. Negative for abdominal pain, constipation and nausea.   Endocrine: Negative for cold intolerance and heat intolerance.   Genitourinary:  Negative for difficulty urinating, dysuria, frequency and hematuria.   Musculoskeletal:  Negative for arthralgias, gait problem and joint swelling.   Allergic/Immunologic: Negative for environmental allergies, food allergies and immunocompromised state.   Neurological:  Negative for dizziness, tremors, syncope, speech difficulty, weakness, light-headedness, numbness and headaches.   Hematological:  Negative for adenopathy. Does not bruise/bleed easily.   Psychiatric/Behavioral:  Negative for agitation, behavioral problems and sleep disturbance. The patient is not  nervous/anxious.         Vitals:    07/16/25 1016   BP: 135/67   Pulse: 71   Resp: 18   Temp: 36.2 °C (97.2 °F)   SpO2: 92%        Physical Exam  Vitals reviewed.   Constitutional:       Appearance: Normal appearance.   HENT:      Head: Normocephalic and atraumatic.     Eyes:      Extraocular Movements: Extraocular movements intact.       Cardiovascular:      Rate and Rhythm: Normal rate and regular rhythm.      Heart sounds: No murmur heard.     No friction rub. No gallop.   Pulmonary:      Effort: Pulmonary effort is normal. No respiratory distress.      Breath sounds: No stridor. No wheezing, rhonchi or rales.   Chest:      Chest wall: No tenderness.   Abdominal:      General: Abdomen is flat. There is no distension.      Palpations: Abdomen is soft. There is no mass.      Tenderness: There is no abdominal tenderness.     Musculoskeletal:         General: Normal range of motion.      Cervical back: Normal range of motion.      Right lower leg: No edema.      Left lower leg: No edema.     Skin:     General: Skin is warm and dry.     Neurological:      Mental Status: She is alert and oriented to person, place, and time.     Psychiatric:         Mood and Affect: Mood normal.         Behavior: Behavior normal.                 [1]   Allergies  Allergen Reactions    Penicillins Hives and Other     Tetramycin    Tolerated ceftriaxone and cefepime    Tolerated zosyn 6/2025    Tetracyclines Hives and GI Upset    Lisinopril Angioedema, Swelling, Hives and Itching    Vancomycin Unknown   [2]   Current Outpatient Medications:     acetaminophen (Tylenol) 325 mg tablet, Take 1 tablet (325 mg) by mouth every 6 hours if needed for mild pain (1 - 3)., Disp: , Rfl:     albuterol 90 mcg/actuation inhaler, Inhale 1-2 puffs every 4 hours if needed for wheezing or shortness of breath., Disp: , Rfl:     alendronate (Fosamax) 70 mg tablet, Take 1 tablet (70 mg) by mouth every 7 days. Take in the morning with a full glass of water, on an  empty stomach, and do not take anything else by mouth or lie down for the next 30 min. (Patient taking differently: Take 1 tablet (70 mg) by mouth every 7 days. Take in the morning with a full glass of water, on an empty stomach, and do not take anything else by mouth or lie down for the next 30 min. friday), Disp: 12 tablet, Rfl: 3    amiodarone (Pacerone) 100 mg tablet, Take 1 tablet (100 mg) by mouth once daily., Disp: 90 tablet, Rfl: 0    apixaban (Eliquis) 5 mg tablet, Take 1 tablet (5 mg) by mouth 2 times a day., Disp: 180 tablet, Rfl: 1    atorvastatin (Lipitor) 40 mg tablet, Take 1 tablet (40 mg) by mouth once daily at bedtime., Disp: 90 tablet, Rfl: 1    blood sugar diagnostic (Blood Glucose Test) strip, TEST 3 TIMES DAILY., Disp: , Rfl:     cholecalciferol (Vitamin D-3) 50 mcg (2,000 unit) capsule, Take 1 tablet by mouth once daily., Disp: , Rfl:     dapagliflozin propanediol (Farxiga) 5 mg, Take 1 tablet (5 mg) by mouth once daily., Disp: 90 tablet, Rfl: 1    diclofenac sodium (Voltaren) 1 % gel, Apply 4.5 inches (4 g) topically 4 times a day., Disp: 100 g, Rfl: 1    furosemide (Lasix) 40 mg tablet, Take 1 tablet (40 mg) by mouth once daily., Disp: , Rfl:     ketoconazole (NIZOral) 2 % cream, Apply topically 2 times a day., Disp: 60 g, Rfl: 2    mometasone (Nasonex) 50 mcg/actuation nasal spray, Administer 1 spray into each nostril 2 times a day., Disp: 17 g, Rfl: 11    nystatin (Mycostatin) 100,000 unit/gram powder, Apply 1 Application topically 2 times a day., Disp: 60 g, Rfl: 1    oxyBUTYnin (Ditropan) 5 mg tablet, Take 1 tablet (5 mg) by mouth once daily., Disp: , Rfl:     oxygen (O2) gas therapy, Inhale 6 L/min continuously., Disp: , Rfl:     potassium chloride CR 20 mEq ER tablet, Take 1 tablet (20 mEq) by mouth once daily. Do not crush or chew., Disp: , Rfl:     tiotropium (Spiriva with HandiHaler) 18 mcg inhalation capsule, Place 1 capsule (18 mcg) into inhaler and inhale once daily., Disp: , Rfl:      gabapentin (Neurontin) 100 mg capsule, Take 3 capsules (300 mg) by mouth 3 times a day., Disp: 270 capsule, Rfl: 3

## 2025-07-16 NOTE — PATIENT INSTRUCTIONS
Would continue present treatment and come back in one month    If there are any problems please call

## 2025-07-23 DIAGNOSIS — E11.610 DIABETIC NEUROPATHIC ARTHROPATHY (MULTI): ICD-10-CM

## 2025-07-23 RX ORDER — GABAPENTIN 100 MG/1
300 CAPSULE ORAL 3 TIMES DAILY
Qty: 270 CAPSULE | Refills: 3 | Status: SHIPPED | OUTPATIENT
Start: 2025-07-23 | End: 2025-11-20

## 2025-08-10 ENCOUNTER — APPOINTMENT (OUTPATIENT)
Dept: CARDIOLOGY | Facility: HOSPITAL | Age: OVER 89
DRG: 178 | End: 2025-08-10
Payer: MEDICARE

## 2025-08-10 ENCOUNTER — HOSPITAL ENCOUNTER (INPATIENT)
Facility: HOSPITAL | Age: OVER 89
End: 2025-08-10
Attending: STUDENT IN AN ORGANIZED HEALTH CARE EDUCATION/TRAINING PROGRAM | Admitting: STUDENT IN AN ORGANIZED HEALTH CARE EDUCATION/TRAINING PROGRAM
Payer: MEDICARE

## 2025-08-10 ENCOUNTER — APPOINTMENT (OUTPATIENT)
Dept: RADIOLOGY | Facility: HOSPITAL | Age: OVER 89
DRG: 178 | End: 2025-08-10
Payer: MEDICARE

## 2025-08-10 VITALS
BODY MASS INDEX: 28.63 KG/M2 | TEMPERATURE: 97 F | OXYGEN SATURATION: 94 % | HEART RATE: 56 BPM | RESPIRATION RATE: 21 BRPM | DIASTOLIC BLOOD PRESSURE: 59 MMHG | WEIGHT: 142 LBS | SYSTOLIC BLOOD PRESSURE: 142 MMHG | HEIGHT: 59 IN

## 2025-08-10 DIAGNOSIS — R07.9 CHEST PAIN, UNSPECIFIED TYPE: ICD-10-CM

## 2025-08-10 DIAGNOSIS — J18.9 PNEUMONIA DUE TO INFECTIOUS ORGANISM, UNSPECIFIED LATERALITY, UNSPECIFIED PART OF LUNG: ICD-10-CM

## 2025-08-10 DIAGNOSIS — R06.02 SHORTNESS OF BREATH: Primary | ICD-10-CM

## 2025-08-10 LAB
ALBUMIN SERPL BCP-MCNC: 3.8 G/DL (ref 3.4–5)
ALP SERPL-CCNC: 75 U/L (ref 33–136)
ALT SERPL W P-5'-P-CCNC: 10 U/L (ref 7–45)
ANION GAP SERPL CALC-SCNC: 14 MMOL/L (ref 10–20)
AST SERPL W P-5'-P-CCNC: 16 U/L (ref 9–39)
BASOPHILS # BLD AUTO: 0.08 X10*3/UL (ref 0–0.1)
BASOPHILS NFR BLD AUTO: 0.9 %
BILIRUB SERPL-MCNC: 0.7 MG/DL (ref 0–1.2)
BNP SERPL-MCNC: 55 PG/ML (ref 0–99)
BUN SERPL-MCNC: 19 MG/DL (ref 6–23)
CALCIUM SERPL-MCNC: 8.7 MG/DL (ref 8.6–10.3)
CARDIAC TROPONIN I PNL SERPL HS: 5 NG/L (ref 0–13)
CARDIAC TROPONIN I PNL SERPL HS: 5 NG/L (ref 0–13)
CHLORIDE SERPL-SCNC: 107 MMOL/L (ref 98–107)
CO2 SERPL-SCNC: 22 MMOL/L (ref 21–32)
CREAT SERPL-MCNC: 1 MG/DL (ref 0.5–1.05)
CRP SERPL-MCNC: 0.66 MG/DL
EGFRCR SERPLBLD CKD-EPI 2021: 53 ML/MIN/1.73M*2
EOSINOPHIL # BLD AUTO: 0.14 X10*3/UL (ref 0–0.4)
EOSINOPHIL NFR BLD AUTO: 1.5 %
ERYTHROCYTE [DISTWIDTH] IN BLOOD BY AUTOMATED COUNT: 14.1 % (ref 11.5–14.5)
FLUAV RNA RESP QL NAA+PROBE: NOT DETECTED
FLUBV RNA RESP QL NAA+PROBE: NOT DETECTED
GLUCOSE SERPL-MCNC: 121 MG/DL (ref 74–99)
HCT VFR BLD AUTO: 46.4 % (ref 36–46)
HGB BLD-MCNC: 14.2 G/DL (ref 12–16)
IMM GRANULOCYTES # BLD AUTO: 0.03 X10*3/UL (ref 0–0.5)
IMM GRANULOCYTES NFR BLD AUTO: 0.3 % (ref 0–0.9)
INR PPP: 1.6 (ref 0.9–1.1)
LYMPHOCYTES # BLD AUTO: 1.72 X10*3/UL (ref 0.8–3)
LYMPHOCYTES NFR BLD AUTO: 18.8 %
MAGNESIUM SERPL-MCNC: 2.27 MG/DL (ref 1.6–2.4)
MCH RBC QN AUTO: 28.9 PG (ref 26–34)
MCHC RBC AUTO-ENTMCNC: 30.6 G/DL (ref 32–36)
MCV RBC AUTO: 94 FL (ref 80–100)
MONOCYTES # BLD AUTO: 0.54 X10*3/UL (ref 0.05–0.8)
MONOCYTES NFR BLD AUTO: 5.9 %
NEUTROPHILS # BLD AUTO: 6.64 X10*3/UL (ref 1.6–5.5)
NEUTROPHILS NFR BLD AUTO: 72.6 %
NRBC BLD-RTO: 0 /100 WBCS (ref 0–0)
PLATELET # BLD AUTO: 190 X10*3/UL (ref 150–450)
POTASSIUM SERPL-SCNC: 4.1 MMOL/L (ref 3.5–5.3)
PROT SERPL-MCNC: 7 G/DL (ref 6.4–8.2)
PROTHROMBIN TIME: 18 SECONDS (ref 9.8–12.4)
RBC # BLD AUTO: 4.92 X10*6/UL (ref 4–5.2)
RSV RNA RESP QL NAA+PROBE: NOT DETECTED
SARS-COV-2 RNA RESP QL NAA+PROBE: NOT DETECTED
SODIUM SERPL-SCNC: 139 MMOL/L (ref 136–145)
WBC # BLD AUTO: 9.2 X10*3/UL (ref 4.4–11.3)

## 2025-08-10 PROCEDURE — 2500000004 HC RX 250 GENERAL PHARMACY W/ HCPCS (ALT 636 FOR OP/ED)

## 2025-08-10 PROCEDURE — 36415 COLL VENOUS BLD VENIPUNCTURE: CPT

## 2025-08-10 PROCEDURE — 2500000005 HC RX 250 GENERAL PHARMACY W/O HCPCS: Performed by: STUDENT IN AN ORGANIZED HEALTH CARE EDUCATION/TRAINING PROGRAM

## 2025-08-10 PROCEDURE — 93005 ELECTROCARDIOGRAM TRACING: CPT

## 2025-08-10 PROCEDURE — 2500000001 HC RX 250 WO HCPCS SELF ADMINISTERED DRUGS (ALT 637 FOR MEDICARE OP)

## 2025-08-10 PROCEDURE — 94660 CPAP INITIATION&MGMT: CPT

## 2025-08-10 PROCEDURE — 87637 SARSCOV2&INF A&B&RSV AMP PRB: CPT

## 2025-08-10 PROCEDURE — 87449 NOS EACH ORGANISM AG IA: CPT | Mod: AHULAB | Performed by: STUDENT IN AN ORGANIZED HEALTH CARE EDUCATION/TRAINING PROGRAM

## 2025-08-10 PROCEDURE — 83880 ASSAY OF NATRIURETIC PEPTIDE: CPT | Performed by: STUDENT IN AN ORGANIZED HEALTH CARE EDUCATION/TRAINING PROGRAM

## 2025-08-10 PROCEDURE — 80053 COMPREHEN METABOLIC PANEL: CPT

## 2025-08-10 PROCEDURE — 86140 C-REACTIVE PROTEIN: CPT | Performed by: STUDENT IN AN ORGANIZED HEALTH CARE EDUCATION/TRAINING PROGRAM

## 2025-08-10 PROCEDURE — 87154 CUL TYP ID BLD PTHGN 6+ TRGT: CPT | Mod: AHULAB | Performed by: STUDENT IN AN ORGANIZED HEALTH CARE EDUCATION/TRAINING PROGRAM

## 2025-08-10 PROCEDURE — 84145 PROCALCITONIN (PCT): CPT | Mod: AHULAB | Performed by: STUDENT IN AN ORGANIZED HEALTH CARE EDUCATION/TRAINING PROGRAM

## 2025-08-10 PROCEDURE — 84484 ASSAY OF TROPONIN QUANT: CPT

## 2025-08-10 PROCEDURE — 87899 AGENT NOS ASSAY W/OPTIC: CPT | Mod: AHULAB | Performed by: STUDENT IN AN ORGANIZED HEALTH CARE EDUCATION/TRAINING PROGRAM

## 2025-08-10 PROCEDURE — 1100000001 HC PRIVATE ROOM DAILY

## 2025-08-10 PROCEDURE — 71275 CT ANGIOGRAPHY CHEST: CPT

## 2025-08-10 PROCEDURE — 83735 ASSAY OF MAGNESIUM: CPT

## 2025-08-10 PROCEDURE — 96365 THER/PROPH/DIAG IV INF INIT: CPT

## 2025-08-10 PROCEDURE — 2500000001 HC RX 250 WO HCPCS SELF ADMINISTERED DRUGS (ALT 637 FOR MEDICARE OP): Performed by: STUDENT IN AN ORGANIZED HEALTH CARE EDUCATION/TRAINING PROGRAM

## 2025-08-10 PROCEDURE — 99223 1ST HOSP IP/OBS HIGH 75: CPT | Performed by: STUDENT IN AN ORGANIZED HEALTH CARE EDUCATION/TRAINING PROGRAM

## 2025-08-10 PROCEDURE — 87075 CULTR BACTERIA EXCEPT BLOOD: CPT | Mod: AHULAB | Performed by: STUDENT IN AN ORGANIZED HEALTH CARE EDUCATION/TRAINING PROGRAM

## 2025-08-10 PROCEDURE — 99285 EMERGENCY DEPT VISIT HI MDM: CPT | Mod: 25 | Performed by: STUDENT IN AN ORGANIZED HEALTH CARE EDUCATION/TRAINING PROGRAM

## 2025-08-10 PROCEDURE — 71275 CT ANGIOGRAPHY CHEST: CPT | Mod: FOREIGN READ | Performed by: RADIOLOGY

## 2025-08-10 PROCEDURE — 2550000001 HC RX 255 CONTRASTS: Performed by: STUDENT IN AN ORGANIZED HEALTH CARE EDUCATION/TRAINING PROGRAM

## 2025-08-10 PROCEDURE — 85025 COMPLETE CBC W/AUTO DIFF WBC: CPT

## 2025-08-10 PROCEDURE — 87081 CULTURE SCREEN ONLY: CPT | Mod: AHULAB | Performed by: STUDENT IN AN ORGANIZED HEALTH CARE EDUCATION/TRAINING PROGRAM

## 2025-08-10 PROCEDURE — 85610 PROTHROMBIN TIME: CPT

## 2025-08-10 RX ORDER — DEXTROSE 50 % IN WATER (D50W) INTRAVENOUS SYRINGE
25
Status: ACTIVE | OUTPATIENT
Start: 2025-08-10

## 2025-08-10 RX ORDER — ATORVASTATIN CALCIUM 40 MG/1
40 TABLET, FILM COATED ORAL NIGHTLY
Status: DISPENSED | OUTPATIENT
Start: 2025-08-10

## 2025-08-10 RX ORDER — AZITHROMYCIN 500 MG/1
500 TABLET, FILM COATED ORAL ONCE
Status: COMPLETED | OUTPATIENT
Start: 2025-08-10 | End: 2025-08-10

## 2025-08-10 RX ORDER — GABAPENTIN 300 MG/1
300 CAPSULE ORAL 3 TIMES DAILY
Status: DISPENSED | OUTPATIENT
Start: 2025-08-10

## 2025-08-10 RX ORDER — ALBUTEROL SULFATE 0.83 MG/ML
3 SOLUTION RESPIRATORY (INHALATION)
Status: DISCONTINUED | OUTPATIENT
Start: 2025-08-11 | End: 2025-08-10 | Stop reason: WASHOUT

## 2025-08-10 RX ORDER — ACETAMINOPHEN 325 MG/1
325 TABLET ORAL EVERY 6 HOURS PRN
Status: ACTIVE | OUTPATIENT
Start: 2025-08-10

## 2025-08-10 RX ORDER — CEFTRIAXONE 1 G/50ML
1 INJECTION, SOLUTION INTRAVENOUS ONCE
Status: COMPLETED | OUTPATIENT
Start: 2025-08-10 | End: 2025-08-10

## 2025-08-10 RX ORDER — DAPAGLIFLOZIN 10 MG/1
5 TABLET, FILM COATED ORAL DAILY
Status: ACTIVE | OUTPATIENT
Start: 2025-08-11

## 2025-08-10 RX ORDER — IPRATROPIUM BROMIDE 0.5 MG/2.5ML
0.5 SOLUTION RESPIRATORY (INHALATION)
Status: DISCONTINUED | OUTPATIENT
Start: 2025-08-11 | End: 2025-08-10 | Stop reason: WASHOUT

## 2025-08-10 RX ORDER — POTASSIUM CHLORIDE 20 MEQ/1
20 TABLET, EXTENDED RELEASE ORAL DAILY
Status: ACTIVE | OUTPATIENT
Start: 2025-08-11

## 2025-08-10 RX ORDER — IPRATROPIUM BROMIDE AND ALBUTEROL SULFATE 2.5; .5 MG/3ML; MG/3ML
3 SOLUTION RESPIRATORY (INHALATION)
Status: ACTIVE | OUTPATIENT
Start: 2025-08-11

## 2025-08-10 RX ORDER — OXYBUTYNIN CHLORIDE 5 MG/1
5 TABLET ORAL DAILY
Status: ACTIVE | OUTPATIENT
Start: 2025-08-11

## 2025-08-10 RX ORDER — AMIODARONE HYDROCHLORIDE 200 MG/1
100 TABLET ORAL DAILY
Status: ACTIVE | OUTPATIENT
Start: 2025-08-11

## 2025-08-10 RX ORDER — FUROSEMIDE 40 MG/1
40 TABLET ORAL DAILY
Status: ACTIVE | OUTPATIENT
Start: 2025-08-11

## 2025-08-10 RX ORDER — IPRATROPIUM BROMIDE AND ALBUTEROL SULFATE 2.5; .5 MG/3ML; MG/3ML
3 SOLUTION RESPIRATORY (INHALATION) EVERY 2 HOUR PRN
Status: ACTIVE | OUTPATIENT
Start: 2025-08-10

## 2025-08-10 RX ORDER — DEXTROSE 50 % IN WATER (D50W) INTRAVENOUS SYRINGE
12.5
Status: ACTIVE | OUTPATIENT
Start: 2025-08-10

## 2025-08-10 RX ORDER — INSULIN LISPRO 100 [IU]/ML
0-5 INJECTION, SOLUTION INTRAVENOUS; SUBCUTANEOUS
Status: ACTIVE | OUTPATIENT
Start: 2025-08-11

## 2025-08-10 RX ADMIN — GABAPENTIN 300 MG: 300 CAPSULE ORAL at 22:08

## 2025-08-10 RX ADMIN — Medication 1 DOSE: at 23:20

## 2025-08-10 RX ADMIN — IOHEXOL 75 ML: 350 INJECTION, SOLUTION INTRAVENOUS at 13:32

## 2025-08-10 RX ADMIN — AZITHROMYCIN DIHYDRATE 500 MG: 500 TABLET ORAL at 18:17

## 2025-08-10 RX ADMIN — CEFTRIAXONE 1 G: 1 INJECTION, SOLUTION INTRAVENOUS at 18:16

## 2025-08-10 RX ADMIN — Medication 1 DOSE: at 23:11

## 2025-08-10 SDOH — SOCIAL STABILITY: SOCIAL INSECURITY: WITHIN THE LAST YEAR, HAVE YOU BEEN HUMILIATED OR EMOTIONALLY ABUSED IN OTHER WAYS BY YOUR PARTNER OR EX-PARTNER?: NO

## 2025-08-10 SDOH — SOCIAL STABILITY: SOCIAL INSECURITY: HAVE YOU HAD ANY THOUGHTS OF HARMING ANYONE ELSE?: NO

## 2025-08-10 SDOH — SOCIAL STABILITY: SOCIAL INSECURITY: WITHIN THE LAST YEAR, HAVE YOU BEEN AFRAID OF YOUR PARTNER OR EX-PARTNER?: NO

## 2025-08-10 SDOH — ECONOMIC STABILITY: FOOD INSECURITY: WITHIN THE PAST 12 MONTHS, YOU WORRIED THAT YOUR FOOD WOULD RUN OUT BEFORE YOU GOT THE MONEY TO BUY MORE.: NEVER TRUE

## 2025-08-10 SDOH — ECONOMIC STABILITY: INCOME INSECURITY: IN THE PAST 12 MONTHS HAS THE ELECTRIC, GAS, OIL, OR WATER COMPANY THREATENED TO SHUT OFF SERVICES IN YOUR HOME?: NO

## 2025-08-10 SDOH — SOCIAL STABILITY: SOCIAL INSECURITY: ARE YOU OR HAVE YOU BEEN THREATENED OR ABUSED PHYSICALLY, EMOTIONALLY, OR SEXUALLY BY ANYONE?: NO

## 2025-08-10 SDOH — ECONOMIC STABILITY: FOOD INSECURITY: WITHIN THE PAST 12 MONTHS, THE FOOD YOU BOUGHT JUST DIDN'T LAST AND YOU DIDN'T HAVE MONEY TO GET MORE.: NEVER TRUE

## 2025-08-10 SDOH — SOCIAL STABILITY: SOCIAL INSECURITY: HAVE YOU HAD THOUGHTS OF HARMING ANYONE ELSE?: NO

## 2025-08-10 SDOH — SOCIAL STABILITY: SOCIAL INSECURITY: DOES ANYONE TRY TO KEEP YOU FROM HAVING/CONTACTING OTHER FRIENDS OR DOING THINGS OUTSIDE YOUR HOME?: NO

## 2025-08-10 SDOH — SOCIAL STABILITY: SOCIAL INSECURITY: ABUSE: ADULT

## 2025-08-10 SDOH — SOCIAL STABILITY: SOCIAL INSECURITY: ARE THERE ANY APPARENT SIGNS OF INJURIES/BEHAVIORS THAT COULD BE RELATED TO ABUSE/NEGLECT?: NO

## 2025-08-10 SDOH — SOCIAL STABILITY: SOCIAL INSECURITY: DO YOU FEEL ANYONE HAS EXPLOITED OR TAKEN ADVANTAGE OF YOU FINANCIALLY OR OF YOUR PERSONAL PROPERTY?: NO

## 2025-08-10 SDOH — SOCIAL STABILITY: SOCIAL INSECURITY: DO YOU FEEL UNSAFE GOING BACK TO THE PLACE WHERE YOU ARE LIVING?: NO

## 2025-08-10 SDOH — SOCIAL STABILITY: SOCIAL INSECURITY: HAS ANYONE EVER THREATENED TO HURT YOUR FAMILY OR YOUR PETS?: NO

## 2025-08-10 SDOH — SOCIAL STABILITY: SOCIAL INSECURITY: WERE YOU ABLE TO COMPLETE ALL THE BEHAVIORAL HEALTH SCREENINGS?: YES

## 2025-08-10 ASSESSMENT — COGNITIVE AND FUNCTIONAL STATUS - GENERAL
HELP NEEDED FOR BATHING: A LITTLE
DRESSING REGULAR UPPER BODY CLOTHING: A LITTLE
DAILY ACTIVITIY SCORE: 20
CLIMB 3 TO 5 STEPS WITH RAILING: A LITTLE
TOILETING: A LITTLE
MOBILITY SCORE: 20
WALKING IN HOSPITAL ROOM: A LITTLE
MOVING TO AND FROM BED TO CHAIR: A LITTLE
PATIENT BASELINE BEDBOUND: NO
DRESSING REGULAR LOWER BODY CLOTHING: A LITTLE
STANDING UP FROM CHAIR USING ARMS: A LITTLE

## 2025-08-10 ASSESSMENT — ACTIVITIES OF DAILY LIVING (ADL)
ADEQUATE_TO_COMPLETE_ADL: YES
JUDGMENT_ADEQUATE_SAFELY_COMPLETE_DAILY_ACTIVITIES: YES
FEEDING YOURSELF: INDEPENDENT
HEARING - LEFT EAR: HEARING AID
HEARING - RIGHT EAR: HEARING AID
ASSISTIVE_DEVICE: OTHER (COMMENT)
WALKS IN HOME: INDEPENDENT
TOILETING: INDEPENDENT
GROOMING: INDEPENDENT
LACK_OF_TRANSPORTATION: NO
DRESSING YOURSELF: INDEPENDENT
PATIENT'S MEMORY ADEQUATE TO SAFELY COMPLETE DAILY ACTIVITIES?: YES
BATHING: INDEPENDENT

## 2025-08-10 ASSESSMENT — LIFESTYLE VARIABLES
AUDIT-C TOTAL SCORE: 3
AUDIT-C TOTAL SCORE: 3
HOW OFTEN DO YOU HAVE A DRINK CONTAINING ALCOHOL: 2-3 TIMES A WEEK
SKIP TO QUESTIONS 9-10: 1
HOW OFTEN DO YOU HAVE 6 OR MORE DRINKS ON ONE OCCASION: NEVER
HOW MANY STANDARD DRINKS CONTAINING ALCOHOL DO YOU HAVE ON A TYPICAL DAY: 1 OR 2

## 2025-08-10 ASSESSMENT — PAIN SCALES - GENERAL
PAINLEVEL_OUTOF10: 0 - NO PAIN
PAINLEVEL_OUTOF10: 0 - NO PAIN

## 2025-08-10 ASSESSMENT — PAIN - FUNCTIONAL ASSESSMENT: PAIN_FUNCTIONAL_ASSESSMENT: 0-10

## 2025-08-10 ASSESSMENT — ENCOUNTER SYMPTOMS
COUGH: 1
SHORTNESS OF BREATH: 1

## 2025-08-10 NOTE — ED TRIAGE NOTES
PT is A/Ox4 coming in for chest pain that started last night. PT stated that she feels a bump on her rib cage that hurts and feels like she broke a bone. PT denies any fall/injuries, denies any other symptoms like coughing, shortness of breath, nausea, vomiting.

## 2025-08-10 NOTE — ASSESSMENT & PLAN NOTE
- cont eliquis, amiodarone  - patient does have a subcutaneous nodule present on anterior chest wall, ? Subcutaneous thrombosis, monitor for now, no PE on CTA

## 2025-08-10 NOTE — H&P
History Of Present Illness  Lissett Rodríguez is a 89 y.o. female PMHx COPD, chronic hypoxemic respiratory failure on 6L O2, A fib on eliquis, KALPANA, HFpEF, DM2, CKD3a, HTN, HLD presenting with chest pain. Symptoms present for 24 hours. EKG showed no ischemic changes and troponin was stable, however CT showed multifocal PNA. She denies any cough or viral syndrome. She did desaturate to 85% on her typical 6L NC. She follows with pulm (Kevin) and cards (Arin) outpatient. Of note, she was just admitted mid June for HCAP and treated with zosyn at that time, admitted beginning of June for COPD exacerbation. She is DNR-DNI     Past Medical History  Medical History[1]    Surgical History  Surgical History[2]     Social History  She reports that she has never smoked. She has never used smokeless tobacco. She reports that she does not drink alcohol and does not use drugs.    Family History  Family History[3]     Allergies  Penicillins, Tetracyclines, Lisinopril, and Vancomycin    Review of Systems   Respiratory:  Positive for cough and shortness of breath.    Cardiovascular:  Positive for chest pain.   All other systems reviewed and are negative.       Physical Exam  Vitals and nursing note reviewed.   Constitutional:       General: She is not in acute distress.     Appearance: Normal appearance.   HENT:      Head: Normocephalic and atraumatic.      Nose:      Comments: NC in place    Eyes:      General: No scleral icterus.     Extraocular Movements: Extraocular movements intact.      Pupils: Pupils are equal, round, and reactive to light.       Cardiovascular:      Rate and Rhythm: Normal rate and regular rhythm.      Pulses: Normal pulses.      Heart sounds: Normal heart sounds.      Comments: Subcutaneous nodule of anterior chest wall approximately 2 cm in length  Pulmonary:      Effort: Pulmonary effort is normal.      Breath sounds: Normal breath sounds. No wheezing, rhonchi or rales.      Comments: Lung sounds  "are actually normal, good air movement throughout, no rhonchi or wheezing  Abdominal:      General: Abdomen is flat. Bowel sounds are normal. There is no distension.      Tenderness: There is no abdominal tenderness. There is no guarding.     Musculoskeletal:         General: No swelling or tenderness. Normal range of motion.     Skin:     General: Skin is warm and dry.      Findings: No erythema or rash.     Neurological:      General: No focal deficit present.      Mental Status: She is alert and oriented to person, place, and time.          Last Recorded Vitals  Blood pressure 127/77, pulse 67, temperature 36.3 °C (97.4 °F), resp. rate 20, height (!) 1.499 m (4' 11\"), weight 64.4 kg (142 lb), SpO2 94%.    Relevant Results      Results for orders placed or performed during the hospital encounter of 08/10/25 (from the past 24 hours)   CBC and Auto Differential   Result Value Ref Range    WBC 9.2 4.4 - 11.3 x10*3/uL    nRBC 0.0 0.0 - 0.0 /100 WBCs    RBC 4.92 4.00 - 5.20 x10*6/uL    Hemoglobin 14.2 12.0 - 16.0 g/dL    Hematocrit 46.4 (H) 36.0 - 46.0 %    MCV 94 80 - 100 fL    MCH 28.9 26.0 - 34.0 pg    MCHC 30.6 (L) 32.0 - 36.0 g/dL    RDW 14.1 11.5 - 14.5 %    Platelets 190 150 - 450 x10*3/uL    Neutrophils % 72.6 40.0 - 80.0 %    Immature Granulocytes %, Automated 0.3 0.0 - 0.9 %    Lymphocytes % 18.8 13.0 - 44.0 %    Monocytes % 5.9 2.0 - 10.0 %    Eosinophils % 1.5 0.0 - 6.0 %    Basophils % 0.9 0.0 - 2.0 %    Neutrophils Absolute 6.64 (H) 1.60 - 5.50 x10*3/uL    Immature Granulocytes Absolute, Automated 0.03 0.00 - 0.50 x10*3/uL    Lymphocytes Absolute 1.72 0.80 - 3.00 x10*3/uL    Monocytes Absolute 0.54 0.05 - 0.80 x10*3/uL    Eosinophils Absolute 0.14 0.00 - 0.40 x10*3/uL    Basophils Absolute 0.08 0.00 - 0.10 x10*3/uL   B-type natriuretic peptide   Result Value Ref Range    BNP 55 0 - 99 pg/mL   Protime-INR   Result Value Ref Range    Protime 18.0 (H) 9.8 - 12.4 seconds    INR 1.6 (H) 0.9 - 1.1 "   Comprehensive metabolic panel   Result Value Ref Range    Glucose 121 (H) 74 - 99 mg/dL    Sodium 139 136 - 145 mmol/L    Potassium 4.1 3.5 - 5.3 mmol/L    Chloride 107 98 - 107 mmol/L    Bicarbonate 22 21 - 32 mmol/L    Anion Gap 14 10 - 20 mmol/L    Urea Nitrogen 19 6 - 23 mg/dL    Creatinine 1.00 0.50 - 1.05 mg/dL    eGFR 53 (L) >60 mL/min/1.73m*2    Calcium 8.7 8.6 - 10.3 mg/dL    Albumin 3.8 3.4 - 5.0 g/dL    Alkaline Phosphatase 75 33 - 136 U/L    Total Protein 7.0 6.4 - 8.2 g/dL    AST 16 9 - 39 U/L    Bilirubin, Total 0.7 0.0 - 1.2 mg/dL    ALT 10 7 - 45 U/L   Magnesium   Result Value Ref Range    Magnesium 2.27 1.60 - 2.40 mg/dL   Troponin I, High Sensitivity, Initial   Result Value Ref Range    Troponin I, High Sensitivity 5 0 - 13 ng/L   Troponin, High Sensitivity, 1 Hour   Result Value Ref Range    Troponin I, High Sensitivity 5 0 - 13 ng/L   C-reactive protein   Result Value Ref Range    C-Reactive Protein 0.66 <1.00 mg/dL     *Note: Due to a large number of results and/or encounters for the requested time period, some results have not been displayed. A complete set of results can be found in Results Review.     CT angio chest for pulmonary embolism  Result Date: 8/10/2025  STUDY: CT Angiogram of the Chest; 8/10/2025 1:43 PM INDICATION: Chest pain, unspecified and hypoxia. COMPARISON: XR chest 7/16/2025.  CTA chest 6/19/2025. ACCESSION NUMBER(S): [id number] ORDERING CLINICIAN: RYAN MCNALLY TECHNIQUE:  CTA of the chest was performed with intravenous contrast. Images are reviewed and processed at a workstation according to the CT angiogram protocol with 3-D and/or MIP post processing imaging generated.  Omnipaque 350--75 mL was administered intravenously.  Automated mA/kV exposure control was utilized and patient examination was performed in strict accordance with principles of ALARA. FINDINGS: Pulmonary arteries are adequately opacified without acute or chronic filling defects.  The thoracic aorta  is normal in course and caliber without dissection or aneurysm. The heart is enlarged. No pericardial effusion. Coronary artery and aortic calcifications are seen.  Mediastinal lymphadenopathy measuring up to 1.4 cm in the precarinal and subcarinal stations, unchanged. There is no pleural effusion, pleural thickening, or pneumothorax. The airways are patent. Diffusely increased groundglass opacities throughout both lungs, which have progressed since 6/19/2025. The previously seen nodular infiltrate in the right middle lobe has significantly decreased. There is a background of chronically increased initial markings. No lobar collapse. No suspicious pulmonary mass. Upper abdomen demonstrates no acute pathology. Status post cholecystectomy. Advanced atherosclerotic calcification of the upper abdominal aorta and its major tributaries. Fatty infiltration of the pancreas. There are no acute fractures.  No suspicious bony lesions. Degenerative changes of the spine and shoulders    1. No pulmonary embolism. 2. Multifocal pneumonia likely on a background of chronic interstitial lung disease, overall progressed since 6/19/2025, as detailed above. 3. Unchanged mediastinal lymphadenopathy, likely reactive. 4. Coronary artery disease. Signed by Deondre Astorga DO    XR chest 2 views  Result Date: 7/18/2025  Interpreted By:  Rossy Paz, STUDY: XR CHEST 2 VIEWS;  7/16/2025 10:37 am   INDICATION: Signs/Symptoms:pneumonia.   ,J44.9 Chronic obstructive pulmonary disease, unspecified   COMPARISON: 06/08/2025, 02/26/2024 in 03/25/2023   ACCESSION NUMBER(S): [id number]   ORDERING CLINICIAN: ISAMAR SERVIN   FINDINGS:         CARDIOMEDIASTINAL SILHOUETTE: Cardiac silhouette is normal in size. Aorta is athero sclerotic. Implanted monitoring device superimposes on the cardiac silhouette on the PA view, located in the anterior soft tissues.   LUNGS: Coarse interstitial markings are present consistent with chronic lung disease. The  superimposed ground-glass infiltrates are improved compared to the prior study but with some lingering patchy faint ground-glass infiltrates in upper lobes and right lower lobe compared to baseline chest x-rays. No pleural effusion is noted.   ABDOMEN: No remarkable upper abdominal findings.   BONES: No acute osseous changes.       1.  Improvement in pulmonary infiltrates from the prior exam. However, there is still some vague ground-glass infiltrate in the upper lobes and in the right lower lobe superimposed on the chronic background changes when compared to baseline exams.       MACRO: None   Signed by: Rossy Paz 7/18/2025 8:48 AM Dictation workstation:   [id number]         Assessment & Plan  HCAP (healthcare-associated pneumonia)  - given azithro and rocephin in ED, changing to zosyn in setting of recent hospitalizations  - checking urine antigens, nasal MRSA, blood cx  - no evidence of SIRS/sepsis  (HFpEF) heart failure with preserved ejection fraction  - continue home meds (furosemide 40 every day, farxiga 5 mg every day)  - no fluids in setting of HFpEF  Atrial fibrillation (Multi)  - cont eliquis, amiodarone  - patient does have a subcutaneous nodule present on anterior chest wall, ? Subcutaneous thrombosis, monitor for now, no PE on CTA  Chronic obstructive pulmonary disease (Multi)  - cont home PRN inhalers  - pulm consult  Diabetes mellitus type 2 without retinopathy (Multi)  - per history, cont farxiga  - low dose SSI  Hypertension  - normotensive, monitor  Obstructive sleep apnea syndrome  - auto CPAP ordered  Stage 3a chronic kidney disease (Multi)  - monitor daily BMP  Shortness of breath  - as above for HCAP      I spent 60 minutes in the professional and overall care of this patient.      Jasiel Wells DO         [1]   Past Medical History:  Diagnosis Date    Angioneurotic edema, initial encounter 08/24/2018    Angioedema of lips    Calculus of gallbladder without cholecystitis without  obstruction 02/22/2017    Calculus of gallbladder without cholecystitis without obstruction    Chronic obstructive pulmonary disease, unspecified 02/20/2017    Chronic obstructive pulmonary disease with hypoxia    Eructation 06/16/2017    Burping    Personal history of transient ischemic attack (TIA), and cerebral infarction 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 10/16/2018    History of transient cerebral ischemia    Personal history of transient ischemic attack (TIA), and cerebral infarction 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 03/08/2016    History of transient cerebral ischemia    Pneumonia, unspecified organism 02/09/2018    CAP (community acquired pneumonia)    Trochanteric bursitis, unspecified hip 04/23/2015    Greater trochanteric bursitis    Type 2 diabetes mellitus without complications 11/06/2017    Controlled type 2 diabetes mellitus without complication, without long-term current use of insulin   [2]   Past Surgical History:  Procedure Laterality Date    APPENDECTOMY  05/15/2013    Appendectomy    CATARACT EXTRACTION  05/15/2013    Cataract Surgery    COLONOSCOPY  04/11/2014    Complete Colonoscopy    HYSTERECTOMY  05/15/2013    Hysterectomy    MR HEAD ANGIO WO IV CONTRAST  1/8/2016    MR HEAD ANGIO WO IV CONTRAST 1/8/2016 [facility]    MR HEAD ANGIO WO IV CONTRAST  10/1/2012    MR HEAD ANGIO WO IV CONTRAST 10/1/2012 [facility]    MR NECK ANGIO W IV CONTRAST  10/1/2012    MR NECK ANGIO W IV CONTRAST 10/1/2012 [facility]    MR NECK ANGIO WO IV CONTRAST  1/8/2016    MR NECK ANGIO WO IV CONTRAST 1/8/2016 [facility]    OTHER SURGICAL HISTORY  05/15/2013    Cardiac Cath Procedure Outcome: Successful    OTHER SURGICAL HISTORY  02/28/2019    Cholecystectomy    TOTAL KNEE ARTHROPLASTY  05/29/2013    Knee Replacement   [3]   Family History  Problem Relation Name Age of Onset    Alzheimer's disease Mother      Heart failure Mother      Stroke Mother          ischemic stroke    Heart attack Father       Stroke Father          ischemic stroke    COPD Sister      Diabetes Sister          mellitus    Cancer Sister      Parkinsonism Sister      Other (previous cardiac prolems) Sister

## 2025-08-10 NOTE — ASSESSMENT & PLAN NOTE
- given azithro and rocephin in ED, changing to zosyn in setting of recent hospitalizations  - checking urine antigens, nasal MRSA, blood cx  - no evidence of SIRS/sepsis

## 2025-08-10 NOTE — ED PROVIDER NOTES
HPI   Chief Complaint   Patient presents with    Chest Pain       89-year-old female past medical history of A-fib on Eliquis, COPD, hypertension, hyperlipidemia, KALPANA, CHF, type 2 diabetes, PE presents to the ED today with a chief concern of chest pain.  Patient reports symptoms for last night.  She reports that she is a dull ache in the left side of her chest.  She reports that when she felt the pain she touched her left chest and notes that there is a nodule there.  She is never notices before.  No fevers.  No nausea or vomiting.  No chest pain or shortness of breath.  No numbness or tingling or weakness.  No dizziness or syncope.  Has not missed any doses of Eliquis.  Does not smoke.  No history of IV drug use.  She is currently on 6 L oxygen NC at home.  Does not feel short of breath.  No lower extremity edema.  No cough or hemoptysis.  She denies any other symptoms or concerns at this time.      History provided by:  Patient and relative   used: No            Patient History   Medical History[1]  Surgical History[2]  Family History[3]  Social History[4]    Physical Exam   ED Triage Vitals   Temperature Heart Rate Respirations BP   08/10/25 1000 08/10/25 1000 08/10/25 1000 08/10/25 1000   36.3 °C (97.4 °F) 76 17 114/57      Pulse Ox Temp src Heart Rate Source Patient Position   08/10/25 1000 -- 08/10/25 1045 08/10/25 1045   (!) 91 %  Monitor Lying      BP Location FiO2 (%)     08/10/25 1045 --     Right arm        Physical Exam\  Constitutional: Vital signs per nursing notes.  Well developed, well nourished.  No acute distress.    Psychiatric: alert and oriented to person, place, and time; no abnormalities of mood or affect; memory intact  Eyes: PERRL; conjunctivae and lids normal; EOMI  ENT: Ears normal externally; face symmetric. voice normal  Neck: neck supple, no meningismus; trachea midline without deviation  Respiratory: normal respiratory effort and excursion; no rales, rhonchi, or  wheezes; equal air entry  Cardiovascular: RRR, 2+ pulses extremities small nodule noted on the left side of chest.  Nodule is about 0.5 cm in diameter.  No fluctuance.  No skin changes.  Somewhat reproducible left-sided chest tenderness.  Neurological: normal speech; CN II-XII grossly intact, normal motor and sensory function  GI: no distention, soft, nontender  : Deferred  Musculoskeletal: normal gait and station; normal digits and nails; normal to palpation; normal strength/tone; neurovascular status intact.  Skin: normal to inspection; normal to palpation; no rash    ED Course & MDM   ED Course as of 08/10/25 1838   Sun Aug 10, 2025   1050 ECG 12 lead  I have personally reviewed and interpreted this EKG.  Normal sinus rhythm, rate 75 BPM  Normal axis  IA interval and QRS duration within normal limits.  QTc within normal limits.  No signs of acute ischemia or infarction   []   1418 I personally interpreted the labs.  CBC shows no evidence of leukocytosis or anemia.  CMP shows normal liver function.  Magnesium normal.  Troponin is negative []   1418 EGFR(!): 53 []   1419 I personally interpreted the EKG.  Ventricular rate 75 bpm.  IA interval 170 ms.  QRS duration 90 ms.  QT/QTc 422/471 ms.  Patient is in normal sinus rhythm at a rate of 71 bpm.  Normal axis.  There is good R wave progression.  No right or left bundle branch block.  No ST elevations.  Compared with previous EKG June 19, 2025 grossly unchanged. [MC]   1721 C-Reactive Protein: 0.66 []   1721 BNP: 55 []   1802 Spoke with Dr. Wells who accepts admission of this patient to the inpatient unit  []      ED Course User Index  [MC] Kvng Ventura PA-C  [SS] Steffen Simerlink, MD         Diagnoses as of 08/10/25 1838   Shortness of breath   Chest pain, unspecified type   Pneumonia due to infectious organism, unspecified laterality, unspecified part of lung                 No data recorded     Jennifer Coma Scale Score: 15 (08/10/25 1112 :  Roberta Morris RN)                           Medical Decision Making  89-year-old female past medical history of A-fib on Eliquis, COPD, hypertension, hyperlipidemia, KALPANA, CHF, type 2 diabetes, PE presents to the ED today with a chief concern of chest pain.  Vital signs are reassuring.  Patient overall appears well and is nontoxic-appearing.Patient has full range of motion of the neck without meningismus.  Satting well on room air.  Not tachycardic.  Afebrile.  She did not pass a walking pulse ox test.  She was 85% while walking.  She was given Rocephin and azithromycin in the ED.  Her CT PE study does show multifocal pneumonia.  She has no cough or flulike symptoms.  Low suspicion for pneumonia however will treat and refer to inpatient team for this.  She was brought for COVID and influenza as well as RSV however these are pending.  No leukocytosis.  Normal liver function.  Magnesium, BNP, CRP normal.  INR is 1.6.  EKG shows no ischemic changes and troponin is stable.  Will admit for hypoxia and chest pain.  Low suspicion for dissection.  Again CT PE study shows no PE but does show that multifocal pneumonia.  No zoster rash.  Discussed impression and findings with patient and she feels comfortable being admitted to the inpatient team for further evaluation and treatment.  Patient was seen and evaluated by ED attending physician.    Differential diagnosis: ACS, aortic dissection, Boerhaave syndrome, PE, pneumothorax, pericardial tamponade, cocaine induced chest pain, endocarditis, myocarditis, cardiomyopathy, COPD, asthma, GERD, costochondritis/musculoskeletal, pericarditis, pneumonia, zoster    Disposition/treatment  1.  See diagnosis          Procedure  Procedures       [1]   Past Medical History:  Diagnosis Date    Angioneurotic edema, initial encounter 08/24/2018    Angioedema of lips    Calculus of gallbladder without cholecystitis without obstruction 02/22/2017    Calculus of gallbladder without  cholecystitis without obstruction    Chronic obstructive pulmonary disease, unspecified 02/20/2017    Chronic obstructive pulmonary disease with hypoxia    Eructation 06/16/2017    Burping    Personal history of transient ischemic attack (TIA), and cerebral infarction 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 10/16/2018    History of transient cerebral ischemia    Personal history of transient ischemic attack (TIA), and cerebral infarction 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 03/08/2016    History of transient cerebral ischemia    Pneumonia, unspecified organism 02/09/2018    CAP (community acquired pneumonia)    Trochanteric bursitis, unspecified hip 04/23/2015    Greater trochanteric bursitis    Type 2 diabetes mellitus without complications 11/06/2017    Controlled type 2 diabetes mellitus without complication, without long-term current use of insulin   [2]   Past Surgical History:  Procedure Laterality Date    APPENDECTOMY  05/15/2013    Appendectomy    CATARACT EXTRACTION  05/15/2013    Cataract Surgery    COLONOSCOPY  04/11/2014    Complete Colonoscopy    HYSTERECTOMY  05/15/2013    Hysterectomy    MR HEAD ANGIO WO IV CONTRAST  1/8/2016    MR HEAD ANGIO WO IV CONTRAST 1/8/2016 [facility]    MR HEAD ANGIO WO IV CONTRAST  10/1/2012    MR HEAD ANGIO WO IV CONTRAST 10/1/2012 [facility]    MR NECK ANGIO W IV CONTRAST  10/1/2012    MR NECK ANGIO W IV CONTRAST 10/1/2012 [facility]    MR NECK ANGIO WO IV CONTRAST  1/8/2016    MR NECK ANGIO WO IV CONTRAST 1/8/2016 [facility]    OTHER SURGICAL HISTORY  05/15/2013    Cardiac Cath Procedure Outcome: Successful    OTHER SURGICAL HISTORY  02/28/2019    Cholecystectomy    TOTAL KNEE ARTHROPLASTY  05/29/2013    Knee Replacement   [3]   Family History  Problem Relation Name Age of Onset    Alzheimer's disease Mother      Heart failure Mother      Stroke Mother          ischemic stroke    Heart attack Father      Stroke Father          ischemic stroke    COPD  Sister      Diabetes Sister          mellitus    Cancer Sister      Parkinsonism Sister      Other (previous cardiac prolems) Sister     [4]   Social History  Tobacco Use    Smoking status: Never    Smokeless tobacco: Never   Substance Use Topics    Alcohol use: Never    Drug use: Never        Kvng Ventura PA-C  08/10/25 5071

## 2025-08-10 NOTE — ASSESSMENT & PLAN NOTE
- continue home meds (furosemide 40 every day, farxiga 5 mg every day)  - no fluids in setting of HFpEF

## 2025-08-11 LAB
ANION GAP SERPL CALC-SCNC: 14 MMOL/L (ref 10–20)
ATRIAL RATE: 75 BPM
BASOPHILS # BLD AUTO: 0.07 X10*3/UL (ref 0–0.1)
BASOPHILS NFR BLD AUTO: 0.7 %
BUN SERPL-MCNC: 17 MG/DL (ref 6–23)
CALCIUM SERPL-MCNC: 8.6 MG/DL (ref 8.6–10.3)
CHLORIDE SERPL-SCNC: 106 MMOL/L (ref 98–107)
CO2 SERPL-SCNC: 23 MMOL/L (ref 21–32)
CREAT SERPL-MCNC: 0.97 MG/DL (ref 0.5–1.05)
EGFRCR SERPLBLD CKD-EPI 2021: 55 ML/MIN/1.73M*2
EOSINOPHIL # BLD AUTO: 0.1 X10*3/UL (ref 0–0.4)
EOSINOPHIL NFR BLD AUTO: 1 %
ERYTHROCYTE [DISTWIDTH] IN BLOOD BY AUTOMATED COUNT: 14 % (ref 11.5–14.5)
GLUCOSE BLD MANUAL STRIP-MCNC: 134 MG/DL (ref 74–99)
GLUCOSE BLD MANUAL STRIP-MCNC: 144 MG/DL (ref 74–99)
GLUCOSE BLD MANUAL STRIP-MCNC: 247 MG/DL (ref 74–99)
GLUCOSE BLD MANUAL STRIP-MCNC: 266 MG/DL (ref 74–99)
GLUCOSE BLD MANUAL STRIP-MCNC: 290 MG/DL (ref 74–99)
GLUCOSE SERPL-MCNC: 115 MG/DL (ref 74–99)
HCT VFR BLD AUTO: 43.9 % (ref 36–46)
HGB BLD-MCNC: 13.8 G/DL (ref 12–16)
IMM GRANULOCYTES # BLD AUTO: 0.04 X10*3/UL (ref 0–0.5)
IMM GRANULOCYTES NFR BLD AUTO: 0.4 % (ref 0–0.9)
LEGIONELLA AG UR QL: NEGATIVE
LYMPHOCYTES # BLD AUTO: 1.44 X10*3/UL (ref 0.8–3)
LYMPHOCYTES NFR BLD AUTO: 14.8 %
MCH RBC QN AUTO: 28.9 PG (ref 26–34)
MCHC RBC AUTO-ENTMCNC: 31.4 G/DL (ref 32–36)
MCV RBC AUTO: 92 FL (ref 80–100)
MONOCYTES # BLD AUTO: 0.55 X10*3/UL (ref 0.05–0.8)
MONOCYTES NFR BLD AUTO: 5.7 %
NEUTROPHILS # BLD AUTO: 7.53 X10*3/UL (ref 1.6–5.5)
NEUTROPHILS NFR BLD AUTO: 77.4 %
NRBC BLD-RTO: 0 /100 WBCS (ref 0–0)
P AXIS: 60 DEGREES
P OFFSET: 189 MS
P ONSET: 128 MS
PLATELET # BLD AUTO: 176 X10*3/UL (ref 150–450)
POTASSIUM SERPL-SCNC: 3.6 MMOL/L (ref 3.5–5.3)
PR INTERVAL: 170 MS
PROCALCITONIN SERPL-MCNC: 0.02 NG/ML
Q ONSET: 213 MS
QRS COUNT: 12 BEATS
QRS DURATION: 90 MS
QT INTERVAL: 422 MS
QTC CALCULATION(BAZETT): 471 MS
QTC FREDERICIA: 454 MS
R AXIS: 12 DEGREES
RBC # BLD AUTO: 4.78 X10*6/UL (ref 4–5.2)
S PNEUM AG UR QL: NEGATIVE
SODIUM SERPL-SCNC: 139 MMOL/L (ref 136–145)
T AXIS: 68 DEGREES
T OFFSET: 424 MS
VENTRICULAR RATE: 75 BPM
WBC # BLD AUTO: 9.7 X10*3/UL (ref 4.4–11.3)

## 2025-08-11 PROCEDURE — 2500000005 HC RX 250 GENERAL PHARMACY W/O HCPCS: Performed by: STUDENT IN AN ORGANIZED HEALTH CARE EDUCATION/TRAINING PROGRAM

## 2025-08-11 PROCEDURE — 97165 OT EVAL LOW COMPLEX 30 MIN: CPT | Mod: GO | Performed by: OCCUPATIONAL THERAPIST

## 2025-08-11 PROCEDURE — 82947 ASSAY GLUCOSE BLOOD QUANT: CPT

## 2025-08-11 PROCEDURE — 80048 BASIC METABOLIC PNL TOTAL CA: CPT | Performed by: STUDENT IN AN ORGANIZED HEALTH CARE EDUCATION/TRAINING PROGRAM

## 2025-08-11 PROCEDURE — 99223 1ST HOSP IP/OBS HIGH 75: CPT | Performed by: INTERNAL MEDICINE

## 2025-08-11 PROCEDURE — 85025 COMPLETE CBC W/AUTO DIFF WBC: CPT | Performed by: STUDENT IN AN ORGANIZED HEALTH CARE EDUCATION/TRAINING PROGRAM

## 2025-08-11 PROCEDURE — 94640 AIRWAY INHALATION TREATMENT: CPT

## 2025-08-11 PROCEDURE — 94660 CPAP INITIATION&MGMT: CPT

## 2025-08-11 PROCEDURE — 2500000002 HC RX 250 W HCPCS SELF ADMINISTERED DRUGS (ALT 637 FOR MEDICARE OP, ALT 636 FOR OP/ED): Performed by: PHARMACIST

## 2025-08-11 PROCEDURE — 1100000001 HC PRIVATE ROOM DAILY

## 2025-08-11 PROCEDURE — 2500000002 HC RX 250 W HCPCS SELF ADMINISTERED DRUGS (ALT 637 FOR MEDICARE OP, ALT 636 FOR OP/ED): Performed by: STUDENT IN AN ORGANIZED HEALTH CARE EDUCATION/TRAINING PROGRAM

## 2025-08-11 PROCEDURE — 97161 PT EVAL LOW COMPLEX 20 MIN: CPT | Mod: GP

## 2025-08-11 PROCEDURE — 97535 SELF CARE MNGMENT TRAINING: CPT | Mod: GO | Performed by: OCCUPATIONAL THERAPIST

## 2025-08-11 PROCEDURE — 99497 ADVNCD CARE PLAN 30 MIN: CPT | Performed by: INTERNAL MEDICINE

## 2025-08-11 PROCEDURE — 36415 COLL VENOUS BLD VENIPUNCTURE: CPT | Performed by: STUDENT IN AN ORGANIZED HEALTH CARE EDUCATION/TRAINING PROGRAM

## 2025-08-11 PROCEDURE — 99233 SBSQ HOSP IP/OBS HIGH 50: CPT | Performed by: INTERNAL MEDICINE

## 2025-08-11 PROCEDURE — 2500000004 HC RX 250 GENERAL PHARMACY W/ HCPCS (ALT 636 FOR OP/ED): Mod: JZ | Performed by: INTERNAL MEDICINE

## 2025-08-11 PROCEDURE — 94664 DEMO&/EVAL PT USE INHALER: CPT

## 2025-08-11 PROCEDURE — 2500000004 HC RX 250 GENERAL PHARMACY W/ HCPCS (ALT 636 FOR OP/ED): Performed by: STUDENT IN AN ORGANIZED HEALTH CARE EDUCATION/TRAINING PROGRAM

## 2025-08-11 PROCEDURE — 2500000001 HC RX 250 WO HCPCS SELF ADMINISTERED DRUGS (ALT 637 FOR MEDICARE OP): Performed by: STUDENT IN AN ORGANIZED HEALTH CARE EDUCATION/TRAINING PROGRAM

## 2025-08-11 PROCEDURE — 97530 THERAPEUTIC ACTIVITIES: CPT | Mod: GP

## 2025-08-11 RX ADMIN — APIXABAN 5 MG: 5 TABLET, FILM COATED ORAL at 09:22

## 2025-08-11 RX ADMIN — Medication: at 10:49

## 2025-08-11 RX ADMIN — GABAPENTIN 300 MG: 300 CAPSULE ORAL at 20:38

## 2025-08-11 RX ADMIN — Medication: at 19:40

## 2025-08-11 RX ADMIN — ATORVASTATIN CALCIUM 40 MG: 40 TABLET, FILM COATED ORAL at 20:38

## 2025-08-11 RX ADMIN — FUROSEMIDE 40 MG: 40 TABLET ORAL at 09:22

## 2025-08-11 RX ADMIN — PIPERACILLIN SODIUM AND TAZOBACTAM SODIUM 3.38 G: 3; .375 INJECTION, SOLUTION INTRAVENOUS at 05:41

## 2025-08-11 RX ADMIN — APIXABAN 5 MG: 5 TABLET, FILM COATED ORAL at 20:38

## 2025-08-11 RX ADMIN — Medication: at 23:40

## 2025-08-11 RX ADMIN — GABAPENTIN 300 MG: 300 CAPSULE ORAL at 16:28

## 2025-08-11 RX ADMIN — POTASSIUM CHLORIDE EXTENDED-RELEASE 20 MEQ: 1500 TABLET ORAL at 09:22

## 2025-08-11 RX ADMIN — PIPERACILLIN SODIUM AND TAZOBACTAM SODIUM 3.38 G: 3; .375 INJECTION, SOLUTION INTRAVENOUS at 13:17

## 2025-08-11 RX ADMIN — Medication 1 DOSE: at 07:05

## 2025-08-11 RX ADMIN — OXYBUTYNIN CHLORIDE 5 MG: 5 TABLET ORAL at 09:22

## 2025-08-11 RX ADMIN — IPRATROPIUM BROMIDE AND ALBUTEROL SULFATE 3 ML: 2.5; .5 SOLUTION RESPIRATORY (INHALATION) at 07:05

## 2025-08-11 RX ADMIN — GABAPENTIN 300 MG: 300 CAPSULE ORAL at 09:22

## 2025-08-11 RX ADMIN — AMIODARONE HYDROCHLORIDE 100 MG: 200 TABLET ORAL at 09:22

## 2025-08-11 RX ADMIN — METHYLPREDNISOLONE SODIUM SUCCINATE 40 MG: 40 INJECTION, POWDER, FOR SOLUTION INTRAMUSCULAR; INTRAVENOUS at 13:17

## 2025-08-11 RX ADMIN — IPRATROPIUM BROMIDE AND ALBUTEROL SULFATE 3 ML: 2.5; .5 SOLUTION RESPIRATORY (INHALATION) at 14:56

## 2025-08-11 RX ADMIN — PIPERACILLIN SODIUM AND TAZOBACTAM SODIUM 3.38 G: 3; .375 INJECTION, SOLUTION INTRAVENOUS at 18:04

## 2025-08-11 RX ADMIN — Medication: at 02:05

## 2025-08-11 RX ADMIN — INSULIN LISPRO 2 UNITS: 100 INJECTION, SOLUTION INTRAVENOUS; SUBCUTANEOUS at 16:28

## 2025-08-11 RX ADMIN — DAPAGLIFLOZIN 5 MG: 10 TABLET, FILM COATED ORAL at 09:21

## 2025-08-11 RX ADMIN — IPRATROPIUM BROMIDE AND ALBUTEROL SULFATE 3 ML: 2.5; .5 SOLUTION RESPIRATORY (INHALATION) at 10:49

## 2025-08-11 RX ADMIN — IPRATROPIUM BROMIDE AND ALBUTEROL SULFATE 3 ML: 2.5; .5 SOLUTION RESPIRATORY (INHALATION) at 19:40

## 2025-08-11 ASSESSMENT — ENCOUNTER SYMPTOMS
NAUSEA: 0
CHILLS: 0
FEVER: 0
APPETITE CHANGE: 0
UNEXPECTED WEIGHT CHANGE: 0
SHORTNESS OF BREATH: 1
WHEEZING: 0
ABDOMINAL PAIN: 0
CHEST TIGHTNESS: 0
CONSTIPATION: 0
COUGH: 0
ACTIVITY CHANGE: 1
DIARRHEA: 1

## 2025-08-11 ASSESSMENT — COGNITIVE AND FUNCTIONAL STATUS - GENERAL
CLIMB 3 TO 5 STEPS WITH RAILING: A LOT
MOVING FROM LYING ON BACK TO SITTING ON SIDE OF FLAT BED WITH BEDRAILS: A LITTLE
CLIMB 3 TO 5 STEPS WITH RAILING: A LITTLE
TURNING FROM BACK TO SIDE WHILE IN FLAT BAD: A LITTLE
HELP NEEDED FOR BATHING: A LITTLE
MOBILITY SCORE: 19
WALKING IN HOSPITAL ROOM: A LITTLE
STANDING UP FROM CHAIR USING ARMS: A LITTLE
MOVING TO AND FROM BED TO CHAIR: A LITTLE
DAILY ACTIVITIY SCORE: 20
STANDING UP FROM CHAIR USING ARMS: A LITTLE
DRESSING REGULAR UPPER BODY CLOTHING: A LITTLE
TOILETING: A LITTLE
WALKING IN HOSPITAL ROOM: A LITTLE
MOBILITY SCORE: 17
PERSONAL GROOMING: A LITTLE
DRESSING REGULAR LOWER BODY CLOTHING: A LITTLE
DAILY ACTIVITIY SCORE: 19
TOILETING: A LITTLE
DAILY ACTIVITIY SCORE: 20
MOVING TO AND FROM BED TO CHAIR: A LITTLE
DRESSING REGULAR LOWER BODY CLOTHING: A LITTLE
DRESSING REGULAR LOWER BODY CLOTHING: A LITTLE
TOILETING: A LITTLE
STANDING UP FROM CHAIR USING ARMS: A LITTLE
HELP NEEDED FOR BATHING: A LITTLE
MOVING TO AND FROM BED TO CHAIR: A LITTLE
TURNING FROM BACK TO SIDE WHILE IN FLAT BAD: A LITTLE
CLIMB 3 TO 5 STEPS WITH RAILING: A LITTLE
MOBILITY SCORE: 19
DRESSING REGULAR UPPER BODY CLOTHING: A LITTLE
TURNING FROM BACK TO SIDE WHILE IN FLAT BAD: A LITTLE
DRESSING REGULAR UPPER BODY CLOTHING: A LITTLE
HELP NEEDED FOR BATHING: A LITTLE
WALKING IN HOSPITAL ROOM: A LITTLE

## 2025-08-11 ASSESSMENT — PAIN SCALES - GENERAL
PAINLEVEL_OUTOF10: 0 - NO PAIN

## 2025-08-11 ASSESSMENT — PAIN - FUNCTIONAL ASSESSMENT
PAIN_FUNCTIONAL_ASSESSMENT: 0-10

## 2025-08-11 ASSESSMENT — ACTIVITIES OF DAILY LIVING (ADL)
ADL_ASSISTANCE: INDEPENDENT
LACK_OF_TRANSPORTATION: NO
ADL_ASSISTANCE: INDEPENDENT
HOME_MANAGEMENT_TIME_ENTRY: 15

## 2025-08-11 NOTE — CARE PLAN
The clinical goals for the shift include [place] adequate oxygenation      Problem: Heart Failure  Goal: Report improvement of dyspnea/breathlessness this shift  Outcome: Progressing     Problem: Pain - Adult  Goal: Verbalizes/displays adequate comfort level or baseline comfort level  Outcome: Progressing     Problem: Safety - Adult  Goal: Free from fall injury  Outcome: Progressing     Problem: Chronic Conditions and Co-morbidities  Goal: Patient's chronic conditions and co-morbidity symptoms are monitored and maintained or improved  Outcome: Progressing     Problem: Fall/Injury  Goal: Not fall by end of shift  Outcome: Progressing     Problem: Diabetes  Goal: Maintain glucose levels >70mg/dl to <250mg/dl throughout shift  Outcome: Progressing

## 2025-08-11 NOTE — CONSULTS
Inpatient consult to Palliative Care  Consult performed by: Hill Lombardo MD  Consult ordered by: Noel Babin MD        Reason For Consult  Reason for Consult: communication / medical decision making, symptom management, and patient/family support     History Of Present Illness  Lissett Rodríguez is a 89 y.o. female with past medical history of COPD c/b CRF on 6L O2 at baseline, Afib, HLD, HTN, history of PE, HFpEF, KALPANA, T2DM, CKD3A and 2 admissions in 6/2025 for COPDE and PNA presenting to hospital from [facility] w/ chest pain.   Labs reviewed; she does not appear to have an infection or cardiac etiology w/ negative Blood cx and respiratory viral panel, procalcitonin,  BNP, troponins, EKG. Her CBC is WNL but with minor left shift. Her CT on admission did show evidence of multifocal PNA and does show evidence of chronic interstitial changes.   She is being treated empirically with ceftriaxone and azithromycin and being continued on zosyn. She has not received steroids. She is above her baseline oxygen between 6-10 L/min.   Palliative was  requested by patient and family.      Symptoms (0 - 10, Best to Worst)  [place] Symptom Assessment System  0-10 (Numeric) Pain Score: 0 - No pain    She is back to baseline oxygen requirement but reports dyspnea after 30 minutes of light activity (painting) as her new baseline. She is moving her bowels, appetite is OK. Denies wheezing, productive cough, fever or chills.   She was especially worried about a tender nodule she found on her LT parasternal 3-4 fourth rib. She was worried that it was something dangerous. She denies pleuritic chest pain or exacerbation of that pain when breathing deeply.   I reviewed her imaging with her and discussed that it is likely her implantable loop recorder. She declined a lidocaine patch for it as it is not very symptomatic.     BM in last 48 hours? yes          Personal/Social History  , 6 ildrne, 18 grand children and 8 great  grandchildren. Retired realtor.   She reports that she has never smoked. She has never used smokeless tobacco. She reports current alcohol use of about 2.0 - 6.0 standard drinks of alcohol per week. She reports that she does not use drugs.    Functional Status  Lives in [facility]. Activity significantly limited by burden of pulm disease.           Past Medical History  She has a past medical history of Angioneurotic edema, initial encounter (08/24/2018), Calculus of gallbladder without cholecystitis without obstruction (02/22/2017), Chronic obstructive pulmonary disease, unspecified (02/20/2017), Eructation (06/16/2017), Personal history of transient ischemic attack (TIA), and cerebral infarction 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 (10/16/2018), Personal history of transient ischemic attack (TIA), and cerebral infarction 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 (03/08/2016), Pneumonia, unspecified organism (02/09/2018), Trochanteric bursitis, unspecified hip (04/23/2015), and Type 2 diabetes mellitus without complications (11/06/2017).    Surgical History  She has a past surgical history that includes Appendectomy (05/15/2013); Cataract extraction (05/15/2013); Hysterectomy (05/15/2013); Other surgical history (05/15/2013); Other surgical history (02/28/2019); Colonoscopy (04/11/2014); Total knee arthroplasty (05/29/2013); MR angio head wo IV contrast (1/8/2016); MR angio neck wo IV contrast (1/8/2016); MR angio head wo IV contrast (10/1/2012); and MR angio neck w IV contrast (10/1/2012).     Family History  Family History[1]  Allergies  Penicillins, Tetracyclines, Lisinopril, and Vancomycin    Review of Systems   Constitutional:  Positive for activity change. Negative for appetite change, chills, fever and unexpected weight change.   Respiratory:  Positive for shortness of breath. Negative for cough, chest tightness and wheezing.    Cardiovascular:  Negative for leg swelling.   Gastrointestinal:  Positive for diarrhea. Negative for abdominal  "pain, constipation and nausea.        Physical Exam  Constitutional:       General: She is not in acute distress.     Appearance: Normal appearance. She is not ill-appearing or toxic-appearing.   HENT:      Head: Normocephalic and atraumatic.      Nose:      Comments: NC in situ    Eyes:      Extraocular Movements: Extraocular movements intact.      Conjunctiva/sclera: Conjunctivae normal.      Pupils: Pupils are equal, round, and reactive to light.       Cardiovascular:      Rate and Rhythm: Normal rate and regular rhythm.   Pulmonary:      Effort: No respiratory distress.      Comments: LT side with some faint high pitched breath sounds on expiration.  Abdominal:      General: Abdomen is flat.      Palpations: Abdomen is soft.      Tenderness: There is no abdominal tenderness.     Musculoskeletal:      Right lower leg: No edema.      Left lower leg: No edema.      Comments: No nail clubbing.      Skin:     General: Skin is warm and dry.     Neurological:      Mental Status: She is alert and oriented to person, place, and time.     Psychiatric:         Mood and Affect: Mood normal.         Behavior: Behavior normal.         Thought Content: Thought content normal.         Judgment: Judgment normal.         Last Recorded Vitals  Blood pressure 125/55, pulse 65, temperature 35.9 °C (96.6 °F), temperature source Temporal, resp. rate 18, height (!) 1.499 m (4' 11\"), weight 64.4 kg (142 lb), SpO2 97%.  Oxygen Therapy  Pulse Ox (24 hr min): 87  Medical Gas Therapy: Supplemental oxygen  Medical Gas Delivery Method: High flow nasal cannula  O2 Flow Rate (L/min): 8 L/min        Relevant Results  Results for orders placed or performed during the hospital encounter of 08/10/25 (from the past 24 hours)   Troponin, High Sensitivity, 1 Hour   Result Value Ref Range    Troponin I, High Sensitivity 5 0 - 13 ng/L   C-reactive protein   Result Value Ref Range    C-Reactive Protein 0.66 <1.00 mg/dL   Procalcitonin   Result Value Ref " Range    Procalcitonin 0.02 <=0.07 ng/mL   Sars-CoV-2 and Influenza A/B PCR   Result Value Ref Range    Flu A Result Not Detected Not Detected    Flu B Result Not Detected Not Detected    Coronavirus 2019, PCR Not Detected Not Detected   RSV PCR   Result Value Ref Range    RSV PCR Not Detected Not Detected   Blood Culture    Specimen: Peripheral Venipuncture; Blood culture   Result Value Ref Range    Blood Culture Loaded on Instrument - Culture in progress    Blood Culture    Specimen: Peripheral Venipuncture; Blood culture   Result Value Ref Range    Blood Culture Loaded on Instrument - Culture in progress    Basic Metabolic Panel   Result Value Ref Range    Glucose 115 (H) 74 - 99 mg/dL    Sodium 139 136 - 145 mmol/L    Potassium 3.6 3.5 - 5.3 mmol/L    Chloride 106 98 - 107 mmol/L    Bicarbonate 23 21 - 32 mmol/L    Anion Gap 14 10 - 20 mmol/L    Urea Nitrogen 17 6 - 23 mg/dL    Creatinine 0.97 0.50 - 1.05 mg/dL    eGFR 55 (L) >60 mL/min/1.73m*2    Calcium 8.6 8.6 - 10.3 mg/dL   CBC and Auto Differential   Result Value Ref Range    WBC 9.7 4.4 - 11.3 x10*3/uL    nRBC 0.0 0.0 - 0.0 /100 WBCs    RBC 4.78 4.00 - 5.20 x10*6/uL    Hemoglobin 13.8 12.0 - 16.0 g/dL    Hematocrit 43.9 36.0 - 46.0 %    MCV 92 80 - 100 fL    MCH 28.9 26.0 - 34.0 pg    MCHC 31.4 (L) 32.0 - 36.0 g/dL    RDW 14.0 11.5 - 14.5 %    Platelets 176 150 - 450 x10*3/uL    Neutrophils % 77.4 40.0 - 80.0 %    Immature Granulocytes %, Automated 0.4 0.0 - 0.9 %    Lymphocytes % 14.8 13.0 - 44.0 %    Monocytes % 5.7 2.0 - 10.0 %    Eosinophils % 1.0 0.0 - 6.0 %    Basophils % 0.7 0.0 - 2.0 %    Neutrophils Absolute 7.53 (H) 1.60 - 5.50 x10*3/uL    Immature Granulocytes Absolute, Automated 0.04 0.00 - 0.50 x10*3/uL    Lymphocytes Absolute 1.44 0.80 - 3.00 x10*3/uL    Monocytes Absolute 0.55 0.05 - 0.80 x10*3/uL    Eosinophils Absolute 0.10 0.00 - 0.40 x10*3/uL    Basophils Absolute 0.07 0.00 - 0.10 x10*3/uL   POCT GLUCOSE   Result Value Ref Range    POCT  Glucose 134 (H) 74 - 99 mg/dL   POCT GLUCOSE   Result Value Ref Range    POCT Glucose 144 (H) 74 - 99 mg/dL     *Note: Due to a large number of results and/or encounters for the requested time period, some results have not been displayed. A complete set of results can be found in Results Review.     CT angio chest for pulmonary embolism  Result Date: 8/10/2025  1. No pulmonary embolism. 2. Multifocal pneumonia likely on a background of chronic interstitial lung disease, overall progressed since 6/19/2025, as detailed above. 3. Unchanged mediastinal lymphadenopathy, likely reactive. 4. Coronary artery disease. Signed by Deondre Astorga DO    XR chest 2 views  Result Date: 7/18/2025  1.  Improvement in pulmonary infiltrates from the prior exam. However, there is still some vague ground-glass infiltrate in the upper lobes and in the right lower lobe superimposed on the chronic background changes when compared to baseline exams.       MACRO: None   Signed by: Rossy Paz 7/18/2025 8:48 AM Dictation workstation:   [id number]             Current Outpatient Medications   Medication Instructions    acetaminophen (TYLENOL) 325 mg, Every 6 hours PRN    albuterol 90 mcg/actuation inhaler Inhale 1-2 puffs every 4 hours if needed for wheezing or shortness of breath.    alendronate (FOSAMAX) 70 mg, oral, Every 7 days, Take in the morning with a full glass of water, on an empty stomach, and do not take anything else by mouth or lie down for the next 30 min.    amiodarone (PACERONE) 100 mg, oral, Daily    apixaban (ELIQUIS) 5 mg, oral, 2 times daily    atorvastatin (LIPITOR) 40 mg, oral, Nightly    cholecalciferol (Vitamin D-3) 50 mcg (2,000 unit) capsule 1 tablet, Daily    dapagliflozin propanediol (FARXIGA) 5 mg, oral, Daily    diclofenac sodium (VOLTAREN) 4 g, Topical, 4 times daily    furosemide (LASIX) 40 mg, Daily    gabapentin (NEURONTIN) 300 mg, oral, 3 times daily    ketoconazole (NIZOral) 2 % cream Topical, 2 times  daily    mometasone (Nasonex) 50 mcg/actuation nasal spray 1 spray, Each Nostril, 2 times daily    nystatin (Mycostatin) 100,000 unit/gram powder 1 Application, Topical, 2 times daily    oxyBUTYnin (Ditropan) 5 mg tablet Take 1 tablet (5 mg) by mouth once daily.    oxygen (O2) 6 L/min, Continuous    potassium chloride CR 20 mEq ER tablet 20 mEq, Daily    tiotropium (Spiriva with HandiHaler) 18 mcg inhalation capsule Place 1 capsule (18 mcg) into inhaler and inhale once daily.       Assessment/Plan   IMP:  89 yo F with PMH of COPD c/b CRF on 6L O2 at baseline, Afib, HLD, HTN, history of PE, HFpEF, KALPANA, T2DM, CKD3A and 2 admissions in 6/2025 for COPDE and [facility].  She is back to her baseline oxygen requirement after being treated empirically for multifocal PNA and COPDE  w/ BSE abx and steroids, respectively. Paliative was consulted at patient's request.     Plan:  Patient has capacity  Has ACP docs on file.  Clear goals of DNR DNI. She is treatment oriented and is not interested in hospice at the moment. Continue current measures within aforementioned treatment limitations.   [place] DNR Form provided to patient and added to paper chart.   Discussed possible disease progression. Discussed role of opioids in symptom mgmt of COPD related dyspnea. I do not think she is a candidate for this at the moment.   Alleviated her concerns about her painful LT sternal nodule as likely being related to an implanted loop recorder. She forgot it was there. Recommended d/w cardiologist/PCP about removal if it continues to be bothersome. She does not think that necessary presently.   Would like confirmation that spiriva and albuterol as home inhalers are optimal for her disease mgmt. Defer to pulm.   Will sign off as goals are clear.       Patient/proxy preference for information  Prefers full information    Goals of Care  DNR DNI    I spent 70 minutes in the professional and overall care of  this patient.  I spent 30 minutes in the professional and overall care of this patient related to ACP in addition to other time spent in assessment, chart review, and coordination of care        Hill Lombardo MD         [1]   Family History  Problem Relation Name Age of Onset    Alzheimer's disease Mother      Heart failure Mother      Stroke Mother          ischemic stroke    Heart attack Father      Stroke Father          ischemic stroke    COPD Sister      Diabetes Sister          mellitus    Cancer Sister      Parkinsonism Sister      Other (previous cardiac prolems) Sister

## 2025-08-11 NOTE — PROGRESS NOTES
08/11/25 1413   Discharge Planning   Living Arrangements Alone   Support Systems Family members   Type of Residence Assisted living   Do you have animals or pets at home? No   Care Facility Name [facility] [phone]   Home or Post Acute Services In home services   Type of Home Care Services Home OT;Home PT   Expected Discharge Disposition Home Health   Does the patient need discharge transport arranged? Yes   Financial Resource Strain   How hard is it for you to pay for the very basics like food, housing, medical care, and heating? Not hard   Housing Stability   In the last 12 months, was there a time when you were not able to pay the mortgage or rent on time? N   At any time in the past 12 months, were you homeless or living in a shelter (including now)? N   Transportation Needs   In the past 12 months, has lack of transportation kept you from medical appointments or from getting medications? no   In the past 12 months, has lack of transportation kept you from meetings, work, or from getting things needed for daily living? No   Patient Choice   Provider Choice list and CMS website (https://medicare.gov/care-compare#search) for post-acute Quality and Resource Measure Data were provided and reviewed with: Patient   Patient / Family choosing to utilize agency / facility established prior to hospitalization Yes     Plan per Medical/Surgical team: currently requiring high flow O2, pending final blood culture results, PULM following, PT/OT  (LOW)  Status: inpatient  Payor source:  Medicare  Discharge disposition: return to [facility]-resume [facility]  Potential Barriers:    ADOD: 8/14/25    [facility] [phone] -they are not online on [employer]    Left message for DON at [facility] [place]

## 2025-08-11 NOTE — CARE PLAN
The patient's goals for the shift include get some rest    The clinical goals for the shift include pt. will remain hemodynamically stable    Problem: Pain - Adult  Goal: Verbalizes/displays adequate comfort level or baseline comfort level  Outcome: Progressing     Problem: Safety - Adult  Goal: Free from fall injury  Outcome: Progressing     Problem: Chronic Conditions and Co-morbidities  Goal: Patient's chronic conditions and co-morbidity symptoms are monitored and maintained or improved  Outcome: Progressing     Problem: Fall/Injury  Goal: Not fall by end of shift  Outcome: Progressing  Goal: Be free from injury by end of the shift  Outcome: Progressing     Problem: Respiratory  Goal: Minimal/no exertional discomfort or dyspnea this shift  Outcome: Progressing  Goal: No signs of respiratory distress (eg. Use of accessory muscles. Peds grunting)  Outcome: Progressing  Goal: Patent airway maintained this shift  Outcome: Progressing

## 2025-08-11 NOTE — PROGRESS NOTES
Occupational Therapy    Evaluation/Treatment    Patient Name: Lissett Rodríguez  MRN: [id number]  Department: [place] [id number]  Room: [address] [place] [address]  Today's Date: 08/11/25  Time Calculation  Start Time: 1000  Stop Time: 1035  Time Calculation (min): 35 min       Assessment:  OT Assessment: Pt presents to OT this date and demos decreased balance, strength, endurance and safety awareness resulting in decreased safety & independence with ADLs & functional transfers/mobility. Pt requires skilled OT services to address above deficits to safely return to [facility].  Prognosis: Good  Barriers to Discharge Home: No anticipated barriers  Evaluation/Treatment Tolerance: Patient limited by fatigue  Medical Staff Made Aware: Yes  End of Session Communication: Bedside nurse  End of Session Patient Position: Up in chair, Alarm on  OT Assessment Results: Decreased ADL status, Decreased upper extremity strength, Decreased safe judgment during ADL, Decreased endurance, Decreased functional mobility, Decreased IADLs, Decreased trunk control for functional activities, Decreased sensation  Prognosis: Good  Barriers to Discharge: None  Evaluation/Treatment Tolerance: Patient limited by fatigue  Medical Staff Made Aware: Yes  Strengths: Premorbid level of function, Living arrangement secure, Access to adaptive/assistive products, Ability to acquire knowledge, Attitude of self  Barriers to Participation: Comorbidities  Plan:  Treatment Interventions: ADL retraining, Functional transfer training, UE strengthening/ROM, Endurance training, Patient/family training, Equipment evaluation/education, Neuromuscular reeducation, Compensatory technique education  OT Frequency: 3 times per week  OT Discharge Recommendations: Low intensity level of continued care  Equipment Recommended upon Discharge:  (TBD)  OT Recommended Transfer Status: Assist of 1  OT - OK to Discharge: Yes (OT POC established this date)  Treatment Interventions: ADL retraining, Functional transfer  training, UE strengthening/ROM, Endurance training, Patient/family training, Equipment evaluation/education, Neuromuscular reeducation, Compensatory technique education    Subjective     OT Visit Info:  OT Received On: 08/11/25  General Visit Info:   General  Reason for Referral: Pt is a 89 yo female presenting due to chest pain. Pt found to have community acquired PNA. Relevant PMHx of HF, a-fib and COPD  Referred By: Jasiel Wells DO  Past Medical History Relevant to Rehab: Past Medical History:  Diagnosis Date    Angioneurotic edema, initial encounter 08/24/2018    Angioedema of lips    Calculus of gallbladder without cholecystitis without obstruction 02/22/2017    Calculus of gallbladder without cholecystitis without obstruction    Chronic obstructive pulmonary disease, unspecified 02/20/2017    Chronic obstructive pulmonary disease with hypoxia    Eructation 06/16/2017    Burping    Personal history of transient ischemic attack (TIA), and cerebral infarction 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 10/16/2018    History of transient cerebral ischemia    Personal history of transient ischemic attack (TIA), and cerebral infarction 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 03/08/2016    History of transient cerebral ischemia    Pneumonia, unspecified organism 02/09/2018    CAP (community acquired pneumonia)    Trochanteric bursitis, unspecified hip 04/23/2015    Greater trochanteric bursitis    Type 2 diabetes mellitus without complications 11/06/2017    Controlled type 2 diabetes mellitus without complication, without long-term current use of insulin      Family/Caregiver Present: Yes  Caregiver Feedback: pt's son and DIL present at start of session  Co-Treatment: PT  Co-Treatment Reason: to maximize safety & participation with skilled intervention  Prior to Session Communication: Bedside nurse  Patient Position Received: Bed, 3 rail up, Alarm on  Preferred Learning Style: auditory, verbal, visual  General Comment: Pt supine in bed upon arrival  and agreeable to OT eval/tx. Pt fully participatory in session.   Precautions:  Hearing/Visual Limitations: [place], bilateral hearing aids  Medical Precautions: Fall precautions, Oxygen therapy device and L/min  Precautions Comment: 6 L/min O2 at baseline; 10 L/min O2 HFNC required at this time      Vital Signs Comment: SPO2 decreased to high 80s with activity, returned to >90% with seated rest break, unsure accuracy of readings with activity d/t use of UE during tasks     Pain:  Pain Assessment  Pain Assessment: 0-10  0-10 (Numeric) Pain Score: 0 - No pain    Objective   Cognition:  Overall Cognitive Status: Within Functional Limits  Orientation Level: Oriented X4  Attention: Within Functional Limits  Memory: Within Funtional Limits  Insight: Within function limits  Impulsive: Within functional limits           Home Living:  Type of Home: Assisted living  Lives With: Alone  Home Adaptive Equipment: Walker rolling or standard, Cane (Rollator)  Home Layout: One level  Home Access: Elevator, Level entry  Bathroom Shower/Tub: Walk-in shower  Bathroom Toilet: Standard  Bathroom Equipment: Grab bars in shower, Grab bars around toilet, Built-in shower seat  Prior Function:  Level of [place]: Independent with ADLs and functional transfers, Independent with homemaking with ambulation  Receives Help From: Personal care attendant  ADL Assistance: Independent  Homemaking Assistance: Needs assistance ([facility] staff assists with all IADLs)  Ambulatory Assistance: Independent (no AD)  Hand Dominance: Right  Prior Function Comments: - driving. pt denies recent falls. pt reports working with PT at [facility] for L shoulder weakness/pain/balance       ADL:  Grooming Deficit: Setup, Wash/dry hands, Wash/dry face, Teeth care, Brushing hair (seated in chair)  UE Dressing Assistance: Minimal  UE Dressing Deficit:  (to don/doff gown)  LE Dressing Assistance: Stand by  LE Dressing Deficit:  (to don B socks and adult brief)  Toileting Assistance  with Device: Stand by  Toileting Deficit:  (SBA for safety during clothing management)  Activities of Daily Living:      Grooming  Grooming Level of Assistance: Setup  Grooming Where Assessed: Chair  Grooming Comments: pt washed face, combed hair, performed hand hygiene and performed oral hygiene    UE Dressing  UE Dressing Level of Assistance: Minimum assistance, Minimal verbal cues  UE Dressing Where Assessed:  ([facility])  UE Dressing Comments: to don/doff gown    LE Dressing  LE Dressing: Yes  Sock Level of Assistance: Close supervision, Minimal verbal cues  Adult Briefs Level of Assistance: Close supervision, Minimal verbal cues  LE Dressing Where Assessed: Edge of bed, Bedside commode  LE Dressing Comments: cues for sequencing and safety awareness    Toileting  Toileting Level of Assistance: Close supervision, Minimal verbal cues  Where Assessed: Bedside commode  Toileting Comments: cues for safety awareness  Activity Tolerance:  Endurance: Tolerates 30 min exercise with multiple rests  Activity Tolerance Comments: cues for PLB technique throughout     Bed Mobility/Transfers: Bed Mobility  Bed Mobility: Yes  Bed Mobility 1  Bed Mobility 1: Supine to sitting  Level of Assistance 1: Distant supervision  Bed Mobility Comments 1: extra time to perform    Transfers  Transfer: Yes  Transfer 1  Technique 1: Sit to stand, Stand to sit  Transfer Device 1: Walker  Transfer Level of Assistance 1: Close supervision, Minimal verbal cues  Trials/Comments 1: cues for safe hand placement and walker safety  Transfers 2  Transfer From 2: Bed to  Transfer to 2: Commode-standard  Technique 2: Stand pivot  Transfer Device 2: Walker  Transfer Level of Assistance 2: Close supervision, Minimal verbal cues  Trials/Comments 2: cues for walker safety and alignment to C    Toilet Transfers  Toilet Transfer Type: To and from  Toilet Transfer to: Standard bedside commode  Toilet Transfer Technique: Stand pivot  Toilet Transfers:  Supervision  Toilet Transfers Comments: cues for safe hand placement and walker safety    Functional Mobility:  Functional Mobility  Functional Mobility Performed: Yes  Functional Mobility 1  Comments 1: Pt functionally navigated x min household distance from BSC>chair using no AD with SBA. Cues for alignment to chair. Distance limited by O2 tubing d/t pt on HFNC.  Sitting Balance:  Static Sitting Balance  Static Sitting-Balance Support: Bilateral upper extremity supported, Feet supported  Static Sitting-Level of Assistance: Close supervision  Static Sitting-Comment/Number of Minutes: at EOB  Dynamic Sitting Balance  Dynamic Sitting-Comments: SBA at EOB  Standing Balance:  Static Standing Balance  Static Standing-Balance Support: Bilateral upper extremity supported  Static Standing-Level of Assistance: Close supervision  Static Standing-Comment/Number of Minutes: FWW  Dynamic Standing Balance  Dynamic Standing-Comments: SBA       Vision:Vision - Basic Assessment  Current Vision: Wears glasses all the time  Sensation:  Light Touch: Partial deficits in the LUE, Partial deficits in the RUE  Sensation Comment: pt reports numbness/tingling in B hands at baseline  Strength:  Strength Comments: B UEs grossly 3/5 based on function    Coordination:  Movements are Fluid and Coordinated: Yes      Extremities: RUE   RUE : Within Functional Limits and LUE   LUE: Within Functional Limits      Outcome Measures: [facility] Daily Activity  Putting on and taking off regular lower body clothing: A little  Bathing (including washing, rinsing, drying): A little  Putting on and taking off regular upper body clothing: A little  Toileting, which includes using toilet, bedpan or urinal: A little  Taking care of personal grooming such as brushing teeth: A little  Eating Meals: None  Daily Activity - Total Score: 19        Education Documentation  Body Mechanics, taught by America Andrade OT at 8/11/2025  1:45 PM.  Learner: Patient  Readiness:  Acceptance  Method: Explanation  Response: Verbalizes Understanding    Precautions, taught by America Andrade OT at 8/11/2025  1:45 PM.  Learner: Patient  Readiness: Acceptance  Method: Explanation  Response: Verbalizes Understanding    ADL Training, taught by America Andrade OT at 8/11/2025  1:45 PM.  Learner: Patient  Readiness: Acceptance  Method: Explanation  Response: Verbalizes Understanding        Goals:  Encounter Problems       Encounter Problems (Active)       ADLs       Patient will perform UB and LB bathing with modified independent level of assistance and grab bars, shower chair, and long-handled sponge.       Start:  08/11/25    Expected End:  08/25/25            Patient with complete upper body dressing with modified independent level of assistance donning and doffing all UE clothes with PRN adaptive equipment.       Start:  08/11/25    Expected End:  08/25/25            Patient with complete lower body dressing with modified independent level of assistance donning and doffing all LE clothes  with PRN adaptive equipment.       Start:  08/11/25    Expected End:  08/25/25            Patient will complete daily grooming tasks with modified independent level of assistance and PRN adaptive equipment while standing.       Start:  08/11/25    Expected End:  08/25/25            Patient will complete toileting including hygiene clothing management/hygiene with modified independent level of assistance and raised toilet seat and grab bars vs. BSC.       Start:  08/11/25    Expected End:  08/25/25               MOBILITY       Patient will perform Functional mobility x Household distances/Community Distances with modified independent level of assistance and least restrictive device in order to improve safety and functional mobility.       Start:  08/11/25    Expected End:  08/25/25               TRANSFERS       Patient will perform bed mobility independent level of assistance in order to improve safety and  independence with mobility       Start:  08/11/25    Expected End:  08/25/25            Patient will complete functional transfers with least restrictive device with modified independent level of assistance.       Start:  08/11/25    Expected End:  08/25/25

## 2025-08-11 NOTE — PROGRESS NOTES
Physical Therapy    Physical Therapy Evaluation & Treatment    Patient Name: Lissett Rodríguez  MRN: [id number]  Department: [place] [address]  Room: [address] [place] [address]  Today's Date: 8/11/2025   Time Calculation  Start Time: 0959  Stop Time: 1035  Time Calculation (min): 36 min    Assessment/Plan   PT Assessment  PT Assessment Results: Decreased strength, Decreased endurance, Impaired balance, Decreased mobility  Rehab Prognosis: Good  Barriers to Discharge Home: No anticipated barriers  Evaluation/Treatment Tolerance: Patient tolerated treatment well  Medical Staff Made Aware: Yes  Strengths: Ability to acquire knowledge, Insight into problems, Living arrangement secure, Support of Caregivers, Support and attitude of living partners  Barriers to Participation: Comorbidities  End of Session Communication: Bedside nurse  Assessment Comment: Pt demonstrating deficits in generalized strength, endurance and balance as well as increased pain resulting in impaired functional mobility, gait and self care. Due to the impairments listed above, pt would benefit from skilled physical therapy services in acute care setting as well as additional therapy in LOW intensity therapy setting  End of Session Patient Position: Up in chair, Alarm on   IP OR SWING BED PT PLAN  Inpatient or Swing Bed: Inpatient  PT Plan  Treatment/Interventions: Bed mobility, Transfer training, Gait training, Stair training, Balance training, Neuromuscular re-education, Strengthening, Endurance training, Therapeutic exercise, Therapeutic activity, Home exercise program  PT Plan: Ongoing PT  PT Frequency: 3 times per week  PT Discharge Recommendations: Low intensity level of continued care  Equipment Recommended upon Discharge:  (no needs anticipated)  PT Recommended Transfer Status: Stand by assist  PT - OK to Discharge: Yes (PT POC initiated this date)      Subjective     PT Visit Info:  PT Received On: 08/11/25  General Visit Information:  General  Reason for Referral: Pt is  a 89 yo female presenting due to chest pain. Pt found to have community acquired PNA. Relevant PMHx of HF, a-fib and COPD  Referred By: Jasiel Wells DO  Past Medical History Relevant to Rehab: Medical History[1]    Family/Caregiver Present: Yes  Caregiver Feedback: pt's son and daughter-in-law present and receptive (beginning of session)  Co-Treatment: OT  Co-Treatment Reason: for maximal pt safety and participation  Prior to Session Communication: Bedside nurse  Patient Position Received: Bed, 3 rail up, Alarm on  General Comment: Pt pleasant and agreeable to PT evaluation  Home Living:  Home Living  Type of Home: Assisted living  Lives With: Alone  Home Adaptive Equipment: Walker rolling or standard, Cane (rollator)  Home Layout: One level  Home Access: Elevator, Level entry  Bathroom Shower/Tub: Walk-in shower  Bathroom Toilet: Standard  Bathroom Equipment: Grab bars in shower, Shower chair with back, Grab bars around toilet  Prior Level of Function:  Prior Function Per Pt/Caregiver Report  Level of [place]: Independent with ADLs and functional transfers, Independent with homemaking with ambulation  Receives Help From: Personal care attendant  ADL Assistance: Independent  Homemaking Assistance: Needs assistance (cooking, cleaning and laundry completed by [facility] staff)  Ambulatory Assistance: Independent (no device)  Prior Function Comments: (-) driving. (-) falls. Pt reports actively receiving PT at facility for L shoulder weakness/pain as well as balance  Precautions:  Precautions  Medical Precautions: Fall precautions, Oxygen therapy device and L/min  Precautions Comment: 6 L/min O2 at baseline; 10 L/min O2 HFNC required at this time     Date/Time Vitals Session Patient Position Pulse Resp SpO2 BP MAP (mmHg)    08/11/25 0959 --  --  --  --  95 %  --  --     08/11/25 1049 --  --  --  --  98 %  --  --     08/11/25 1059 --  --  --  --  97 %  --  --      Vital Signs Comment: SpO2 dropping into high 80s with  mobility and returning >90% with seated rest break. Poor read with use of UE    Objective   Pain:  Pain Assessment  Pain Assessment: 0-10  0-10 (Numeric) Pain Score: 0 - No pain  Cognition:  Cognition  Overall Cognitive Status: Within Functional Limits  Orientation Level: Oriented X4  Attention: Within Functional Limits  Memory: Within Funtional Limits  Insight: Within function limits  Impulsive: Within functional limits    General Assessments:  Activity Tolerance  Endurance: Tolerates 30 min exercise with multiple rests    Sensation  Light Touch: Partial deficits in the LUE, Partial deficits in the RUE  Sensation Comment: numbness and tingling of bilateral hands/fingers    Coordination  Movements are Fluid and Coordinated: Yes    Static Sitting Balance  Static Sitting-Balance Support: No upper extremity supported, Feet supported  Static Sitting-Level of Assistance: Close supervision  Static Sitting-Comment/Number of Minutes: 2 min    Static Standing Balance  Static Standing-Balance Support: Bilateral upper extremity supported (FWW)  Static Standing-Level of Assistance: Close supervision  Static Standing-Comment/Number of Minutes: 2 min  Functional Assessments:  Bed Mobility  Bed Mobility: Yes  Bed Mobility 1  Bed Mobility 1: Supine to sitting  Level of Assistance 1: Distant supervision  Bed Mobility Comments 1: Increased time to complete    Transfers  Transfer: Yes  Transfer 1  Transfer From 1: Sit to  Transfer to 1: Stand  Technique 1: Sit to stand, Stand to sit  Transfer Device 1: Walker  Transfer Level of Assistance 1: Close supervision  Trials/Comments 1: VCs for hand placement. x2 trials completed    Ambulation/Gait Training  Ambulation/Gait Training Performed: Yes  Ambulation/Gait Training 1  Surface 1: Level tile  Device 1: Rolling walker  Assistance 1: Close supervision  Quality of Gait 1:  (reduced B step length and gait hemal)  Comments/Distance (ft) 1: 3ft (bed to bedside commode)  Ambulation/Gait  Training 2  Surface 2: Level tile  Device 2: No device  Assistance 2: Contact guard  Quality of Gait 2:  (reduced B step length and gait hemal and flexed trunk posture)  Comments/Distance (ft) 2: 3ft (bedside commode to bedside chair)  Extremity/Trunk Assessments:  RLE   RLE : Within Functional Limits  LLE   LLE : Within Functional Limits  Treatments:  Therapeutic Activity  Therapeutic Activity Performed: Yes  Therapeutic Activity 1: Completed additional standing trials and transfers between surfaces to assist pt with toileting. SBA for all mobility. Pt completed dynamic seated balance with forward lean to enable self pericare after toileting (SBA). Total unsupported sitting time 10 min    Ambulation/Gait Training  Ambulation/Gait Training Performed: Yes  Ambulation/Gait Training 1  Surface 1: Level tile  Device 1: Rolling walker  Assistance 1: Close supervision  Quality of Gait 1:  (reduced B step length and gait hemal)  Comments/Distance (ft) 1: 3ft (bed to bedside commode)  Ambulation/Gait Training 2  Surface 2: Level tile  Device 2: No device  Assistance 2: Contact guard  Quality of Gait 2:  (reduced B step length and gait hemal and flexed trunk posture)  Comments/Distance (ft) 2: 3ft (bedside commode to bedside chair)  Transfers  Transfer: Yes  Transfer 1  Transfer From 1: Sit to  Transfer to 1: Stand  Technique 1: Sit to stand, Stand to sit  Transfer Device 1: Walker  Transfer Level of Assistance 1: Close supervision  Trials/Comments 1: VCs for hand placement. x2 trials completed  Outcome Measures:  [facility] Basic Mobility  Turning from your back to your side while in a flat bed without using bedrails: A little  Moving from lying on your back to sitting on the side of a flat bed without using bedrails: A little  Moving to and from bed to chair (including a wheelchair): A little  Standing up from a chair using your arms (e.g. wheelchair or bedside chair): A little  To walk in hospital room: A  little  Climbing 3-5 steps with railing: A lot  Basic Mobility - Total Score: 17    Encounter Problems       Encounter Problems (Active)       Mobility       STG - Patient will ambulate       Start:  08/11/25    Expected End:  08/25/25       Mod ind using FWW vs rollator >150ft maintaining O2 line and SpO2 maintained >90%            PT Transfers       STG - Patient will perform bed mobility       Start:  08/11/25    Expected End:  08/25/25       Mod INd         STG - Patient will transfer sit to and from stand       Start:  08/11/25    Expected End:  08/25/25       Mod Ind                Education Documentation  Precautions, taught by Rajinder Jefferson, PT at 8/11/2025 11:41 AM.  Learner: Patient  Readiness: Acceptance  Method: Explanation  Response: Verbalizes Understanding  Comment: see above    Body Mechanics, taught by Rajinder Jefferson, PT at 8/11/2025 11:41 AM.  Learner: Patient  Readiness: Acceptance  Method: Explanation  Response: Verbalizes Understanding  Comment: see above    Mobility Training, taught by Rajinder Jefferson, PT at 8/11/2025 11:41 AM.  Learner: Patient  Readiness: Acceptance  Method: Explanation  Response: Verbalizes Understanding  Comment: see above    Education Comments  No comments found.           [1]   Past Medical History:  Diagnosis Date    Angioneurotic edema, initial encounter 08/24/2018    Angioedema of lips    Calculus of gallbladder without cholecystitis without obstruction 02/22/2017    Calculus of gallbladder without cholecystitis without obstruction    Chronic obstructive pulmonary disease, unspecified 02/20/2017    Chronic obstructive pulmonary disease with hypoxia    Eructation 06/16/2017    Burping    Personal history of transient ischemic attack (TIA), and cerebral infarction 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 10/16/2018    History of transient cerebral ischemia    Personal history of transient ischemic attack (TIA), and cerebral infarction 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  03/08/2016    History of transient cerebral ischemia    Pneumonia, unspecified organism 02/09/2018    CAP (community acquired pneumonia)    Trochanteric bursitis, unspecified hip 04/23/2015    Greater trochanteric bursitis    Type 2 diabetes mellitus without complications 11/06/2017    Controlled type 2 diabetes mellitus without complication, without long-term current use of insulin

## 2025-08-11 NOTE — PROGRESS NOTES
Lissett Rodríguez is a 89 y.o. female on day 1 of admission presenting with HCAP (healthcare-associated pneumonia).      Subjective   On 8L NC, she feels much better than yesterday. Afebrile. No overnight events reported.        Objective     Last Recorded Vitals  /62 (BP Location: Left arm, Patient Position: Sitting)   Pulse 76   Temp 35.9 °C (96.6 °F) (Temporal)   Resp 17   Wt 64.4 kg (142 lb)   SpO2 95%   Intake/Output last 3 Shifts:    Intake/Output Summary (Last 24 hours) at 8/11/2025 1235  Last data filed at 8/11/2025 0541  Gross per 24 hour   Intake 150 ml   Output --   Net 150 ml       Admission Weight  Weight: 64.4 kg (142 lb) (08/10/25 1000)    Daily Weight  08/10/25 : 64.4 kg (142 lb)    Image Results  ECG 12 lead  Normal sinus rhythm  Normal ECG  When compared with ECG of 19-JUN-2025 11:17,  Questionable change in QRS axis  See ED provider note for full interpretation and clinical correlation  Confirmed by Ade Walker ([id number]) on 8/11/2025 10:50:29 AM      Physical Exam  Constitutional:       Appearance: Normal appearance.     Cardiovascular:      Rate and Rhythm: Normal rate and regular rhythm.   Pulmonary:      Effort: Pulmonary effort is normal.      Breath sounds: Wheezing (slight wheeze bilaterally) present.   Abdominal:      General: Abdomen is flat. Bowel sounds are normal.      Palpations: Abdomen is soft.     Musculoskeletal:      Cervical back: Normal range of motion.     Skin:     General: Skin is warm.     Neurological:      General: No focal deficit present.      Mental Status: She is alert and oriented to person, place, and time. Mental status is at baseline.     Psychiatric:         Mood and Affect: Mood normal.         Behavior: Behavior normal.         Thought Content: Thought content normal.         Judgment: Judgment normal.         Relevant Results          Assessment & Plan  HCAP (healthcare-associated pneumonia)    (HFpEF) heart failure with preserved ejection  fraction    Atrial fibrillation (Multi)  TA  Chronic obstructive pulmonary disease (Multi)    Diabetes mellitus type 2 without retinopathy (Multi)    Hypertension    Obstructive sleep apnea syndrome    Stage 3a chronic kidney disease (Multi)    Shortness of breath        8/11:  Aecopd secondary to pna and progressing interstitial lung disease... currently on 8L NC (Home o2 6L)... start iv steroids, cont iv abx, duonebs, supportive regimen. Family requesting palliative consult which has been requested.     KALPANA... nocturnal cpap.     HypoK... replace and monitor.   DM... most recent A1c 7.3% 8 months ago... ss, farxiga.     Hx HFpEF... cont home lasix, farxiga.   Hx afib... cont home eliquis, amio.     Home regimen for chronic conditions  Dvt px covered with eliquis.   Pt/ot    Pt lives in [place], uses walker baseline.   Dispo possibly tomorrow.         8/10:  HCAP (healthcare-associated pneumonia)  - given azithro and rocephin in ED, changing to zosyn in setting of recent hospitalizations  - checking urine antigens, nasal MRSA, blood cx  - no evidence of SIRS/sepsis  (HFpEF) heart failure with preserved ejection fraction  - continue home meds (furosemide 40 every day, farxiga 5 mg every day)  - no fluids in setting of HFpEF  Atrial fibrillation (Multi)  - cont eliquis, amiodarone  - patient does have a subcutaneous nodule present on anterior chest wall, ? Subcutaneous thrombosis, monitor for now, no PE on CTA  Chronic obstructive pulmonary disease (Multi)  - cont home PRN inhalers  - pulm consult  Diabetes mellitus type 2 without retinopathy (Multi)  - per history, cont farxiga  - low dose SSI  Hypertension  - normotensive, monitor  Obstructive sleep apnea syndrome  - auto CPAP ordered  Stage 3a chronic kidney disease (Multi)  - monitor daily BMP  Shortness of breath  - as above for HCAP         Noel Babin MD

## 2025-08-12 ENCOUNTER — NURSE ONLY (OUTPATIENT)
Dept: INPATIENT UNIT | Facility: HOSPITAL | Age: OVER 89
End: 2025-08-12
Payer: MEDICARE

## 2025-08-12 LAB
ANION GAP SERPL CALC-SCNC: 15 MMOL/L (ref 10–20)
BASOPHILS # BLD AUTO: 0.02 X10*3/UL (ref 0–0.1)
BASOPHILS NFR BLD AUTO: 0.2 %
BUN SERPL-MCNC: 21 MG/DL (ref 6–23)
CALCIUM SERPL-MCNC: 8.7 MG/DL (ref 8.6–10.3)
CHLORIDE SERPL-SCNC: 104 MMOL/L (ref 98–107)
CO2 SERPL-SCNC: 26 MMOL/L (ref 21–32)
CREAT SERPL-MCNC: 1.16 MG/DL (ref 0.5–1.05)
EGFRCR SERPLBLD CKD-EPI 2021: 44 ML/MIN/1.73M*2
EOSINOPHIL # BLD AUTO: 0 X10*3/UL (ref 0–0.4)
EOSINOPHIL NFR BLD AUTO: 0 %
ERYTHROCYTE [DISTWIDTH] IN BLOOD BY AUTOMATED COUNT: 14.1 % (ref 11.5–14.5)
GLUCOSE BLD MANUAL STRIP-MCNC: 163 MG/DL (ref 74–99)
GLUCOSE BLD MANUAL STRIP-MCNC: 171 MG/DL (ref 74–99)
GLUCOSE BLD MANUAL STRIP-MCNC: 192 MG/DL (ref 74–99)
GLUCOSE BLD MANUAL STRIP-MCNC: 221 MG/DL (ref 74–99)
GLUCOSE BLD MANUAL STRIP-MCNC: 245 MG/DL (ref 74–99)
GLUCOSE SERPL-MCNC: 155 MG/DL (ref 74–99)
HCT VFR BLD AUTO: 41.3 % (ref 36–46)
HGB BLD-MCNC: 13.3 G/DL (ref 12–16)
IMM GRANULOCYTES # BLD AUTO: 0.05 X10*3/UL (ref 0–0.5)
IMM GRANULOCYTES NFR BLD AUTO: 0.5 % (ref 0–0.9)
LYMPHOCYTES # BLD AUTO: 0.7 X10*3/UL (ref 0.8–3)
LYMPHOCYTES NFR BLD AUTO: 7.2 %
MCH RBC QN AUTO: 28.7 PG (ref 26–34)
MCHC RBC AUTO-ENTMCNC: 32.2 G/DL (ref 32–36)
MCV RBC AUTO: 89 FL (ref 80–100)
MECA ISLT/SPM QL: DETECTED
MONOCYTES # BLD AUTO: 0.42 X10*3/UL (ref 0.05–0.8)
MONOCYTES NFR BLD AUTO: 4.3 %
NEUTROPHILS # BLD AUTO: 8.59 X10*3/UL (ref 1.6–5.5)
NEUTROPHILS NFR BLD AUTO: 87.8 %
NRBC BLD-RTO: 0 /100 WBCS (ref 0–0)
PLATELET # BLD AUTO: 193 X10*3/UL (ref 150–450)
POTASSIUM SERPL-SCNC: 3.8 MMOL/L (ref 3.5–5.3)
RBC # BLD AUTO: 4.63 X10*6/UL (ref 4–5.2)
S EPIDERMIDIS DNA BLD POS QL NAA+PROBE: DETECTED
SODIUM SERPL-SCNC: 141 MMOL/L (ref 136–145)
STAPHYLOCOCCUS SPEC CULT: NORMAL
WBC # BLD AUTO: 9.8 X10*3/UL (ref 4.4–11.3)

## 2025-08-12 PROCEDURE — 2500000002 HC RX 250 W HCPCS SELF ADMINISTERED DRUGS (ALT 637 FOR MEDICARE OP, ALT 636 FOR OP/ED): Performed by: STUDENT IN AN ORGANIZED HEALTH CARE EDUCATION/TRAINING PROGRAM

## 2025-08-12 PROCEDURE — 2500000004 HC RX 250 GENERAL PHARMACY W/ HCPCS (ALT 636 FOR OP/ED): Mod: JZ | Performed by: INTERNAL MEDICINE

## 2025-08-12 PROCEDURE — 94640 AIRWAY INHALATION TREATMENT: CPT

## 2025-08-12 PROCEDURE — 1100000001 HC PRIVATE ROOM DAILY

## 2025-08-12 PROCEDURE — 2500000004 HC RX 250 GENERAL PHARMACY W/ HCPCS (ALT 636 FOR OP/ED): Performed by: STUDENT IN AN ORGANIZED HEALTH CARE EDUCATION/TRAINING PROGRAM

## 2025-08-12 PROCEDURE — 2500000005 HC RX 250 GENERAL PHARMACY W/O HCPCS: Performed by: INTERNAL MEDICINE

## 2025-08-12 PROCEDURE — 82947 ASSAY GLUCOSE BLOOD QUANT: CPT

## 2025-08-12 PROCEDURE — 85025 COMPLETE CBC W/AUTO DIFF WBC: CPT | Performed by: STUDENT IN AN ORGANIZED HEALTH CARE EDUCATION/TRAINING PROGRAM

## 2025-08-12 PROCEDURE — 99233 SBSQ HOSP IP/OBS HIGH 50: CPT | Performed by: INTERNAL MEDICINE

## 2025-08-12 PROCEDURE — 94660 CPAP INITIATION&MGMT: CPT

## 2025-08-12 PROCEDURE — 80048 BASIC METABOLIC PNL TOTAL CA: CPT | Performed by: STUDENT IN AN ORGANIZED HEALTH CARE EDUCATION/TRAINING PROGRAM

## 2025-08-12 PROCEDURE — 2500000001 HC RX 250 WO HCPCS SELF ADMINISTERED DRUGS (ALT 637 FOR MEDICARE OP): Performed by: STUDENT IN AN ORGANIZED HEALTH CARE EDUCATION/TRAINING PROGRAM

## 2025-08-12 PROCEDURE — 36415 COLL VENOUS BLD VENIPUNCTURE: CPT | Performed by: STUDENT IN AN ORGANIZED HEALTH CARE EDUCATION/TRAINING PROGRAM

## 2025-08-12 PROCEDURE — 2500000002 HC RX 250 W HCPCS SELF ADMINISTERED DRUGS (ALT 637 FOR MEDICARE OP, ALT 636 FOR OP/ED): Performed by: PHARMACIST

## 2025-08-12 PROCEDURE — 97116 GAIT TRAINING THERAPY: CPT | Mod: GP,CQ

## 2025-08-12 RX ADMIN — INSULIN LISPRO 1 UNITS: 100 INJECTION, SOLUTION INTRAVENOUS; SUBCUTANEOUS at 12:54

## 2025-08-12 RX ADMIN — PIPERACILLIN SODIUM AND TAZOBACTAM SODIUM 3.38 G: 3; .375 INJECTION, SOLUTION INTRAVENOUS at 18:02

## 2025-08-12 RX ADMIN — DAPAGLIFLOZIN 5 MG: 10 TABLET, FILM COATED ORAL at 09:41

## 2025-08-12 RX ADMIN — APIXABAN 5 MG: 5 TABLET, FILM COATED ORAL at 21:30

## 2025-08-12 RX ADMIN — APIXABAN 5 MG: 5 TABLET, FILM COATED ORAL at 09:38

## 2025-08-12 RX ADMIN — FUROSEMIDE 40 MG: 40 TABLET ORAL at 09:38

## 2025-08-12 RX ADMIN — GABAPENTIN 300 MG: 300 CAPSULE ORAL at 21:30

## 2025-08-12 RX ADMIN — INSULIN LISPRO 1 UNITS: 100 INJECTION, SOLUTION INTRAVENOUS; SUBCUTANEOUS at 18:01

## 2025-08-12 RX ADMIN — Medication 1 DOSE: at 23:52

## 2025-08-12 RX ADMIN — OXYBUTYNIN CHLORIDE 5 MG: 5 TABLET ORAL at 09:38

## 2025-08-12 RX ADMIN — INSULIN LISPRO 2 UNITS: 100 INJECTION, SOLUTION INTRAVENOUS; SUBCUTANEOUS at 09:38

## 2025-08-12 RX ADMIN — PIPERACILLIN SODIUM AND TAZOBACTAM SODIUM 3.38 G: 3; .375 INJECTION, SOLUTION INTRAVENOUS at 00:26

## 2025-08-12 RX ADMIN — IPRATROPIUM BROMIDE AND ALBUTEROL SULFATE 3 ML: 2.5; .5 SOLUTION RESPIRATORY (INHALATION) at 11:04

## 2025-08-12 RX ADMIN — POTASSIUM CHLORIDE EXTENDED-RELEASE 20 MEQ: 1500 TABLET ORAL at 09:38

## 2025-08-12 RX ADMIN — METHYLPREDNISOLONE SODIUM SUCCINATE 40 MG: 40 INJECTION, POWDER, FOR SOLUTION INTRAMUSCULAR; INTRAVENOUS at 15:15

## 2025-08-12 RX ADMIN — Medication 1 DOSE: at 03:12

## 2025-08-12 RX ADMIN — AMIODARONE HYDROCHLORIDE 100 MG: 200 TABLET ORAL at 09:38

## 2025-08-12 RX ADMIN — GABAPENTIN 300 MG: 300 CAPSULE ORAL at 18:02

## 2025-08-12 RX ADMIN — IPRATROPIUM BROMIDE AND ALBUTEROL SULFATE 3 ML: 2.5; .5 SOLUTION RESPIRATORY (INHALATION) at 15:01

## 2025-08-12 RX ADMIN — Medication 1 DOSE: at 21:55

## 2025-08-12 RX ADMIN — GABAPENTIN 300 MG: 300 CAPSULE ORAL at 09:38

## 2025-08-12 RX ADMIN — ATORVASTATIN CALCIUM 40 MG: 40 TABLET, FILM COATED ORAL at 21:30

## 2025-08-12 RX ADMIN — PIPERACILLIN SODIUM AND TAZOBACTAM SODIUM 3.38 G: 3; .375 INJECTION, SOLUTION INTRAVENOUS at 05:44

## 2025-08-12 RX ADMIN — IPRATROPIUM BROMIDE AND ALBUTEROL SULFATE 3 ML: 2.5; .5 SOLUTION RESPIRATORY (INHALATION) at 07:18

## 2025-08-12 RX ADMIN — Medication: at 07:18

## 2025-08-12 RX ADMIN — IPRATROPIUM BROMIDE AND ALBUTEROL SULFATE 3 ML: 2.5; .5 SOLUTION RESPIRATORY (INHALATION) at 20:55

## 2025-08-12 RX ADMIN — PIPERACILLIN SODIUM AND TAZOBACTAM SODIUM 3.38 G: 3; .375 INJECTION, SOLUTION INTRAVENOUS at 12:49

## 2025-08-12 ASSESSMENT — COGNITIVE AND FUNCTIONAL STATUS - GENERAL
MOBILITY SCORE: 20
CLIMB 3 TO 5 STEPS WITH RAILING: A LITTLE
DRESSING REGULAR UPPER BODY CLOTHING: A LITTLE
TURNING FROM BACK TO SIDE WHILE IN FLAT BAD: A LITTLE
TOILETING: A LITTLE
DRESSING REGULAR LOWER BODY CLOTHING: A LITTLE
TOILETING: A LITTLE
MOVING TO AND FROM BED TO CHAIR: A LITTLE
WALKING IN HOSPITAL ROOM: A LITTLE
STANDING UP FROM CHAIR USING ARMS: A LITTLE
WALKING IN HOSPITAL ROOM: A LITTLE
DAILY ACTIVITIY SCORE: 20
DAILY ACTIVITIY SCORE: 20
STANDING UP FROM CHAIR USING ARMS: A LITTLE
CLIMB 3 TO 5 STEPS WITH RAILING: A LOT
HELP NEEDED FOR BATHING: A LITTLE
TURNING FROM BACK TO SIDE WHILE IN FLAT BAD: A LITTLE
WALKING IN HOSPITAL ROOM: A LITTLE
STANDING UP FROM CHAIR USING ARMS: A LITTLE
MOBILITY SCORE: 18
DRESSING REGULAR LOWER BODY CLOTHING: A LITTLE
DRESSING REGULAR UPPER BODY CLOTHING: A LITTLE
MOVING TO AND FROM BED TO CHAIR: A LITTLE
HELP NEEDED FOR BATHING: A LITTLE
MOVING FROM LYING ON BACK TO SITTING ON SIDE OF FLAT BED WITH BEDRAILS: A LITTLE
MOBILITY SCORE: 19
MOVING TO AND FROM BED TO CHAIR: A LITTLE

## 2025-08-12 ASSESSMENT — PAIN SCALES - GENERAL
PAINLEVEL_OUTOF10: 0 - NO PAIN

## 2025-08-12 ASSESSMENT — PAIN - FUNCTIONAL ASSESSMENT
PAIN_FUNCTIONAL_ASSESSMENT: 0-10
PAIN_FUNCTIONAL_ASSESSMENT: 0-10

## 2025-08-12 NOTE — PROGRESS NOTES
Lissett Rodríguez is a 89 y.o. female on day 2 of admission presenting with HCAP (healthcare-associated pneumonia).      Subjective   On 7L, feels better, reading a book in the chair, afebrile, no overnight events reported.        Objective     Last Recorded Vitals  /64 (BP Location: Right arm, Patient Position: Sitting)   Pulse 83   Temp 36.3 °C (97.3 °F) (Temporal)   Resp 16   Wt 64.4 kg (142 lb)   SpO2 90%   Intake/Output last 3 Shifts:    Intake/Output Summary (Last 24 hours) at 8/12/2025 1200  Last data filed at 8/12/2025 0621  Gross per 24 hour   Intake 50 ml   Output 600 ml   Net -550 ml       Admission Weight  Weight: 64.4 kg (142 lb) (08/10/25 1000)    Daily Weight  08/10/25 : 64.4 kg (142 lb)    Image Results  ECG 12 lead  Normal sinus rhythm  Normal ECG  When compared with ECG of 19-JUN-2025 11:17,  Questionable change in QRS axis  See ED provider note for full interpretation and clinical correlation  Confirmed by Ade Walker ([id number]) on 8/11/2025 10:50:29 AM      Physical Exam  Constitutional:       Appearance: Normal appearance.     Cardiovascular:      Rate and Rhythm: Normal rate and regular rhythm.   Pulmonary:      Effort: Pulmonary effort is normal.      Breath sounds: Wheezes: slight wheeze bilaterally.   Abdominal:      General: Abdomen is flat. Bowel sounds are normal.      Palpations: Abdomen is soft.     Musculoskeletal:      Cervical back: Normal range of motion.     Skin:     General: Skin is warm.     Neurological:      General: No focal deficit present.      Mental Status: She is alert and oriented to person, place, and time. Mental status is at baseline.     Psychiatric:         Mood and Affect: Mood normal.         Behavior: Behavior normal.         Thought Content: Thought content normal.         Judgment: Judgment normal.         Relevant Results          Assessment & Plan      8/12:  Clinically improving, down to 7L NC... cont current regimen and wean to home regimen of 6L.      + BC with staph epi is contamination.     Dispo home likely tomorrow.       8/11:  Aecopd secondary to pna and progressing interstitial lung disease... currently on 8L NC (Home o2 6L)... start iv steroids, cont iv abx, duonebs, supportive regimen. Family requesting palliative consult which has been requested.     KALPANA... nocturnal cpap.     HypoK... replace and monitor.   DM... most recent A1c 7.3% 8 months ago... ss, farxiga.     Hx HFpEF... cont home lasix, farxiga.   Hx afib... cont home eliquis, amio.     Home regimen for chronic conditions  Dvt px covered with eliquis.   Pt/ot    [place], [place] baseline.   Dispo possibly tomorrow.         8/10:  HCAP (healthcare-associated pneumonia)  - given azithro and rocephin in ED, changing to zosyn in setting of recent hospitalizations  - checking urine antigens, nasal MRSA, blood cx  - no evidence of SIRS/sepsis  (HFpEF) heart failure with preserved ejection fraction  - continue home meds (furosemide 40 every day, farxiga 5 mg every day)  - no fluids in setting of HFpEF  Atrial fibrillation (Multi)  - cont eliquis, amiodarone  - patient does have a subcutaneous nodule present on anterior chest wall, ? Subcutaneous thrombosis, monitor for now, no PE on CTA  Chronic obstructive pulmonary disease (Multi)  - cont home PRN inhalers  - pulm consult  Diabetes mellitus type 2 without retinopathy (Multi)  - per history, cont farxiga  - low dose SSI  Hypertension  - normotensive, monitor  Obstructive sleep apnea syndrome  - auto CPAP ordered  Stage 3a chronic kidney disease (Multi)  - monitor daily BMP  Shortness of breath  - as above for HCAP         Noel Babin MD

## 2025-08-12 NOTE — PROGRESS NOTES
Pharmacy Medication History     Source of Information: patient/filled history    Additional concerns with the patient's PTA list.   N/a  Notified Provider via [facility] : No    The following updates were made to the Prior to Admission medication list:     Medications ADDED:   N/a  Medications CHANGED:  N/a  Medications REMOVED:   N/a  Medications NOT TAKING:   N/a    Allergy reviewed : Yes    Meds 2 Beds : No    Outpatient pharmacy confirmed and updated in chart : Yes    Pharm[facility]    The list below reflectives the updated PTA list. Please review each medication in order reconciliation for additional clarification and justification.    Prior to Admission Medications   Prescriptions Last Dose Informant   acetaminophen (Tylenol) 325 mg tablet Unknown Other   Sig: Take 1 tablet (325 mg) by mouth every 6 hours if needed for mild pain (1 - 3).   albuterol 90 mcg/actuation inhaler Past Week Other   Sig: Inhale 1-2 puffs every 4 hours if needed for wheezing or shortness of breath.   alendronate (Fosamax) 70 mg tablet Unknown Other   Sig: Take 1 tablet (70 mg) by mouth every 7 days. Take in the morning with a full glass of water, on an empty stomach, and do not take anything else by mouth or lie down for the next 30 min.   Patient taking differently: Take 1 tablet (70 mg) by mouth every 7 days. Take in the morning with a full glass of water, on an empty stomach, and do not take anything else by mouth or lie down for the next 30 min. friday   amiodarone (Pacerone) 100 mg tablet Unknown Other   Sig: Take 1 tablet (100 mg) by mouth once daily.   apixaban (Eliquis) 5 mg tablet 8/10/2025 Other   Sig: Take 1 tablet (5 mg) by mouth 2 times a day.   atorvastatin (Lipitor) 40 mg tablet 8/10/2025 Other   Sig: Take 1 tablet (40 mg) by mouth once daily at bedtime.   cholecalciferol (Vitamin D-3) 50 mcg (2,000 unit) capsule Unknown Other   Sig: Take 1 tablet by mouth once daily.   dapagliflozin propanediol (Farxiga) 5  mg Unknown Other   Sig: Take 1 tablet (5 mg) by mouth once daily.   diclofenac sodium (Voltaren) 1 % gel Unknown Other   Sig: Apply 4.5 inches (4 g) topically 4 times a day.   furosemide (Lasix) 40 mg tablet Unknown Other   Sig: Take 1 tablet (40 mg) by mouth once daily.   gabapentin (Neurontin) 100 mg capsule 8/10/2025 Bedtime Other   Sig: Take 3 capsules (300 mg) by mouth 3 times a day.   ketoconazole (NIZOral) 2 % cream Unknown Other   Sig: Apply topically 2 times a day.   mometasone (Nasonex) 50 mcg/actuation nasal spray Unknown Other   Sig: Administer 1 spray into each nostril 2 times a day.   nystatin (Mycostatin) 100,000 unit/gram powder Unknown Other   Sig: Apply 1 Application topically 2 times a day.   oxyBUTYnin (Ditropan) 5 mg tablet Unknown Other   Sig: Take 1 tablet (5 mg) by mouth once daily.   oxygen (O2) gas therapy 8/10/2025 Other   Sig: Inhale 6 L/min continuously.   potassium chloride CR 20 mEq ER tablet Unknown Other   Sig: Take 1 tablet (20 mEq) by mouth once daily. Do not crush or chew.   tiotropium (Spiriva with HandiHaler) 18 mcg inhalation capsule Unknown Other   Sig: Place 1 capsule (18 mcg) into inhaler and inhale once daily.      Facility-Administered Medications: None       The list below reflectives the updated allergy list. Please review each documented allergy for additional clarification and justification.    Allergies   Allergen Reactions    Tetracyclines Hives and GI Upset    Lisinopril Angioedema, Swelling, Hives and Itching    Vancomycin Unknown          08/12/25 at 3:49 PM - Kenia Tejeda

## 2025-08-12 NOTE — CARE PLAN
The patient's goals for the shift include get some rest    The clinical goals for the shift include Pt to remain safe and HDS throughout shift    Over the shift, the patient did  Problem: Heart Failure  Goal: Report improvement of dyspnea/breathlessness this shift  Outcome: Progressing     Problem: Heart Failure  Goal: Increase self care and/or family involvement in 24 hours  Outcome: Progressing     Problem: Safety - Adult  Goal: Free from fall injury  Outcome: Progressing     Problem: Discharge Planning  Goal: Discharge to home or other facility with appropriate resources  Outcome: Progressing     Problem: Chronic Conditions and Co-morbidities  Goal: Patient's chronic conditions and co-morbidity symptoms are monitored and maintained or improved  Outcome: Progressing     Problem: Fall/Injury  Goal: Use assistive devices by end of the shift  Outcome: Progressing    make progress toward the following goals.

## 2025-08-12 NOTE — CARE PLAN
The patient's goals for the shift include get some rest    The clinical goals for the shift include Pt to remain safe and HDS throughout shift    Problem: Heart Failure  Goal: Improved gas exchange this shift  Outcome: Progressing  Goal: Improved urinary output this shift  Outcome: Progressing  Goal: Reduction in peripheral edema within 24 hours  Outcome: Progressing  Goal: Report improvement of dyspnea/breathlessness this shift  Outcome: Progressing  Goal: Weight from fluid excess reduced over 2-3 days, then stabilize  Outcome: Progressing  Goal: Increase self care and/or family involvement in 24 hours  Outcome: Progressing     Problem: Pain - Adult  Goal: Verbalizes/displays adequate comfort level or baseline comfort level  Outcome: Progressing     Problem: Safety - Adult  Goal: Free from fall injury  Outcome: Progressing     Problem: Discharge Planning  Goal: Discharge to home or other facility with appropriate resources  Outcome: Progressing     Problem: Chronic Conditions and Co-morbidities  Goal: Patient's chronic conditions and co-morbidity symptoms are monitored and maintained or improved  Outcome: Progressing     Problem: Nutrition  Goal: Nutrient intake appropriate for maintaining nutritional needs  Outcome: Progressing     Problem: Fall/Injury  Goal: Not fall by end of shift  Outcome: Progressing  Goal: Be free from injury by end of the shift  Outcome: Progressing  Goal: Use assistive devices by end of the shift  Outcome: Progressing  Goal: Pace activities to prevent fatigue by end of the shift  Outcome: Progressing     Problem: Diabetes  Goal: Achieve decreasing blood glucose levels by end of shift  Outcome: Progressing  Goal: Increase stability of blood glucose readings by end of shift  Outcome: Progressing  Goal: Maintain glucose levels >70mg/dl to <250mg/dl throughout shift  Outcome: Progressing  Goal: No changes in neurological exam by end of shift  Outcome: Progressing  Goal: Vital signs within  normal range for age by end of shift  Outcome: Progressing     Problem: Respiratory  Goal: Minimal/no exertional discomfort or dyspnea this shift  Outcome: Progressing  Goal: No signs of respiratory distress (eg. Use of accessory muscles. Peds grunting)  Outcome: Progressing  Goal: Patent airway maintained this shift  Outcome: Progressing     Problem: Skin  Goal: Decreased wound size/increased tissue granulation at next dressing change  Outcome: Progressing  Goal: Participates in plan/prevention/treatment measures  Outcome: Progressing  Goal: Prevent/manage excess moisture  Outcome: Progressing  Goal: Prevent/minimize sheer/friction injuries  Outcome: Progressing  Goal: Promote/optimize nutrition  Outcome: Progressing  Goal: Promote skin healing  Outcome: Progressing

## 2025-08-13 ENCOUNTER — DOCUMENTATION (OUTPATIENT)
Dept: HOME HEALTH SERVICES | Facility: HOME HEALTH | Age: OVER 89
End: 2025-08-13
Payer: MEDICARE

## 2025-08-13 LAB
ANION GAP SERPL CALC-SCNC: 16 MMOL/L (ref 10–20)
BASOPHILS # BLD AUTO: 0.02 X10*3/UL (ref 0–0.1)
BASOPHILS NFR BLD AUTO: 0.2 %
BUN SERPL-MCNC: 23 MG/DL (ref 6–23)
CALCIUM SERPL-MCNC: 8.8 MG/DL (ref 8.6–10.3)
CHLORIDE SERPL-SCNC: 103 MMOL/L (ref 98–107)
CO2 SERPL-SCNC: 24 MMOL/L (ref 21–32)
CREAT SERPL-MCNC: 0.92 MG/DL (ref 0.5–1.05)
EGFRCR SERPLBLD CKD-EPI 2021: 59 ML/MIN/1.73M*2
EOSINOPHIL # BLD AUTO: 0.01 X10*3/UL (ref 0–0.4)
EOSINOPHIL NFR BLD AUTO: 0.1 %
ERYTHROCYTE [DISTWIDTH] IN BLOOD BY AUTOMATED COUNT: 14.2 % (ref 11.5–14.5)
GLUCOSE BLD MANUAL STRIP-MCNC: 157 MG/DL (ref 74–99)
GLUCOSE BLD MANUAL STRIP-MCNC: 163 MG/DL (ref 74–99)
GLUCOSE BLD MANUAL STRIP-MCNC: 220 MG/DL (ref 74–99)
GLUCOSE SERPL-MCNC: 150 MG/DL (ref 74–99)
HCT VFR BLD AUTO: 41.3 % (ref 36–46)
HGB BLD-MCNC: 13.2 G/DL (ref 12–16)
IMM GRANULOCYTES # BLD AUTO: 0.06 X10*3/UL (ref 0–0.5)
IMM GRANULOCYTES NFR BLD AUTO: 0.5 % (ref 0–0.9)
LYMPHOCYTES # BLD AUTO: 0.73 X10*3/UL (ref 0.8–3)
LYMPHOCYTES NFR BLD AUTO: 6.1 %
MCH RBC QN AUTO: 28.9 PG (ref 26–34)
MCHC RBC AUTO-ENTMCNC: 32 G/DL (ref 32–36)
MCV RBC AUTO: 91 FL (ref 80–100)
MONOCYTES # BLD AUTO: 0.42 X10*3/UL (ref 0.05–0.8)
MONOCYTES NFR BLD AUTO: 3.5 %
NEUTROPHILS # BLD AUTO: 10.77 X10*3/UL (ref 1.6–5.5)
NEUTROPHILS NFR BLD AUTO: 89.6 %
NRBC BLD-RTO: 0 /100 WBCS (ref 0–0)
PLATELET # BLD AUTO: 201 X10*3/UL (ref 150–450)
POTASSIUM SERPL-SCNC: 4.1 MMOL/L (ref 3.5–5.3)
RBC # BLD AUTO: 4.56 X10*6/UL (ref 4–5.2)
SODIUM SERPL-SCNC: 139 MMOL/L (ref 136–145)
WBC # BLD AUTO: 12 X10*3/UL (ref 4.4–11.3)

## 2025-08-13 PROCEDURE — 2500000004 HC RX 250 GENERAL PHARMACY W/ HCPCS (ALT 636 FOR OP/ED): Performed by: STUDENT IN AN ORGANIZED HEALTH CARE EDUCATION/TRAINING PROGRAM

## 2025-08-13 PROCEDURE — 94660 CPAP INITIATION&MGMT: CPT

## 2025-08-13 PROCEDURE — RXMED WILLOW AMBULATORY MEDICATION CHARGE

## 2025-08-13 PROCEDURE — 97116 GAIT TRAINING THERAPY: CPT | Mod: GP,CQ

## 2025-08-13 PROCEDURE — 36415 COLL VENOUS BLD VENIPUNCTURE: CPT | Performed by: STUDENT IN AN ORGANIZED HEALTH CARE EDUCATION/TRAINING PROGRAM

## 2025-08-13 PROCEDURE — 2500000001 HC RX 250 WO HCPCS SELF ADMINISTERED DRUGS (ALT 637 FOR MEDICARE OP): Performed by: STUDENT IN AN ORGANIZED HEALTH CARE EDUCATION/TRAINING PROGRAM

## 2025-08-13 PROCEDURE — 94640 AIRWAY INHALATION TREATMENT: CPT

## 2025-08-13 PROCEDURE — 2500000005 HC RX 250 GENERAL PHARMACY W/O HCPCS: Performed by: INTERNAL MEDICINE

## 2025-08-13 PROCEDURE — 82374 ASSAY BLOOD CARBON DIOXIDE: CPT | Performed by: STUDENT IN AN ORGANIZED HEALTH CARE EDUCATION/TRAINING PROGRAM

## 2025-08-13 PROCEDURE — 1100000001 HC PRIVATE ROOM DAILY

## 2025-08-13 PROCEDURE — 82947 ASSAY GLUCOSE BLOOD QUANT: CPT

## 2025-08-13 PROCEDURE — 2500000004 HC RX 250 GENERAL PHARMACY W/ HCPCS (ALT 636 FOR OP/ED): Mod: JZ | Performed by: INTERNAL MEDICINE

## 2025-08-13 PROCEDURE — 2500000002 HC RX 250 W HCPCS SELF ADMINISTERED DRUGS (ALT 637 FOR MEDICARE OP, ALT 636 FOR OP/ED): Performed by: STUDENT IN AN ORGANIZED HEALTH CARE EDUCATION/TRAINING PROGRAM

## 2025-08-13 PROCEDURE — 97530 THERAPEUTIC ACTIVITIES: CPT | Mod: GP,CQ

## 2025-08-13 PROCEDURE — 85025 COMPLETE CBC W/AUTO DIFF WBC: CPT | Performed by: STUDENT IN AN ORGANIZED HEALTH CARE EDUCATION/TRAINING PROGRAM

## 2025-08-13 PROCEDURE — 97535 SELF CARE MNGMENT TRAINING: CPT | Mod: GO | Performed by: OCCUPATIONAL THERAPIST

## 2025-08-13 PROCEDURE — 2500000002 HC RX 250 W HCPCS SELF ADMINISTERED DRUGS (ALT 637 FOR MEDICARE OP, ALT 636 FOR OP/ED): Performed by: PHARMACIST

## 2025-08-13 PROCEDURE — 99239 HOSP IP/OBS DSCHRG MGMT >30: CPT | Performed by: INTERNAL MEDICINE

## 2025-08-13 RX ORDER — AMOXICILLIN AND CLAVULANATE POTASSIUM 875; 125 MG/1; MG/1
1 TABLET, FILM COATED ORAL 2 TIMES DAILY
Qty: 10 TABLET | Refills: 0 | Status: SHIPPED | OUTPATIENT
Start: 2025-08-13 | End: 2025-08-23 | Stop reason: HOSPADM

## 2025-08-13 RX ORDER — PREDNISONE 20 MG/1
TABLET ORAL
Qty: 18 TABLET | Refills: 0 | Status: SHIPPED | OUTPATIENT
Start: 2025-08-13 | End: 2025-08-23 | Stop reason: HOSPADM

## 2025-08-13 RX ADMIN — INSULIN LISPRO 1 UNITS: 100 INJECTION, SOLUTION INTRAVENOUS; SUBCUTANEOUS at 12:39

## 2025-08-13 RX ADMIN — FUROSEMIDE 40 MG: 40 TABLET ORAL at 08:30

## 2025-08-13 RX ADMIN — ATORVASTATIN CALCIUM 40 MG: 40 TABLET, FILM COATED ORAL at 21:31

## 2025-08-13 RX ADMIN — PIPERACILLIN SODIUM AND TAZOBACTAM SODIUM 3.38 G: 3; .375 INJECTION, SOLUTION INTRAVENOUS at 00:54

## 2025-08-13 RX ADMIN — Medication: at 07:43

## 2025-08-13 RX ADMIN — IPRATROPIUM BROMIDE AND ALBUTEROL SULFATE 3 ML: 2.5; .5 SOLUTION RESPIRATORY (INHALATION) at 20:06

## 2025-08-13 RX ADMIN — Medication: at 03:36

## 2025-08-13 RX ADMIN — IPRATROPIUM BROMIDE AND ALBUTEROL SULFATE 3 ML: 2.5; .5 SOLUTION RESPIRATORY (INHALATION) at 07:42

## 2025-08-13 RX ADMIN — INSULIN LISPRO 2 UNITS: 100 INJECTION, SOLUTION INTRAVENOUS; SUBCUTANEOUS at 19:02

## 2025-08-13 RX ADMIN — AMIODARONE HYDROCHLORIDE 100 MG: 200 TABLET ORAL at 08:30

## 2025-08-13 RX ADMIN — POTASSIUM CHLORIDE EXTENDED-RELEASE 20 MEQ: 1500 TABLET ORAL at 08:29

## 2025-08-13 RX ADMIN — METHYLPREDNISOLONE SODIUM SUCCINATE 40 MG: 40 INJECTION, POWDER, FOR SOLUTION INTRAMUSCULAR; INTRAVENOUS at 14:10

## 2025-08-13 RX ADMIN — IPRATROPIUM BROMIDE AND ALBUTEROL SULFATE 3 ML: 2.5; .5 SOLUTION RESPIRATORY (INHALATION) at 14:29

## 2025-08-13 RX ADMIN — GABAPENTIN 300 MG: 300 CAPSULE ORAL at 08:30

## 2025-08-13 RX ADMIN — DAPAGLIFLOZIN 5 MG: 10 TABLET, FILM COATED ORAL at 08:30

## 2025-08-13 RX ADMIN — APIXABAN 5 MG: 5 TABLET, FILM COATED ORAL at 21:31

## 2025-08-13 RX ADMIN — APIXABAN 5 MG: 5 TABLET, FILM COATED ORAL at 08:30

## 2025-08-13 RX ADMIN — GABAPENTIN 300 MG: 300 CAPSULE ORAL at 19:02

## 2025-08-13 RX ADMIN — IPRATROPIUM BROMIDE AND ALBUTEROL SULFATE 3 ML: 2.5; .5 SOLUTION RESPIRATORY (INHALATION) at 11:28

## 2025-08-13 RX ADMIN — PIPERACILLIN SODIUM AND TAZOBACTAM SODIUM 3.38 G: 3; .375 INJECTION, SOLUTION INTRAVENOUS at 06:14

## 2025-08-13 RX ADMIN — Medication: at 20:06

## 2025-08-13 RX ADMIN — PIPERACILLIN SODIUM AND TAZOBACTAM SODIUM 3.38 G: 3; .375 INJECTION, SOLUTION INTRAVENOUS at 12:39

## 2025-08-13 RX ADMIN — OXYBUTYNIN CHLORIDE 5 MG: 5 TABLET ORAL at 08:30

## 2025-08-13 RX ADMIN — INSULIN LISPRO 1 UNITS: 100 INJECTION, SOLUTION INTRAVENOUS; SUBCUTANEOUS at 08:30

## 2025-08-13 RX ADMIN — Medication 1 DOSE: at 23:36

## 2025-08-13 ASSESSMENT — COGNITIVE AND FUNCTIONAL STATUS - GENERAL
DRESSING REGULAR UPPER BODY CLOTHING: A LITTLE
WALKING IN HOSPITAL ROOM: A LITTLE
MOBILITY SCORE: 18
DRESSING REGULAR UPPER BODY CLOTHING: A LITTLE
DRESSING REGULAR LOWER BODY CLOTHING: A LITTLE
STANDING UP FROM CHAIR USING ARMS: A LITTLE
WALKING IN HOSPITAL ROOM: A LITTLE
DAILY ACTIVITIY SCORE: 20
DRESSING REGULAR LOWER BODY CLOTHING: A LITTLE
MOVING TO AND FROM BED TO CHAIR: A LITTLE
MOVING TO AND FROM BED TO CHAIR: A LITTLE
TOILETING: A LITTLE
MOBILITY SCORE: 19
TURNING FROM BACK TO SIDE WHILE IN FLAT BAD: A LITTLE
MOVING FROM LYING ON BACK TO SITTING ON SIDE OF FLAT BED WITH BEDRAILS: A LITTLE
HELP NEEDED FOR BATHING: A LITTLE
DAILY ACTIVITIY SCORE: 19
TOILETING: A LITTLE
PERSONAL GROOMING: A LITTLE
HELP NEEDED FOR BATHING: A LITTLE
CLIMB 3 TO 5 STEPS WITH RAILING: A LOT
CLIMB 3 TO 5 STEPS WITH RAILING: A LITTLE
STANDING UP FROM CHAIR USING ARMS: A LITTLE

## 2025-08-13 ASSESSMENT — PAIN - FUNCTIONAL ASSESSMENT
PAIN_FUNCTIONAL_ASSESSMENT: 0-10

## 2025-08-13 ASSESSMENT — PAIN SCALES - GENERAL
PAINLEVEL_OUTOF10: 0 - NO PAIN

## 2025-08-13 ASSESSMENT — ACTIVITIES OF DAILY LIVING (ADL): HOME_MANAGEMENT_TIME_ENTRY: 14

## 2025-08-13 NOTE — PROGRESS NOTES
Physical Therapy    Physical Therapy Treatment    Patient Name: Lissett Rodríguez  MRN: [id number]  Department: [facility] [id number]  Room: [address] [place] [address]  Today's Date: 8/13/2025  Time Calculation  Start Time: 0911  Stop Time: 0949  Time Calculation (min): 38 min         Assessment/Plan   PT Assessment  Rehab Prognosis: Good  Barriers to Discharge Home: No anticipated barriers  End of Session Communication: Bedside nurse  Assessment Comment: pt able to walk further this tx, req more more o2 today @8L,  baseline of 6  End of Session Patient Position: Alarm on, Up in chair  PT Plan  Inpatient/Swing Bed or Outpatient: Inpatient  PT Plan  Treatment/Interventions: Transfer training, Gait training  PT Plan: Ongoing PT  PT Frequency for Current Admission: 3 times per week during this acute inpatient hospitalization  PT Discharge Recommendations: Low intensity level of continued care  Equipment Recommended upon Discharge:  (no needs anticipated)  PT Recommended Transfer Status: Stand by assist  PT - OK to Discharge: Yes (per POC)    PT Visit Info:  PT Received On: 08/13/25     General Visit Information:   General  Reason for Referral: Pt is a 89 yo female presenting due to chest pain. Pt found to have community acquired PNA. Relevant PMHx of HF, a-fib and COPD  Referred By: Jasiel Wells DO  Prior to Session Communication: Bedside nurse  Patient Position Received: Up in chair, Alarm on  Preferred Learning Style: auditory, verbal, visual  General Comment: pt agreeable to tx    Subjective   Precautions:  Precautions  Hearing/Visual Limitations: [place], bilateral hearing aids  Medical Precautions: Fall precautions, Oxygen therapy device and L/min      Vital Signs Comment: Sp02 93% at start, after gait training 84% however with VC for PLB increased to 92% <1 min.     Objective   Pain:  Pain Assessment  0-10 (Numeric) Pain Score: 0 - No pain  Cognition:  Cognition  Overall Cognitive Status: Within Functional Limits       Postural Control:  Static  Standing Balance  Static Standing-Comment/Number of Minutes: pt performed static standing balance with UE support at RW and  CGA,  x2 min  x2              Ambulation/Gait Training  Ambulation/Gait Training Performed: Yes  Ambulation/Gait Training 1  Surface 1: Level tile  Device 1: Rolling walker  Gait Support Devices: Gait belt  Assistance 1: Close supervision  Quality of Gait 1: Wide base of support  Comments/Distance (ft) 1: 150x1, 270x1, 15x2 (pt able to perform with slow step through gait pattern. pt req seated rest break 2/2 fatigue.  increased time req.)  Transfer 1  Technique 1: Sit to stand, Stand to sit  Transfer Device 1: Walker  Transfer Level of Assistance 1: Contact guard  Trials/Comments 1: vc/tc for hand placment on solid sitting surface and not RW. pt performed x4, VC to improve eccentric control    Outcome Measures:  [facility] Basic Mobility  Turning from your back to your side while in a flat bed without using bedrails: None  Moving from lying on your back to sitting on the side of a flat bed without using bedrails: None  Moving to and from bed to chair (including a wheelchair): A little  Standing up from a chair using your arms (e.g. wheelchair or bedside chair): A little  To walk in hospital room: A little  Climbing 3-5 steps with railing: A lot  Basic Mobility - Total Score: 19    Education Documentation  Body Mechanics, taught by Man Kumar PTA at 8/13/2025 11:57 AM.  Learner: Patient  Readiness: Acceptance  Method: Explanation  Response: Verbalizes Understanding    Mobility Training, taught by Man Kumar PTA at 8/13/2025 11:57 AM.  Learner: Patient  Readiness: Acceptance  Method: Explanation  Response: Verbalizes Understanding    Education Comments  No comments found.        OP EDUCATION:       Encounter Problems       Encounter Problems (Active)       Mobility       STG - Patient will ambulate (Progressing)       Start:  08/11/25    Expected End:  08/25/25       Mod ind using FWW  vs rollator >150ft maintaining O2 line and SpO2 maintained >90%            PT Transfers       STG - Patient will perform bed mobility (Progressing)       Start:  08/11/25    Expected End:  08/25/25       Mod INd         STG - Patient will transfer sit to and from stand (Progressing)       Start:  08/11/25    Expected End:  08/25/25       Mod Ind

## 2025-08-13 NOTE — DISCHARGE SUMMARY
Discharge Diagnosis  HCAP (healthcare-associated pneumonia)           Discharge Meds     Medication List      START taking these medications     amoxicillin-clavulanate 875-125 mg tablet; Commonly known as: Augmentin;   Take 1 tablet by mouth 2 times a day for 5 days.   predniSONE 20 mg tablet; Commonly known as: Deltasone; Take 3 tabs   (60mg) daily for 3 days, then take 2 tabs (40mg) daily for 3 days, then   take 1 tab (20mg) daily for 3 days.     CHANGE how you take these medications     oxygen gas therapy; Commonly known as: O2; Inhale 8 L/min at 480,000   mL/hr continuously.; What changed: how much to take, how fast to infuse   this     CONTINUE taking these medications     acetaminophen 325 mg tablet; Commonly known as: Tylenol   albuterol 90 mcg/actuation inhaler   alendronate 70 mg tablet; Commonly known as: Fosamax; Take 1 tablet (70   mg) by mouth every 7 days. Take in the morning with a full glass of water,   on an empty stomach, and do not take anything else by mouth or lie down   for the next 30 min.   amiodarone 100 mg tablet; Commonly known as: Pacerone; Take 1 tablet   (100 mg) by mouth once daily.   apixaban 5 mg tablet; Commonly known as: Eliquis; Take 1 tablet (5 mg)   by mouth 2 times a day.   atorvastatin 40 mg tablet; Commonly known as: Lipitor; Take 1 tablet (40   mg) by mouth once daily at bedtime.   cholecalciferol 50 mcg (2,000 units) capsule; Commonly known as: Vitamin   D-3   dapagliflozin propanediol 5 mg tablet; Commonly known as: Farxiga; Take   1 tablet (5 mg) by mouth once daily.   diclofenac sodium 1 % gel; Commonly known as: Voltaren; Apply 4.5 inches   (4 g) topically 4 times a day.   furosemide 40 mg tablet; Commonly known as: Lasix   gabapentin 100 mg capsule; Commonly known as: Neurontin; Take 3 capsules   (300 mg) by mouth 3 times a day.   ketoconazole 2 % cream; Commonly known as: NIZOral; Apply topically 2   times a day.   mometasone 50 mcg/actuation nasal spray; Commonly  known as: Nasonex;   Administer 1 spray into each nostril 2 times a day.   nystatin 100,000 unit/gram powder; Commonly known as: Mycostatin; Apply   1 Application topically 2 times a day.   oxyBUTYnin 5 mg tablet; Commonly known as: Ditropan   potassium chloride CR 20 mEq ER tablet; Commonly known as: Klor-Con M20   Spiriva with HandiHaler 18 mcg inhalation capsule; Generic drug:   tiotropium       Test Results Pending At Discharge  Pending Labs       Order Current Status    Blood Culture Preliminary result    Blood Culture Preliminary result            Hospital Course         8/13:  Stable on 8L NC... instructed to increase her home o2 from 6 to 8 for now. Will dc with abx and steroid regimen to complete as well as f/up with pulm.     Dispo is snf.   Appreciate palliative.     Pt clinically and hemodynamically stable, tolerating PO intake.  Instructed to F/U with PCP within 5 days.   She understands and agrees with the dc plan.     More than 30 min spent on dc.         8/12:  Clinically improving, down to 7L NC... cont current regimen and wean to home regimen of 6L.      + BC with staph epi is contamination.      Dispo home likely tomorrow.         8/11:  Aecopd secondary to pna and progressing interstitial lung disease... currently on 8L NC (Home o2 6L)... start iv steroids, cont iv abx, duonebs, supportive regimen. Family requesting palliative consult which has been requested.      KALPANA... nocturnal cpap.      HypoK... replace and monitor.   DM... most recent A1c 7.3% 8 months ago... ss, farxiga.      Hx HFpEF... cont home lasix, farxiga.   Hx afib... cont home eliquis, amio.      Home regimen for chronic conditions  Dvt px covered with eliquis.   Pt/ot     Pt lives in [place], uses walker baseline.   Dispo possibly tomorrow.     Pertinent Physical Exam At Time of Discharge  Physical Exam  Constitutional:       Appearance: Normal appearance.     Cardiovascular:      Rate and Rhythm: Normal rate and regular rhythm.  "  Pulmonary:      Effort: Pulmonary effort is normal.      Comments: Chronically decreased BS bilaterally.   Abdominal:      General: Abdomen is flat. Bowel sounds are normal.      Palpations: Abdomen is soft.     Musculoskeletal:      Cervical back: Normal range of motion.     Skin:     General: Skin is warm.     Neurological:      General: No focal deficit present.      Mental Status: She is alert and oriented to person, place, and time. Mental status is at baseline.     Psychiatric:         Mood and Affect: Mood normal.         Behavior: Behavior normal.         Thought Content: Thought content normal.         Judgment: Judgment normal.     /67 (BP Location: Right arm, Patient Position: Sitting)   Pulse 69   Temp 36.6 °C (97.9 °F) (Temporal)   Resp 17   Ht (!) 1.499 m (4' 11\")   Wt 64.4 kg (142 lb)   SpO2 94%   BMI 28.68 kg/m²       Outpatient Follow-Up  Future Appointments   Date Time Provider Department Center   8/18/2025 10:30 AM Mal Brown MD MPH [id number] [facility]   9/2/2025  2:00 PM Radha Anderson DO [id number] [facility]         Noel Babin MD  "

## 2025-08-13 NOTE — CARE PLAN
The patient's goals for the shift include get some rest    The clinical goals for the shift include Pt's O2 requirement will return to baseline by end of shift    Over the shift, the patient did make progress toward the following goals.   Problem: Heart Failure  Goal: Improved gas exchange this shift  Outcome: Progressing     Problem: Heart Failure  Goal: Report improvement of dyspnea/breathlessness this shift  Outcome: Progressing     Problem: Safety - Adult  Goal: Free from fall injury  Outcome: Progressing     Problem: Discharge Planning  Goal: Discharge to home or other facility with appropriate resources  Outcome: Progressing     Problem: Chronic Conditions and Co-morbidities  Goal: Patient's chronic conditions and co-morbidity symptoms are monitored and maintained or improved  Outcome: Progressing     Problem: Diabetes  Goal: Achieve decreasing blood glucose levels by end of shift  Outcome: Progressing

## 2025-08-13 NOTE — PROGRESS NOTES
Occupational Therapy    OT Treatment    Patient Name: Lissett Rodríguez  MRN: [id number]  Department: [place] [address]  Room: [address] [place] [address]  Today's Date: 8/13/2025  Time Calculation  Start Time: 1117  Stop Time: 1131  Time Calculation (min): 14 min        Assessment:  OT Assessment: Pt is making good progress towards OT goals. Pt continues to demo decreased balance, strength, endurance and safety awareness resulting in decreased safety & independence with ADLs & functional transfers/mobility. Pt requires skilled OT services to address above deficits to safely return to OF.  Prognosis: Good  Barriers to Discharge Home: No anticipated barriers  Evaluation/Treatment Tolerance: Patient limited by fatigue  Medical Staff Made Aware: Yes  End of Session Communication: Bedside nurse  End of Session Patient Position: Up in chair, Alarm on  OT Assessment Results: Decreased ADL status, Decreased upper extremity strength, Decreased safe judgment during ADL, Decreased endurance, Decreased functional mobility, Decreased IADLs, Decreased trunk control for functional activities, Decreased sensation  Prognosis: Good  Barriers to Discharge: None  Evaluation/Treatment Tolerance: Patient limited by fatigue  Medical Staff Made Aware: Yes  Strengths: Ability to acquire knowledge, Attitude of self, Premorbid level of function  Barriers to Participation: Comorbidities  Plan:  Treatment Interventions:  (ADL retraining; Functional transfer training; UE strengthening/ROM; Endurance training; Patient/family training; Equipment evaluation/education; Neuromuscular reeducation; Compensatory technique education)  OT Frequency for Current Admission: 3 times per week during this acute inpatient hospitalization  OT Discharge Recommendations: Low intensity level of continued care  Equipment Recommended upon Discharge:  (TBD)  OT Recommended Transfer Status: Assist of 1  OT - OK to Discharge: Yes (continue per OT POC)  Treatment Interventions:  (ADL retraining;  Functional transfer training; UE strengthening/ROM; Endurance training; Patient/family training; Equipment evaluation/education; Neuromuscular reeducation; Compensatory technique education)    Subjective   OT Visit Info:   OT Received on: 8/13/25  General Visit Info:  General  Reason for Referral: Pt is a 89 yo female presenting due to chest pain. Pt found to have community acquired PNA. Relevant PMHx of HF, a-fib and COPD  Referred By: Jasiel Wells DO  Past Medical History Relevant to Rehab: Past Medical History:  Diagnosis Date    Angioneurotic edema, initial encounter 08/24/2018    Angioedema of lips    Calculus of gallbladder without cholecystitis without obstruction 02/22/2017    Calculus of gallbladder without cholecystitis without obstruction    Chronic obstructive pulmonary disease, unspecified 02/20/2017    Chronic obstructive pulmonary disease with hypoxia    Eructation 06/16/2017    Burping    Personal history of transient ischemic attack (TIA), and cerebral infarction 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 10/16/2018    History of transient cerebral ischemia    Personal history of transient ischemic attack (TIA), and cerebral infarction 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 03/08/2016    History of transient cerebral ischemia    Pneumonia, unspecified organism 02/09/2018    CAP (community acquired pneumonia)    Trochanteric bursitis, unspecified hip 04/23/2015    Greater trochanteric bursitis    Type 2 diabetes mellitus without complications 11/06/2017    Controlled type 2 diabetes mellitus without complication, without long-term current use of insulin      Family/Caregiver Present: No  Prior to Session Communication: Bedside nurse  Patient Position Received: Up in chair, Alarm on  Preferred Learning Style: auditory, verbal, visual  General Comment: Pt seated in chair upon arrival and agreeable to treatment. Pt fully participatory in session.  Precautions:  Hearing/Visual Limitations: [place], bilateral hearing aids  Medical  Precautions: Fall precautions, Oxygen therapy device and L/min (8L/min HFNC)      Pain:  Pain Assessment  Pain Assessment: 0-10  0-10 (Numeric) Pain Score: 0 - No pain    Objective    Cognition:  Cognition  Overall Cognitive Status: Within Functional Limits  Orientation Level: Oriented X4     Activities of Daily Living:      Grooming  Grooming Level of Assistance: Setup  Grooming Where Assessed: Chair  Grooming Comments: pt performed hand hygiene         LE Dressing  LE Dressing: Yes  Sock Level of Assistance: Close supervision, Minimal verbal cues  Adult Briefs Level of Assistance: Close supervision, Minimal verbal cues  LE Dressing Where Assessed: Chair, Toilet  LE Dressing Comments: cues for sequencing and safety awareness    Toileting  Toileting Level of Assistance: Close supervision, Minimal verbal cues  Where Assessed: Toilet  Toileting Comments: cues for safety awareness and sequencing     Bed Mobility/Transfers:      Transfers  Transfer: Yes  Transfer 1  Technique 1: Sit to stand, Stand to sit  Transfer Device 1: Walker  Transfer Level of Assistance 1: Close supervision, Minimal verbal cues  Trials/Comments 1: cues for sequencing, safe hand placement, walker safety and eccentric control    Toilet Transfers  Toilet Transfer Type: To and from  Toilet Transfer to: Raised toilet seat with rails  Toilet Transfer Technique: Ambulating  Toilet Transfers: Supervision  Toilet Transfers Comments: cues for sequencing, safe hand placement and walker safety       Standing Balance:  Static Standing Balance  Static Standing-Balance Support: Bilateral upper extremity supported  Static Standing-Level of Assistance: Close supervision  Static Standing-Comment/Number of Minutes: FWW  Dynamic Standing Balance  Dynamic Standing-Comments: SBA using FWW    Therapy/Activity: Therapeutic Activity  Therapeutic Activity Performed: Yes  Therapeutic Activity 1: Pt functionally navigated x min household distance using FWW with SBA x1.  Cues for walker safety and sequencing, assist to manage O2 tubing, no LOB noted.      Outcome Measures:[facility] Daily Activity  Putting on and taking off regular lower body clothing: A little  Bathing (including washing, rinsing, drying): A little  Putting on and taking off regular upper body clothing: A little  Toileting, which includes using toilet, bedpan or urinal: A little  Taking care of personal grooming such as brushing teeth: A little  Eating Meals: None  Daily Activity - Total Score: 19        Education Documentation  Body Mechanics, taught by America Andrade OT at 8/13/2025  3:58 PM.  Learner: Patient  Readiness: Acceptance  Method: Explanation  Response: Verbalizes Understanding    Precautions, taught by America Andrade OT at 8/13/2025  3:58 PM.  Learner: Patient  Readiness: Acceptance  Method: Explanation  Response: Verbalizes Understanding    ADL Training, taught by America Andrade OT at 8/13/2025  3:58 PM.  Learner: Patient  Readiness: Acceptance  Method: Explanation  Response: Verbalizes Understanding           Goals:  Encounter Problems       Encounter Problems (Active)       ADLs       Patient will perform UB and LB bathing with modified independent level of assistance and grab bars, shower chair, and long-handled sponge.       Start:  08/11/25    Expected End:  08/25/25            Patient with complete upper body dressing with modified independent level of assistance donning and doffing all UE clothes with PRN adaptive equipment.       Start:  08/11/25    Expected End:  08/25/25            Patient with complete lower body dressing with modified independent level of assistance donning and doffing all LE clothes  with PRN adaptive equipment. (Progressing)       Start:  08/11/25    Expected End:  08/25/25            Patient will complete daily grooming tasks with modified independent level of assistance and PRN adaptive equipment while standing. (Progressing)       Start:  08/11/25    Expected End:   08/25/25            Patient will complete toileting including hygiene clothing management/hygiene with modified independent level of assistance and raised toilet seat and grab bars vs. BSC. (Progressing)       Start:  08/11/25    Expected End:  08/25/25               MOBILITY       Patient will perform Functional mobility x Household distances/Community Distances with modified independent level of assistance and least restrictive device in order to improve safety and functional mobility. (Progressing)       Start:  08/11/25    Expected End:  08/25/25               TRANSFERS       Patient will perform bed mobility independent level of assistance in order to improve safety and independence with mobility (Progressing)       Start:  08/11/25    Expected End:  08/25/25            Patient will complete functional transfers with least restrictive device with modified independent level of assistance. (Progressing)       Start:  08/11/25    Expected End:  08/25/25

## 2025-08-13 NOTE — PROGRESS NOTES
08/13/25 0858   Discharge Planning   Expected Discharge Disposition Home Health  (Patient has HHC through [facility] phone [phone])     Once med ready plan is to discharge back to her A.L, patient has HHC through [facility], I have called [phone] and left a message in regards to patient resuming HHC and will need fax # to fax information as they are not online in [facility].    09:01 received call from agency Fax [phone] will need to fax over clinical information and discharge paperwork    15:16 Patient will discharge back to her A.L today I have faxed over [facility] discharge paperwork and referral to [phone]

## 2025-08-13 NOTE — CARE PLAN
The clinical goals for the shift include Pt's O2 requirement will return to baseline by end of shift      Problem: Respiratory  Goal: Minimal/no exertional discomfort or dyspnea this shift  Outcome: Progressing  Goal: No signs of respiratory distress (eg. Use of accessory muscles. Peds grunting)  Outcome: Progressing  Goal: Patent airway maintained this shift  Outcome: Progressing

## 2025-08-13 NOTE — CARE PLAN
Palliative sign off note.  Patient goals are clear. She opted for DNR/DNI, no heroic measures. She signed and was given a copy of an [place] DNR form.   Palliative will sign off. Please re-consult should new needs arise.   Thank you.     Hill Lombardo MD

## 2025-08-13 NOTE — PROGRESS NOTES
Physical Therapy    Physical Therapy Treatment    Patient Name: Lissett Rodríguez  MRN: [id number]  Department: [place] [address]  Room: [address] [place] [address]  Today's Date: 8/13/2025  Time Calculation  Start Time: 1430  Stop Time: 1457  Time Calculation (min): 27 min         Assessment/Plan   PT Assessment  Rehab Prognosis: Good  Barriers to Discharge Home: No anticipated barriers  End of Session Communication: Bedside nurse  Assessment Comment: pt able to walk into [facility], no noted LOB  End of Session Patient Position: Alarm on, Bed, 3 rail up  PT Plan  Inpatient/Swing Bed or Outpatient: Inpatient  PT Plan  Treatment/Interventions: Bed mobility, Transfer training, Gait training  PT Plan: Ongoing PT  PT Frequency for Current Admission: 3 times per week during this acute inpatient hospitalization  PT Discharge Recommendations: Low intensity level of continued care  Equipment Recommended upon Discharge:  (no needs anticipated)  PT Recommended Transfer Status: Stand by assist  PT - OK to Discharge: Yes (per POC)    PT Visit Info:  PT Received On: 08/12/25     General Visit Information:   General  Reason for Referral: Pt is a 89 yo female presenting due to chest pain. Pt found to have community acquired PNA. Relevant PMHx of HF, a-fib and COPD  Referred By: Jasiel Wells DO  Prior to Session Communication: Bedside nurse  Patient Position Received: Up in chair, Alarm on  Preferred Learning Style: auditory, verbal, visual  General Comment: pt agreeable to tx, pt reports min lightheadedness during gait training but as time increased sitting symptoms decreased.    Subjective   Precautions:  Precautions  Hearing/Visual Limitations: [place], bilateral hearing aids  Medical Precautions: Fall precautions, Oxygen therapy device and L/min        Objective   Pain:  Pain Assessment  0-10 (Numeric) Pain Score: 0 - No pain  Cognition:  Cognition  Overall Cognitive Status: Within Functional Limits  Coordination:     Postural Control:  Static Standing  Balance  Static Standing-Comment/Number of Minutes: pt performed static standing balance with UE support at RW and  CGA/SBA,  x3 min, x1 seated rest break         Bed Mobility  Bed Mobility: Yes  Bed Mobility 1  Bed Mobility 1: Sitting to supine  Level of Assistance 1: Modified independent  Bed Mobility Comments 1: HOB flat, min use of bed rail.    Ambulation/Gait Training  Ambulation/Gait Training Performed: Yes  Ambulation/Gait Training 1  Surface 1: Level tile  Device 1: Rolling walker  Gait Support Devices: Gait belt  Assistance 1: Close supervision  Comments/Distance (ft) 1: 170x1, 225x1 (pt performed with slow step through gait pattern.  min VC for forward gaze. x1 seated rest break req as noted above.  no overt  LOB, increased time req.)  Transfer 1  Technique 1: Sit to stand, Stand to sit  Transfer Device 1: Walker  Transfer Level of Assistance 1: Contact guard, Close supervision  Trials/Comments 1: vc/tc for hand placment on solid sitting surface and not RW. pt performed x5 this tx, CGAx1 2/2 lower surface, SBA for all other trials.    Outcome Measures:  [facility] Basic Mobility  Turning from your back to your side while in a flat bed without using bedrails: None  Moving from lying on your back to sitting on the side of a flat bed without using bedrails: None  Moving to and from bed to chair (including a wheelchair): A little  Standing up from a chair using your arms (e.g. wheelchair or bedside chair): A little  To walk in hospital room: A little  Climbing 3-5 steps with railing: A lot  Basic Mobility - Total Score: 19    Education Documentation  No documentation found.  Education Comments  No comments found.        OP EDUCATION:       Encounter Problems       Encounter Problems (Active)       Mobility       STG - Patient will ambulate (Progressing)       Start:  08/11/25    Expected End:  08/25/25       Mod ind using FWW vs rollator >150ft maintaining O2 line and SpO2 maintained >90%            PT Transfers        STG - Patient will perform bed mobility (Progressing)       Start:  08/11/25    Expected End:  08/25/25       Mod INd         STG - Patient will transfer sit to and from stand (Progressing)       Start:  08/11/25    Expected End:  08/25/25       Mod Ind

## 2025-08-13 NOTE — DOCUMENTATION CLARIFICATION NOTE
"    PATIENT:               CELIO العراقي  ACCT #:                  [id number]  MRN:                       [id number]  :                       1933  ADMIT DATE:       8/10/2025 10:07 AM  DISCH DATE:  RESPONDING PROVIDER #:        [id number]          PROVIDER RESPONSE TEXT:    Gram Negative PNA    CDI QUERY TEXT:    Clarification        Instruction:    Based on your assessment of the patient and the clinical information, please provide the requested documentation by clicking on the appropriate radio button and enter any additional information if prompted.      Question: Please further specify the type of pneumonia being treated      When answering this query, please exercise your independent professional judgment. The fact that a question is being asked, does not imply that any particular answer is desired or expected.    The patient's clinical indicators include:  Clinical Information:  89 y/o female presents to [facility] c/o chest pain. DX Pneumonia.      Clinical Indicators:  - 08/10/25 ED note per THERESE Ventura PA-C: \"Pneumonia due to infectious organism, unspecified laterality, unspecified part of lung\"    - 08/10/25 H&P note per WEI Wells, DO: \"HCAP healthcare-associated pneumonia given azithro and rocephin in ED, changing to zosyn in setting of recent hospitalizations\"    - 25 Medicine note per ABI Babin MD: \"HCAP healthcare-associated pneumonia\"    ED: T 36.3, HR 76, RR 17, SpO2 91, 114/57  08/10 per H&P note \"Respiratory: Positive for cough and shortness of breath. Cardiovascular: Positive for chest pain.\"    Labs 08/10 - :  WBC 9.2 - 9.7 - 9.8  Neutrophils Absolute 6.64 - 7.53 - 8.59  Procalcitonin 0.02  CRP 0.66    08/10/25 CTA Chest \"IMPRESSION: 2. Multifocal pneumonia likely on a background of chronic interstitial lung disease, overall progressed since 2025, as detailed above.\"      Treatment: IV Azithromycin and IV Ceftriaxone x1 each 08/10, IV Zosyn 08/10 -       Risk Factors: PMH: HTN, " HFpEF, DM 2, A Fib on Eliquis, COPD, Chronic hypoxic respiratory failure, HLD, KALPANA, prior PE  Options provided:  -- Gram Negative PNA  -- Simple Bacterial PNA  -- Other - I will add my own diagnosis  -- Refer to Clinical Documentation Reviewer    Query created by: Alisia Maciel on 8/12/2025 11:37 AM      Electronically signed by:  LORRAINE ROBERTS MD 8/13/2025 3:25 PM

## 2025-08-14 ENCOUNTER — PHARMACY VISIT (OUTPATIENT)
Dept: PHARMACY | Facility: CLINIC | Age: OVER 89
End: 2025-08-14
Payer: MEDICARE

## 2025-08-14 VITALS
DIASTOLIC BLOOD PRESSURE: 60 MMHG | OXYGEN SATURATION: 95 % | RESPIRATION RATE: 18 BRPM | SYSTOLIC BLOOD PRESSURE: 124 MMHG | WEIGHT: 142 LBS | BODY MASS INDEX: 28.63 KG/M2 | TEMPERATURE: 97.6 F | HEIGHT: 59 IN | HEART RATE: 82 BPM

## 2025-08-14 LAB
ANION GAP SERPL CALC-SCNC: 17 MMOL/L (ref 10–20)
BACTERIA BLD AEROBE CULT: ABNORMAL
BACTERIA BLD CULT: ABNORMAL
BASOPHILS # BLD AUTO: 0.02 X10*3/UL (ref 0–0.1)
BASOPHILS NFR BLD AUTO: 0.2 %
BUN SERPL-MCNC: 27 MG/DL (ref 6–23)
CALCIUM SERPL-MCNC: 9 MG/DL (ref 8.6–10.3)
CHLORIDE SERPL-SCNC: 104 MMOL/L (ref 98–107)
CO2 SERPL-SCNC: 24 MMOL/L (ref 21–32)
CREAT SERPL-MCNC: 0.96 MG/DL (ref 0.5–1.05)
EGFRCR SERPLBLD CKD-EPI 2021: 56 ML/MIN/1.73M*2
EOSINOPHIL # BLD AUTO: 0.01 X10*3/UL (ref 0–0.4)
EOSINOPHIL NFR BLD AUTO: 0.1 %
ERYTHROCYTE [DISTWIDTH] IN BLOOD BY AUTOMATED COUNT: 14.4 % (ref 11.5–14.5)
GLUCOSE BLD MANUAL STRIP-MCNC: 136 MG/DL (ref 74–99)
GLUCOSE SERPL-MCNC: 147 MG/DL (ref 74–99)
GRAM STN SPEC: ABNORMAL
HCT VFR BLD AUTO: 45.5 % (ref 36–46)
HGB BLD-MCNC: 14.4 G/DL (ref 12–16)
IMM GRANULOCYTES # BLD AUTO: 0.04 X10*3/UL (ref 0–0.5)
IMM GRANULOCYTES NFR BLD AUTO: 0.4 % (ref 0–0.9)
LYMPHOCYTES # BLD AUTO: 0.88 X10*3/UL (ref 0.8–3)
LYMPHOCYTES NFR BLD AUTO: 7.9 %
MCH RBC QN AUTO: 29.2 PG (ref 26–34)
MCHC RBC AUTO-ENTMCNC: 31.6 G/DL (ref 32–36)
MCV RBC AUTO: 92 FL (ref 80–100)
MONOCYTES # BLD AUTO: 0.56 X10*3/UL (ref 0.05–0.8)
MONOCYTES NFR BLD AUTO: 5 %
NEUTROPHILS # BLD AUTO: 9.63 X10*3/UL (ref 1.6–5.5)
NEUTROPHILS NFR BLD AUTO: 86.4 %
NRBC BLD-RTO: 0 /100 WBCS (ref 0–0)
PLATELET # BLD AUTO: 185 X10*3/UL (ref 150–450)
POTASSIUM SERPL-SCNC: 4.4 MMOL/L (ref 3.5–5.3)
RBC # BLD AUTO: 4.93 X10*6/UL (ref 4–5.2)
SODIUM SERPL-SCNC: 141 MMOL/L (ref 136–145)
WBC # BLD AUTO: 11.1 X10*3/UL (ref 4.4–11.3)

## 2025-08-14 PROCEDURE — 94660 CPAP INITIATION&MGMT: CPT

## 2025-08-14 PROCEDURE — 85025 COMPLETE CBC W/AUTO DIFF WBC: CPT | Performed by: STUDENT IN AN ORGANIZED HEALTH CARE EDUCATION/TRAINING PROGRAM

## 2025-08-14 PROCEDURE — 2500000005 HC RX 250 GENERAL PHARMACY W/O HCPCS: Performed by: INTERNAL MEDICINE

## 2025-08-14 PROCEDURE — 82947 ASSAY GLUCOSE BLOOD QUANT: CPT

## 2025-08-14 PROCEDURE — 2500000001 HC RX 250 WO HCPCS SELF ADMINISTERED DRUGS (ALT 637 FOR MEDICARE OP): Performed by: STUDENT IN AN ORGANIZED HEALTH CARE EDUCATION/TRAINING PROGRAM

## 2025-08-14 PROCEDURE — 94640 AIRWAY INHALATION TREATMENT: CPT

## 2025-08-14 PROCEDURE — 2500000002 HC RX 250 W HCPCS SELF ADMINISTERED DRUGS (ALT 637 FOR MEDICARE OP, ALT 636 FOR OP/ED): Performed by: PHARMACIST

## 2025-08-14 PROCEDURE — 80048 BASIC METABOLIC PNL TOTAL CA: CPT | Performed by: STUDENT IN AN ORGANIZED HEALTH CARE EDUCATION/TRAINING PROGRAM

## 2025-08-14 PROCEDURE — 2500000002 HC RX 250 W HCPCS SELF ADMINISTERED DRUGS (ALT 637 FOR MEDICARE OP, ALT 636 FOR OP/ED): Performed by: STUDENT IN AN ORGANIZED HEALTH CARE EDUCATION/TRAINING PROGRAM

## 2025-08-14 PROCEDURE — 36415 COLL VENOUS BLD VENIPUNCTURE: CPT | Performed by: STUDENT IN AN ORGANIZED HEALTH CARE EDUCATION/TRAINING PROGRAM

## 2025-08-14 RX ADMIN — GABAPENTIN 300 MG: 300 CAPSULE ORAL at 09:27

## 2025-08-14 RX ADMIN — OXYBUTYNIN CHLORIDE 5 MG: 5 TABLET ORAL at 09:22

## 2025-08-14 RX ADMIN — Medication 1 DOSE: at 03:12

## 2025-08-14 RX ADMIN — POTASSIUM CHLORIDE EXTENDED-RELEASE 20 MEQ: 1500 TABLET ORAL at 09:22

## 2025-08-14 RX ADMIN — IPRATROPIUM BROMIDE AND ALBUTEROL SULFATE 3 ML: 2.5; .5 SOLUTION RESPIRATORY (INHALATION) at 07:52

## 2025-08-14 RX ADMIN — AMIODARONE HYDROCHLORIDE 100 MG: 200 TABLET ORAL at 09:22

## 2025-08-14 RX ADMIN — APIXABAN 5 MG: 5 TABLET, FILM COATED ORAL at 09:22

## 2025-08-14 RX ADMIN — FUROSEMIDE 40 MG: 40 TABLET ORAL at 09:27

## 2025-08-14 RX ADMIN — DAPAGLIFLOZIN 5 MG: 10 TABLET, FILM COATED ORAL at 09:22

## 2025-08-14 ASSESSMENT — PAIN - FUNCTIONAL ASSESSMENT
PAIN_FUNCTIONAL_ASSESSMENT: 0-10
PAIN_FUNCTIONAL_ASSESSMENT: 0-10

## 2025-08-14 ASSESSMENT — PAIN SCALES - GENERAL
PAINLEVEL_OUTOF10: 0 - NO PAIN
PAINLEVEL_OUTOF10: 0 - NO PAIN

## 2025-08-14 NOTE — SIGNIFICANT EVENT
Dc held up from yesterday due to transport issues.   Dc today with instructions per dc summary.   No bill encounter.

## 2025-08-14 NOTE — CARE PLAN
The patient's goals for the shift include get some rest    The clinical goals for the shift include Pt will remain safe during shift    Problem: Heart Failure  Goal: Improved gas exchange this shift  Outcome: Progressing  Goal: Improved urinary output this shift  Outcome: Progressing  Goal: Reduction in peripheral edema within 24 hours  Outcome: Progressing  Goal: Report improvement of dyspnea/breathlessness this shift  Outcome: Progressing  Goal: Weight from fluid excess reduced over 2-3 days, then stabilize  Outcome: Progressing  Goal: Increase self care and/or family involvement in 24 hours  Outcome: Progressing     Problem: Pain - Adult  Goal: Verbalizes/displays adequate comfort level or baseline comfort level  Outcome: Progressing     Problem: Safety - Adult  Goal: Free from fall injury  Outcome: Progressing     Problem: Discharge Planning  Goal: Discharge to home or other facility with appropriate resources  Outcome: Progressing     Problem: Chronic Conditions and Co-morbidities  Goal: Patient's chronic conditions and co-morbidity symptoms are monitored and maintained or improved  Outcome: Progressing     Problem: Nutrition  Goal: Nutrient intake appropriate for maintaining nutritional needs  Outcome: Progressing     Problem: Fall/Injury  Goal: Not fall by end of shift  Outcome: Progressing  Goal: Be free from injury by end of the shift  Outcome: Progressing  Goal: Use assistive devices by end of the shift  Outcome: Progressing  Goal: Pace activities to prevent fatigue by end of the shift  Outcome: Progressing     Problem: Diabetes  Goal: Achieve decreasing blood glucose levels by end of shift  Outcome: Progressing  Goal: Increase stability of blood glucose readings by end of shift  Outcome: Progressing  Goal: Maintain glucose levels >70mg/dl to <250mg/dl throughout shift  Outcome: Progressing  Goal: No changes in neurological exam by end of shift  Outcome: Progressing  Goal: Vital signs within normal range  for age by end of shift  Outcome: Progressing     Problem: Respiratory  Goal: Minimal/no exertional discomfort or dyspnea this shift  Outcome: Progressing  Goal: No signs of respiratory distress (eg. Use of accessory muscles. Peds grunting)  Outcome: Progressing  Goal: Patent airway maintained this shift  Outcome: Progressing     Problem: Skin  Goal: Decreased wound size/increased tissue granulation at next dressing change  Outcome: Progressing  Goal: Participates in plan/prevention/treatment measures  Outcome: Progressing  Goal: Prevent/manage excess moisture  Outcome: Progressing  Goal: Prevent/minimize sheer/friction injuries  Outcome: Progressing  Goal: Promote/optimize nutrition  Outcome: Progressing  Goal: Promote skin healing  Outcome: Progressing

## 2025-08-14 NOTE — NURSING NOTE
Discharge instructions provided using teach back method. Pt's health related  risk factors discussed with pt. pt educated to look for any worsening sign and symptoms. Pt educated to seek medical attention if experience any medical emergency. Pt aware to follow up with outpatient clinics as scheduled. Home going meds reviewed with pt. Pt verbalized understanding of disposition and discharge instructions. All questions answered to patient's satisfaction and within nursing scope of practice. Vitals stable. Family at bedside to transport to facility.

## 2025-08-15 ENCOUNTER — PATIENT OUTREACH (OUTPATIENT)
Dept: PRIMARY CARE | Facility: CLINIC | Age: OVER 89
End: 2025-08-15
Payer: MEDICARE

## 2025-08-15 LAB — BACTERIA BLD CULT: NORMAL

## 2025-08-18 ENCOUNTER — OFFICE VISIT (OUTPATIENT)
Dept: PULMONOLOGY | Facility: CLINIC | Age: OVER 89
End: 2025-08-18
Payer: MEDICARE

## 2025-08-18 VITALS
HEART RATE: 56 BPM | SYSTOLIC BLOOD PRESSURE: 128 MMHG | OXYGEN SATURATION: 96 % | TEMPERATURE: 96.9 F | DIASTOLIC BLOOD PRESSURE: 69 MMHG | WEIGHT: 145 LBS | BODY MASS INDEX: 29.29 KG/M2 | RESPIRATION RATE: 17 BRPM

## 2025-08-18 DIAGNOSIS — J96.11 CHRONIC RESPIRATORY FAILURE WITH HYPOXIA: Primary | Chronic | ICD-10-CM

## 2025-08-18 PROCEDURE — 1159F MED LIST DOCD IN RCRD: CPT | Performed by: INTERNAL MEDICINE

## 2025-08-18 PROCEDURE — 99214 OFFICE O/P EST MOD 30 MIN: CPT | Performed by: INTERNAL MEDICINE

## 2025-08-18 PROCEDURE — 3078F DIAST BP <80 MM HG: CPT | Performed by: INTERNAL MEDICINE

## 2025-08-18 PROCEDURE — 1160F RVW MEDS BY RX/DR IN RCRD: CPT | Performed by: INTERNAL MEDICINE

## 2025-08-18 PROCEDURE — 1036F TOBACCO NON-USER: CPT | Performed by: INTERNAL MEDICINE

## 2025-08-18 PROCEDURE — 1111F DSCHRG MED/CURRENT MED MERGE: CPT | Performed by: INTERNAL MEDICINE

## 2025-08-18 PROCEDURE — 99212 OFFICE O/P EST SF 10 MIN: CPT | Performed by: INTERNAL MEDICINE

## 2025-08-18 PROCEDURE — 3074F SYST BP LT 130 MM HG: CPT | Performed by: INTERNAL MEDICINE

## 2025-08-18 ASSESSMENT — ENCOUNTER SYMPTOMS
SLEEP DISTURBANCE: 0
FEVER: 0
RHINORRHEA: 0
SPEECH DIFFICULTY: 0
NAUSEA: 0
ADENOPATHY: 0
TREMORS: 0
EYE REDNESS: 0
LIGHT-HEADEDNESS: 0
UNEXPECTED WEIGHT CHANGE: 0
CHOKING: 0
FREQUENCY: 0
DYSURIA: 0
APNEA: 0
STRIDOR: 0
JOINT SWELLING: 0
BRUISES/BLEEDS EASILY: 0
FACIAL SWELLING: 0
HEADACHES: 0
SINUS PRESSURE: 0
COUGH: 0
ARTHRALGIAS: 0
DIFFICULTY URINATING: 0
NUMBNESS: 0
NERVOUS/ANXIOUS: 0
PALPITATIONS: 0
EYE DISCHARGE: 0
WEAKNESS: 0
HEMATURIA: 0
ABDOMINAL PAIN: 0
ABDOMINAL DISTENTION: 0
FATIGUE: 0
WHEEZING: 0
SINUS PAIN: 0
CONSTIPATION: 0
AGITATION: 0
SHORTNESS OF BREATH: 1
DIZZINESS: 0

## 2025-08-20 ENCOUNTER — APPOINTMENT (OUTPATIENT)
Dept: RADIOLOGY | Facility: HOSPITAL | Age: OVER 89
DRG: 184 | End: 2025-08-20
Payer: MEDICARE

## 2025-08-20 ENCOUNTER — HOSPITAL ENCOUNTER (EMERGENCY)
Facility: HOSPITAL | Age: OVER 89
End: 2025-08-20
Payer: MEDICARE

## 2025-08-20 ENCOUNTER — APPOINTMENT (OUTPATIENT)
Dept: CARDIOLOGY | Facility: HOSPITAL | Age: OVER 89
DRG: 184 | End: 2025-08-20
Payer: MEDICARE

## 2025-08-20 ENCOUNTER — HOSPITAL ENCOUNTER (INPATIENT)
Facility: HOSPITAL | Age: OVER 89
LOS: 3 days | Discharge: NURSING, REHAB OR LONG-TERM CARE | DRG: 184 | End: 2025-08-23
Attending: EMERGENCY MEDICINE | Admitting: STUDENT IN AN ORGANIZED HEALTH CARE EDUCATION/TRAINING PROGRAM
Payer: MEDICARE

## 2025-08-20 DIAGNOSIS — J44.1 COPD EXACERBATION (MULTI): ICD-10-CM

## 2025-08-20 DIAGNOSIS — S22.41XS CLOSED FRACTURE OF MULTIPLE RIBS OF RIGHT SIDE, SEQUELA: ICD-10-CM

## 2025-08-20 DIAGNOSIS — R06.02 SHORTNESS OF BREATH: ICD-10-CM

## 2025-08-20 DIAGNOSIS — S22.41XA CLOSED FRACTURE OF MULTIPLE RIBS OF RIGHT SIDE, INITIAL ENCOUNTER: ICD-10-CM

## 2025-08-20 DIAGNOSIS — R09.02 HYPOXIA: Primary | ICD-10-CM

## 2025-08-20 DIAGNOSIS — E11.610 DIABETIC NEUROPATHIC ARTHROPATHY (MULTI): ICD-10-CM

## 2025-08-20 PROBLEM — S22.49XA MULTIPLE RIB FRACTURES: Status: ACTIVE | Noted: 2025-08-20

## 2025-08-20 LAB
ALBUMIN SERPL BCP-MCNC: 3.6 G/DL (ref 3.4–5)
ALP SERPL-CCNC: 66 U/L (ref 33–136)
ALT SERPL W P-5'-P-CCNC: 19 U/L (ref 7–45)
ANION GAP SERPL CALC-SCNC: 17 MMOL/L (ref 10–20)
APPEARANCE UR: CLEAR
AST SERPL W P-5'-P-CCNC: 11 U/L (ref 9–39)
BASOPHILS # BLD AUTO: 0.03 X10*3/UL (ref 0–0.1)
BASOPHILS NFR BLD AUTO: 0.2 %
BILIRUB SERPL-MCNC: 0.5 MG/DL (ref 0–1.2)
BILIRUB UR STRIP.AUTO-MCNC: NEGATIVE MG/DL
BUN SERPL-MCNC: 26 MG/DL (ref 6–23)
CALCIUM SERPL-MCNC: 8.3 MG/DL (ref 8.6–10.3)
CARDIAC TROPONIN I PNL SERPL HS: 6 NG/L (ref 0–13)
CHLORIDE SERPL-SCNC: 103 MMOL/L (ref 98–107)
CO2 SERPL-SCNC: 22 MMOL/L (ref 21–32)
COLOR UR: COLORLESS
CREAT SERPL-MCNC: 1.05 MG/DL (ref 0.5–1.05)
EGFRCR SERPLBLD CKD-EPI 2021: 50 ML/MIN/1.73M*2
EOSINOPHIL # BLD AUTO: 0.14 X10*3/UL (ref 0–0.4)
EOSINOPHIL NFR BLD AUTO: 1 %
ERYTHROCYTE [DISTWIDTH] IN BLOOD BY AUTOMATED COUNT: 13.8 % (ref 11.5–14.5)
GLUCOSE SERPL-MCNC: 160 MG/DL (ref 74–99)
GLUCOSE UR STRIP.AUTO-MCNC: ABNORMAL MG/DL
HCT VFR BLD AUTO: 44.1 % (ref 36–46)
HGB BLD-MCNC: 14 G/DL (ref 12–16)
IMM GRANULOCYTES # BLD AUTO: 0.16 X10*3/UL (ref 0–0.5)
IMM GRANULOCYTES NFR BLD AUTO: 1.2 % (ref 0–0.9)
KETONES UR STRIP.AUTO-MCNC: NEGATIVE MG/DL
LEUKOCYTE ESTERASE UR QL STRIP.AUTO: NEGATIVE
LIPASE SERPL-CCNC: 29 U/L (ref 9–82)
LYMPHOCYTES # BLD AUTO: 3.44 X10*3/UL (ref 0.8–3)
LYMPHOCYTES NFR BLD AUTO: 25.7 %
MCH RBC QN AUTO: 28.9 PG (ref 26–34)
MCHC RBC AUTO-ENTMCNC: 31.7 G/DL (ref 32–36)
MCV RBC AUTO: 91 FL (ref 80–100)
MONOCYTES # BLD AUTO: 0.7 X10*3/UL (ref 0.05–0.8)
MONOCYTES NFR BLD AUTO: 5.2 %
NEUTROPHILS # BLD AUTO: 8.89 X10*3/UL (ref 1.6–5.5)
NEUTROPHILS NFR BLD AUTO: 66.7 %
NITRITE UR QL STRIP.AUTO: NEGATIVE
NRBC BLD-RTO: 0 /100 WBCS (ref 0–0)
PH UR STRIP.AUTO: 5 [PH]
PLATELET # BLD AUTO: 200 X10*3/UL (ref 150–450)
POTASSIUM SERPL-SCNC: 3.9 MMOL/L (ref 3.5–5.3)
PROT SERPL-MCNC: 6 G/DL (ref 6.4–8.2)
PROT UR STRIP.AUTO-MCNC: NEGATIVE MG/DL
RBC # BLD AUTO: 4.84 X10*6/UL (ref 4–5.2)
RBC # UR STRIP.AUTO: NEGATIVE MG/DL
SODIUM SERPL-SCNC: 138 MMOL/L (ref 136–145)
SP GR UR STRIP.AUTO: 1.01
UROBILINOGEN UR STRIP.AUTO-MCNC: NORMAL MG/DL
WBC # BLD AUTO: 13.4 X10*3/UL (ref 4.4–11.3)

## 2025-08-20 PROCEDURE — 2500000001 HC RX 250 WO HCPCS SELF ADMINISTERED DRUGS (ALT 637 FOR MEDICARE OP): Performed by: NURSE PRACTITIONER

## 2025-08-20 PROCEDURE — 81003 URINALYSIS AUTO W/O SCOPE: CPT | Performed by: NURSE PRACTITIONER

## 2025-08-20 PROCEDURE — 94660 CPAP INITIATION&MGMT: CPT

## 2025-08-20 PROCEDURE — 2550000001 HC RX 255 CONTRASTS: Performed by: EMERGENCY MEDICINE

## 2025-08-20 PROCEDURE — 2500000004 HC RX 250 GENERAL PHARMACY W/ HCPCS (ALT 636 FOR OP/ED): Performed by: NURSE PRACTITIONER

## 2025-08-20 PROCEDURE — 99223 1ST HOSP IP/OBS HIGH 75: CPT | Performed by: NURSE PRACTITIONER

## 2025-08-20 PROCEDURE — 2500000005 HC RX 250 GENERAL PHARMACY W/O HCPCS: Performed by: NURSE PRACTITIONER

## 2025-08-20 PROCEDURE — 94640 AIRWAY INHALATION TREATMENT: CPT

## 2025-08-20 PROCEDURE — 85025 COMPLETE CBC W/AUTO DIFF WBC: CPT | Performed by: EMERGENCY MEDICINE

## 2025-08-20 PROCEDURE — 71045 X-RAY EXAM CHEST 1 VIEW: CPT | Mod: FOREIGN READ | Performed by: STUDENT IN AN ORGANIZED HEALTH CARE EDUCATION/TRAINING PROGRAM

## 2025-08-20 PROCEDURE — 1200000002 HC GENERAL ROOM WITH TELEMETRY DAILY

## 2025-08-20 PROCEDURE — 2500000002 HC RX 250 W HCPCS SELF ADMINISTERED DRUGS (ALT 637 FOR MEDICARE OP, ALT 636 FOR OP/ED): Performed by: NURSE PRACTITIONER

## 2025-08-20 PROCEDURE — 96375 TX/PRO/DX INJ NEW DRUG ADDON: CPT

## 2025-08-20 PROCEDURE — 96361 HYDRATE IV INFUSION ADD-ON: CPT

## 2025-08-20 PROCEDURE — 74177 CT ABD & PELVIS W/CONTRAST: CPT | Mod: FOREIGN READ | Performed by: STUDENT IN AN ORGANIZED HEALTH CARE EDUCATION/TRAINING PROGRAM

## 2025-08-20 PROCEDURE — 74177 CT ABD & PELVIS W/CONTRAST: CPT

## 2025-08-20 PROCEDURE — 71045 X-RAY EXAM CHEST 1 VIEW: CPT

## 2025-08-20 PROCEDURE — 36415 COLL VENOUS BLD VENIPUNCTURE: CPT | Performed by: EMERGENCY MEDICINE

## 2025-08-20 PROCEDURE — 96374 THER/PROPH/DIAG INJ IV PUSH: CPT

## 2025-08-20 PROCEDURE — 51702 INSERT TEMP BLADDER CATH: CPT

## 2025-08-20 PROCEDURE — 2500000004 HC RX 250 GENERAL PHARMACY W/ HCPCS (ALT 636 FOR OP/ED): Performed by: EMERGENCY MEDICINE

## 2025-08-20 PROCEDURE — 93005 ELECTROCARDIOGRAM TRACING: CPT

## 2025-08-20 PROCEDURE — 99282 EMERGENCY DEPT VISIT SF MDM: CPT

## 2025-08-20 PROCEDURE — 83690 ASSAY OF LIPASE: CPT | Performed by: EMERGENCY MEDICINE

## 2025-08-20 PROCEDURE — 84484 ASSAY OF TROPONIN QUANT: CPT | Performed by: EMERGENCY MEDICINE

## 2025-08-20 PROCEDURE — 99285 EMERGENCY DEPT VISIT HI MDM: CPT | Mod: 25 | Performed by: EMERGENCY MEDICINE

## 2025-08-20 PROCEDURE — 80053 COMPREHEN METABOLIC PANEL: CPT | Performed by: EMERGENCY MEDICINE

## 2025-08-20 PROCEDURE — 9420000001 HC RT PATIENT EDUCATION 5 MIN

## 2025-08-20 RX ORDER — ACETAMINOPHEN 325 MG/1
650 TABLET ORAL EVERY 4 HOURS PRN
Status: DISCONTINUED | OUTPATIENT
Start: 2025-08-20 | End: 2025-08-23 | Stop reason: HOSPADM

## 2025-08-20 RX ORDER — DEXTROSE 50 % IN WATER (D50W) INTRAVENOUS SYRINGE
12.5
Status: DISCONTINUED | OUTPATIENT
Start: 2025-08-20 | End: 2025-08-23 | Stop reason: HOSPADM

## 2025-08-20 RX ORDER — ACETAMINOPHEN 325 MG/1
975 TABLET ORAL EVERY 8 HOURS
Status: DISCONTINUED | OUTPATIENT
Start: 2025-08-20 | End: 2025-08-23 | Stop reason: HOSPADM

## 2025-08-20 RX ORDER — PREDNISONE 20 MG/1
20 TABLET ORAL DAILY
Status: COMPLETED | OUTPATIENT
Start: 2025-08-20 | End: 2025-08-22

## 2025-08-20 RX ORDER — ACETAMINOPHEN 160 MG/5ML
650 SOLUTION ORAL EVERY 4 HOURS PRN
Status: DISCONTINUED | OUTPATIENT
Start: 2025-08-20 | End: 2025-08-23 | Stop reason: HOSPADM

## 2025-08-20 RX ORDER — DEXTROSE 50 % IN WATER (D50W) INTRAVENOUS SYRINGE
25
Status: DISCONTINUED | OUTPATIENT
Start: 2025-08-20 | End: 2025-08-23 | Stop reason: HOSPADM

## 2025-08-20 RX ORDER — CHOLECALCIFEROL (VITAMIN D3) 25 MCG
100 TABLET ORAL DAILY
Status: DISCONTINUED | OUTPATIENT
Start: 2025-08-20 | End: 2025-08-23 | Stop reason: HOSPADM

## 2025-08-20 RX ORDER — ALBUTEROL SULFATE 0.83 MG/ML
3 SOLUTION RESPIRATORY (INHALATION) EVERY 2 HOUR PRN
Status: DISCONTINUED | OUTPATIENT
Start: 2025-08-20 | End: 2025-08-23 | Stop reason: HOSPADM

## 2025-08-20 RX ORDER — LIDOCAINE 560 MG/1
1 PATCH PERCUTANEOUS; TOPICAL; TRANSDERMAL DAILY
Status: DISCONTINUED | OUTPATIENT
Start: 2025-08-20 | End: 2025-08-23 | Stop reason: HOSPADM

## 2025-08-20 RX ORDER — GABAPENTIN 300 MG/1
300 CAPSULE ORAL 2 TIMES DAILY
Status: DISCONTINUED | OUTPATIENT
Start: 2025-08-20 | End: 2025-08-23 | Stop reason: HOSPADM

## 2025-08-20 RX ORDER — AMIODARONE HYDROCHLORIDE 200 MG/1
100 TABLET ORAL DAILY
Status: DISCONTINUED | OUTPATIENT
Start: 2025-08-21 | End: 2025-08-23 | Stop reason: HOSPADM

## 2025-08-20 RX ORDER — ONDANSETRON HYDROCHLORIDE 2 MG/ML
4 INJECTION, SOLUTION INTRAVENOUS ONCE
Status: COMPLETED | OUTPATIENT
Start: 2025-08-20 | End: 2025-08-20

## 2025-08-20 RX ORDER — INSULIN LISPRO 100 [IU]/ML
0-5 INJECTION, SOLUTION INTRAVENOUS; SUBCUTANEOUS
Status: DISCONTINUED | OUTPATIENT
Start: 2025-08-21 | End: 2025-08-23 | Stop reason: HOSPADM

## 2025-08-20 RX ORDER — FUROSEMIDE 20 MG/1
40 TABLET ORAL DAILY
Status: DISCONTINUED | OUTPATIENT
Start: 2025-08-21 | End: 2025-08-23 | Stop reason: HOSPADM

## 2025-08-20 RX ORDER — ALBUTEROL SULFATE 0.83 MG/ML
3 SOLUTION RESPIRATORY (INHALATION) EVERY 6 HOURS PRN
Status: DISCONTINUED | OUTPATIENT
Start: 2025-08-20 | End: 2025-08-20

## 2025-08-20 RX ORDER — ACETAMINOPHEN 650 MG/1
650 SUPPOSITORY RECTAL EVERY 4 HOURS PRN
Status: DISCONTINUED | OUTPATIENT
Start: 2025-08-20 | End: 2025-08-23 | Stop reason: HOSPADM

## 2025-08-20 RX ORDER — MORPHINE SULFATE 4 MG/ML
4 INJECTION, SOLUTION INTRAMUSCULAR; INTRAVENOUS ONCE
Status: COMPLETED | OUTPATIENT
Start: 2025-08-20 | End: 2025-08-20

## 2025-08-20 RX ORDER — OXYBUTYNIN CHLORIDE 5 MG/1
5 TABLET ORAL DAILY
Status: DISCONTINUED | OUTPATIENT
Start: 2025-08-20 | End: 2025-08-23 | Stop reason: HOSPADM

## 2025-08-20 RX ORDER — DAPAGLIFLOZIN 5 MG/1
5 TABLET, FILM COATED ORAL DAILY
Status: DISCONTINUED | OUTPATIENT
Start: 2025-08-21 | End: 2025-08-23 | Stop reason: HOSPADM

## 2025-08-20 RX ORDER — IPRATROPIUM BROMIDE 0.5 MG/2.5ML
0.5 SOLUTION RESPIRATORY (INHALATION)
Status: DISCONTINUED | OUTPATIENT
Start: 2025-08-20 | End: 2025-08-23 | Stop reason: HOSPADM

## 2025-08-20 RX ORDER — ATORVASTATIN CALCIUM 40 MG/1
40 TABLET, FILM COATED ORAL NIGHTLY
Status: DISCONTINUED | OUTPATIENT
Start: 2025-08-20 | End: 2025-08-23 | Stop reason: HOSPADM

## 2025-08-20 RX ADMIN — OXYBUTYNIN CHLORIDE 5 MG: 5 TABLET ORAL at 17:17

## 2025-08-20 RX ADMIN — Medication 100 MCG: at 17:17

## 2025-08-20 RX ADMIN — Medication: at 21:17

## 2025-08-20 RX ADMIN — APIXABAN 5 MG: 5 TABLET, FILM COATED ORAL at 21:28

## 2025-08-20 RX ADMIN — ONDANSETRON 4 MG: 2 INJECTION, SOLUTION INTRAMUSCULAR; INTRAVENOUS at 12:00

## 2025-08-20 RX ADMIN — MORPHINE SULFATE 4 MG: 4 INJECTION, SOLUTION INTRAMUSCULAR; INTRAVENOUS at 12:00

## 2025-08-20 RX ADMIN — IPRATROPIUM BROMIDE 0.5 MG: 0.5 SOLUTION RESPIRATORY (INHALATION) at 19:29

## 2025-08-20 RX ADMIN — GABAPENTIN 300 MG: 300 CAPSULE ORAL at 21:27

## 2025-08-20 RX ADMIN — SODIUM CHLORIDE 1000 ML: 0.9 INJECTION, SOLUTION INTRAVENOUS at 12:00

## 2025-08-20 RX ADMIN — ACETAMINOPHEN 975 MG: 325 TABLET ORAL at 23:27

## 2025-08-20 RX ADMIN — ATORVASTATIN CALCIUM 40 MG: 40 TABLET, FILM COATED ORAL at 21:27

## 2025-08-20 RX ADMIN — ACETAMINOPHEN 975 MG: 325 TABLET ORAL at 15:13

## 2025-08-20 RX ADMIN — PREDNISONE 20 MG: 20 TABLET ORAL at 17:17

## 2025-08-20 RX ADMIN — Medication: at 17:05

## 2025-08-20 RX ADMIN — LIDOCAINE 4% 1 PATCH: 40 PATCH TOPICAL at 15:14

## 2025-08-20 RX ADMIN — IOHEXOL 75 ML: 350 INJECTION, SOLUTION INTRAVENOUS at 12:52

## 2025-08-20 RX ADMIN — Medication: at 19:29

## 2025-08-20 SDOH — SOCIAL STABILITY: SOCIAL INSECURITY: HAVE YOU HAD ANY THOUGHTS OF HARMING ANYONE ELSE?: NO

## 2025-08-20 SDOH — SOCIAL STABILITY: SOCIAL INSECURITY: HAS ANYONE EVER THREATENED TO HURT YOUR FAMILY OR YOUR PETS?: NO

## 2025-08-20 SDOH — SOCIAL STABILITY: SOCIAL INSECURITY: DID YOU CONTACT ANYONE TO TELL THEM HOW YOU FELT: YES

## 2025-08-20 SDOH — SOCIAL STABILITY: SOCIAL INSECURITY: DO YOU FEEL UNSAFE GOING BACK TO THE PLACE WHERE YOU ARE LIVING?: NO

## 2025-08-20 SDOH — SOCIAL STABILITY: SOCIAL INSECURITY: DOES ANYONE TRY TO KEEP YOU FROM HAVING/CONTACTING OTHER FRIENDS OR DOING THINGS OUTSIDE YOUR HOME?: NO

## 2025-08-20 SDOH — SOCIAL STABILITY: SOCIAL INSECURITY: ARE THERE ANY APPARENT SIGNS OF INJURIES/BEHAVIORS THAT COULD BE RELATED TO ABUSE/NEGLECT?: NO

## 2025-08-20 SDOH — SOCIAL STABILITY: SOCIAL INSECURITY: DO YOU FEEL ANYONE HAS EXPLOITED OR TAKEN ADVANTAGE OF YOU FINANCIALLY OR OF YOUR PERSONAL PROPERTY?: NO

## 2025-08-20 SDOH — SOCIAL STABILITY: SOCIAL INSECURITY: HAVE YOU HAD THOUGHTS OF HARMING ANYONE ELSE?: NO

## 2025-08-20 SDOH — HEALTH STABILITY: MENTAL HEALTH

## 2025-08-20 SDOH — SOCIAL STABILITY: SOCIAL INSECURITY: ARE YOU OR HAVE YOU BEEN THREATENED OR ABUSED PHYSICALLY, EMOTIONALLY, OR SEXUALLY BY ANYONE?: NO

## 2025-08-20 ASSESSMENT — COGNITIVE AND FUNCTIONAL STATUS - GENERAL
PATIENT BASELINE BEDBOUND: NO
HELP NEEDED FOR BATHING: A LITTLE
MOVING TO AND FROM BED TO CHAIR: A LITTLE
TOILETING: A LITTLE
MOVING FROM LYING ON BACK TO SITTING ON SIDE OF FLAT BED WITH BEDRAILS: A LITTLE
TURNING FROM BACK TO SIDE WHILE IN FLAT BAD: A LITTLE
MOBILITY SCORE: 18
DRESSING REGULAR UPPER BODY CLOTHING: A LITTLE
CLIMB 3 TO 5 STEPS WITH RAILING: A LITTLE
WALKING IN HOSPITAL ROOM: A LITTLE
STANDING UP FROM CHAIR USING ARMS: A LITTLE
DAILY ACTIVITIY SCORE: 20
DRESSING REGULAR LOWER BODY CLOTHING: A LITTLE

## 2025-08-20 ASSESSMENT — PAIN SCALES - GENERAL
PAINLEVEL_OUTOF10: 10 - WORST POSSIBLE PAIN
PAINLEVEL_OUTOF10: 6
PAINLEVEL_OUTOF10: 0 - NO PAIN
PAINLEVEL_OUTOF10: 4
PAINLEVEL_OUTOF10: 10 - WORST POSSIBLE PAIN
PAINLEVEL_OUTOF10: 0 - NO PAIN
PAINLEVEL_OUTOF10: 4

## 2025-08-20 ASSESSMENT — PAIN - FUNCTIONAL ASSESSMENT
PAIN_FUNCTIONAL_ASSESSMENT: 0-10

## 2025-08-20 ASSESSMENT — ACTIVITIES OF DAILY LIVING (ADL)
HEARING - LEFT EAR: HEARING AID
LACK_OF_TRANSPORTATION: NO
GROOMING: INDEPENDENT
PATIENT'S MEMORY ADEQUATE TO SAFELY COMPLETE DAILY ACTIVITIES?: YES
HEARING - RIGHT EAR: HEARING AID
JUDGMENT_ADEQUATE_SAFELY_COMPLETE_DAILY_ACTIVITIES: YES
WALKS IN HOME: INDEPENDENT
BATHING: NEEDS ASSISTANCE
TOILETING: NEEDS ASSISTANCE
FEEDING YOURSELF: INDEPENDENT
ASSISTIVE_DEVICE: WALKER
ADEQUATE_TO_COMPLETE_ADL: YES
DRESSING YOURSELF: NEEDS ASSISTANCE

## 2025-08-20 ASSESSMENT — PAIN DESCRIPTION - LOCATION
LOCATION: ABDOMEN
LOCATION: RIB CAGE
LOCATION: ABDOMEN
LOCATION: ABDOMEN

## 2025-08-20 ASSESSMENT — PAIN DESCRIPTION - PAIN TYPE
TYPE: ACUTE PAIN
TYPE: ACUTE PAIN

## 2025-08-20 ASSESSMENT — ENCOUNTER SYMPTOMS
CARDIOVASCULAR NEGATIVE: 1
PSYCHIATRIC NEGATIVE: 1
RESPIRATORY NEGATIVE: 1
GASTROINTESTINAL NEGATIVE: 1
MUSCULOSKELETAL NEGATIVE: 1
NEUROLOGICAL NEGATIVE: 1

## 2025-08-20 ASSESSMENT — PATIENT HEALTH QUESTIONNAIRE - PHQ9
2. FEELING DOWN, DEPRESSED OR HOPELESS: NOT AT ALL
SUM OF ALL RESPONSES TO PHQ9 QUESTIONS 1 & 2: 0
1. LITTLE INTEREST OR PLEASURE IN DOING THINGS: NOT AT ALL

## 2025-08-20 ASSESSMENT — LIFESTYLE VARIABLES
AUDIT-C TOTAL SCORE: 0
SUBSTANCE_ABUSE_PAST_12_MONTHS: NO
HOW OFTEN DO YOU HAVE A DRINK CONTAINING ALCOHOL: NEVER
PRESCIPTION_ABUSE_PAST_12_MONTHS: NO
HOW MANY STANDARD DRINKS CONTAINING ALCOHOL DO YOU HAVE ON A TYPICAL DAY: PATIENT DOES NOT DRINK
HOW OFTEN DO YOU HAVE 6 OR MORE DRINKS ON ONE OCCASION: NEVER
AUDIT-C TOTAL SCORE: 0
SKIP TO QUESTIONS 9-10: 1

## 2025-08-20 ASSESSMENT — PAIN DESCRIPTION - DESCRIPTORS
DESCRIPTORS: ACHING
DESCRIPTORS: ACHING

## 2025-08-20 ASSESSMENT — PAIN DESCRIPTION - ORIENTATION
ORIENTATION: RIGHT;UPPER
ORIENTATION: RIGHT;UPPER
ORIENTATION: RIGHT
ORIENTATION: RIGHT;UPPER

## 2025-08-21 ENCOUNTER — APPOINTMENT (OUTPATIENT)
Dept: CARDIOLOGY | Facility: HOSPITAL | Age: OVER 89
DRG: 184 | End: 2025-08-21
Payer: MEDICARE

## 2025-08-21 LAB
ANION GAP SERPL CALC-SCNC: 10 MMOL/L (ref 10–20)
BASOPHILS # BLD AUTO: 0.02 X10*3/UL (ref 0–0.1)
BASOPHILS NFR BLD AUTO: 0.2 %
BUN SERPL-MCNC: 21 MG/DL (ref 6–23)
CALCIUM SERPL-MCNC: 8.2 MG/DL (ref 8.6–10.3)
CHLORIDE SERPL-SCNC: 102 MMOL/L (ref 98–107)
CO2 SERPL-SCNC: 30 MMOL/L (ref 21–32)
CREAT SERPL-MCNC: 0.84 MG/DL (ref 0.5–1.05)
EGFRCR SERPLBLD CKD-EPI 2021: 65 ML/MIN/1.73M*2
EOSINOPHIL # BLD AUTO: 0.02 X10*3/UL (ref 0–0.4)
EOSINOPHIL NFR BLD AUTO: 0.2 %
ERYTHROCYTE [DISTWIDTH] IN BLOOD BY AUTOMATED COUNT: 14 % (ref 11.5–14.5)
GLUCOSE BLD MANUAL STRIP-MCNC: 167 MG/DL (ref 74–99)
GLUCOSE BLD MANUAL STRIP-MCNC: 178 MG/DL (ref 74–99)
GLUCOSE BLD MANUAL STRIP-MCNC: 194 MG/DL (ref 74–99)
GLUCOSE BLD MANUAL STRIP-MCNC: 196 MG/DL (ref 74–99)
GLUCOSE BLD MANUAL STRIP-MCNC: 246 MG/DL (ref 74–99)
GLUCOSE SERPL-MCNC: 151 MG/DL (ref 74–99)
HCT VFR BLD AUTO: 44.6 % (ref 36–46)
HGB BLD-MCNC: 14.2 G/DL (ref 12–16)
IMM GRANULOCYTES # BLD AUTO: 0.14 X10*3/UL (ref 0–0.5)
IMM GRANULOCYTES NFR BLD AUTO: 1.3 % (ref 0–0.9)
LYMPHOCYTES # BLD AUTO: 0.87 X10*3/UL (ref 0.8–3)
LYMPHOCYTES NFR BLD AUTO: 8.2 %
MCH RBC QN AUTO: 28.6 PG (ref 26–34)
MCHC RBC AUTO-ENTMCNC: 31.8 G/DL (ref 32–36)
MCV RBC AUTO: 90 FL (ref 80–100)
MONOCYTES # BLD AUTO: 0.28 X10*3/UL (ref 0.05–0.8)
MONOCYTES NFR BLD AUTO: 2.6 %
NEUTROPHILS # BLD AUTO: 9.31 X10*3/UL (ref 1.6–5.5)
NEUTROPHILS NFR BLD AUTO: 87.5 %
NRBC BLD-RTO: 0 /100 WBCS (ref 0–0)
PLATELET # BLD AUTO: 192 X10*3/UL (ref 150–450)
POTASSIUM SERPL-SCNC: 4.4 MMOL/L (ref 3.5–5.3)
RBC # BLD AUTO: 4.96 X10*6/UL (ref 4–5.2)
SODIUM SERPL-SCNC: 138 MMOL/L (ref 136–145)
WBC # BLD AUTO: 10.6 X10*3/UL (ref 4.4–11.3)

## 2025-08-21 PROCEDURE — 2500000004 HC RX 250 GENERAL PHARMACY W/ HCPCS (ALT 636 FOR OP/ED): Performed by: NURSE PRACTITIONER

## 2025-08-21 PROCEDURE — 36415 COLL VENOUS BLD VENIPUNCTURE: CPT | Performed by: NURSE PRACTITIONER

## 2025-08-21 PROCEDURE — 80048 BASIC METABOLIC PNL TOTAL CA: CPT | Performed by: NURSE PRACTITIONER

## 2025-08-21 PROCEDURE — 2500000005 HC RX 250 GENERAL PHARMACY W/O HCPCS: Performed by: NURSE PRACTITIONER

## 2025-08-21 PROCEDURE — 93005 ELECTROCARDIOGRAM TRACING: CPT

## 2025-08-21 PROCEDURE — 94640 AIRWAY INHALATION TREATMENT: CPT

## 2025-08-21 PROCEDURE — 99232 SBSQ HOSP IP/OBS MODERATE 35: CPT | Performed by: STUDENT IN AN ORGANIZED HEALTH CARE EDUCATION/TRAINING PROGRAM

## 2025-08-21 PROCEDURE — 2500000005 HC RX 250 GENERAL PHARMACY W/O HCPCS: Performed by: STUDENT IN AN ORGANIZED HEALTH CARE EDUCATION/TRAINING PROGRAM

## 2025-08-21 PROCEDURE — 82947 ASSAY GLUCOSE BLOOD QUANT: CPT

## 2025-08-21 PROCEDURE — 1200000002 HC GENERAL ROOM WITH TELEMETRY DAILY

## 2025-08-21 PROCEDURE — 2500000001 HC RX 250 WO HCPCS SELF ADMINISTERED DRUGS (ALT 637 FOR MEDICARE OP): Performed by: NURSE PRACTITIONER

## 2025-08-21 PROCEDURE — 85025 COMPLETE CBC W/AUTO DIFF WBC: CPT | Performed by: NURSE PRACTITIONER

## 2025-08-21 PROCEDURE — 2500000002 HC RX 250 W HCPCS SELF ADMINISTERED DRUGS (ALT 637 FOR MEDICARE OP, ALT 636 FOR OP/ED): Performed by: NURSE PRACTITIONER

## 2025-08-21 PROCEDURE — 94660 CPAP INITIATION&MGMT: CPT

## 2025-08-21 RX ADMIN — ACETAMINOPHEN 975 MG: 325 TABLET ORAL at 22:19

## 2025-08-21 RX ADMIN — GABAPENTIN 300 MG: 300 CAPSULE ORAL at 22:19

## 2025-08-21 RX ADMIN — INSULIN LISPRO 1 UNITS: 100 INJECTION, SOLUTION INTRAVENOUS; SUBCUTANEOUS at 12:19

## 2025-08-21 RX ADMIN — IPRATROPIUM BROMIDE 0.5 MG: 0.5 SOLUTION RESPIRATORY (INHALATION) at 10:58

## 2025-08-21 RX ADMIN — Medication: at 21:37

## 2025-08-21 RX ADMIN — IPRATROPIUM BROMIDE 0.5 MG: 0.5 SOLUTION RESPIRATORY (INHALATION) at 15:20

## 2025-08-21 RX ADMIN — Medication 100 MCG: at 08:10

## 2025-08-21 RX ADMIN — IPRATROPIUM BROMIDE 0.5 MG: 0.5 SOLUTION RESPIRATORY (INHALATION) at 07:27

## 2025-08-21 RX ADMIN — ACETAMINOPHEN 975 MG: 325 TABLET ORAL at 16:19

## 2025-08-21 RX ADMIN — Medication: at 07:27

## 2025-08-21 RX ADMIN — APIXABAN 5 MG: 5 TABLET, FILM COATED ORAL at 22:19

## 2025-08-21 RX ADMIN — APIXABAN 5 MG: 5 TABLET, FILM COATED ORAL at 08:10

## 2025-08-21 RX ADMIN — Medication: at 03:12

## 2025-08-21 RX ADMIN — Medication: at 20:02

## 2025-08-21 RX ADMIN — INSULIN LISPRO 2 UNITS: 100 INJECTION, SOLUTION INTRAVENOUS; SUBCUTANEOUS at 16:18

## 2025-08-21 RX ADMIN — PREDNISONE 20 MG: 20 TABLET ORAL at 08:09

## 2025-08-21 RX ADMIN — INSULIN LISPRO 1 UNITS: 100 INJECTION, SOLUTION INTRAVENOUS; SUBCUTANEOUS at 08:14

## 2025-08-21 RX ADMIN — ACETAMINOPHEN 975 MG: 325 TABLET ORAL at 06:26

## 2025-08-21 RX ADMIN — AMIODARONE HYDROCHLORIDE 100 MG: 200 TABLET ORAL at 08:10

## 2025-08-21 RX ADMIN — LIDOCAINE 4% 1 PATCH: 40 PATCH TOPICAL at 16:19

## 2025-08-21 RX ADMIN — OXYBUTYNIN CHLORIDE 5 MG: 5 TABLET ORAL at 08:10

## 2025-08-21 RX ADMIN — FUROSEMIDE 40 MG: 20 TABLET ORAL at 08:10

## 2025-08-21 RX ADMIN — IPRATROPIUM BROMIDE 0.5 MG: 0.5 SOLUTION RESPIRATORY (INHALATION) at 20:02

## 2025-08-21 RX ADMIN — DAPAGLIFLOZIN 5 MG: 5 TABLET, FILM COATED ORAL at 08:10

## 2025-08-21 RX ADMIN — GABAPENTIN 300 MG: 300 CAPSULE ORAL at 08:09

## 2025-08-21 RX ADMIN — ATORVASTATIN CALCIUM 40 MG: 40 TABLET, FILM COATED ORAL at 22:20

## 2025-08-21 ASSESSMENT — PAIN SCALES - GENERAL
PAINLEVEL_OUTOF10: 0 - NO PAIN
PAINLEVEL_OUTOF10: 0 - NO PAIN

## 2025-08-21 ASSESSMENT — COGNITIVE AND FUNCTIONAL STATUS - GENERAL
CLIMB 3 TO 5 STEPS WITH RAILING: A LITTLE
STANDING UP FROM CHAIR USING ARMS: A LITTLE
DAILY ACTIVITIY SCORE: 20
HELP NEEDED FOR BATHING: A LITTLE
TOILETING: A LITTLE
MOBILITY SCORE: 18
MOVING TO AND FROM BED TO CHAIR: A LITTLE
DRESSING REGULAR LOWER BODY CLOTHING: A LITTLE
WALKING IN HOSPITAL ROOM: A LITTLE
TURNING FROM BACK TO SIDE WHILE IN FLAT BAD: A LITTLE
DRESSING REGULAR UPPER BODY CLOTHING: A LITTLE
MOVING FROM LYING ON BACK TO SITTING ON SIDE OF FLAT BED WITH BEDRAILS: A LITTLE

## 2025-08-21 ASSESSMENT — PAIN - FUNCTIONAL ASSESSMENT: PAIN_FUNCTIONAL_ASSESSMENT: 0-10

## 2025-08-22 LAB
ANION GAP SERPL CALC-SCNC: 16 MMOL/L (ref 10–20)
ATRIAL RATE: 46 BPM
ATRIAL RATE: 58 BPM
BASOPHILS # BLD AUTO: 0.03 X10*3/UL (ref 0–0.1)
BASOPHILS NFR BLD AUTO: 0.2 %
BUN SERPL-MCNC: 26 MG/DL (ref 6–23)
CALCIUM SERPL-MCNC: 9.1 MG/DL (ref 8.6–10.3)
CHLORIDE SERPL-SCNC: 97 MMOL/L (ref 98–107)
CO2 SERPL-SCNC: 29 MMOL/L (ref 21–32)
CREAT SERPL-MCNC: 0.93 MG/DL (ref 0.5–1.05)
EGFRCR SERPLBLD CKD-EPI 2021: 58 ML/MIN/1.73M*2
EOSINOPHIL # BLD AUTO: 0.13 X10*3/UL (ref 0–0.4)
EOSINOPHIL NFR BLD AUTO: 0.9 %
ERYTHROCYTE [DISTWIDTH] IN BLOOD BY AUTOMATED COUNT: 14 % (ref 11.5–14.5)
GLUCOSE BLD MANUAL STRIP-MCNC: 137 MG/DL (ref 74–99)
GLUCOSE BLD MANUAL STRIP-MCNC: 149 MG/DL (ref 74–99)
GLUCOSE BLD MANUAL STRIP-MCNC: 236 MG/DL (ref 74–99)
GLUCOSE BLD MANUAL STRIP-MCNC: 264 MG/DL (ref 74–99)
GLUCOSE SERPL-MCNC: 159 MG/DL (ref 74–99)
HCT VFR BLD AUTO: 50 % (ref 36–46)
HGB BLD-MCNC: 15.7 G/DL (ref 12–16)
HOLD SPECIMEN: NORMAL
IMM GRANULOCYTES # BLD AUTO: 0.17 X10*3/UL (ref 0–0.5)
IMM GRANULOCYTES NFR BLD AUTO: 1.2 % (ref 0–0.9)
LYMPHOCYTES # BLD AUTO: 3.6 X10*3/UL (ref 0.8–3)
LYMPHOCYTES NFR BLD AUTO: 24.6 %
MCH RBC QN AUTO: 28.6 PG (ref 26–34)
MCHC RBC AUTO-ENTMCNC: 31.4 G/DL (ref 32–36)
MCV RBC AUTO: 91 FL (ref 80–100)
MONOCYTES # BLD AUTO: 0.63 X10*3/UL (ref 0.05–0.8)
MONOCYTES NFR BLD AUTO: 4.3 %
NEUTROPHILS # BLD AUTO: 10.09 X10*3/UL (ref 1.6–5.5)
NEUTROPHILS NFR BLD AUTO: 68.8 %
NRBC BLD-RTO: 0 /100 WBCS (ref 0–0)
P AXIS: 59 DEGREES
P AXIS: 86 DEGREES
P OFFSET: 196 MS
P OFFSET: 197 MS
P ONSET: 134 MS
P ONSET: 137 MS
PLATELET # BLD AUTO: 203 X10*3/UL (ref 150–450)
POTASSIUM SERPL-SCNC: 3.6 MMOL/L (ref 3.5–5.3)
PR INTERVAL: 172 MS
PR INTERVAL: 174 MS
Q ONSET: 221 MS
Q ONSET: 223 MS
QRS COUNT: 7 BEATS
QRS COUNT: 9 BEATS
QRS DURATION: 86 MS
QRS DURATION: 88 MS
QT INTERVAL: 416 MS
QT INTERVAL: 494 MS
QTC CALCULATION(BAZETT): 408 MS
QTC CALCULATION(BAZETT): 432 MS
QTC FREDERICIA: 411 MS
QTC FREDERICIA: 452 MS
R AXIS: 36 DEGREES
R AXIS: 87 DEGREES
RBC # BLD AUTO: 5.49 X10*6/UL (ref 4–5.2)
SODIUM SERPL-SCNC: 138 MMOL/L (ref 136–145)
T AXIS: 46 DEGREES
T AXIS: 86 DEGREES
T OFFSET: 431 MS
T OFFSET: 468 MS
VENTRICULAR RATE: 46 BPM
VENTRICULAR RATE: 58 BPM
WBC # BLD AUTO: 14.7 X10*3/UL (ref 4.4–11.3)

## 2025-08-22 PROCEDURE — 94660 CPAP INITIATION&MGMT: CPT

## 2025-08-22 PROCEDURE — 2500000002 HC RX 250 W HCPCS SELF ADMINISTERED DRUGS (ALT 637 FOR MEDICARE OP, ALT 636 FOR OP/ED): Performed by: NURSE PRACTITIONER

## 2025-08-22 PROCEDURE — 2500000005 HC RX 250 GENERAL PHARMACY W/O HCPCS: Performed by: STUDENT IN AN ORGANIZED HEALTH CARE EDUCATION/TRAINING PROGRAM

## 2025-08-22 PROCEDURE — 2500000001 HC RX 250 WO HCPCS SELF ADMINISTERED DRUGS (ALT 637 FOR MEDICARE OP): Performed by: NURSE PRACTITIONER

## 2025-08-22 PROCEDURE — 80048 BASIC METABOLIC PNL TOTAL CA: CPT | Performed by: STUDENT IN AN ORGANIZED HEALTH CARE EDUCATION/TRAINING PROGRAM

## 2025-08-22 PROCEDURE — 97165 OT EVAL LOW COMPLEX 30 MIN: CPT | Mod: GO | Performed by: OCCUPATIONAL THERAPIST

## 2025-08-22 PROCEDURE — 2500000004 HC RX 250 GENERAL PHARMACY W/ HCPCS (ALT 636 FOR OP/ED): Performed by: STUDENT IN AN ORGANIZED HEALTH CARE EDUCATION/TRAINING PROGRAM

## 2025-08-22 PROCEDURE — 1200000002 HC GENERAL ROOM WITH TELEMETRY DAILY

## 2025-08-22 PROCEDURE — 2500000004 HC RX 250 GENERAL PHARMACY W/ HCPCS (ALT 636 FOR OP/ED): Performed by: NURSE PRACTITIONER

## 2025-08-22 PROCEDURE — 85025 COMPLETE CBC W/AUTO DIFF WBC: CPT | Performed by: STUDENT IN AN ORGANIZED HEALTH CARE EDUCATION/TRAINING PROGRAM

## 2025-08-22 PROCEDURE — 2500000005 HC RX 250 GENERAL PHARMACY W/O HCPCS: Performed by: NURSE PRACTITIONER

## 2025-08-22 PROCEDURE — 97161 PT EVAL LOW COMPLEX 20 MIN: CPT | Mod: GP

## 2025-08-22 PROCEDURE — 82947 ASSAY GLUCOSE BLOOD QUANT: CPT

## 2025-08-22 PROCEDURE — 36415 COLL VENOUS BLD VENIPUNCTURE: CPT | Performed by: STUDENT IN AN ORGANIZED HEALTH CARE EDUCATION/TRAINING PROGRAM

## 2025-08-22 PROCEDURE — 94640 AIRWAY INHALATION TREATMENT: CPT

## 2025-08-22 PROCEDURE — 99232 SBSQ HOSP IP/OBS MODERATE 35: CPT | Performed by: STUDENT IN AN ORGANIZED HEALTH CARE EDUCATION/TRAINING PROGRAM

## 2025-08-22 RX ORDER — POLYETHYLENE GLYCOL 3350 17 G/17G
17 POWDER, FOR SOLUTION ORAL DAILY PRN
Status: DISCONTINUED | OUTPATIENT
Start: 2025-08-22 | End: 2025-08-23 | Stop reason: HOSPADM

## 2025-08-22 RX ADMIN — LIDOCAINE 4% 1 PATCH: 40 PATCH TOPICAL at 14:54

## 2025-08-22 RX ADMIN — IPRATROPIUM BROMIDE 0.5 MG: 0.5 SOLUTION RESPIRATORY (INHALATION) at 15:03

## 2025-08-22 RX ADMIN — APIXABAN 5 MG: 5 TABLET, FILM COATED ORAL at 22:36

## 2025-08-22 RX ADMIN — GABAPENTIN 300 MG: 300 CAPSULE ORAL at 22:36

## 2025-08-22 RX ADMIN — FUROSEMIDE 40 MG: 20 TABLET ORAL at 09:20

## 2025-08-22 RX ADMIN — IPRATROPIUM BROMIDE 0.5 MG: 0.5 SOLUTION RESPIRATORY (INHALATION) at 07:33

## 2025-08-22 RX ADMIN — IPRATROPIUM BROMIDE 0.5 MG: 0.5 SOLUTION RESPIRATORY (INHALATION) at 19:18

## 2025-08-22 RX ADMIN — Medication: at 04:24

## 2025-08-22 RX ADMIN — ACETAMINOPHEN 975 MG: 325 TABLET ORAL at 22:36

## 2025-08-22 RX ADMIN — Medication: at 19:18

## 2025-08-22 RX ADMIN — Medication: at 01:35

## 2025-08-22 RX ADMIN — ACETAMINOPHEN 975 MG: 325 TABLET ORAL at 14:54

## 2025-08-22 RX ADMIN — DAPAGLIFLOZIN 5 MG: 5 TABLET, FILM COATED ORAL at 09:20

## 2025-08-22 RX ADMIN — AMIODARONE HYDROCHLORIDE 100 MG: 200 TABLET ORAL at 09:19

## 2025-08-22 RX ADMIN — POLYETHYLENE GLYCOL 3350 17 G: 17 POWDER, FOR SOLUTION ORAL at 10:28

## 2025-08-22 RX ADMIN — ACETAMINOPHEN 975 MG: 325 TABLET ORAL at 09:20

## 2025-08-22 RX ADMIN — ATORVASTATIN CALCIUM 40 MG: 40 TABLET, FILM COATED ORAL at 22:36

## 2025-08-22 RX ADMIN — OXYBUTYNIN CHLORIDE 5 MG: 5 TABLET ORAL at 09:20

## 2025-08-22 RX ADMIN — PREDNISONE 20 MG: 20 TABLET ORAL at 09:20

## 2025-08-22 RX ADMIN — Medication: at 21:25

## 2025-08-22 RX ADMIN — GABAPENTIN 300 MG: 300 CAPSULE ORAL at 09:20

## 2025-08-22 RX ADMIN — IPRATROPIUM BROMIDE 0.5 MG: 0.5 SOLUTION RESPIRATORY (INHALATION) at 11:08

## 2025-08-22 RX ADMIN — APIXABAN 5 MG: 5 TABLET, FILM COATED ORAL at 09:20

## 2025-08-22 RX ADMIN — INSULIN LISPRO 3 UNITS: 100 INJECTION, SOLUTION INTRAVENOUS; SUBCUTANEOUS at 16:02

## 2025-08-22 RX ADMIN — Medication 100 MCG: at 09:20

## 2025-08-22 ASSESSMENT — COGNITIVE AND FUNCTIONAL STATUS - GENERAL
WALKING IN HOSPITAL ROOM: A LITTLE
CLIMB 3 TO 5 STEPS WITH RAILING: A LITTLE
DRESSING REGULAR LOWER BODY CLOTHING: A LITTLE
TOILETING: A LITTLE
WALKING IN HOSPITAL ROOM: A LITTLE
HELP NEEDED FOR BATHING: A LITTLE
TURNING FROM BACK TO SIDE WHILE IN FLAT BAD: A LITTLE
DRESSING REGULAR LOWER BODY CLOTHING: A LITTLE
MOBILITY SCORE: 16
DRESSING REGULAR UPPER BODY CLOTHING: A LITTLE
PERSONAL GROOMING: A LITTLE
CLIMB 3 TO 5 STEPS WITH RAILING: TOTAL
DRESSING REGULAR LOWER BODY CLOTHING: A LITTLE
TURNING FROM BACK TO SIDE WHILE IN FLAT BAD: A LITTLE
EATING MEALS: A LITTLE
PERSONAL GROOMING: A LITTLE
STANDING UP FROM CHAIR USING ARMS: A LITTLE
TURNING FROM BACK TO SIDE WHILE IN FLAT BAD: A LITTLE
CLIMB 3 TO 5 STEPS WITH RAILING: A LITTLE
MOVING FROM LYING ON BACK TO SITTING ON SIDE OF FLAT BED WITH BEDRAILS: A LITTLE
STANDING UP FROM CHAIR USING ARMS: A LITTLE
DRESSING REGULAR UPPER BODY CLOTHING: A LITTLE
MOBILITY SCORE: 18
DRESSING REGULAR UPPER BODY CLOTHING: A LITTLE
MOVING TO AND FROM BED TO CHAIR: A LITTLE
MOVING FROM LYING ON BACK TO SITTING ON SIDE OF FLAT BED WITH BEDRAILS: A LITTLE
HELP NEEDED FOR BATHING: A LITTLE
MOVING TO AND FROM BED TO CHAIR: A LITTLE
DAILY ACTIVITIY SCORE: 19
MOVING FROM LYING ON BACK TO SITTING ON SIDE OF FLAT BED WITH BEDRAILS: A LITTLE
TOILETING: A LITTLE
MOBILITY SCORE: 18
DAILY ACTIVITIY SCORE: 18
STANDING UP FROM CHAIR USING ARMS: A LITTLE
PERSONAL GROOMING: A LITTLE
EATING MEALS: A LITTLE
MOVING TO AND FROM BED TO CHAIR: A LITTLE
WALKING IN HOSPITAL ROOM: A LITTLE
DAILY ACTIVITIY SCORE: 18
TOILETING: A LITTLE
HELP NEEDED FOR BATHING: A LITTLE

## 2025-08-22 ASSESSMENT — ACTIVITIES OF DAILY LIVING (ADL)
ADL_ASSISTANCE: INDEPENDENT
ADL_ASSISTANCE: INDEPENDENT

## 2025-08-22 ASSESSMENT — PAIN SCALES - GENERAL
PAINLEVEL_OUTOF10: 6
PAINLEVEL_OUTOF10: 6
PAINLEVEL_OUTOF10: 0 - NO PAIN

## 2025-08-22 ASSESSMENT — PAIN DESCRIPTION - DESCRIPTORS: DESCRIPTORS: ACHING;DISCOMFORT

## 2025-08-22 ASSESSMENT — PAIN - FUNCTIONAL ASSESSMENT
PAIN_FUNCTIONAL_ASSESSMENT: 0-10
PAIN_FUNCTIONAL_ASSESSMENT: 0-10

## 2025-08-23 VITALS
HEART RATE: 68 BPM | OXYGEN SATURATION: 98 % | BODY MASS INDEX: 29.23 KG/M2 | DIASTOLIC BLOOD PRESSURE: 59 MMHG | WEIGHT: 145 LBS | SYSTOLIC BLOOD PRESSURE: 118 MMHG | HEIGHT: 59 IN | TEMPERATURE: 98.1 F | RESPIRATION RATE: 16 BRPM

## 2025-08-23 LAB
ANION GAP SERPL CALC-SCNC: 12 MMOL/L (ref 10–20)
BASOPHILS # BLD AUTO: 0.03 X10*3/UL (ref 0–0.1)
BASOPHILS NFR BLD AUTO: 0.2 %
BUN SERPL-MCNC: 26 MG/DL (ref 6–23)
CALCIUM SERPL-MCNC: 9 MG/DL (ref 8.6–10.3)
CHLORIDE SERPL-SCNC: 101 MMOL/L (ref 98–107)
CO2 SERPL-SCNC: 30 MMOL/L (ref 21–32)
CREAT SERPL-MCNC: 0.86 MG/DL (ref 0.5–1.05)
EGFRCR SERPLBLD CKD-EPI 2021: 63 ML/MIN/1.73M*2
EOSINOPHIL # BLD AUTO: 0.09 X10*3/UL (ref 0–0.4)
EOSINOPHIL NFR BLD AUTO: 0.7 %
ERYTHROCYTE [DISTWIDTH] IN BLOOD BY AUTOMATED COUNT: 14 % (ref 11.5–14.5)
GLUCOSE BLD MANUAL STRIP-MCNC: 117 MG/DL (ref 74–99)
GLUCOSE BLD MANUAL STRIP-MCNC: 187 MG/DL (ref 74–99)
GLUCOSE SERPL-MCNC: 107 MG/DL (ref 74–99)
HCT VFR BLD AUTO: 47.5 % (ref 36–46)
HGB BLD-MCNC: 14.6 G/DL (ref 12–16)
IMM GRANULOCYTES # BLD AUTO: 0.14 X10*3/UL (ref 0–0.5)
IMM GRANULOCYTES NFR BLD AUTO: 1 % (ref 0–0.9)
LYMPHOCYTES # BLD AUTO: 2.96 X10*3/UL (ref 0.8–3)
LYMPHOCYTES NFR BLD AUTO: 22 %
MCH RBC QN AUTO: 28.3 PG (ref 26–34)
MCHC RBC AUTO-ENTMCNC: 30.7 G/DL (ref 32–36)
MCV RBC AUTO: 92 FL (ref 80–100)
MONOCYTES # BLD AUTO: 0.67 X10*3/UL (ref 0.05–0.8)
MONOCYTES NFR BLD AUTO: 5 %
NEUTROPHILS # BLD AUTO: 9.55 X10*3/UL (ref 1.6–5.5)
NEUTROPHILS NFR BLD AUTO: 71.1 %
NRBC BLD-RTO: 0 /100 WBCS (ref 0–0)
PLATELET # BLD AUTO: 186 X10*3/UL (ref 150–450)
POTASSIUM SERPL-SCNC: 3.8 MMOL/L (ref 3.5–5.3)
RBC # BLD AUTO: 5.15 X10*6/UL (ref 4–5.2)
SODIUM SERPL-SCNC: 139 MMOL/L (ref 136–145)
WBC # BLD AUTO: 13.4 X10*3/UL (ref 4.4–11.3)

## 2025-08-23 PROCEDURE — 80048 BASIC METABOLIC PNL TOTAL CA: CPT | Performed by: STUDENT IN AN ORGANIZED HEALTH CARE EDUCATION/TRAINING PROGRAM

## 2025-08-23 PROCEDURE — 94664 DEMO&/EVAL PT USE INHALER: CPT

## 2025-08-23 PROCEDURE — 36415 COLL VENOUS BLD VENIPUNCTURE: CPT | Performed by: STUDENT IN AN ORGANIZED HEALTH CARE EDUCATION/TRAINING PROGRAM

## 2025-08-23 PROCEDURE — 85025 COMPLETE CBC W/AUTO DIFF WBC: CPT | Performed by: STUDENT IN AN ORGANIZED HEALTH CARE EDUCATION/TRAINING PROGRAM

## 2025-08-23 PROCEDURE — 82947 ASSAY GLUCOSE BLOOD QUANT: CPT

## 2025-08-23 PROCEDURE — 94660 CPAP INITIATION&MGMT: CPT

## 2025-08-23 PROCEDURE — 94640 AIRWAY INHALATION TREATMENT: CPT

## 2025-08-23 PROCEDURE — 2500000005 HC RX 250 GENERAL PHARMACY W/O HCPCS: Performed by: STUDENT IN AN ORGANIZED HEALTH CARE EDUCATION/TRAINING PROGRAM

## 2025-08-23 PROCEDURE — 99239 HOSP IP/OBS DSCHRG MGMT >30: CPT | Performed by: STUDENT IN AN ORGANIZED HEALTH CARE EDUCATION/TRAINING PROGRAM

## 2025-08-23 PROCEDURE — 2500000001 HC RX 250 WO HCPCS SELF ADMINISTERED DRUGS (ALT 637 FOR MEDICARE OP): Performed by: NURSE PRACTITIONER

## 2025-08-23 PROCEDURE — 2500000002 HC RX 250 W HCPCS SELF ADMINISTERED DRUGS (ALT 637 FOR MEDICARE OP, ALT 636 FOR OP/ED): Performed by: NURSE PRACTITIONER

## 2025-08-23 PROCEDURE — 9420000001 HC RT PATIENT EDUCATION 5 MIN

## 2025-08-23 RX ORDER — LIDOCAINE 50 MG/G
1 PATCH TOPICAL DAILY
Start: 2025-08-23 | End: 2025-09-02

## 2025-08-23 RX ORDER — OXYCODONE HYDROCHLORIDE 5 MG/1
5 TABLET ORAL EVERY 6 HOURS PRN
Qty: 15 TABLET | Refills: 0 | Status: SHIPPED | OUTPATIENT
Start: 2025-08-23 | End: 2025-08-28

## 2025-08-23 RX ORDER — GABAPENTIN 100 MG/1
300 CAPSULE ORAL 2 TIMES DAILY
Start: 2025-08-23

## 2025-08-23 RX ADMIN — IPRATROPIUM BROMIDE 0.5 MG: 0.5 SOLUTION RESPIRATORY (INHALATION) at 15:39

## 2025-08-23 RX ADMIN — APIXABAN 5 MG: 5 TABLET, FILM COATED ORAL at 08:34

## 2025-08-23 RX ADMIN — Medication: at 00:53

## 2025-08-23 RX ADMIN — Medication 1 DOSE: at 07:20

## 2025-08-23 RX ADMIN — FUROSEMIDE 40 MG: 20 TABLET ORAL at 08:34

## 2025-08-23 RX ADMIN — OXYBUTYNIN CHLORIDE 5 MG: 5 TABLET ORAL at 08:33

## 2025-08-23 RX ADMIN — ACETAMINOPHEN 975 MG: 325 TABLET ORAL at 08:33

## 2025-08-23 RX ADMIN — Medication 1 DOSE: at 15:39

## 2025-08-23 RX ADMIN — AMIODARONE HYDROCHLORIDE 100 MG: 200 TABLET ORAL at 08:34

## 2025-08-23 RX ADMIN — IPRATROPIUM BROMIDE 0.5 MG: 0.5 SOLUTION RESPIRATORY (INHALATION) at 11:38

## 2025-08-23 RX ADMIN — DAPAGLIFLOZIN 5 MG: 5 TABLET, FILM COATED ORAL at 08:33

## 2025-08-23 RX ADMIN — IPRATROPIUM BROMIDE 0.5 MG: 0.5 SOLUTION RESPIRATORY (INHALATION) at 07:20

## 2025-08-23 RX ADMIN — Medication 100 MCG: at 08:33

## 2025-08-23 RX ADMIN — GABAPENTIN 300 MG: 300 CAPSULE ORAL at 08:34

## 2025-08-23 RX ADMIN — Medication: at 03:41

## 2025-08-23 ASSESSMENT — PAIN SCALES - GENERAL: PAINLEVEL_OUTOF10: 0 - NO PAIN

## 2025-09-02 ENCOUNTER — APPOINTMENT (OUTPATIENT)
Dept: PRIMARY CARE | Facility: CLINIC | Age: OVER 89
End: 2025-09-02
Payer: MEDICARE

## 2025-09-03 ENCOUNTER — DOCUMENTATION (OUTPATIENT)
Dept: PRIMARY CARE | Facility: CLINIC | Age: OVER 89
End: 2025-09-03
Payer: MEDICARE

## 2025-09-04 ENCOUNTER — PATIENT OUTREACH (OUTPATIENT)
Dept: PRIMARY CARE | Facility: CLINIC | Age: OVER 89
End: 2025-09-04
Payer: MEDICARE

## 2025-09-04 RX ORDER — UMECLIDINIUM 62.5 UG/1
AEROSOL, POWDER ORAL
COMMUNITY
Start: 2025-09-02